# Patient Record
Sex: FEMALE | Race: WHITE
[De-identification: names, ages, dates, MRNs, and addresses within clinical notes are randomized per-mention and may not be internally consistent; named-entity substitution may affect disease eponyms.]

---

## 2017-01-07 ENCOUNTER — HOSPITAL ENCOUNTER (INPATIENT)
Dept: HOSPITAL 47 - EC | Age: 82
LOS: 5 days | Discharge: HOME | DRG: 291 | End: 2017-01-12
Payer: MEDICARE

## 2017-01-07 VITALS — BODY MASS INDEX: 24.9 KG/M2

## 2017-01-07 DIAGNOSIS — I48.1: ICD-10-CM

## 2017-01-07 DIAGNOSIS — J96.00: ICD-10-CM

## 2017-01-07 DIAGNOSIS — I42.9: ICD-10-CM

## 2017-01-07 DIAGNOSIS — E78.5: ICD-10-CM

## 2017-01-07 DIAGNOSIS — K21.9: ICD-10-CM

## 2017-01-07 DIAGNOSIS — I50.23: ICD-10-CM

## 2017-01-07 DIAGNOSIS — Z87.891: ICD-10-CM

## 2017-01-07 DIAGNOSIS — I48.0: ICD-10-CM

## 2017-01-07 DIAGNOSIS — H91.90: ICD-10-CM

## 2017-01-07 DIAGNOSIS — I27.2: ICD-10-CM

## 2017-01-07 DIAGNOSIS — N18.3: ICD-10-CM

## 2017-01-07 DIAGNOSIS — I25.10: ICD-10-CM

## 2017-01-07 DIAGNOSIS — J98.01: ICD-10-CM

## 2017-01-07 DIAGNOSIS — M19.91: ICD-10-CM

## 2017-01-07 DIAGNOSIS — Z79.899: ICD-10-CM

## 2017-01-07 DIAGNOSIS — I36.1: ICD-10-CM

## 2017-01-07 DIAGNOSIS — I13.0: Primary | ICD-10-CM

## 2017-01-07 DIAGNOSIS — J44.9: ICD-10-CM

## 2017-01-07 DIAGNOSIS — F41.1: ICD-10-CM

## 2017-01-07 LAB
ALP SERPL-CCNC: 73 U/L (ref 38–126)
ALT SERPL-CCNC: 50 U/L (ref 9–52)
ANION GAP SERPL CALC-SCNC: 11 MMOL/L
APTT BLD: 22.4 SEC (ref 22–30)
AST SERPL-CCNC: 37 U/L (ref 14–36)
BASOPHILS # BLD AUTO: 0.1 K/UL (ref 0–0.2)
BASOPHILS NFR BLD AUTO: 1 %
BUN SERPL-SCNC: 17 MG/DL (ref 7–17)
CALCIUM SPEC-MCNC: 9.6 MG/DL (ref 8.4–10.2)
CH: 30.8
CHCM: 31.8
CHLORIDE SERPL-SCNC: 103 MMOL/L (ref 98–107)
CK SERPL-CCNC: 115 U/L (ref 30–135)
CK SERPL-CCNC: 91 U/L (ref 30–135)
CO2 SERPL-SCNC: 27 MMOL/L (ref 22–30)
EOSINOPHIL # BLD AUTO: 0.1 K/UL (ref 0–0.7)
EOSINOPHIL NFR BLD AUTO: 2 %
ERYTHROCYTE [DISTWIDTH] IN BLOOD BY AUTOMATED COUNT: 4.66 M/UL (ref 3.8–5.4)
ERYTHROCYTE [DISTWIDTH] IN BLOOD: 12.8 % (ref 11.5–15.5)
GLUCOSE SERPL-MCNC: 109 MG/DL (ref 74–99)
HCT VFR BLD AUTO: 45.4 % (ref 34–46)
HDW: 2.34
HGB BLD-MCNC: 14.3 GM/DL (ref 11.4–16)
INR PPP: 1.2 (ref ?–1.1)
LUC NFR BLD AUTO: 1 %
LYMPHOCYTES # SPEC AUTO: 0.9 K/UL (ref 1–4.8)
LYMPHOCYTES NFR SPEC AUTO: 12 %
MAGNESIUM SPEC-SCNC: 1.8 MG/DL (ref 1.6–2.3)
MCH RBC QN AUTO: 30.6 PG (ref 25–35)
MCHC RBC AUTO-ENTMCNC: 31.4 G/DL (ref 31–37)
MCV RBC AUTO: 97.3 FL (ref 80–100)
MONOCYTES # BLD AUTO: 0.3 K/UL (ref 0–1)
MONOCYTES NFR BLD AUTO: 4 %
NEUTROPHILS # BLD AUTO: 5.9 K/UL (ref 1.3–7.7)
NEUTROPHILS NFR BLD AUTO: 80 %
NON-AFRICAN AMERICAN GFR(MDRD): 42
POTASSIUM SERPL-SCNC: 4.4 MMOL/L (ref 3.5–5.1)
PROT SERPL-MCNC: 6.8 G/DL (ref 6.3–8.2)
PT BLD: 12.3 SEC (ref 9–12)
SODIUM SERPL-SCNC: 141 MMOL/L (ref 137–145)
TROPONIN I SERPL-MCNC: <0.012 NG/ML (ref 0–0.03)
WBC # BLD AUTO: 0.11 10*3/UL
WBC # BLD AUTO: 7.4 K/UL (ref 3.8–10.6)
WBC (PEROX): 7.47

## 2017-01-07 PROCEDURE — 71275 CT ANGIOGRAPHY CHEST: CPT

## 2017-01-07 PROCEDURE — 85379 FIBRIN DEGRADATION QUANT: CPT

## 2017-01-07 PROCEDURE — 84484 ASSAY OF TROPONIN QUANT: CPT

## 2017-01-07 PROCEDURE — 93970 EXTREMITY STUDY: CPT

## 2017-01-07 PROCEDURE — 99291 CRITICAL CARE FIRST HOUR: CPT

## 2017-01-07 PROCEDURE — 93571 IV DOP VEL&/PRESS C FLO 1ST: CPT

## 2017-01-07 PROCEDURE — 81003 URINALYSIS AUTO W/O SCOPE: CPT

## 2017-01-07 PROCEDURE — 82553 CREATINE MB FRACTION: CPT

## 2017-01-07 PROCEDURE — 80048 BASIC METABOLIC PNL TOTAL CA: CPT

## 2017-01-07 PROCEDURE — 85025 COMPLETE CBC W/AUTO DIFF WBC: CPT

## 2017-01-07 PROCEDURE — 85610 PROTHROMBIN TIME: CPT

## 2017-01-07 PROCEDURE — 96376 TX/PRO/DX INJ SAME DRUG ADON: CPT

## 2017-01-07 PROCEDURE — 36415 COLL VENOUS BLD VENIPUNCTURE: CPT

## 2017-01-07 PROCEDURE — 93454 CORONARY ARTERY ANGIO S&I: CPT

## 2017-01-07 PROCEDURE — 83735 ASSAY OF MAGNESIUM: CPT

## 2017-01-07 PROCEDURE — 94640 AIRWAY INHALATION TREATMENT: CPT

## 2017-01-07 PROCEDURE — 96365 THER/PROPH/DIAG IV INF INIT: CPT

## 2017-01-07 PROCEDURE — 85730 THROMBOPLASTIN TIME PARTIAL: CPT

## 2017-01-07 PROCEDURE — 93306 TTE W/DOPPLER COMPLETE: CPT

## 2017-01-07 PROCEDURE — 94760 N-INVAS EAR/PLS OXIMETRY 1: CPT

## 2017-01-07 PROCEDURE — 80053 COMPREHEN METABOLIC PANEL: CPT

## 2017-01-07 PROCEDURE — 83880 ASSAY OF NATRIURETIC PEPTIDE: CPT

## 2017-01-07 PROCEDURE — 71010: CPT

## 2017-01-07 PROCEDURE — 84443 ASSAY THYROID STIM HORMONE: CPT

## 2017-01-07 PROCEDURE — 93965: CPT

## 2017-01-07 PROCEDURE — 82550 ASSAY OF CK (CPK): CPT

## 2017-01-07 PROCEDURE — 93005 ELECTROCARDIOGRAM TRACING: CPT

## 2017-01-07 RX ADMIN — SODIUM CHLORIDE SCH MLS/HR: 450 INJECTION, SOLUTION INTRAVENOUS at 16:34

## 2017-01-07 RX ADMIN — METOPROLOL TARTRATE SCH MG: 25 TABLET, FILM COATED ORAL at 21:00

## 2017-01-07 RX ADMIN — FUROSEMIDE SCH MG: 10 INJECTION, SOLUTION INTRAMUSCULAR; INTRAVENOUS at 20:58

## 2017-01-07 NOTE — XR
EXAMINATION TYPE: XR chest 1V portable

 

DATE OF EXAM: 1/7/2017 11:57 AM

 

COMPARISON: Chest x-ray and CT chest February 2, 2015.

 

HISTORY: Ankle swelling per patient. Dysrhythmia per order

 

TECHNIQUE: Single AP portable frontal upright view of the chest is obtained.

 

FINDINGS:  Underlying emphysematous change with left basilar linear scarring is redemonstrated.  Ther
e is no suspicious new focal airspace opacity, pleural effusion, or pneumothorax seen bilaterally The
 cardiac silhouette size is enlarged with atherosclerotic aorta.   The osseous structures are somewha
t demineralized.

 

IMPRESSION:  Chronic emphysematous change and cardiomegaly without acute pulmonary process. No signif
icant change from prior studies.

## 2017-01-07 NOTE — ED
General Adult HPI





- General


Chief complaint: Extremity Injury, Lower


Stated complaint: LEFT FOOT SWELLING


Time Seen by Provider: 17 11:25


Source: patient, RN notes reviewed, old records reviewed


Mode of arrival: ambulatory


Limitations: no limitations





- History of Present Illness


Initial comments: 





This is a 82-year-old female who presented with complaints of swollen ankles 

and feet for the past week she denies any fevers chills nausea vomiting sweats 

he does have some shortness of breath.  She is of a history of hypertension 

history of A. fib in the past no history of DVTs.  She does complain some calf 

pain.  No leg or ankle pain otherwise.  No cough or phlegm production.  She 

does get some exertional dyspnea.





- Related Data


 Home Medications











 Medication  Instructions  Recorded  Confirmed


 


Acetaminophen Tab [Tylenol Tab] 325 - 650 mg PO Q4H PRN 17


 


Albuterol Inhaler [Ventolin Hfa 1 - 2 puff INHALATION Q6HR PRN 17





Inhaler]   


 


Cholecalciferol [Vitamin D3] 2,000 unit PO DAILY 17


 


Fluticasone/Salmeterol [Advair 1 puff INHALATION RT-BID 17





250-50 Diskus]   


 


LORazepam [Ativan] 1 mg PO HS 17


 


Metoprolol Tartrate [Lopressor] 25 mg PO BID 17


 


Tiotropium 18 Mcg/Puff [Spiriva] 1 cap INHALATION RT-DAILY 17











 Allergies











Allergy/AdvReac Type Severity Reaction Status Date / Time


 


clindamycin HCl Allergy  Swelling Verified 17 12:51





[From Cleocin]     


 


cefuroxime axetil AdvReac  Unknown Verified 17 12:51





[From Ceftin]     


 


levofloxacin [From Levaquin] AdvReac  Sweating Verified 17 12:51


 


prednisone AdvReac  Sweating Verified 17 12:51














Review of Systems


ROS Statement: 


Those systems with pertinent positive or pertinent negative responses have been 

documented in the HPI.





ROS Other: All systems not noted in ROS Statement are negative.





Past Medical History


Past Medical History: Atrial Fibrillation, COPD, GERD/Reflux, Osteoarthritis (OA

)


Additional Past Medical History / Comment(s): Generalized anxiety disorder, 

chronic kidney disease stage III. hearing impaired


History of Any Multi-Drug Resistant Organisms: None Reported


Past Surgical History: Adenoidectomy, Appendectomy, Hysterectomy, Orthopedic 

Surgery, Tonsillectomy


Additional Past Surgical History / Comment(s): Right knee arthroscopy for torn 

meniscus. Colonoscopies x 3 total.  Bilateral eyelid surgery.


Past Anesthesia/Blood Transfusion Reactions: No Reported Reaction


Additional Past Anesthesia/Blood Transfusion Reaction / Comment(s): Pt has 

never recieved blood.


Past Psychological History: Anxiety


Additional Psychological History / Comment(s): Pt is .  She lives alone 

in her single level home in [facility]. She is very independent. She 

rides bicycle and is an avid bowler.  She drives a car.  She has a niece that 

lives nearby if she needs someone.  She shovels her own driveway.


Smoking Status: Former smoker


Past Alcohol Use History: Occasional


Additional Past Alcohol Use History / Comment(s): Patient was a smoker one pack 

per day 54 years.  She quit 10 years ago.  She drinks alcohol daily 1-2 drinks 

if she is at home.  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 

any recent travel.  She has no pets in the home.


Past Drug Use History: None Reported





- Past Family History


  ** Father


Family Medical History: Cancer


Additional Family Medical History / Comment(s): Father  at age 77yrs of 

cancer which pt believes may have started in his throat.





  ** Mother


Family Medical History: Dementia


Additional Family Medical History / Comment(s): Mother  at age 95yrs.  She 

was very healthy most of her life- she had dementia at the very end of her life.





General Exam





- General Exam Comments


Initial Comments: 





This is a well-developed well-nourished awake alert oriented 3 female


Limitations: no limitations


General appearance: alert, in no apparent distress


Head exam: Present: atraumatic, normocephalic, normal inspection


Eye exam: Present: normal appearance, PERRL, EOMI.  Absent: scleral icterus, 

conjunctival injection, periorbital swelling


ENT exam: Present: normal exam, mucous membranes moist


Neck exam: Present: normal inspection.  Absent: tenderness, meningismus, 

lymphadenopathy


Respiratory exam: Present: decreased breath sounds.  Absent: respiratory 

distress, wheezes, rales, rhonchi, stridor


Cardiovascular Exam: Present: tachycardia, irregular rhythm, normal heart 

sounds.  Absent: systolic murmur, diastolic murmur, rubs, gallop, clicks


GI/Abdominal exam: Present: soft, normal bowel sounds.  Absent: distended, 

tenderness, guarding, rebound, rigid


Extremities exam: Present: full ROM, normal capillary refill, pedal edema, calf 

tenderness.  Absent: tenderness, joint swelling


Back exam: Present: normal inspection


Neurological exam: Present: alert, oriented X3, CN II-XII intact


Psychiatric exam: Present: normal affect, normal mood


Skin exam: Present: warm, dry, intact, normal color.  Absent: rash





Course


 Vital Signs











  17





  11:17 11:38 11:42


 


Temperature 97.4 F L  


 


Pulse Rate 137 H  144 H


 


Pulse Rate [  146 H 





Cardiac Monitor   





]   


 


Respiratory 20 20 22





Rate   


 


Blood Pressure 131/95  142/94


 


O2 Sat by Pulse 96  98





Oximetry   














  17





  12:53 13:10 14:30


 


Temperature  97.6 F 


 


Pulse Rate 100 117 H 110 H


 


Pulse Rate [   





Cardiac Monitor   





]   


 


Respiratory  18 18





Rate   


 


Blood Pressure  119/79 151/84


 


O2 Sat by Pulse  97 100





Oximetry   














- Reevaluation(s)


Reevaluation #1: 





17 16:02


Reevaluation patient after the initial evaluation showed no chest pain.  He 

still having symptoms however


Reevaluation #2: 





17 16:02


She had markedly elevated d-dimer ultrasound was done which is negative for DVT 

CAT scan chest showed no evidence of pulmonary embolism





Medical Decision Making





- Medical Decision Making





I did discuss findings with patient and with the admitting physician patient 

will be admitted for evaluation of rapid atrial fibrillation.





- Lab Data


Result diagrams: 


 17 11:35





 17 11:35


 Lab Results











  17 Range/Units





  11:35 11:35 11:35 


 


WBC     (3.8-10.6)  k/uL


 


RBC     (3.80-5.40)  m/uL


 


Hgb     (11.4-16.0)  gm/dL


 


Hct     (34.0-46.0)  %


 


MCV     (80.0-100.0)  fL


 


MCH     (25.0-35.0)  pg


 


MCHC     (31.0-37.0)  g/dL


 


RDW     (11.5-15.5)  %


 


Plt Count     (150-450)  k/uL


 


Neutrophils %     %


 


Lymphocytes %     %


 


Monocytes %     %


 


Eosinophils %     %


 


Basophils %     %


 


Neutrophils #     (1.3-7.7)  k/uL


 


Lymphocytes #     (1.0-4.8)  k/uL


 


Monocytes #     (0-1.0)  k/uL


 


Eosinophils #     (0-0.7)  k/uL


 


Basophils #     (0-0.2)  k/uL


 


PT  12.3 H    (9.0-12.0)  sec


 


INR  1.2    (<1.1)  


 


APTT  22.4    (22.0-30.0)  sec


 


D-Dimer     (<0.60)  mg/L FEU


 


Sodium     (137-145)  mmol/L


 


Potassium     (3.5-5.1)  mmol/L


 


Chloride     ()  mmol/L


 


Carbon Dioxide     (22-30)  mmol/L


 


Anion Gap     mmol/L


 


BUN     (7-17)  mg/dL


 


Creatinine     (0.52-1.04)  mg/dL


 


Est GFR (MDRD) Af Amer     (>60 ml/min/1.73 sqM)  


 


Est GFR (MDRD) Non-Af     (>60 ml/min/1.73 sqM)  


 


Glucose     (74-99)  mg/dL


 


Calcium     (8.4-10.2)  mg/dL


 


Magnesium     (1.6-2.3)  mg/dL


 


Total Bilirubin     (0.2-1.3)  mg/dL


 


AST     (14-36)  U/L


 


ALT     (9-52)  U/L


 


Alkaline Phosphatase     ()  U/L


 


Total Creatine Kinase    115  ()  U/L


 


CK-MB (CK-2)    2.8 H*  (0.0-2.4)  ng/mL


 


CK-MB (CK-2) Rel Index    2.4  


 


Troponin I    <0.012  (0.000-0.034)  ng/mL


 


NT-Pro-B Natriuret Pep   1390   pg/mL


 


Total Protein     (6.3-8.2)  g/dL


 


Albumin     (3.5-5.0)  g/dL


 


TSH     (0.465-4.680)  mIU/L














  17 Range/Units





  11:35 11:35 11:35 


 


WBC  7.4    (3.8-10.6)  k/uL


 


RBC  4.66    (3.80-5.40)  m/uL


 


Hgb  14.3    (11.4-16.0)  gm/dL


 


Hct  45.4    (34.0-46.0)  %


 


MCV  97.3    (80.0-100.0)  fL


 


MCH  30.6    (25.0-35.0)  pg


 


MCHC  31.4    (31.0-37.0)  g/dL


 


RDW  12.8    (11.5-15.5)  %


 


Plt Count  170    (150-450)  k/uL


 


Neutrophils %  80    %


 


Lymphocytes %  12    %


 


Monocytes %  4    %


 


Eosinophils %  2    %


 


Basophils %  1    %


 


Neutrophils #  5.9    (1.3-7.7)  k/uL


 


Lymphocytes #  0.9 L    (1.0-4.8)  k/uL


 


Monocytes #  0.3    (0-1.0)  k/uL


 


Eosinophils #  0.1    (0-0.7)  k/uL


 


Basophils #  0.1    (0-0.2)  k/uL


 


PT     (9.0-12.0)  sec


 


INR     (<1.1)  


 


APTT     (22.0-30.0)  sec


 


D-Dimer    4.37 H  (<0.60)  mg/L FEU


 


Sodium   141   (137-145)  mmol/L


 


Potassium   4.4   (3.5-5.1)  mmol/L


 


Chloride   103   ()  mmol/L


 


Carbon Dioxide   27   (22-30)  mmol/L


 


Anion Gap   11   mmol/L


 


BUN   17   (7-17)  mg/dL


 


Creatinine   1.22 H   (0.52-1.04)  mg/dL


 


Est GFR (MDRD) Af Amer   51   (>60 ml/min/1.73 sqM)  


 


Est GFR (MDRD) Non-Af   42   (>60 ml/min/1.73 sqM)  


 


Glucose   109 H   (74-99)  mg/dL


 


Calcium   9.6   (8.4-10.2)  mg/dL


 


Magnesium   1.8   (1.6-2.3)  mg/dL


 


Total Bilirubin   0.8   (0.2-1.3)  mg/dL


 


AST   37 H   (14-36)  U/L


 


ALT   50   (9-52)  U/L


 


Alkaline Phosphatase   73   ()  U/L


 


Total Creatine Kinase     ()  U/L


 


CK-MB (CK-2)     (0.0-2.4)  ng/mL


 


CK-MB (CK-2) Rel Index     


 


Troponin I     (0.000-0.034)  ng/mL


 


NT-Pro-B Natriuret Pep     pg/mL


 


Total Protein   6.8   (6.3-8.2)  g/dL


 


Albumin   4.3   (3.5-5.0)  g/dL


 


TSH   2.710   (0.465-4.680)  mIU/L














- EKG Data


-: EKG Interpreted by Me (Rapid atrial fibrillation with a rate of 145 QRS of 

86 QT/QTC of 3 or 47)





- Radiology Data


Radiology results: report reviewed, image reviewed





Critical Care Time


Critical Care Time: Yes


Critical Care Time: 





32 minutes of critical care time which includes initial assessment with history 

physical lab and x-ray evaluation reevaluation of response to therapy.  

Reevaluation the patient again.  Discussion with the admitting physician.  

Evaluation of the lab and x-rays were done.  Evaluation CAT scan ultrasound.  

Admission orders and documentation of the above.





Disposition


Clinical Impression: 


 Rapid atrial fibrillation, Bronchospasm, acute





Disposition: ADMITTED AS IP TO THIS HOSP


Condition: Stable

## 2017-01-07 NOTE — US
EXAMINATION TYPE: US venous doppler duplex LE 

 

DATE OF EXAM: 1/7/2017 11:42 AM

 

COMPARISON: US 2/2/2015 right lower extremity

 

CLINICAL HISTORY: US. Patient presents with swollen ankles and feet for the past week as well as calf
 tenderness. She has a history of hypertension and Afib.

 

SIDE PERFORMED: Bilateral  

 

VESSELS IMAGED:

External Iliac Vein (EIV)

Common Femoral Vein

Deep Femoral Vein

Greater Saphenous Vein *

Femoral Vein

Popliteal Vein

Proximal Calf Veins

(* superficial vessels)

 

Right Leg:  Negative for DVT

 

Left Leg:  Negative for DVT

 

TECHNOLOGIST IMPRESSION:  Negative as seen for DVT bilaterally.

 

Satisfactory compressibility, phasicity, and blood flow is seen at the following levels above.

 

IMPRESSION:  No ultrasound evidence for acute DVT in either lower extremity.

## 2017-01-07 NOTE — CT
EXAMINATION TYPE: CT angio chest

 

DATE OF EXAM: 1/7/2017 2:18 PM

 

COMPARISON: Prior CTA chest February 2, 2015

 

HISTORY: Prior study on PACS. Patient having swelling to ankles chest pain rule out pulmonary embolis
m.

 

CT DLP: 561.0 mGycm

Automated exposure control for dose reduction was used.

 

CONTRAST: 

CTA scan of the thorax is performed with IV Contrast, patient injected with 80, wasted 55 mL of Visip
aque 320, pulmonary embolism protocol.  MIP images are created and reviewed.  3D reconstructed images
 are created on an independent workstation and reviewed.

 

FINDINGS:

 

LUNGS: Background of mild to moderate emphysematous change is present bilaterally. There is some scat
tered linear scarring and/or atelectasis in both lung bases. There is no concerning noncalcified pare
nchymal nodule or mass identified. There is redemonstration of 1 cm central calcified nodule in the r
ight midlung anteriorly on axial image 66 presumed benign. 

 

MEDIASTINUM: There is satisfactory enhancement of the pulmonary artery and its branches, there is no 
CT evidence for pulmonary embolism.  There are no greater than 1 cm hilar or mediastinal lymph nodes.
 There are prominent but subcentimeter prevascular, AP window, pericarinal, bilateral hilar, and subc
arinal lymph nodes.  No pericardial effusion is seen. There is moderate right greater than left atria
l dilatation redemonstrated. There is no significant cardiomegaly.

 

 

OTHER:  Small hiatal hernia is present. There is 2 mm nonobstructing calculus posteriorly mid pole le
merritt right kidney on axial image 189 slight underlying scoliotic curvature is present. Multilevel spur
ring in the spine is present. There is persistent slight low-attenuation and lobulation of liver.

 

IMPRESSION: 

 

1. NO CT EVIDENCE FOR PULMONARY EMBOLISM.

2. MILD TO MODERATE EMPHYSEMATOUS CHANGE WITH BASILAR ATELECTATIC CHANGE AND/OR SCARRING. NO SUSPICIO
US ACUTE PULMONARY PROCESS IS SEEN.

## 2017-01-08 LAB
ANION GAP SERPL CALC-SCNC: 11 MMOL/L
BASOPHILS # BLD AUTO: 0.1 K/UL (ref 0–0.2)
BASOPHILS NFR BLD AUTO: 1 %
BUN SERPL-SCNC: 14 MG/DL (ref 7–17)
CALCIUM SPEC-MCNC: 9 MG/DL (ref 8.4–10.2)
CH: 31.6
CHCM: 32.2
CHLORIDE SERPL-SCNC: 99 MMOL/L (ref 98–107)
CK SERPL-CCNC: 85 U/L (ref 30–135)
CO2 SERPL-SCNC: 27 MMOL/L (ref 22–30)
EOSINOPHIL # BLD AUTO: 0.1 K/UL (ref 0–0.7)
EOSINOPHIL NFR BLD AUTO: 1 %
ERYTHROCYTE [DISTWIDTH] IN BLOOD BY AUTOMATED COUNT: 4.57 M/UL (ref 3.8–5.4)
ERYTHROCYTE [DISTWIDTH] IN BLOOD: 12.8 % (ref 11.5–15.5)
GLUCOSE SERPL-MCNC: 144 MG/DL (ref 74–99)
HCT VFR BLD AUTO: 45.1 % (ref 34–46)
HDW: 2.32
HGB BLD-MCNC: 13.9 GM/DL (ref 11.4–16)
LUC NFR BLD AUTO: 1 %
LYMPHOCYTES # SPEC AUTO: 0.5 K/UL (ref 1–4.8)
LYMPHOCYTES NFR SPEC AUTO: 5 %
MCH RBC QN AUTO: 30.4 PG (ref 25–35)
MCHC RBC AUTO-ENTMCNC: 30.8 G/DL (ref 31–37)
MCV RBC AUTO: 98.6 FL (ref 80–100)
MONOCYTES # BLD AUTO: 0.3 K/UL (ref 0–1)
MONOCYTES NFR BLD AUTO: 3 %
NEUTROPHILS # BLD AUTO: 9.1 K/UL (ref 1.3–7.7)
NEUTROPHILS NFR BLD AUTO: 90 %
NON-AFRICAN AMERICAN GFR(MDRD): 46
PH UR: 5 [PH] (ref 5–8)
POTASSIUM SERPL-SCNC: 3.9 MMOL/L (ref 3.5–5.1)
SODIUM SERPL-SCNC: 137 MMOL/L (ref 137–145)
SP GR UR: 1.01 (ref 1–1.03)
TROPONIN I SERPL-MCNC: 0.07 NG/ML (ref 0–0.03)
UA BILLING (MACRO VS. MICRO): (no result)
UROBILINOGEN UR QL STRIP: <2 MG/DL (ref ?–2)
WBC # BLD AUTO: 0.06 10*3/UL
WBC # BLD AUTO: 10.1 K/UL (ref 3.8–10.6)
WBC (PEROX): 10.93

## 2017-01-08 RX ADMIN — BUDESONIDE AND FORMOTEROL FUMARATE DIHYDRATE SCH PUFF: 80; 4.5 AEROSOL RESPIRATORY (INHALATION) at 09:16

## 2017-01-08 RX ADMIN — IPRATROPIUM BROMIDE AND ALBUTEROL SULFATE SCH: .5; 3 SOLUTION RESPIRATORY (INHALATION) at 19:43

## 2017-01-08 RX ADMIN — METOPROLOL TARTRATE SCH MG: 25 TABLET, FILM COATED ORAL at 22:37

## 2017-01-08 RX ADMIN — FUROSEMIDE SCH MG: 10 INJECTION, SOLUTION INTRAMUSCULAR; INTRAVENOUS at 08:34

## 2017-01-08 RX ADMIN — IPRATROPIUM BROMIDE AND ALBUTEROL SULFATE SCH ML: .5; 3 SOLUTION RESPIRATORY (INHALATION) at 11:46

## 2017-01-08 RX ADMIN — SODIUM CHLORIDE SCH MLS/HR: 450 INJECTION, SOLUTION INTRAVENOUS at 17:33

## 2017-01-08 RX ADMIN — BUDESONIDE AND FORMOTEROL FUMARATE DIHYDRATE SCH: 80; 4.5 AEROSOL RESPIRATORY (INHALATION) at 19:43

## 2017-01-08 RX ADMIN — Medication SCH UNIT: at 12:28

## 2017-01-08 RX ADMIN — METOPROLOL TARTRATE SCH MG: 25 TABLET, FILM COATED ORAL at 08:34

## 2017-01-08 RX ADMIN — Medication SCH MG: at 22:37

## 2017-01-08 RX ADMIN — FUROSEMIDE SCH MG: 10 INJECTION, SOLUTION INTRAMUSCULAR; INTRAVENOUS at 22:37

## 2017-01-08 RX ADMIN — IPRATROPIUM BROMIDE AND ALBUTEROL SULFATE SCH ML: .5; 3 SOLUTION RESPIRATORY (INHALATION) at 09:16

## 2017-01-08 RX ADMIN — IPRATROPIUM BROMIDE AND ALBUTEROL SULFATE SCH ML: .5; 3 SOLUTION RESPIRATORY (INHALATION) at 15:44

## 2017-01-08 RX ADMIN — Medication SCH MG: at 01:42

## 2017-01-08 NOTE — P.CRDCN
History of Present Illness


Consult date: 17


Chief complaint: CHF


History of present illness: 





This is a pleasant 82-year-old  female patient who never seen a 

cardiologist in the past with a past medical history significant for COPD, 

paroxysmal A. fib, hypertension, and dyslipidemia, presented to the hospital 

complaining of progressive dyspnea.


She has not been feeding well over the last 4 weeks.  She describes exertional 

dyspnea associated with bilateral lower extremities edema.  Over the last 

several days she has been feeding her heart was racing up.  The patient denies 

having any chest pain or discomfort and denies feeling any dizziness or 

lightheadedness.


She was diagnosed with congestive heart failure exacerbation and she was 

started on Lasix IV.  Also she was noted to be in A. fib with RVR and she was 

started on Cardizem and drip.


The EKG showed A. fib with RVR with diffuse ST and T wave abnormalities likely 

represent underlying CAD.  The chest x-ray showed chronic emphysematous changes 

without any acute finding.  The BNP came in to elevated.


The patient never had any echocardiogram over the last year.





We'll continue the Lasix IV.  Continue the Cardizem drip to control the heart 

rate.  Unfortunately I cannot increase the dose of metoprolol by mouth because 

of the marginally low blood pressure.  We will obtain an echocardiogram with 

Doppler to assess the LV function.  The patient need to be on anticoagulation.  

Currently she is on heparin IV.  We will consider starting her on one of the 

new anticoagulation agent was sitting echocardiogram performed. 





Past Medical History


Past Medical History: Atrial Fibrillation, COPD, Osteoarthritis (OA)


Additional Past Medical History / Comment(s): Generalized anxiety disorder, 

chronic kidney disease stage III. hearing impaired


History of Any Multi-Drug Resistant Organisms: None Reported


Past Surgical History: Adenoidectomy, Appendectomy, Hysterectomy, Orthopedic 

Surgery, Tonsillectomy


Additional Past Surgical History / Comment(s): Right knee arthroscopy for torn 

meniscus. Colonoscopies x 3 total.  Bilateral eyelid surgery.


Past Anesthesia/Blood Transfusion Reactions: No Reported Reaction


Additional Past Anesthesia/Blood Transfusion Reaction / Comment(s): Pt has 

never recieved blood.


Past Psychological History: Anxiety


Additional Psychological History / Comment(s): Pt is .  She lives alone 

in her single level home in [facility]. She is very independent. She 

rides bicycle and is an avid bowler.  She drives a car.  She has a niece that 

lives nearby if she needs someone.  She shovels her own driveway.


Smoking Status: Former smoker


Past Alcohol Use History: Occasional


Additional Past Alcohol Use History / Comment(s): Patient was a smoker one pack 

per day 54 years.  She quit 12 years ago.  She drinks alcohol daily 1-2 drinks 

if she is at home.  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 

any recent travel.  She has no pets in the home.


Past Drug Use History: None Reported





- Past Family History


  ** Father


Family Medical History: Cancer


Additional Family Medical History / Comment(s): Father  at age 77yrs of 

cancer which pt believes may have started in his throat.





  ** Mother


Family Medical History: Dementia


Additional Family Medical History / Comment(s): Mother  at age 95yrs.  She 

was very healthy most of her life- she had dementia at the very end of her life.





Medications and Allergies


 Home Medications











 Medication  Instructions  Recorded  Confirmed  Type


 


Acetaminophen Tab [Tylenol Tab] 325 - 650 mg PO Q4H PRN 17 

History


 


Albuterol Inhaler [Ventolin Hfa 1 - 2 puff INHALATION Q6HR PRN 17 History





Inhaler]    


 


Cholecalciferol [Vitamin D3] 2,000 unit PO DAILY 17 History


 


Fluticasone/Salmeterol [Advair 1 puff INHALATION RT-BID 17 

History





250-50 Diskus]    


 


LORazepam [Ativan] 1 mg PO HS 17 History


 


Metoprolol Tartrate [Lopressor] 25 mg PO BID 17 History


 


Tiotropium 18 Mcg/Puff [Spiriva] 1 cap INHALATION RT-DAILY 17 

History











 Allergies











Allergy/AdvReac Type Severity Reaction Status Date / Time


 


clindamycin HCl Allergy  Swelling Verified 17 12:51





[From Cleocin]     


 


cefuroxime axetil AdvReac  Unknown Verified 17 12:51





[From Ceftin]     


 


levofloxacin [From Levaquin] AdvReac  Sweating Verified 17 12:51


 


prednisone AdvReac  Sweating Verified 17 12:51














Physical Exam


Vitals: 


 Vital Signs











  Temp Pulse Pulse Resp BP BP Pulse Ox


 


 17 11:12    112 H  16   98/64  97


 


 17 09:25   104 H     


 


 17 09:16   100     


 


 17 08:00  97.6 F   116 H  16   109/63  97


 


 17 04:00    112 H  18   120/73  98


 


 17 00:00    122 H  20   136/82  97


 


 17 20:48   110 H     


 


 17 20:39   110 H      98


 


 17 20:00    132 H  20   135/90  96


 


 17 17:10    135 H  18   160/98  98


 


 17 16:36  97.1 F L  115 H   18  107/84   100








 Intake and Output











 17





 22:59 06:59 14:59


 


Intake Total 200 719.302 


 


Output Total  1900 900


 


Balance 200 -1180.698 -900


 


Intake:   


 


  IV  60 


 


    Heparin Sodium,Porcine/  60 





    D5w Pmx 25,000 unit In   





    Dextrose/Water 1 500ml.   





    bag @ 12 UNITS/KG/HR 17.   





    41 mls/hr IV .Q24H [facility] Rx   





    #:[id number]   


 


  Intake, IV Titration  179.302 





  Amount   


 


    Diltiazem 125 mg In  20 





    Sodium Chloride 0.9% 100   





    ml @ 5 MG/HR 5 mls/hr IV   





    .Q24H STA Rx#:[id number]   


 


    Heparin Sodium,Porcine/  159.302 





    D5w Pmx 25,000 unit In   





    Dextrose/Water 1 500ml.   





    bag @ 12 UNITS/KG/HR 17.   





    41 mls/hr IV .Q24H [facility] Rx   





    #:[id number]   


 


  Oral 200 480 


 


Output:   


 


  Urine  1900 900


 


Other:   


 


  # Voids  4 


 


  Weight 72.2 kg 71.9 kg 














- Constitutional


General appearance: no acute distress





- Respiratory


Respiratory: bilateral: diminished





- Cardiovascular


Rhythm: irregularly irregular


Heart sounds: normal: S1, S2





Results





 17 09:16





 17 09:16


 Cardiac Enzymes











  17 Range/Units





  19:41 23:18 09:16 


 


CK-MB (CK-2)  1.8  1.3   (0.0-2.4)  ng/mL


 


Troponin I   0.074 H*  0.138 H*  (0.000-0.034)  ng/mL








 Coagulation











  17 Range/Units





  23:18 09:16 


 


APTT  37.1 H  53.2 H  (22.0-30.0)  sec








 CBC











  17 Range/Units





  09:16 


 


WBC  10.1  (3.8-10.6)  k/uL


 


RBC  4.57  (3.80-5.40)  m/uL


 


Hgb  13.9  (11.4-16.0)  gm/dL


 


Hct  45.1  (34.0-46.0)  %


 


Plt Count  147 L  (150-450)  k/uL








 Comprehensive Metabolic Panel











  17 Range/Units





  09:16 


 


Sodium  137  (137-145)  mmol/L


 


Potassium  3.9  (3.5-5.1)  mmol/L


 


Chloride  99  ()  mmol/L


 


Carbon Dioxide  27  (22-30)  mmol/L


 


BUN  14  (7-17)  mg/dL


 


Creatinine  1.13 H  (0.52-1.04)  mg/dL


 


Glucose  144 H  (74-99)  mg/dL


 


Calcium  9.0  (8.4-10.2)  mg/dL








 Current Medications











Generic Name Dose Route Start Last Admin





  Trade Name Coleq  PRN Reason Stop Dose Admin


 


Acetaminophen/Hydrocodone Bitart  1 each  17 22:53  





  [place] 5-325  PO   





  Q6HR PRN   





  Pain   


 


Albuterol/Ipratropium  3 ml  17 21:47  





  Duoneb 0.5 Mg-3 Mg/3 Ml Soln  INHALATION   





  RT-QID PRN   





  Shortness Of Breath Or Wheezing   


 


Albuterol/Ipratropium  3 ml  17 08:00  17 09:16





  Duoneb 0.5 Mg-3 Mg/3 Ml Soln  INHALATION   3 ml





  RT-QID GOYO   Administration


 


Alprazolam  0.25 mg  17 22:53  





  Xanax  PO   





  TID PRN   





  Anxiety   


 


Budesonide/Formoterol Fumarate  2 puff  17 08:00  17 09:16





  Symbicort 80-4.5 Mcg Inhaler  INHALATION   2 puff





  RT-BID GOYO   Administration


 


Cholecalciferol  2,000 unit  17 12:00  





  Vitamin D3  PO   





  DAILY@1200 GOYO   


 


Furosemide  40 mg  17 21:00  17 08:34





  Lasix  IV   40 mg





  Q12HR GOYO   Administration


 


Heparin Sodium/Dextrose 25,000  500 mls @ 17.41 mls/hr  17 16:15   01:43





  unit/ IV Solution  IV   15 units/kg/hr





  .Q24H GOYO   21.77 mls/hr





  Protocol   Titration





  12 UNITS/KG/HR   


 


Lorazepam  1 mg  17 21:00  17 20:58





  Ativan  PO   1 mg





  HS GOYO   Administration


 


Melatonin  3 mg  17 23:00  17 01:42





  Melatonin  PO   3 mg





  HS GOYO   Administration


 


Metoprolol Tartrate  25 mg  17 21:00  17 08:34





  Lopressor  PO   25 mg





  BID GOYO   Administration


 


Miscellaneous Information  1 each  17 13:32  17 13:52





  Rx Info: Iv Contrast Was Given  MISCELLANE  17 13:32  1 each





  DAILY PRN   Administration





  Per Protocol   


 


Naloxone HCl  0.2 mg  17 16:07  





  Narcan  IV   





  Q2M PRN   





  Opioid Reversal   








 Intake and Output











 17





 22:59 06:59 14:59


 


Intake Total 200 719.302 


 


Output Total  1900 900


 


Balance 200 -1180.698 -900


 


Intake:   


 


  IV  60 


 


    Heparin Sodium,Porcine/  60 





    D5w Pmx 25,000 unit In   





    Dextrose/Water 1 500ml.   





    bag @ 12 UNITS/KG/HR 17.   





    41 mls/hr IV .Q24H GOY[facility] Rx   





    #:[id number]   


 


  Intake, IV Titration  179.302 





  Amount   


 


    Diltiazem 125 mg In  20 





    Sodium Chloride 0.9% 100   





    ml @ 5 MG/HR 5 mls/hr IV   





    .Q24H STA Rx#:[id number]   


 


    Heparin Sodium,Porcine/  159.302 





    D5w Pmx 25,000 unit In   





    Dextrose/Water 1 500ml.   





    bag @ 12 UNITS/KG/HR 17.   





    41 mls/hr IV .Q24H GOYO Rx   





    #:[id number]   


 


  Oral 200 480 


 


Output:   


 


  Urine  1900 900


 


Other:   


 


  # Voids  4 


 


  Weight 72.2 kg 71.9 kg 








 





 17 09:16 





 17 09:16 











Assessment and Plan


Plan: 





Assessment


#1 acute respiratory failure


#2 congestive heart failure exacerbation and known if this is secondary to 

systolic or diastole dysfunction at this point


#3 atrial fibrillation with rapid ventricular response


#4 COPD


#5 multiple comorbidities





Plan


#1 continue the Lasix IV


#2 monitor the kidney function and electrolytes


#3 increase the dose of metoprolol once a pressure is better


#4 consider starting the patient on oral anticoagulation was echocardiogram 

performed 


#5 obtaining an echocardiogram was Doppler


#6 CV underlying CAD has to be ruled out down the line.

## 2017-01-08 NOTE — HP
DATE OF ADMISSION:   



The chief complaint is left foot swelling. 



HISTORY OF PRESENT ILLNESS: This is an 82-year-old woman with a past 

medical history of atrial fibrillation, history of COPD, history of 

DJD, history of generalized anxiety, history of chronic renal disease, 

history of anxiety, lives alone in a single level at home . The 

patient was noted to have ankle swelling and some discomfort and 

patient was taken to [facility], admitted for further 

evaluation and treatment. The patient was found to have atrial 

fibrillation with fast ventricular rate and patient was admitted for 

further evaluation and treatment. Patient also had some bronchospasm 

also.  There is no history of fever, chills or rigors.  No history of 

headache, loss of consciousness or seizures. The patient followed with 

Dr. Anaya in the outpatient setting. 



PAST MEDICAL HISTORY: History of atrial fibrillation, COPD, DJD, 

history of generalized anxiety disorder, adenoidectomy, appendectomy, 

history of anxiety.  



Medications prior to admission include home medications prior to 

admission include:  

1. Spiriva 1 puff daily. 

2. Lopressor 25 mg daily. 

3. Ativan 1 mg p.o. q.h.s.  

4. Advair 250/50 one puff b.i.d. 

5. Vitamin D3 two thousand daily. 

6. Ventolin HFA 1 to 2 puffs q.6 p.r.n.

7. Tylenol 325 to 650 mg q.4 p.r.n. 



Allergies are CLINDAMYCIN, CIPROFLOXACIN, LEVAQUIN and PREDNISONE. 



FAMILY HISTORY:  History of cancer in the family, throat cancer in the 

father.  



SOCIAL HISTORY:  Previous history of smoking, no history of current 

smoking or alcohol intake.  



REVIEW OF SYSTEMS:

ENT:  No diminished vision or diminished hearing.

CARDIOVASCULAR: As mentioned earlier. 

RESPIRATORY: As mentioned earlier. 

GI: No nausea. 

: No dysuria. 

NERVOUS SYSTEM:  No numbness or weakness.

ALLERGY/IMMUNOLOGY:  No asthma or hayfever.

MUSCULOSKELETAL:  As mentioned earlier. 

HEMATOLOGY/ONCOLOGY: No history of anemia. 

ENDOCRINE: As mentioned earlier. 

CONSTITUTIONAL: As mentioned earlier.

DERMATOLOGY:  Negative.

RHEUMATOLOGY:  Negative.

PSYCHIATRY:  As mentioned earlier. 



PHYSICAL EXAM:

Patient is alert and oriented x3. Pulse 132, irregular. Blood pressure 

135/96, respirations 20, temperature is normal, pulse ox is 96% on 2 

L. 

HEENT:  Conjunctivae normal, oral mucosa moist.

NECK:  No jugular venous congestion.  No lymph node enlargement. 

CARDIOVASCULAR SYSTEM:  S1, S2, irregular, tachycardic. No murmur.

RESPIRATORY:  Breath sounds diminished at the bases, bilateral 

scattered rhonchi and expiratory wheeze, no crackles.  

Abdomen is soft, nontender. No mass palpable. 

EXTREMITIES:  Bilateral leg edema present. Otherwise, pulses are 

normal. No swelling.  

NERVOUS SYSTEM: Higher function as mentioned. Moves all 4 limbs. No 

focal motor deficits. 

LYMPHATICS:  No lymph node in the neck, axillae or groin. 

SKIN: No ulcer, rash or bleeding. 



Labs are at this time shows CBC within normal limits. INR and PT is 

12.3, INR is 1.2. D-dimer is 4.37, creatinine is 1.22 and CK-MB is 

2.8.  



ASSESSMENT: 

1. Atrial fibrillation with fast ventricular rate. 

2. Bilateral leg edema, rule out congestive heart failure acute 

exacerbation or cor pulmonale.  

3. Chronic obstructive pulmonary disease. 

4. Degenerative joint disease. 

5. Generalized anxiety.

6. Chronic kidney disease, stage III.

7. Hard of hearing. 

8. History of hysterectomy. 

9. History of degenerative joint disease. 

10. History of anxiety. 

11. Remote history of nicotine dependence. 

12. History of gastroesophageal reflux disease.

13. History of renal failure. 



RECOMMENDATION:   Recommend to continue current medications, continue 

monitoring and symptomatic treatment.  Otherwise, at this time 

continue the bronchodilators. I would also continue with Lasix and 

obtain a 2-D echo with a Doppler. Cardiology will be consulted and 

guarded prognosis because of multiple complex medical issues. A copy 

of this will be forwarded to Dr. Anaya who is the primary physician. 

 



MENDOZA

## 2017-01-09 LAB
ANION GAP SERPL CALC-SCNC: 10 MMOL/L
BASOPHILS # BLD AUTO: 0 K/UL (ref 0–0.2)
BASOPHILS NFR BLD AUTO: 1 %
BUN SERPL-SCNC: 16 MG/DL (ref 7–17)
CALCIUM SPEC-MCNC: 8.9 MG/DL (ref 8.4–10.2)
CH: 31.1
CHCM: 32.5
CHLORIDE SERPL-SCNC: 100 MMOL/L (ref 98–107)
CO2 SERPL-SCNC: 28 MMOL/L (ref 22–30)
EOSINOPHIL # BLD AUTO: 0.2 K/UL (ref 0–0.7)
EOSINOPHIL NFR BLD AUTO: 2 %
ERYTHROCYTE [DISTWIDTH] IN BLOOD BY AUTOMATED COUNT: 4.44 M/UL (ref 3.8–5.4)
ERYTHROCYTE [DISTWIDTH] IN BLOOD: 12.9 % (ref 11.5–15.5)
GLUCOSE SERPL-MCNC: 133 MG/DL (ref 74–99)
HCT VFR BLD AUTO: 42.6 % (ref 34–46)
HDW: 2.35
HGB BLD-MCNC: 13.5 GM/DL (ref 11.4–16)
LUC NFR BLD AUTO: 1 %
LYMPHOCYTES # SPEC AUTO: 0.9 K/UL (ref 1–4.8)
LYMPHOCYTES NFR SPEC AUTO: 13 %
MCH RBC QN AUTO: 30.5 PG (ref 25–35)
MCHC RBC AUTO-ENTMCNC: 31.8 G/DL (ref 31–37)
MCV RBC AUTO: 96 FL (ref 80–100)
MONOCYTES # BLD AUTO: 0.2 K/UL (ref 0–1)
MONOCYTES NFR BLD AUTO: 3 %
NEUTROPHILS # BLD AUTO: 5.5 K/UL (ref 1.3–7.7)
NEUTROPHILS NFR BLD AUTO: 79 %
NON-AFRICAN AMERICAN GFR(MDRD): 48
POTASSIUM SERPL-SCNC: 4 MMOL/L (ref 3.5–5.1)
SODIUM SERPL-SCNC: 138 MMOL/L (ref 137–145)
WBC # BLD AUTO: 0.1 10*3/UL
WBC # BLD AUTO: 6.9 K/UL (ref 3.8–10.6)
WBC (PEROX): 7.12

## 2017-01-09 RX ADMIN — AMIODARONE HYDROCHLORIDE SCH MLS/HR: 50 INJECTION, SOLUTION INTRAVENOUS at 16:45

## 2017-01-09 RX ADMIN — AMIODARONE HYDROCHLORIDE SCH MLS/HR: 50 INJECTION, SOLUTION INTRAVENOUS at 23:10

## 2017-01-09 RX ADMIN — FUROSEMIDE SCH MG: 10 INJECTION, SOLUTION INTRAMUSCULAR; INTRAVENOUS at 15:17

## 2017-01-09 RX ADMIN — IPRATROPIUM BROMIDE AND ALBUTEROL SULFATE SCH ML: .5; 3 SOLUTION RESPIRATORY (INHALATION) at 15:16

## 2017-01-09 RX ADMIN — IPRATROPIUM BROMIDE AND ALBUTEROL SULFATE SCH: .5; 3 SOLUTION RESPIRATORY (INHALATION) at 15:15

## 2017-01-09 RX ADMIN — FUROSEMIDE SCH MG: 10 INJECTION, SOLUTION INTRAMUSCULAR; INTRAVENOUS at 08:43

## 2017-01-09 RX ADMIN — BUDESONIDE AND FORMOTEROL FUMARATE DIHYDRATE SCH PUFF: 80; 4.5 AEROSOL RESPIRATORY (INHALATION) at 08:59

## 2017-01-09 RX ADMIN — APIXABAN SCH MG: 2.5 TABLET, FILM COATED ORAL at 12:03

## 2017-01-09 RX ADMIN — SPIRONOLACTONE SCH MG: 25 TABLET, FILM COATED ORAL at 16:45

## 2017-01-09 RX ADMIN — METOPROLOL TARTRATE SCH MG: 25 TABLET, FILM COATED ORAL at 08:43

## 2017-01-09 RX ADMIN — IPRATROPIUM BROMIDE AND ALBUTEROL SULFATE SCH ML: .5; 3 SOLUTION RESPIRATORY (INHALATION) at 08:59

## 2017-01-09 RX ADMIN — APIXABAN SCH MG: 2.5 TABLET, FILM COATED ORAL at 20:35

## 2017-01-09 RX ADMIN — Medication SCH MG: at 20:36

## 2017-01-09 RX ADMIN — DOCUSATE SODIUM SCH MG: 100 CAPSULE, LIQUID FILLED ORAL at 20:36

## 2017-01-09 RX ADMIN — IPRATROPIUM BROMIDE AND ALBUTEROL SULFATE SCH ML: .5; 3 SOLUTION RESPIRATORY (INHALATION) at 19:29

## 2017-01-09 RX ADMIN — BUDESONIDE AND FORMOTEROL FUMARATE DIHYDRATE SCH PUFF: 80; 4.5 AEROSOL RESPIRATORY (INHALATION) at 19:30

## 2017-01-09 RX ADMIN — Medication SCH UNIT: at 12:03

## 2017-01-09 RX ADMIN — METOPROLOL TARTRATE SCH MG: 25 TABLET, FILM COATED ORAL at 20:36

## 2017-01-09 NOTE — ECHOF
Referral Reason:chf



MEASUREMENTS

--------

HEIGHT: 170.2 cm

WEIGHT: 70.8 kg

BP: 115/63

RVIDd:   2.3 cm     (< 3.3)

IVSd:   1.0 cm     (0.6 - 1.1)

LVIDd:   4.3 cm     (3.9 - 5.3)

LVPWd:   0.9 cm     (0.6 - 1.1)

IVSs:   1.3 cm

LVIDs:   2.9 cm

LVPWs:   1.4 cm

LA Diam:   2.8 cm     (2.7 - 3.8)

LAESV Index (A-L):   24.01 ml/m

Ao Diam:   3.4 cm     (2.0 - 3.7)

AV Cusp:   1.8 cm     (1.5 - 2.6)

LA Diam:   2.7 cm     (2.7 - 3.8)

MV EXCURSION:   13.991 mm     (> 18.000)

MV EF SLOPE:   124 mm/s     (70 - 150)

EPSS:   1.3 cm

MV E Sharif:   0.90 m/s

MV DecT:   232 ms

MV A Sharif:   0.32 m/s

MV E/A Ratio:   2.76 

RAP:   5.00 mmHg

RVSP:   48.29 mmHg







FINDINGS

--------

Resting tachycardia (HR>100bpm).

This was a technically good study.

Left ventricular wall thickness is normal.   Overall 

left ventricular systolic function is severely impaired 

with, an EF between 20 - 25 %.

The right ventricle is normal in size.

Normal LA  size by volume 22+/-6 ml/m2.

The right atrium is normal in size.

Aortic valve is trileaflet and is mildly thickened.

The mitral valve leaflets are mildly thickened.   Mild 

mitral annular calcification present.   Mild mitral 

regurgitation is present.

Moderate to severe tricuspid regurgitation present.   

There is moderate pulmonary hypertension.   The right 

ventricular systolic pressure, as measured by Doppler, 

is 48.29mmHg.

Pulmonic valve appears structurally normal.

The aortic root size is normal.

The inferior vena cava is mildly dilated.

Echo free space may represent effusion or a pericardial 

fat pad.



CONCLUSIONS

--------

1. Resting tachycardia (HR>100bpm).

2. Mild mitral annular calcification present.

3. Mild mitral regurgitation is present.

4. Moderate to severe tricuspid regurgitation present.

5. There is moderate pulmonary hypertension.

6. The right ventricular systolic pressure, as measured by 

Doppler, is 48.29mmHg.

7. Pulmonic valve appears structurally normal.

8. The aortic root size is normal.

9. The inferior vena cava is mildly dilated.

10. Echo free space may represent effusion or a pericardial 

fat pad.

11. This was a technically good study.

12. Left ventricular wall thickness is normal.

13. Overall left ventricular systolic function is severely 

impaired with, an EF between 20 - 25 %.

14. The right ventricle is normal in size.

15. Normal LA size by volume 22+/-6 ml/m2.

16. The right atrium is normal in size.

17. Aortic valve is trileaflet and is mildly thickened.

18. The mitral valve leaflets are mildly thickened.





SONOGRAPHER: Dwayne Yun RDCS

## 2017-01-09 NOTE — PN
DATE OF SERVICE:  01/08/2017



This 82-year-old woman who was admitted with foot swelling and as well 

as CHF atrial fibrillation is being closely monitored.  

Cardiology is following the patient closely.  No chest pain. No 

palpitations.  No fever.  CTA of the chest was showing no evidence of 

pulmonary embolism.  Venous Doppler was negative for DVT. No chest 

pain or palpitation. No fever. 



On exam, alert and oriented times three.  Pulse 118, blood pressure 

ntd, respiratory  rate 20, temperature normal, Pulse ox 97% on 2 L.  

HEENT: Conjunctivae normal. 

NECK: No jugular venous distention. 

CARDIOVASCULAR: S1, S2 muffled. 

RESPIRATORY: Breath sounds diminished at the bases.  A few scattered 

rhonchi and crackles.  

ABDOMEN: Soft, nontender. 

LEGS: Bilateral leg edema. 

Nervous system:  No focal deficits. 



LABS: WBC 10.2, hemoglobin 13.9,  glucose 144, troponin 0.138. 



ASSESSMENT:

1. Acute atrial fibrillation with fast ventricular rate, present on 

admission. 

2. History of bilateral leg edema, possible congestive heart failure 

acute exacerbation with cor pulmonale.  

3. Troponin 0.138, possible acute coronary syndrome. 

4. Chronic obstructive pulmonary disease. 

5. Degenerative joint disease.

6. Generalized anxiety. 

7. Chronic kidney disease, stage III. 

8. History of hard of hearing. 

9. History of hysterectomy. 

10. History of degenerative joint disease. 

11. History of anxiety. 

12. Remote history of nicotine dependence history. 

13. History of gastroesophageal reflux disease. 

14. History of renal failure. 

15. FULL CODE.



RECOMMENDATIONS AND DISCUSSION: In this 82-year-old woman who 

presented with multiple complex medical issues, we will monitor 

patient closely. Continue with current medications.  Continue with 

symptomatic treatment. Cardiology consultation.  Otherwise unstable 

angina protocol. Guarded prognosis because of the multiple complex 

medical issues.  Further recommendations to follow.  See orders for 

details.   



MTDD

## 2017-01-09 NOTE — P.PN
Subjective


Principal diagnosis: 





Atrial fibrillation/congestive heart failure


This is a pleasant 82-year-old  female with history of COPD, 

paroxysmal atrial fibrillation, hypertension, hyperlipidemia, who presented to 

the hospital with symptoms of worsening shortness of breath.  She states that 

she's been getting having progressively more short of breath over the past one 

month or so.  She has also noticed an increase in peripheral edema.  Patient 

was found to be in atrial fib with RVR on admission here and was initiated on 

IV Cardizem.  Chest x-ray also suggested congestive cardiac failure and for 

this reason she was initiated on IV Lasix.  Patient has been diuresing well on 

IV Lasix, her weight is down 1 kg today.  Continues to be in atrial 

fibrillation with a heart rate of 110.  Echo with Doppler study was performed 

which revealed an ejection fraction 20-25%.  IV Cardizem was discontinued.  

Heart rate this afternoon up into the 140 range, earlier today in the 1 teens.  

Patient is currently on Lopressor 25 mg one tablet by mouth twice a day.  Blood 

pressure 108/70.








Objective





- Vital Signs


Vital signs: 


 Vital Signs











Temp  98.1 F   01/09/17 12:00


 


Pulse  126 H  01/09/17 12:00


 


Resp  19   01/09/17 12:00


 


BP  108/72   01/09/17 12:00


 


Pulse Ox  97   01/09/17 12:00








 Intake & Output











 01/08/17 01/09/17 01/09/17





 18:59 06:59 18:59


 


Intake Total 700.698 684.099 540


 


Output Total 900 700 700


 


Balance -199.302 -15.901 -160


 


Weight  70.8 kg 


 


Intake:   


 


  Intake, IV Titration 340.698 444.099 





  Amount   


 


    Diltiazem 125 mg In  160 





    Sodium Chloride 0.9% 100   





    ml @ 5 MG/HR 5 mls/hr IV   





    .Q24H [facility] Rx#:[id number]   


 


    Heparin Sodium,Porcine/ 340.698 284.099 





    D5w Pmx 25,000 unit In   





    Dextrose/Water 1 500ml.   





    bag @ 12 UNITS/KG/HR 17.   





    41 mls/hr IV .Q24H [facility] Rx   





    #:[id number]   


 


  Oral 360 240 540


 


Output:   


 


  Urine 900 700 700


 


Other:   


 


  # Voids  3 


 


  # Bowel Movements   1














- Exam





PHYSICAL EXAMINATION: 





HEENT: Head is atraumatic, normocephalic.  Pupils equal, round.  Neck is 

supple.  There is no elevated jugular venous pressure.





HEART EXAMINATION: S1 and S2 irregular irregular a systolic murmur is heard.





CHEST EXAMINATION: Lungs revealed decreased air exchange with some fine 

expiratory wheezes.





ABDOMEN:  Soft, nontender. Bowel sounds are heard. No organomegaly noted.


 


EXTREMITIES: 2+ peripheral pulses with no evidence of peripheral edema and no 

calf tenderness noted.





NEUROLOGIC patient is awake, alert and oriented -3.


 


.


 








- Labs


CBC & Chem 7: 


 01/09/17 05:55





 01/09/17 05:55


Labs: 


 Abnormal Lab Results - Last 24 Hours (Table)











  01/09/17 01/09/17 01/09/17 Range/Units





  05:55 05:55 05:55 


 


Lymphocytes #  0.9 L    (1.0-4.8)  k/uL


 


APTT    46.6 H  (22.0-30.0)  sec


 


Creatinine   1.10 H   (0.52-1.04)  mg/dL


 


Glucose   133 H   (74-99)  mg/dL














  01/09/17 Range/Units





  12:37 


 


Lymphocytes #   (1.0-4.8)  k/uL


 


APTT  36.5 H  (22.0-30.0)  sec


 


Creatinine   (0.52-1.04)  mg/dL


 


Glucose   (74-99)  mg/dL














Assessment and Plan


(1) Systolic CHF, acute on chronic


Status: Acute   





(2) Paroxysmal a-fib


Status: Acute   





(3) Paroxysmal a-fib


Status: Acute   





(4) COPD (chronic obstructive pulmonary disease)


Status: Acute   





(5) Anxiety


Status: Acute   





(6) Hx of nicotine dependence


Status: Acute   





(7) Cardiomyopathy


Status: Acute   


Plan: 


From cardiology's perspective, we will discontinue the IV Cardizem, start the 

patient on IV amiodarone, we will also give one dose of IV and one dose of by 

mouth Lanoxin.  Continue beta blocker.  Patient will need to have cardiac 

catheterization at some point to rule out underlying coronary artery disease.  

Continue IV Lasix for 24 hours.








DNP note has been reviewed, I agree with a documented findings and plan of 

care.  Patient was seen and examined.

## 2017-01-09 NOTE — PN
DATE OF SERVICE: 01/09/2017. 



PRESENTING COMPLAINT: Short of breath. 



INTERVAL HISTORY: This patient presented with CHF exacerbation, A. fib 

with rapid ventricular rate. Patient has been on IV amiodarone. Heart 

rate still getting up to 150s. Some swelling has come down. Also 

getting IV Lasix. Did tolerate diet. Patient has been up to the 

bedside commode.  



Review of systems done for constitutional, cardiovascular, GI, 

pulmonary; relevant findings as above.  



Current medications are reviewed that include IV amiodarone and 

Eliquis.  



On examination, temperature 98.1, pulse 130, respirations 19, blood 

pressure 122, pulse ox 97% on 2 liters.  

GENERAL APPEARANCE: Sitting up, lying in bed, tired appeared. 

HEENT: Conjunctivae normal. 

NECK: JVD possibly raised. 

RESPIRATORY: Effort increased. 

LUNGS: Some crackles in bases. 

CARDIOVASCULAR: Heart sounds irregular. Edema present. 

ABDOMEN: Soft, nontender. Liver and spleen not palpable. 

PSYCHIATRY: Alert and oriented x3. Mood and affect normal. 



INVESTIGATIONS: White count 6.9, hemoglobin 13.5, potassium 4.0, BUN 

16, creatinine 1.10. The 2-D echocardiogram shows moderate to severe 

tricuspid regurgitation, moderate pulmonary hypertension; ejection 

fraction 20% to 25%.  



ASSESSMENT: 

1. Acute on chronic congestive heart failure exacerbation from 

systolic dysfunction, probably from underlying hypertension.  

2. Persistent atrial fibrillation, rate uncontrolled. 

3. Primary osteoarthritis of multiple joints bilateral. 

4. Chronic kidney stage III, probably from nephrosclerosis. 

5. Chronic obstructive pulmonary disease in an ex-smoker. 

6. Moderate tricuspid, nonrheumatic. 

7. Secondary pulmonary hypertension secondary to chronic obstructive 

pulmonary disease.  



PLAN: The patient continued to diuresed. Already on IV amiodarone. 

Also on IV Lasix. Will also had small dose of Aldactone. CHF is slow 

to respond.

## 2017-01-10 LAB
ANION GAP SERPL CALC-SCNC: 10 MMOL/L
BASOPHILS # BLD AUTO: 0 K/UL (ref 0–0.2)
BASOPHILS NFR BLD AUTO: 0 %
BUN SERPL-SCNC: 18 MG/DL (ref 7–17)
CALCIUM SPEC-MCNC: 8.9 MG/DL (ref 8.4–10.2)
CH: 31
CHCM: 32.2
CHLORIDE SERPL-SCNC: 98 MMOL/L (ref 98–107)
CO2 SERPL-SCNC: 29 MMOL/L (ref 22–30)
EOSINOPHIL # BLD AUTO: 0.1 K/UL (ref 0–0.7)
EOSINOPHIL NFR BLD AUTO: 2 %
ERYTHROCYTE [DISTWIDTH] IN BLOOD BY AUTOMATED COUNT: 4.19 M/UL (ref 3.8–5.4)
ERYTHROCYTE [DISTWIDTH] IN BLOOD: 12.7 % (ref 11.5–15.5)
GLUCOSE SERPL-MCNC: 122 MG/DL (ref 74–99)
HCT VFR BLD AUTO: 40.5 % (ref 34–46)
HDW: 2.35
HGB BLD-MCNC: 13 GM/DL (ref 11.4–16)
LUC NFR BLD AUTO: 2 %
LYMPHOCYTES # SPEC AUTO: 0.7 K/UL (ref 1–4.8)
LYMPHOCYTES NFR SPEC AUTO: 14 %
MCH RBC QN AUTO: 31.2 PG (ref 25–35)
MCHC RBC AUTO-ENTMCNC: 32.2 G/DL (ref 31–37)
MCV RBC AUTO: 96.7 FL (ref 80–100)
MONOCYTES # BLD AUTO: 0.3 K/UL (ref 0–1)
MONOCYTES NFR BLD AUTO: 6 %
NEUTROPHILS # BLD AUTO: 4 K/UL (ref 1.3–7.7)
NEUTROPHILS NFR BLD AUTO: 75 %
NON-AFRICAN AMERICAN GFR(MDRD): 43
POTASSIUM SERPL-SCNC: 3.7 MMOL/L (ref 3.5–5.1)
SODIUM SERPL-SCNC: 137 MMOL/L (ref 137–145)
WBC # BLD AUTO: 0.1 10*3/UL
WBC # BLD AUTO: 5.3 K/UL (ref 3.8–10.6)
WBC (PEROX): 5.58

## 2017-01-10 RX ADMIN — SPIRONOLACTONE SCH MG: 25 TABLET, FILM COATED ORAL at 08:09

## 2017-01-10 RX ADMIN — AMIODARONE HYDROCHLORIDE SCH MLS/HR: 50 INJECTION, SOLUTION INTRAVENOUS at 06:28

## 2017-01-10 RX ADMIN — FUROSEMIDE SCH MG: 10 INJECTION, SOLUTION INTRAMUSCULAR; INTRAVENOUS at 08:08

## 2017-01-10 RX ADMIN — APIXABAN SCH MG: 2.5 TABLET, FILM COATED ORAL at 08:09

## 2017-01-10 RX ADMIN — DIGOXIN SCH MCG: 125 TABLET ORAL at 08:09

## 2017-01-10 RX ADMIN — DOCUSATE SODIUM SCH: 100 CAPSULE, LIQUID FILLED ORAL at 21:50

## 2017-01-10 RX ADMIN — FUROSEMIDE SCH MG: 10 INJECTION, SOLUTION INTRAMUSCULAR; INTRAVENOUS at 16:41

## 2017-01-10 RX ADMIN — IPRATROPIUM BROMIDE AND ALBUTEROL SULFATE SCH ML: .5; 3 SOLUTION RESPIRATORY (INHALATION) at 09:16

## 2017-01-10 RX ADMIN — IPRATROPIUM BROMIDE AND ALBUTEROL SULFATE SCH ML: .5; 3 SOLUTION RESPIRATORY (INHALATION) at 13:36

## 2017-01-10 RX ADMIN — AMIODARONE HYDROCHLORIDE SCH MG: 200 TABLET ORAL at 21:47

## 2017-01-10 RX ADMIN — BUDESONIDE AND FORMOTEROL FUMARATE DIHYDRATE SCH PUFF: 80; 4.5 AEROSOL RESPIRATORY (INHALATION) at 09:16

## 2017-01-10 RX ADMIN — METOPROLOL TARTRATE SCH MG: 50 TABLET, FILM COATED ORAL at 21:48

## 2017-01-10 RX ADMIN — DOCUSATE SODIUM SCH MG: 100 CAPSULE, LIQUID FILLED ORAL at 08:09

## 2017-01-10 RX ADMIN — METOPROLOL TARTRATE SCH MG: 25 TABLET, FILM COATED ORAL at 08:09

## 2017-01-10 RX ADMIN — IPRATROPIUM BROMIDE AND ALBUTEROL SULFATE SCH ML: .5; 3 SOLUTION RESPIRATORY (INHALATION) at 16:39

## 2017-01-10 RX ADMIN — Medication SCH UNIT: at 16:40

## 2017-01-10 RX ADMIN — Medication SCH MG: at 21:48

## 2017-01-10 RX ADMIN — IPRATROPIUM BROMIDE AND ALBUTEROL SULFATE SCH ML: .5; 3 SOLUTION RESPIRATORY (INHALATION) at 21:01

## 2017-01-10 RX ADMIN — BUDESONIDE AND FORMOTEROL FUMARATE DIHYDRATE SCH PUFF: 80; 4.5 AEROSOL RESPIRATORY (INHALATION) at 21:01

## 2017-01-10 NOTE — P.PN
Subjective


Principal diagnosis: 





CHF/A. fib





This is a pleasant 82-year-old  female patient who never seen a 

cardiologist in the past with a past medical history significant for COPD, 

paroxysmal A. fib, hypertension, and dyslipidemia, presented to the hospital 

complaining of progressive dyspnea.


She has not been feeding well over the last 4 weeks.  She describes exertional 

dyspnea associated with bilateral lower extremities edema.  Over the last 

several days she has been feeding her heart was racing up.  The patient denies 

having any chest pain or discomfort and denies feeling any dizziness or 

lightheadedness.


She was diagnosed with congestive heart failure exacerbation and she was 

started on Lasix IV.  Also she was noted to be in A. fib with RVR and she was 

started on Cardizem and drip.


The EKG showed A. fib with RVR with diffuse ST and T wave abnormalities likely 

represent underlying CAD.  The chest x-ray showed chronic emphysematous changes 

without any acute finding.  The BNP came in to elevated.


I'll follow-up with the patient today, she continues to not feel well.  She 

still short of breath.  She still have bilateral lower extremities edema.


In term of atrial fibrillation, she continues to be in A. fib with RVR.





From the cardiovascular standpoint overview, I am going to increase the dose of 

metoprolol to 50 mg by mouth twice a day.  Continue digoxin by mouth.  DC 

amiodarone IV and start her on amiodarone by mouth.  Continue anticoagulation.  

The patient's need to have a heart catheterization in view of the echo showing 

severe cardiomyopathy.








Objective





- Vital Signs


Vital signs: 


 Vital Signs











Temp  98.2 F   01/10/17 08:00


 


Pulse  136 H  01/10/17 09:38


 


Resp  20   01/10/17 08:00


 


BP  124/66   01/10/17 08:00


 


Pulse Ox  94 L  01/10/17 08:00








 Intake & Output











 01/09/17 01/10/17 01/10/17





 18:59 06:59 18:59


 


Intake Total 740 947.566 600


 


Output Total 1100 2775 400


 


Balance -360 -9047.434 200


 


Weight  71.7 kg 


 


Intake:   


 


  Intake, IV Titration  347.566 





  Amount   


 


    Amiodarone 450 mg In  347.566 





    Dextrose 5% in Water 250   





    ml @ 1 MG/MIN 34.53 mls/   





    hr IV .Q7H31M [facility] Rx#:   





    [id number]   


 


  Oral 740 600 600


 


Output:   


 


  Urine 1100 2775 400


 


Other:   


 


  # Voids  5 


 


  # Bowel Movements 1  1














- Constitutional


General appearance: Present: no acute distress





- Respiratory


Respiratory: bilateral: diminished





- Cardiovascular


Rhythm: regular


Heart sounds: normal: S1, S2





- Labs


CBC & Chem 7: 


 01/10/17 06:30





 01/10/17 06:30


Labs: 


 Abnormal Lab Results - Last 24 Hours (Table)











  01/09/17 01/10/17 01/10/17 Range/Units





  12:37 06:30 06:30 


 


Lymphocytes #   0.7 L   (1.0-4.8)  k/uL


 


APTT  36.5 H    (22.0-30.0)  sec


 


BUN    18 H  (7-17)  mg/dL


 


Creatinine    1.20 H  (0.52-1.04)  mg/dL


 


Glucose    122 H  (74-99)  mg/dL














Assessment and Plan


Plan: 





Assessment


#1 congestive heart failure exacerbation secondary to systolic dysfunction 


#2 A. fib with RVR


#3 severity cardiomyopathy





Plan


#1 increase the dose of metoprolol to 50 mg by mouth twice a day


#2 DC amiodarone IV and start amiodarone by mouth


#3 proceed with heart catheterization in the next 24 hours.

## 2017-01-11 LAB
ANION GAP SERPL CALC-SCNC: 13 MMOL/L
BASOPHILS # BLD AUTO: 0 K/UL (ref 0–0.2)
BASOPHILS NFR BLD AUTO: 1 %
BUN SERPL-SCNC: 20 MG/DL (ref 7–17)
CALCIUM SPEC-MCNC: 9.2 MG/DL (ref 8.4–10.2)
CH: 30.7
CHCM: 31.4
CHLORIDE SERPL-SCNC: 105 MMOL/L (ref 98–107)
CO2 SERPL-SCNC: 24 MMOL/L (ref 22–30)
EOSINOPHIL # BLD AUTO: 0.1 K/UL (ref 0–0.7)
EOSINOPHIL NFR BLD AUTO: 3 %
ERYTHROCYTE [DISTWIDTH] IN BLOOD BY AUTOMATED COUNT: 4.46 M/UL (ref 3.8–5.4)
ERYTHROCYTE [DISTWIDTH] IN BLOOD: 12.6 % (ref 11.5–15.5)
GLUCOSE SERPL-MCNC: 137 MG/DL (ref 74–99)
HCT VFR BLD AUTO: 43.9 % (ref 34–46)
HDW: 2.35
HGB BLD-MCNC: 13.5 GM/DL (ref 11.4–16)
LUC NFR BLD AUTO: 2 %
LYMPHOCYTES # SPEC AUTO: 0.7 K/UL (ref 1–4.8)
LYMPHOCYTES NFR SPEC AUTO: 14 %
MCH RBC QN AUTO: 30.2 PG (ref 25–35)
MCHC RBC AUTO-ENTMCNC: 30.7 G/DL (ref 31–37)
MCV RBC AUTO: 98.2 FL (ref 80–100)
MONOCYTES # BLD AUTO: 0.3 K/UL (ref 0–1)
MONOCYTES NFR BLD AUTO: 5 %
NEUTROPHILS # BLD AUTO: 3.6 K/UL (ref 1.3–7.7)
NEUTROPHILS NFR BLD AUTO: 76 %
NON-AFRICAN AMERICAN GFR(MDRD): 43
POTASSIUM SERPL-SCNC: 4.6 MMOL/L (ref 3.5–5.1)
SODIUM SERPL-SCNC: 142 MMOL/L (ref 137–145)
WBC # BLD AUTO: 0.1 10*3/UL
WBC # BLD AUTO: 4.8 K/UL (ref 3.8–10.6)
WBC (PEROX): 5.12

## 2017-01-11 RX ADMIN — MIDAZOLAM ONE MG: 1 INJECTION INTRAMUSCULAR; INTRAVENOUS at 14:30

## 2017-01-11 RX ADMIN — AMIODARONE HYDROCHLORIDE SCH MG: 200 TABLET ORAL at 21:17

## 2017-01-11 RX ADMIN — IPRATROPIUM BROMIDE AND ALBUTEROL SULFATE SCH ML: .5; 3 SOLUTION RESPIRATORY (INHALATION) at 15:55

## 2017-01-11 RX ADMIN — Medication SCH MG: at 21:18

## 2017-01-11 RX ADMIN — VERAPAMIL HYDROCHLORIDE ONE ML: 2.5 INJECTION, SOLUTION INTRAVENOUS at 14:30

## 2017-01-11 RX ADMIN — SPIRONOLACTONE SCH: 25 TABLET, FILM COATED ORAL at 08:01

## 2017-01-11 RX ADMIN — FUROSEMIDE SCH MG: 10 INJECTION, SOLUTION INTRAMUSCULAR; INTRAVENOUS at 07:58

## 2017-01-11 RX ADMIN — MIDAZOLAM ONE MG: 1 INJECTION INTRAMUSCULAR; INTRAVENOUS at 14:32

## 2017-01-11 RX ADMIN — DOCUSATE SODIUM SCH MG: 100 CAPSULE, LIQUID FILLED ORAL at 07:59

## 2017-01-11 RX ADMIN — Medication SCH: at 11:25

## 2017-01-11 RX ADMIN — HEPARIN SODIUM ONE UNIT: 1000 INJECTION, SOLUTION INTRAVENOUS; SUBCUTANEOUS at 14:45

## 2017-01-11 RX ADMIN — METOPROLOL TARTRATE SCH MG: 50 TABLET, FILM COATED ORAL at 06:10

## 2017-01-11 RX ADMIN — DOCUSATE SODIUM SCH MG: 100 CAPSULE, LIQUID FILLED ORAL at 21:17

## 2017-01-11 RX ADMIN — BUDESONIDE AND FORMOTEROL FUMARATE DIHYDRATE SCH PUFF: 80; 4.5 AEROSOL RESPIRATORY (INHALATION) at 08:51

## 2017-01-11 RX ADMIN — METOPROLOL TARTRATE SCH MG: 50 TABLET, FILM COATED ORAL at 21:18

## 2017-01-11 RX ADMIN — BUDESONIDE AND FORMOTEROL FUMARATE DIHYDRATE SCH PUFF: 80; 4.5 AEROSOL RESPIRATORY (INHALATION) at 19:56

## 2017-01-11 RX ADMIN — HEPARIN SODIUM ONE UNIT: 1000 INJECTION, SOLUTION INTRAVENOUS; SUBCUTANEOUS at 14:34

## 2017-01-11 RX ADMIN — DIGOXIN SCH MCG: 125 TABLET ORAL at 06:10

## 2017-01-11 RX ADMIN — AMIODARONE HYDROCHLORIDE SCH MG: 200 TABLET ORAL at 06:09

## 2017-01-11 RX ADMIN — SPIRONOLACTONE SCH MG: 25 TABLET, FILM COATED ORAL at 07:59

## 2017-01-11 RX ADMIN — IPRATROPIUM BROMIDE AND ALBUTEROL SULFATE SCH ML: .5; 3 SOLUTION RESPIRATORY (INHALATION) at 19:57

## 2017-01-11 RX ADMIN — IPRATROPIUM BROMIDE AND ALBUTEROL SULFATE SCH ML: .5; 3 SOLUTION RESPIRATORY (INHALATION) at 08:51

## 2017-01-11 RX ADMIN — VERAPAMIL HYDROCHLORIDE ONE ML: 2.5 INJECTION, SOLUTION INTRAVENOUS at 14:55

## 2017-01-11 RX ADMIN — IPRATROPIUM BROMIDE AND ALBUTEROL SULFATE SCH ML: .5; 3 SOLUTION RESPIRATORY (INHALATION) at 12:14

## 2017-01-11 RX ADMIN — FUROSEMIDE SCH MG: 10 INJECTION, SOLUTION INTRAMUSCULAR; INTRAVENOUS at 16:12

## 2017-01-11 NOTE — LTR
January 11, 2017













RE:  Rosana Valenzuela VILMA







Dear Delfin,



Per our discussion on the phone, Ms. Rosana Valenzuela underwent a heart 

catheterization which showed only intermediate disease involving the 

LAD. Maximized medical treatment is recommended at this point of time. 

 



Thank you for allowing me to participate in her care. 





Sincerely, 







ORLIN FRIEDMAN MD

## 2017-01-11 NOTE — PN
DATE OF SERVICE: 01/11/2017 



PRESENTING COMPLAINT: Short of breath. 



INTERVAL HISTORY: This is a patient who presented with CHF 

exacerbation, atrial fibrillation with rapid ventricular rate. Patient 

is due for a cardiac catheterization today. Edema is slowly coming 

down. Breathing is a bit better.  



Review of systems done for constitutional, cardiovascular, GI, 

pulmonary; relevant findings as above.   

 

Current medications are reviewed. 



On examination, temperature 98.4, pulse 101, respiration 16, blood 

pressure 94/67, pulse ox 97% on 3 L.  

GENERAL APPEARANCE: Lying in bed, not in distress. 

EYES: Pupils equal. Conjunctivae normal. 

NECK: JVD not raised. Mass not palpable. 

RESPIRATORY: Effort normal. 

LUNGS: Diminished. Improved air entry. 

CARDIOVASCULAR: Heart sounds regular. Mild edema. 

ABDOMEN: Soft, non-tender. Liver and spleen not palpable. 

PSYCHIATRY: Alert and oriented x3. Mood and affect normal. 



INVESTIGATIONS: White count 4.8, hemoglobin 13.5. Potassium 4.6. BUN 

20, creatinine 1.20.  



ASSESSMENT: 

1. Acute on chronic congestive heart failure exacerbation from 

systolic dysfunction, probably from underlying hypertension; ejection 

fraction 20%.  

2. Persistent atrial fibrillation. 

3. Primary osteoarthritis of multiple joints, bilateral. 

4. Chronic kidney disease, stage III, probably from nephrosclerosis. 

5. Chronic obstructive pulmonary disease in an ex-smoker. 

6. Moderate tricuspid regurgitation, non-rheumatic. 

7. Secondary pulmonary hypertension secondary to chronic obstructive 

pulmonary disease.  



PLAN: Continue the current medication and treatment plan. Awaiting 

cardiac catheterization. Will follow.

## 2017-01-12 VITALS — TEMPERATURE: 97.5 F | DIASTOLIC BLOOD PRESSURE: 68 MMHG | SYSTOLIC BLOOD PRESSURE: 112 MMHG | HEART RATE: 110 BPM

## 2017-01-12 VITALS — RESPIRATION RATE: 18 BRPM

## 2017-01-12 LAB
ANION GAP SERPL CALC-SCNC: 11 MMOL/L
BUN SERPL-SCNC: 22 MG/DL (ref 7–17)
CALCIUM SPEC-MCNC: 8.8 MG/DL (ref 8.4–10.2)
CHLORIDE SERPL-SCNC: 101 MMOL/L (ref 98–107)
CO2 SERPL-SCNC: 26 MMOL/L (ref 22–30)
GLUCOSE SERPL-MCNC: 110 MG/DL (ref 74–99)
NON-AFRICAN AMERICAN GFR(MDRD): 43
POTASSIUM SERPL-SCNC: 4.1 MMOL/L (ref 3.5–5.1)
SODIUM SERPL-SCNC: 138 MMOL/L (ref 137–145)

## 2017-01-12 RX ADMIN — METOPROLOL TARTRATE SCH MG: 50 TABLET, FILM COATED ORAL at 07:46

## 2017-01-12 RX ADMIN — BUDESONIDE AND FORMOTEROL FUMARATE DIHYDRATE SCH PUFF: 80; 4.5 AEROSOL RESPIRATORY (INHALATION) at 08:15

## 2017-01-12 RX ADMIN — DIGOXIN SCH MCG: 125 TABLET ORAL at 07:46

## 2017-01-12 RX ADMIN — AMIODARONE HYDROCHLORIDE SCH MG: 200 TABLET ORAL at 07:46

## 2017-01-12 RX ADMIN — Medication SCH UNIT: at 11:41

## 2017-01-12 RX ADMIN — DOCUSATE SODIUM SCH MG: 100 CAPSULE, LIQUID FILLED ORAL at 07:46

## 2017-01-12 RX ADMIN — FUROSEMIDE SCH MG: 10 INJECTION, SOLUTION INTRAMUSCULAR; INTRAVENOUS at 07:46

## 2017-01-12 RX ADMIN — SPIRONOLACTONE SCH MG: 25 TABLET, FILM COATED ORAL at 07:46

## 2017-01-12 RX ADMIN — IPRATROPIUM BROMIDE AND ALBUTEROL SULFATE SCH ML: .5; 3 SOLUTION RESPIRATORY (INHALATION) at 08:15

## 2017-01-12 RX ADMIN — IPRATROPIUM BROMIDE AND ALBUTEROL SULFATE SCH ML: .5; 3 SOLUTION RESPIRATORY (INHALATION) at 11:43

## 2017-01-12 NOTE — P.PN
Subjective


Principal diagnosis: 





Atrial fibrillation/congestive heart failure


This is a pleasant 82-year-old  female with history of COPD, 

paroxysmal atrial fibrillation, hypertension, hyperlipidemia, who presented to 

the hospital with symptoms of worsening shortness of breath.  She states that 

she's been getting having progressively more short of breath over the past one 

month or so.  She has also noticed an increase in peripheral edema.  Patient 

was found to be in atrial fib with RVR on admission here and was initiated on 

IV Cardizem.  Chest x-ray also suggested congestive cardiac failure and for 

this reason she was initiated on IV Lasix.  Patient has been diuresing well on 

IV Lasix, her weight is down 1 kg today.  Continues to be in atrial 

fibrillation with a heart rate of 110.  Echo with Doppler study was performed 

which revealed an ejection fraction 20-25%.  IV Cardizem was discontinued. 


01/12/17.... Patient was seen and examined this morning, doing well overall.  

She underwent a cardiac catheterization yesterday which did not reveal any 

significant obstructive coronary artery disease.  From cardiology's perspective 

she may be able to be discharged home today.





Objective





- Vital Signs


Vital signs: 


 Vital Signs











Temp  97.7 F   01/12/17 07:49


 


Pulse  104 H  01/12/17 11:55


 


Resp  18   01/12/17 07:49


 


BP  134/67   01/12/17 07:49


 


Pulse Ox  98   01/12/17 07:49








 Intake & Output











 01/11/17 01/12/17 01/12/17





 18:59 06:59 18:59


 


Intake Total 499.83 540 


 


Output Total 250 1700 


 


Balance 249.83 -1160 


 


Weight  71.7 kg 


 


Intake:   


 


  .83  


 


  Oral 280 540 


 


Output:   


 


  Urine 250 1700 


 


Other:   


 


  # Bowel Movements  1 














- Exam





PHYSICAL EXAMINATION: 





HEENT: Head is atraumatic, normocephalic.  Pupils equal, round.  Neck is 

supple.  There is no elevated jugular venous pressure.





HEART EXAMINATION: S1 and S2 irregular irregular a systolic murmur is heard.





CHEST EXAMINATION: Lungs revealed decreased air exchange with some fine 

expiratory wheezes.





ABDOMEN:  Soft, nontender. Bowel sounds are heard. No organomegaly noted.  

Right radial site clean and dry, small amount of ecchymosis noted.  Good distal 

pulse.


 


EXTREMITIES: 2+ peripheral pulses with no evidence of peripheral edema and no 

calf tenderness noted.





NEUROLOGIC patient is awake, alert and oriented -3.


 


.


 








- Labs


CBC & Chem 7: 


 01/11/17 06:11





 01/12/17 05:58


Labs: 


 Abnormal Lab Results - Last 24 Hours (Table)











  01/12/17 Range/Units





  05:58 


 


BUN  22 H  (7-17)  mg/dL


 


Creatinine  1.20 H  (0.52-1.04)  mg/dL


 


Glucose  110 H  (74-99)  mg/dL














Assessment and Plan


(1) Systolic CHF, acute on chronic


Status: Acute   





(2) Paroxysmal a-fib


Status: Acute   





(3) Paroxysmal a-fib


Status: Acute   





(4) COPD (chronic obstructive pulmonary disease)


Status: Acute   





(5) Anxiety


Status: Acute   





(6) Hx of nicotine dependence


Status: Acute   





(7) Cardiomyopathy


Status: Acute   





(8) S/P cardiac cath


Status: Acute   


Plan: 


From cardiology's perspective, patient may be able to be discharged home today.

  She will be discharged home on amiodarone 400 mg one tablet by mouth twice a 

day for one week, then 200 mg one tablet by mouth 3 times a day for a week, 

then 200 mg one tablet by mouth twice a day.  Along with metoprolol tartrate 50 

mg one tablet by mouth twice a day.  A follow-up appointment will be made with 

Dr. Frank in the office post discharge.


DNP note has been reviewed, I agree with a documented findings and plan of 

care.  Patient was seen and examined.

## 2017-01-13 NOTE — DS
DATE OF ADMISSION:   01/07/2017

DATE OF DISCHARGE:   01/12/2017



FINAL DIAGNOSES: 

1. Acute on chronic congestive heart failure exacerbation from 

systolic dysfunction likely from underlying hypertension. Ejection 

fraction 20%.  

2. Persistent atrial fibrillation. 

3. Primary osteoarthritis of multiple joints, bilateral. 

4. Chronic kidney stage III from nephrosclerosis. 

5. Chronic obstructive pulmonary disease in an ex-smoker. 

6. Moderate tricuspid regurgitation, nonrheumatic. 

7. Secondary pulmonary hypertension secondary to chronic obstructive 

pulmonary disease.  

8. Coronary artery disease, nonobstructive with lesion in the LAD. 



PROCEDURE: Cardiac catheterization 



HOSPITAL COURSE: This patient presented with CHF exacerbation, EF 

found to be 20%, also in atrial fibrillation. Cardiac cath showed 

nonobstructive disease in LAD. Heart rate is controlled before 

discharge. Seen by Dr. Pope from cardiology.  



On exam, lungs are clear. 

CARDIOVASCULAR: Heart sounds irregular. 



DISCHARGE MEDICATIONS: 

1. Aldactone 25 mg a day. 

2. Lopressor 50 mg p.o. b.i.d. 

3. Melatonin 3 mg q.h.s. 

4. Lasix 40 mg a day. 

5. Cordarone 200 mg twice a day for 7 days then 200 mg a day. 

6. Spiriva 1 puff a day. 

7. Ativan 1 mg q.h.s. 

8. Advair 250/50 one puff b.i.d. 

9. Vitamin D3, 2000 units a day. 

10. Ventolin HFA 1 to 2 puffs q.6 p.r.n. 



Follow up with Dr. Anaya 3 days. Follow up with Dr. Pope  on 1/19/17. 



BMP in 3 days.

## 2017-01-13 NOTE — DS
DATE OF ADMISSION:   01/07/2017

DATE OF DISCHARGE:   01/12/2017



ADDENDUM: 



Discharge medications include Eliquis 5 mg p.o. b.i.d.

## 2017-01-23 NOTE — CC
DATE OF SERVICE:   January 11, 2017 







Performing physician: Adonis Pope M.D.  interventional cardiologist. 





PROCEDURE PERFORMED:

1. Selective right and left coronary angiogram: 

2. Fractional flow reserve, FFR of the left anterior descending artery. 



INDICATION: This is a pleasant 82-year-old  female patient 

who presented to the hospital with congestive heart failure and was 

found to have severe cardiomyopathy with an ejection fraction around 

25%. She was scheduled to undergo heart catheterization to rule out 

any severe underlying CAD.  



Approach: Right radial artery. 



COMPLICATIONS: None. 



Level of sedation: Moderate. 



PROCEDURE DESCRIPTION: After obtaining informed consent, the patient 

was brought to the cardiac cath lab. Right radial artery was 

cannulated using micropuncture technique. The micropuncture wire 

passed easily then I placed a 6 [place] sheath in the right radial 

artery. Subsequently, the patient was given 2 mg of verapamil IA and 

3000 units of heparin IV.  



Subsequently I did selective right and left coronary angiogram using 

JR4 and JL 3.5 catheters. Then I did an FFR per IV adenosine infusion 

and please see separate paragraph for that.  



SELECTIVE CORONARY ANGIOGRAM: 

1. The right coronary artery is a large-caliber vessel and it is a 

dominant vessel. It is angiographically normal. It bifurcates into PDA 

and PLV branches; both are angiographically normal.  

2. The left main is angiographically normal. It bifurcates into the 

left circumflex and left anterior descending artery.  

3. The left circumflex is a large-caliber vessel. It is a nondominant 

vessel. The proximal circumflex is angiographically normal. The mid 

circumflex is normal and gives rise to the first OM branch, which 

seems to be angiographically normal and the left circumflex distally 

is angiographically normal.  

4. Left anterior descending artery. The proximal LAD is 

angiographically normally. The mid LAD has a lesion appeared to be in 

the range of 40% to 50%. We did an FFR of that lesion and that came in 

to be 0.84. The LAD distally appeared to have mild disease only.  



FFR of the LAD: Anticoagulation was initiated using Angiomax. 

Subsequently,  after zeroing the Doppler wire and equalization between 

the Doppler wire and the guiding catheter, which was JL 3.5 guiding 

catheter, we did an FFR using IV adenosine infusion and FFR came in to 

be nonischemic.  



CONCLUSION: 

1. Intermediate disease involving the mid left anterior descending 

artery.  

2. FFR of the LAD was 0.84, which was nonischemic. 



POSTPROCEDURE MANAGEMENT: 

1. Maximize medical treatment.

2. Follow up with the patient.

## 2017-04-17 ENCOUNTER — HOSPITAL ENCOUNTER (INPATIENT)
Dept: HOSPITAL 47 - EC | Age: 82
LOS: 3 days | Discharge: HOME | DRG: 191 | End: 2017-04-20
Payer: MEDICARE

## 2017-04-17 VITALS — BODY MASS INDEX: 21.9 KG/M2

## 2017-04-17 DIAGNOSIS — Z87.891: ICD-10-CM

## 2017-04-17 DIAGNOSIS — I25.10: ICD-10-CM

## 2017-04-17 DIAGNOSIS — J44.1: ICD-10-CM

## 2017-04-17 DIAGNOSIS — I36.1: ICD-10-CM

## 2017-04-17 DIAGNOSIS — M19.91: ICD-10-CM

## 2017-04-17 DIAGNOSIS — J20.9: ICD-10-CM

## 2017-04-17 DIAGNOSIS — F41.9: ICD-10-CM

## 2017-04-17 DIAGNOSIS — I13.0: ICD-10-CM

## 2017-04-17 DIAGNOSIS — F41.1: ICD-10-CM

## 2017-04-17 DIAGNOSIS — I50.22: ICD-10-CM

## 2017-04-17 DIAGNOSIS — J44.0: Primary | ICD-10-CM

## 2017-04-17 DIAGNOSIS — Z79.01: ICD-10-CM

## 2017-04-17 DIAGNOSIS — I48.1: ICD-10-CM

## 2017-04-17 DIAGNOSIS — E86.0: ICD-10-CM

## 2017-04-17 DIAGNOSIS — H91.90: ICD-10-CM

## 2017-04-17 DIAGNOSIS — N17.9: ICD-10-CM

## 2017-04-17 DIAGNOSIS — I27.2: ICD-10-CM

## 2017-04-17 DIAGNOSIS — Z79.899: ICD-10-CM

## 2017-04-17 DIAGNOSIS — N18.3: ICD-10-CM

## 2017-04-17 LAB
ALP SERPL-CCNC: 75 U/L (ref 38–126)
ALT SERPL-CCNC: 27 U/L (ref 9–52)
ANION GAP SERPL CALC-SCNC: 13 MMOL/L
APTT BLD: 23 SEC (ref 22–30)
AST SERPL-CCNC: 33 U/L (ref 14–36)
BASOPHILS # BLD AUTO: 0.1 K/UL (ref 0–0.2)
BASOPHILS NFR BLD AUTO: 1 %
BUN SERPL-SCNC: 34 MG/DL (ref 7–17)
CALCIUM SPEC-MCNC: 10.1 MG/DL (ref 8.4–10.2)
CH: 32.3
CHCM: 33.3
CHLORIDE SERPL-SCNC: 96 MMOL/L (ref 98–107)
CK SERPL-CCNC: 108 U/L (ref 30–135)
CO2 SERPL-SCNC: 30 MMOL/L (ref 22–30)
EOSINOPHIL # BLD AUTO: 0.2 K/UL (ref 0–0.7)
EOSINOPHIL NFR BLD AUTO: 2 %
ERYTHROCYTE [DISTWIDTH] IN BLOOD BY AUTOMATED COUNT: 4.51 M/UL (ref 3.8–5.4)
ERYTHROCYTE [DISTWIDTH] IN BLOOD: 14.9 % (ref 11.5–15.5)
GLUCOSE SERPL-MCNC: 97 MG/DL (ref 74–99)
HCT VFR BLD AUTO: 44.1 % (ref 34–46)
HDW: 2.55
HGB BLD-MCNC: 14.5 GM/DL (ref 11.4–16)
INR PPP: 1.1 (ref ?–1.1)
LUC NFR BLD AUTO: 1 %
LYMPHOCYTES # SPEC AUTO: 0.9 K/UL (ref 1–4.8)
LYMPHOCYTES NFR SPEC AUTO: 11 %
MCH RBC QN AUTO: 32.1 PG (ref 25–35)
MCHC RBC AUTO-ENTMCNC: 32.9 G/DL (ref 31–37)
MCV RBC AUTO: 97.6 FL (ref 80–100)
MONOCYTES # BLD AUTO: 0.4 K/UL (ref 0–1)
MONOCYTES NFR BLD AUTO: 5 %
NEUTROPHILS # BLD AUTO: 5.9 K/UL (ref 1.3–7.7)
NEUTROPHILS NFR BLD AUTO: 79 %
NON-AFRICAN AMERICAN GFR(MDRD): 30
POTASSIUM SERPL-SCNC: 4.1 MMOL/L (ref 3.5–5.1)
PROT SERPL-MCNC: 8 G/DL (ref 6.3–8.2)
PT BLD: 11.2 SEC (ref 9–12)
SODIUM SERPL-SCNC: 139 MMOL/L (ref 137–145)
TROPONIN I SERPL-MCNC: <0.012 NG/ML (ref 0–0.03)
WBC # BLD AUTO: 0.09 10*3/UL
WBC # BLD AUTO: 7.5 K/UL (ref 3.8–10.6)
WBC (PEROX): 7.27

## 2017-04-17 PROCEDURE — 87077 CULTURE AEROBIC IDENTIFY: CPT

## 2017-04-17 PROCEDURE — 87040 BLOOD CULTURE FOR BACTERIA: CPT

## 2017-04-17 PROCEDURE — 80053 COMPREHEN METABOLIC PANEL: CPT

## 2017-04-17 PROCEDURE — 87070 CULTURE OTHR SPECIMN AEROBIC: CPT

## 2017-04-17 PROCEDURE — 85730 THROMBOPLASTIN TIME PARTIAL: CPT

## 2017-04-17 PROCEDURE — 99285 EMERGENCY DEPT VISIT HI MDM: CPT

## 2017-04-17 PROCEDURE — 96366 THER/PROPH/DIAG IV INF ADDON: CPT

## 2017-04-17 PROCEDURE — 87502 INFLUENZA DNA AMP PROBE: CPT

## 2017-04-17 PROCEDURE — 36415 COLL VENOUS BLD VENIPUNCTURE: CPT

## 2017-04-17 PROCEDURE — 82553 CREATINE MB FRACTION: CPT

## 2017-04-17 PROCEDURE — 96375 TX/PRO/DX INJ NEW DRUG ADDON: CPT

## 2017-04-17 PROCEDURE — 96365 THER/PROPH/DIAG IV INF INIT: CPT

## 2017-04-17 PROCEDURE — 82550 ASSAY OF CK (CPK): CPT

## 2017-04-17 PROCEDURE — 94760 N-INVAS EAR/PLS OXIMETRY 1: CPT

## 2017-04-17 PROCEDURE — 87205 SMEAR GRAM STAIN: CPT

## 2017-04-17 PROCEDURE — 85610 PROTHROMBIN TIME: CPT

## 2017-04-17 PROCEDURE — 85025 COMPLETE CBC W/AUTO DIFF WBC: CPT

## 2017-04-17 PROCEDURE — 71020: CPT

## 2017-04-17 PROCEDURE — 80048 BASIC METABOLIC PNL TOTAL CA: CPT

## 2017-04-17 PROCEDURE — 87186 SC STD MICRODIL/AGAR DIL: CPT

## 2017-04-17 PROCEDURE — 94640 AIRWAY INHALATION TREATMENT: CPT

## 2017-04-17 PROCEDURE — 93005 ELECTROCARDIOGRAM TRACING: CPT

## 2017-04-17 PROCEDURE — 84484 ASSAY OF TROPONIN QUANT: CPT

## 2017-04-17 RX ADMIN — APIXABAN SCH MG: 2.5 TABLET, FILM COATED ORAL at 22:12

## 2017-04-17 RX ADMIN — METHYLPREDNISOLONE SODIUM SUCCINATE SCH MG: 125 INJECTION, POWDER, FOR SOLUTION INTRAMUSCULAR; INTRAVENOUS at 17:23

## 2017-04-17 RX ADMIN — BUDESONIDE AND FORMOTEROL FUMARATE DIHYDRATE SCH PUFF: 80; 4.5 AEROSOL RESPIRATORY (INHALATION) at 21:38

## 2017-04-17 RX ADMIN — METHYLPREDNISOLONE SODIUM SUCCINATE SCH MG: 125 INJECTION, POWDER, FOR SOLUTION INTRAMUSCULAR; INTRAVENOUS at 23:24

## 2017-04-17 RX ADMIN — IPRATROPIUM BROMIDE AND ALBUTEROL SULFATE PRN ML: .5; 3 SOLUTION RESPIRATORY (INHALATION) at 21:38

## 2017-04-17 RX ADMIN — Medication SCH MG: at 22:12

## 2017-04-17 RX ADMIN — METOPROLOL TARTRATE SCH MG: 50 TABLET, FILM COATED ORAL at 22:12

## 2017-04-17 NOTE — XR
EXAMINATION TYPE: XR chest 2V

 

DATE OF EXAM: 4/17/2017 1:58 PM

 

COMPARISON: 1/7/2017 and 2/2/2015.

 

HISTORY: 83-year-old female difficulty breathing

 

TECHNIQUE:  PA and lateral views

 

FINDINGS:  

The heart is normal size. Aorta within normal limits. Strandy atelectasis at the left base. A few isael
e interstitial prominence with hyperinflation and flattening hemidiaphragms. Suggestion of a calcifie
d granuloma anterior medial right mid lung, unchanged from 2/2/2015. No consolidation or pleural effu
leslye.

 

 

IMPRESSION:  

COPD with strandy left basilar atelectasis. No acute process seen.

## 2017-04-17 NOTE — ED
SOB HPI





- General


Chief Complaint: Shortness of Breath


Stated Complaint: Cough


Time Seen by Provider: 17 13:23


Source: patient


Mode of arrival: ambulatory


Limitations: no limitations





- History of Present Illness


Initial Comments: 





83-year-old white female presents with the complaint of coughing and shortness 

of breath.  She has had this for the last 9 days.  He states that she's had a 

greenish then brownish production.  The shortness of breath is worse with any 

exertion.  She denies any chest pain.  She does have a history of COPD.  She 

normally uses her nebulizer as needed.  She's been utilizing it regularly for 

the past several days.  She admits some mild relief with the nebulized 

treatments.  She has not been on any antibiotics.  She is up-to-date with her 

pneumonia shot and immunizations.  She denies any known fever.  She does not 

utilize oxygen at home.  She quit utilizing tobacco 12 years ago.  No other 

complaints or modifying factors.





- Related Data


 Home Medications











 Medication  Instructions  Recorded  Confirmed


 


Albuterol Inhaler [Ventolin Hfa 1 - 2 puff INHALATION Q6HR PRN 17





Inhaler]   


 


Cholecalciferol [Vitamin D3] 4,000 unit PO DAILY 17


 


Fluticasone/Salmeterol [Advair 1 puff INHALATION RT-BID 17





250-50 Diskus]   


 


LORazepam [Ativan] 1 mg PO BID PRN 17


 


Tiotropium 18 Mcg/Puff [Spiriva] 1 cap INHALATION RT-DAILY 17


 


Amiodarone [Cordarone] 200 mg PO DAILY 17


 


Apixaban [Eliquis] 2.5 mg PO BID 17








 Previous Rx's











 Medication  Instructions  Recorded


 


Furosemide [Lasix] 40 mg PO DAILY #30 tablet 17


 


Melatonin 3 mg PO HS  tablet 17


 


Metoprolol Tartrate [Lopressor] 50 mg PO BID #60 tab 17


 


Spironolactone [Aldactone] 25 mg PO DAILY #30 tab 17











 Allergies











Allergy/AdvReac Type Severity Reaction Status Date / Time


 


cefuroxime axetil AdvReac  Unknown Verified 17 14:06





[From Ceftin]     














Review of Systems


ROS Statement: 


Those systems with pertinent positive or pertinent negative responses have been 

documented in the HPI.





ROS Other: All systems not noted in ROS Statement are negative.





Past Medical History


Past Medical History: Atrial Fibrillation, COPD, Hypertension


Additional Past Medical History / Comment(s): Generalized anxiety disorder, 

chronic kidney disease stage III. hearing impaired


History of Any Multi-Drug Resistant Organisms: None Reported


Past Surgical History: Adenoidectomy, Appendectomy, Hysterectomy, Orthopedic 

Surgery, Tonsillectomy


Additional Past Surgical History / Comment(s): Right knee arthroscopy for torn 

meniscus. Colonoscopies x 3 total.  Bilateral eyelid surgery.


Past Anesthesia/Blood Transfusion Reactions: No Reported Reaction


Additional Past Anesthesia/Blood Transfusion Reaction / Comment(s): Pt has 

never recieved blood.


Past Psychological History: Anxiety


Additional Psychological History / Comment(s): Pt is .  She lives alone 

in her single level home in [facility]. She is very independent. She 

rides bicycle and is an avid bowler.  She drives a car.  She has a niece that 

lives nearby if she needs someone.  She shovels her own driveway.


Smoking Status: Former smoker


Past Alcohol Use History: Occasional


Additional Past Alcohol Use History / Comment(s): Patient was a smoker one pack 

per day 54 years.  She quit 12 years ago.  She drinks alcohol daily 1-2 drinks 

if she is at home.  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 

any recent travel.  She has no pets in the home.


Past Drug Use History: None Reported





- Past Family History


  ** Father


Family Medical History: Cancer


Additional Family Medical History / Comment(s): Father  at age 77yrs of 

cancer which pt believes may have started in his throat.





  ** Mother


Family Medical History: Dementia


Additional Family Medical History / Comment(s): Mother  at age 95yrs.  She 

was very healthy most of her life- she had dementia at the very end of her life.





General Exam





- General Exam Comments


Initial Comments: 





GENERAL: The patient is well nourished and well hydrated. 


VITAL SIGNS: Heart rate, blood pressure, respiratory rate reviewed as recorded 

in nurse's notes. 


EYES: Pupils are round and reactive. Extraocular movements are intact. No 

conjunctival / lid redness or swelling. 


ENT: No external evidence of injury, swelling, or ecchymosis. Airway is patent. 

Throat is clear.  Mucous membranes are dry.


NECK: Nontender. No swelling or evidence of injury. No subcutaneous emphysema. 

Trachea is midline. No thyroid mass. 


HEART: Regular rate and rhythm. Good peripheral pulses. 


LUNGS/CHEST: There is mild wheezing noted bilaterally.  No ecchymosis, 

subcutaneous emphysema, or tenderness. 


ABDOMEN: Abdomen soft without tenderness. No palpable masses or organomegaly. 

No peritoneal signs. No abdominal wall swelling or ecchymosis. 


EXTREMITIES: No extremity tenderness. Normal muscle tone and function. No 

thoracolumbar tenderness. 


NEUROLOGIC: Sensation is grossly intact. Cranial nerve exam reveals face is 

symmetrical, tongue is midline, speech is clear. 


SKIN: No abrasions or ecchymosis is noted. No induration or masses noted. 


PSYCHIATRIC: Alert and oriented. Appropriate behavior and judgment.





Limitations: no limitations





Course


 Vital Signs











  17





  13:10 14:07 14:13


 


Temperature 97.0 F L  


 


Pulse Rate 64 51 L 50 L


 


Respiratory 18  





Rate   


 


Blood Pressure 171/74  


 


O2 Sat by Pulse 94 L  





Oximetry   














Medical Decision Making





- Medical Decision Making





The patient was seen and examined.  All diagnostics are reviewed.  EKG shows a 

normal sinus rhythm with a left anterior fascicular block.  There is some 

nonspecific ST-T wave changes noted in the lateral leads.  The SC interval is 1:

30, QRS durations 100, and QTC intervals 364.  Patient receives an IV as well 

as some Solu-Medrol and Zithromax intravenously.  She receives a DuoNeb 

breathing treatment.  She received some IV fluid hydration.  The x-ray shows 

evidence of COPD but no definite infiltrate.  The laboratory shows that her 

creatinine has increased consistent with acute kidney injury.  It is felt as 

though she benefit from admission to the hospital for further treatment of 

bronchitis, COPD exacerbation and acute kidney injury.  She is agreeable.  Case 

is discussed with internal medicine and they're agreeable to admission as well.





- Lab Data


Result diagrams: 


 17 13:44





 17 13:44


 Lab Results











  17 Range/Units





  13:44 13:44 13:44 


 


WBC   7.5   (3.8-10.6)  k/uL


 


RBC   4.51   (3.80-5.40)  m/uL


 


Hgb   14.5   (11.4-16.0)  gm/dL


 


Hct   44.1   (34.0-46.0)  %


 


MCV   97.6   (80.0-100.0)  fL


 


MCH   32.1   (25.0-35.0)  pg


 


MCHC   32.9   (31.0-37.0)  g/dL


 


RDW   14.9   (11.5-15.5)  %


 


Plt Count   195   (150-450)  k/uL


 


Neutrophils %   79   %


 


Lymphocytes %   11   %


 


Monocytes %   5   %


 


Eosinophils %   2   %


 


Basophils %   1   %


 


Neutrophils #   5.9   (1.3-7.7)  k/uL


 


Lymphocytes #   0.9 L   (1.0-4.8)  k/uL


 


Monocytes #   0.4   (0-1.0)  k/uL


 


Eosinophils #   0.2   (0-0.7)  k/uL


 


Basophils #   0.1   (0-0.2)  k/uL


 


PT     (9.0-12.0)  sec


 


INR     (<1.1)  


 


APTT     (22.0-30.0)  sec


 


Sodium    139  (137-145)  mmol/L


 


Potassium    4.1  (3.5-5.1)  mmol/L


 


Chloride    96 L  ()  mmol/L


 


Carbon Dioxide    30  (22-30)  mmol/L


 


Anion Gap    13  mmol/L


 


BUN    34 H  (7-17)  mg/dL


 


Creatinine    1.63 H  (0.52-1.04)  mg/dL


 


Est GFR (MDRD) Af Amer    37  (>60 ml/min/1.73 sqM)  


 


Est GFR (MDRD) Non-Af    30  (>60 ml/min/1.73 sqM)  


 


Glucose    97  (74-99)  mg/dL


 


Calcium    10.1  (8.4-10.2)  mg/dL


 


Total Bilirubin    0.9  (0.2-1.3)  mg/dL


 


AST    33  (14-36)  U/L


 


ALT    27  (9-52)  U/L


 


Alkaline Phosphatase    75  ()  U/L


 


Total Creatine Kinase  108    ()  U/L


 


CK-MB (CK-2)  2.9 H*    (0.0-2.4)  ng/mL


 


CK-MB (CK-2) Rel Index  2.7    


 


Troponin I  <0.012    (0.000-0.034)  ng/mL


 


Total Protein    8.0  (6.3-8.2)  g/dL


 


Albumin    5.0  (3.5-5.0)  g/dL


 


Influenza Type A RNA     (Not Detectd)  


 


Influenza Type B (PCR)     (Not Detectd)  














  17 Range/Units





  13:44 13:48 


 


WBC    (3.8-10.6)  k/uL


 


RBC    (3.80-5.40)  m/uL


 


Hgb    (11.4-16.0)  gm/dL


 


Hct    (34.0-46.0)  %


 


MCV    (80.0-100.0)  fL


 


MCH    (25.0-35.0)  pg


 


MCHC    (31.0-37.0)  g/dL


 


RDW    (11.5-15.5)  %


 


Plt Count    (150-450)  k/uL


 


Neutrophils %    %


 


Lymphocytes %    %


 


Monocytes %    %


 


Eosinophils %    %


 


Basophils %    %


 


Neutrophils #    (1.3-7.7)  k/uL


 


Lymphocytes #    (1.0-4.8)  k/uL


 


Monocytes #    (0-1.0)  k/uL


 


Eosinophils #    (0-0.7)  k/uL


 


Basophils #    (0-0.2)  k/uL


 


PT  11.2   (9.0-12.0)  sec


 


INR  1.1   (<1.1)  


 


APTT  23.0   (22.0-30.0)  sec


 


Sodium    (137-145)  mmol/L


 


Potassium    (3.5-5.1)  mmol/L


 


Chloride    ()  mmol/L


 


Carbon Dioxide    (22-30)  mmol/L


 


Anion Gap    mmol/L


 


BUN    (7-17)  mg/dL


 


Creatinine    (0.52-1.04)  mg/dL


 


Est GFR (MDRD) Af Amer    (>60 ml/min/1.73 sqM)  


 


Est GFR (MDRD) Non-Af    (>60 ml/min/1.73 sqM)  


 


Glucose    (74-99)  mg/dL


 


Calcium    (8.4-10.2)  mg/dL


 


Total Bilirubin    (0.2-1.3)  mg/dL


 


AST    (14-36)  U/L


 


ALT    (9-52)  U/L


 


Alkaline Phosphatase    ()  U/L


 


Total Creatine Kinase    ()  U/L


 


CK-MB (CK-2)    (0.0-2.4)  ng/mL


 


CK-MB (CK-2) Rel Index    


 


Troponin I    (0.000-0.034)  ng/mL


 


Total Protein    (6.3-8.2)  g/dL


 


Albumin    (3.5-5.0)  g/dL


 


Influenza Type A RNA   Not Detected  (Not Detectd)  


 


Influenza Type B (PCR)   Not Detected  (Not Detectd)  














Disposition


Clinical Impression: 


 Bronchitis, Acute exacerbation of chronic obstructive pulmonary disease (COPD)

, Acute kidney injury, Dehydration, Acute bronchospasm





Disposition: ADMITTED AS IP TO THIS HOSP


Condition: Fair


Time of Disposition: 15:01


Decision Date: 17


Decision Time: 15:01

## 2017-04-18 LAB
GLUCOSE BLD-MCNC: 181 MG/DL (ref 75–99)
GLUCOSE BLD-MCNC: 202 MG/DL (ref 75–99)

## 2017-04-18 RX ADMIN — Medication SCH UNIT: at 08:19

## 2017-04-18 RX ADMIN — INSULIN LISPRO SCH UNIT: 100 INJECTION, SOLUTION INTRAVENOUS; SUBCUTANEOUS at 20:54

## 2017-04-18 RX ADMIN — APIXABAN SCH MG: 2.5 TABLET, FILM COATED ORAL at 08:18

## 2017-04-18 RX ADMIN — METHYLPREDNISOLONE SODIUM SUCCINATE SCH MG: 40 INJECTION, POWDER, FOR SOLUTION INTRAMUSCULAR; INTRAVENOUS at 17:22

## 2017-04-18 RX ADMIN — IPRATROPIUM BROMIDE AND ALBUTEROL SULFATE SCH ML: .5; 3 SOLUTION RESPIRATORY (INHALATION) at 20:35

## 2017-04-18 RX ADMIN — AMIODARONE HYDROCHLORIDE SCH MG: 200 TABLET ORAL at 08:19

## 2017-04-18 RX ADMIN — METHYLPREDNISOLONE SODIUM SUCCINATE SCH MG: 125 INJECTION, POWDER, FOR SOLUTION INTRAMUSCULAR; INTRAVENOUS at 12:16

## 2017-04-18 RX ADMIN — IPRATROPIUM BROMIDE AND ALBUTEROL SULFATE PRN ML: .5; 3 SOLUTION RESPIRATORY (INHALATION) at 08:26

## 2017-04-18 RX ADMIN — METOPROLOL TARTRATE SCH: 50 TABLET, FILM COATED ORAL at 08:16

## 2017-04-18 RX ADMIN — SPIRONOLACTONE SCH MG: 25 TABLET, FILM COATED ORAL at 08:19

## 2017-04-18 RX ADMIN — INSULIN LISPRO SCH UNIT: 100 INJECTION, SOLUTION INTRAVENOUS; SUBCUTANEOUS at 17:23

## 2017-04-18 RX ADMIN — BUDESONIDE AND FORMOTEROL FUMARATE DIHYDRATE SCH PUFF: 80; 4.5 AEROSOL RESPIRATORY (INHALATION) at 08:26

## 2017-04-18 RX ADMIN — METHYLPREDNISOLONE SODIUM SUCCINATE SCH MG: 125 INJECTION, POWDER, FOR SOLUTION INTRAMUSCULAR; INTRAVENOUS at 05:52

## 2017-04-18 RX ADMIN — IPRATROPIUM BROMIDE AND ALBUTEROL SULFATE SCH ML: .5; 3 SOLUTION RESPIRATORY (INHALATION) at 14:07

## 2017-04-18 RX ADMIN — APIXABAN SCH MG: 2.5 TABLET, FILM COATED ORAL at 20:02

## 2017-04-18 RX ADMIN — Medication SCH MG: at 20:02

## 2017-04-18 RX ADMIN — METOPROLOL TARTRATE SCH MG: 50 TABLET, FILM COATED ORAL at 20:02

## 2017-04-18 RX ADMIN — AZITHROMYCIN MONOHYDRATE SCH MLS/HR: 500 INJECTION, POWDER, LYOPHILIZED, FOR SOLUTION INTRAVENOUS at 12:15

## 2017-04-18 RX ADMIN — BUDESONIDE AND FORMOTEROL FUMARATE DIHYDRATE SCH PUFF: 80; 4.5 AEROSOL RESPIRATORY (INHALATION) at 20:35

## 2017-04-18 NOTE — HP
DATE OF ADMISSION:  2017



PRESENTING COMPLAINT: Cough, short of breath. 



HISTORY OF PRESENTING COMPLAINT: This is a pleasant 83-year-old 

patient of Dr. Anaya whose chronic stable medical conditions include 

CHF, atrial fibrillation, osteoarthritis, chronic kidney disease, COPD 

which is at present in exacerbation and secondary pulmonary 

hypertension. Patient presents with one week of increasing amount of 

cough, brown sputum; had a fever for one day; wheezing, brown sputum; 

admitted for the same.  



REVIEW OF SYSTEMS: 

CONSTITUTIONAL: Weak, tired. 

HEENT: None. 

RESPIRATORY: As above. 

CARDIOVASCULAR: None. 

GASTROINTESTINAL: None.

GENITOURINARY: None. 

MUSCULOSKELETAL: Pain in the joints. 

DERMATOLOGICAL: None. 

HEMATOLOGICAL: None. 

LYMPHATICS: None. 

PSYCHIATRY: None. 

NEUROLOGICAL: None. 



PAST MEDICAL HISTORY: 

1. CHF; EF 20%. 

2. Atrial fibrillation. 

3. Arthritis. 

4. Chronic kidney disease. 

5. COPD. 

6. Moderate tricuspid regurgitation. 

7. Secondary pulmonary hypertension. 

8. Coronary artery disease. 



PAST SURGICAL HISTORY: 

1. Adenoidectomy. 

2. Appendectomy. 

3. Hysterectomy. 

4. Orthopedic surgery. 

5. Tonsillectomy. 

6. Right knee arthroscopy for torn meniscus. 

7. Bilateral eye surgery. 



SOCIAL HISTORY: Patient is a , lives by herself. Rides a bicycle. 

Is an avid bowler. Niece lives nearby. Patient smoked for 54 years; 

quit about 12 years ago. She drinks 1 or 2 alcoholic drinks per day. 

She was a seamstress.  



FAMILY HISTORY: Father  of throat cancer. 



HOME MEDICATIONS: 

1. Ventolin HFA one to two puffs q.6 p.r.n. 

2. Spiriva 1 capsule daily. 

3. Melatonin 3 mg at bedtime. 

4. Ativan 1 mg p.o. b.i.d. p.r.n. 

5. Eliquis 2.5 mg p.o. b.i.d. 

6. Aldactone 25 mg p.o. daily. 

7. Lopressor 50 mg p.o. b.i.d.

8. Lasix 40 mg p.o. daily. 

9. Advair 250/50 one puff b.i.d. 

10. Vitamin D3, 4000 units p.o. daily. 

11. Amiodarone 200 mg p.o. daily. 



ALLERGIES: CEFTIN. 



PHYSICAL EXAMINATION: 

VITAL SIGNS ON PRESENTATION: Temperature 97, pulse 64, respiration 18, 

blood pressure 171/74, pulse ox 94% on room air. Repeat blood pressure 

104/56.  

GENERAL APPEARANCE: Thin build. Sitting up, not in distress. 

EYES: Pupils equal. Conjunctivae normal. 

HEENT: External appearance of nose and ears normal. Oral cavity normal. 

NECK: JVD not raised. Mass not palpable. 

RESPIRATORY: Effort increased. 

LUNGS: Diminished breath sounds. Prolonged expiration and wheezing. 

CARDIOVASCULAR: First and second sounds normal. No edema. 

ABDOMEN: Soft, nontender. Liver and spleen not palpable. 

LYMPHATICS: No lymph node palpable in neck or axillae. 

PSYCHIATRY: Alert and oriented x3. Mood and affect normal. 

NEUROLOGICAL: Pupils equal. Cranial nerves grossly intact. Power and 

sensation grossly intact.  



INVESTIGATIONS: White count 7.5, hemoglobin 14.5, potassium 4.1. BUN 

34, creatinine 1.63. Patient's BUN and creatinine were 22 and 1.20 on 

17. Chest x-ray reviewed; it shows no obvious infiltrate.  



ASSESSMENT: 

1. Acute severe chronic obstructive pulmonary disease exacerbation in 

an ex-smoker from acute tracheobronchitis.  

2. Chronic kidney disease, stage III, from hypertensive 

nephrosclerosis.  

3. Chronic congestive heart failure from systolic dysfunction from 

underlying coronary artery disease; ejection fraction 20%.  

4. Persistent atrial fibrillation. 

5. Moderate tricuspid regurgitation, non-rheumatic. 

6. Secondary pulmonary hypertension secondary to chronic obstructive 

pulmonary disease.  

7. Coronary artery disease with non-obstructing lesion in the left 

anterior descending coronary artery.  



PLAN: Home medications are resumed. Will temporarily hold off 

patient's Lasix. Patient was put on antibiotics, nebulized 

bronchodilators, steroids. Care was discussed with the patient. 

Patient was put back on Eliquis. Patient's chest x-ray shows normal 

sinus rhythm. Will hydrate the patient gently, see if there is any 

acute component of renal failure. Pulmonary is consulted.

## 2017-04-19 LAB
ANION GAP SERPL CALC-SCNC: 14 MMOL/L
BUN SERPL-SCNC: 32 MG/DL (ref 7–17)
CALCIUM SPEC-MCNC: 9.6 MG/DL (ref 8.4–10.2)
CHLORIDE SERPL-SCNC: 105 MMOL/L (ref 98–107)
CO2 SERPL-SCNC: 20 MMOL/L (ref 22–30)
GLUCOSE BLD-MCNC: 111 MG/DL (ref 75–99)
GLUCOSE BLD-MCNC: 129 MG/DL (ref 75–99)
GLUCOSE BLD-MCNC: 138 MG/DL (ref 75–99)
GLUCOSE BLD-MCNC: 170 MG/DL (ref 75–99)
GLUCOSE SERPL-MCNC: 243 MG/DL (ref 74–99)
NON-AFRICAN AMERICAN GFR(MDRD): 46
POTASSIUM SERPL-SCNC: 4.1 MMOL/L (ref 3.5–5.1)
SODIUM SERPL-SCNC: 139 MMOL/L (ref 137–145)

## 2017-04-19 RX ADMIN — IPRATROPIUM BROMIDE AND ALBUTEROL SULFATE SCH ML: .5; 3 SOLUTION RESPIRATORY (INHALATION) at 08:03

## 2017-04-19 RX ADMIN — INSULIN LISPRO SCH: 100 INJECTION, SOLUTION INTRAVENOUS; SUBCUTANEOUS at 12:10

## 2017-04-19 RX ADMIN — INSULIN LISPRO SCH UNIT: 100 INJECTION, SOLUTION INTRAVENOUS; SUBCUTANEOUS at 21:19

## 2017-04-19 RX ADMIN — IPRATROPIUM BROMIDE AND ALBUTEROL SULFATE SCH ML: .5; 3 SOLUTION RESPIRATORY (INHALATION) at 20:33

## 2017-04-19 RX ADMIN — AZITHROMYCIN MONOHYDRATE SCH MLS/HR: 500 INJECTION, POWDER, LYOPHILIZED, FOR SOLUTION INTRAVENOUS at 14:27

## 2017-04-19 RX ADMIN — METHYLPREDNISOLONE SODIUM SUCCINATE SCH MG: 40 INJECTION, POWDER, FOR SOLUTION INTRAMUSCULAR; INTRAVENOUS at 08:14

## 2017-04-19 RX ADMIN — APIXABAN SCH MG: 2.5 TABLET, FILM COATED ORAL at 08:15

## 2017-04-19 RX ADMIN — BUDESONIDE AND FORMOTEROL FUMARATE DIHYDRATE SCH PUFF: 80; 4.5 AEROSOL RESPIRATORY (INHALATION) at 08:03

## 2017-04-19 RX ADMIN — APIXABAN SCH MG: 2.5 TABLET, FILM COATED ORAL at 20:09

## 2017-04-19 RX ADMIN — Medication SCH MG: at 21:53

## 2017-04-19 RX ADMIN — IPRATROPIUM BROMIDE AND ALBUTEROL SULFATE SCH ML: .5; 3 SOLUTION RESPIRATORY (INHALATION) at 11:37

## 2017-04-19 RX ADMIN — Medication SCH UNIT: at 08:15

## 2017-04-19 RX ADMIN — BUDESONIDE AND FORMOTEROL FUMARATE DIHYDRATE SCH PUFF: 80; 4.5 AEROSOL RESPIRATORY (INHALATION) at 20:33

## 2017-04-19 RX ADMIN — IPRATROPIUM BROMIDE AND ALBUTEROL SULFATE SCH: .5; 3 SOLUTION RESPIRATORY (INHALATION) at 20:32

## 2017-04-19 RX ADMIN — SPIRONOLACTONE SCH MG: 25 TABLET, FILM COATED ORAL at 08:14

## 2017-04-19 RX ADMIN — METOPROLOL TARTRATE SCH MG: 50 TABLET, FILM COATED ORAL at 08:15

## 2017-04-19 RX ADMIN — METHYLPREDNISOLONE SODIUM SUCCINATE SCH MG: 40 INJECTION, POWDER, FOR SOLUTION INTRAMUSCULAR; INTRAVENOUS at 17:44

## 2017-04-19 RX ADMIN — INSULIN LISPRO SCH UNIT: 100 INJECTION, SOLUTION INTRAVENOUS; SUBCUTANEOUS at 08:19

## 2017-04-19 RX ADMIN — INSULIN LISPRO SCH: 100 INJECTION, SOLUTION INTRAVENOUS; SUBCUTANEOUS at 17:44

## 2017-04-19 RX ADMIN — METHYLPREDNISOLONE SODIUM SUCCINATE SCH MG: 40 INJECTION, POWDER, FOR SOLUTION INTRAMUSCULAR; INTRAVENOUS at 23:51

## 2017-04-19 RX ADMIN — METOPROLOL TARTRATE SCH MG: 50 TABLET, FILM COATED ORAL at 20:09

## 2017-04-19 RX ADMIN — METHYLPREDNISOLONE SODIUM SUCCINATE SCH MG: 40 INJECTION, POWDER, FOR SOLUTION INTRAMUSCULAR; INTRAVENOUS at 00:03

## 2017-04-19 NOTE — P.CNPUL
History of Present Illness


Consult date: 17


Reason for consult: dyspnea, cough, COPD


Chief complaint: Shortness of breath and cough


History of present illness: 





83-year-old female with a history of cough and shortness of breath.  The going 

on for about 10 days prior to admission.  Getting worse.  In addition, she is 

wheezing.  She's coughing up greenish brownish phlegm.  Not coughing up any 

blood.  No fever no chills chills.  No chest pain or chest discomfort.  The 

patient has been using her nebulizer machine at home.  She is feeling generally 

better.  He smoked number of years.  We figure she smoked 57 years at about a 

pack a day.  The patient gave up smoking a while back.  Anyway the patient is 

quite short of breath as mentioned above.





Review of Systems





A 12 point review of system is positive for cough and shortness of breath under 

the pulmonary system.  The rest of the 12 point review of system is 

unremarkable.  The patient does feel much better today compared to yesterday.





Past Medical History


Past Medical History: Atrial Fibrillation, COPD, Hearing Disorder / Deafness, 

Hypertension, Respiratory Disorder


Additional Past Medical History / Comment(s): Generalized anxiety disorder, 

chronic kidney disease stage III. hearing impaired


History of Any Multi-Drug Resistant Organisms: None Reported


Past Surgical History: Adenoidectomy, Appendectomy, Hysterectomy, Orthopedic 

Surgery, Tonsillectomy


Additional Past Surgical History / Comment(s): Right knee arthroscopy for torn 

meniscus. Colonoscopies x 3 total.  Bilateral eyelid surgery.


Past Anesthesia/Blood Transfusion Reactions: No Reported Reaction


Additional Past Anesthesia/Blood Transfusion Reaction / Comment(s): Pt has 

never recieved blood.


Past Psychological History: Anxiety


Additional Psychological History / Comment(s): Pt is .  She lives alone 

in her single level home in [facility]. She is very independent. She 

rides bicycle and is an avid bowler.  She drives a car.  She has a niece that 

lives nearby if she needs someone.  She shovels her own driveway.


Smoking Status: Former smoker


Past Alcohol Use History: Occasional


Additional Past Alcohol Use History / Comment(s): Patient was a smoker one pack 

per day 54 years.  She quit 12 years ago.  She drinks alcohol daily 1-2 drinks 

if she is at home.  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 

any recent travel.  She has no pets in the home.


Past Drug Use History: None Reported





- Past Family History


  ** Father


Family Medical History: Cancer


Additional Family Medical History / Comment(s): Father  at age 77yrs of 

cancer which pt believes may have started in his throat.





  ** Mother


Family Medical History: Dementia


Additional Family Medical History / Comment(s): Mother  at age 95yrs.  She 

was very healthy most of her life- she had dementia at the very end of her life.





Medications and Allergies


 Home Medications











 Medication  Instructions  Recorded  Confirmed  Type


 


Albuterol Inhaler [Ventolin Hfa 1 - 2 puff INHALATION RT-Q6H PRN 17 History





Inhaler]    


 


Cholecalciferol [Vitamin D3] 4,000 unit PO DAILY 17 History


 


Fluticasone/Salmeterol [Advair 1 puff INHALATION RT-BID 17 

History





250-50 Diskus]    


 


LORazepam [Ativan] 1 mg PO BID PRN 17 History


 


Tiotropium 18 Mcg/Puff [Spiriva] 1 cap INHALATION RT-DAILY 17 

History


 


Amiodarone [Cordarone] 200 mg PO DAILY 17 History


 


Apixaban [Eliquis] 2.5 mg PO BID 17 History











 Allergies











Allergy/AdvReac Type Severity Reaction Status Date / Time


 


cefuroxime axetil AdvReac  Unknown Verified 17 14:06





[From Ceftin]     














Physical Exam


Osteopathic Statement: *.  No significant issues noted on an osteopathic 

structural exam other than those noted in the History and Physical/Consult.


Vitals: 


 Vital Signs











  Temp Pulse Pulse Pulse Resp BP Pulse Ox


 


 17 11:47   72     


 


 17 11:37   70     


 


 17 08:13   72     


 


 17 08:03   70     


 


 17 07:00  97.0 F L   65   20  139/71  94 L


 


 17 22:58  97.1 F L    83  16  116/60  94 L


 


 17 20:54   72     


 


 17 20:36   72     


 


 17 16:00     71  18  








 Intake and Output











 17





 06:59 14:59 22:59


 


Intake Total  480 


 


Balance  480 


 


Intake:   


 


  Oral  480 


 


Other:   


 


  # Voids 2  














No acute distress, oriented 3.  Not receiving any supplemental oxygen.





HEENT examination is grossly unremarkable.  Mucous membranes are moist.





Neck supple.  Full range of motion.  No adenopathy or thyromegaly.  Neck veins 

are flat.





Cardiovascular examination reveals regular rhythm rate.  S2 normal.  No S3-S4 

or murmur.





Lungs reveal mostly clear breath sounds.  No wheezes rhonchi or crackles.  

Breath sounds are diminished.





Abdomen soft bowel sounds are heard.





Extremities are intact.  No cyanosis clubbing or edema.





Skin examination is without lesion or rash.





Neurologic examination is brief but nonfocal.





Results





- Laboratory Findings


CBC and BMP: 


 17 13:44





 17 08:59


PT/INR, D-dimer











PT  11.2 sec (9.0-12.0)   17  13:44    


 


INR  1.1  (<1.1)   17  13:44    








Abnormal lab findings: 


 Abnormal Labs











  17





  16:47 20:26 07:23


 


Carbon Dioxide   


 


BUN   


 


Creatinine   


 


Glucose   


 


POC Glucose (mg/dL)  181 H  202 H  138 H














  17





  08:59 11:42


 


Carbon Dioxide  20 L 


 


BUN  32 H 


 


Creatinine  1.14 H 


 


Glucose  243 H 


 


POC Glucose (mg/dL)   111 H














- Diagnostic Findings


Chest x-ray: image reviewed (Chest x-ray labs and medications are all reviewed.

  The patient was examined and interviewed.)





Assessment and Plan


(1) Acute exacerbation of chronic obstructive pulmonary disease (COPD)


Status: Acute   





(2) Bronchitis


Status: Acute   





(3) Bronchospasm, acute


Status: Acute   





(4) Acute bronchospasm


Status: Acute   





(5) Bronchitis


Status: Acute   





(6) COPD (chronic obstructive pulmonary disease)


Status: Acute   





(7) Hx of nicotine dependence


Status: Acute   


Plan: 





Plan dated 2017





The patient should be placed on usual medications for COPD including short 

acting beta agonist, short acting muscarinic antagonist, long-acting beta 

agonist, and inhaled corticosteroids.  In addition, the patient should placed 

on systemic corticosteroids.  The patient would probably benefit from an oral 

antibiotic.  Additional recommendations suggestions are forthcoming.  We'll 

continue to follow.  Chest x-ray stable.  Prognosis is guarded.


Time with Patient: Greater than 30

## 2017-04-20 VITALS — SYSTOLIC BLOOD PRESSURE: 141 MMHG | TEMPERATURE: 97.5 F | DIASTOLIC BLOOD PRESSURE: 76 MMHG

## 2017-04-20 VITALS — RESPIRATION RATE: 16 BRPM

## 2017-04-20 VITALS — HEART RATE: 68 BPM

## 2017-04-20 LAB
ANION GAP SERPL CALC-SCNC: 12 MMOL/L
BUN SERPL-SCNC: 32 MG/DL (ref 7–17)
CALCIUM SPEC-MCNC: 9.7 MG/DL (ref 8.4–10.2)
CHLORIDE SERPL-SCNC: 104 MMOL/L (ref 98–107)
CO2 SERPL-SCNC: 25 MMOL/L (ref 22–30)
GLUCOSE BLD-MCNC: 101 MG/DL (ref 75–99)
GLUCOSE BLD-MCNC: 133 MG/DL (ref 75–99)
GLUCOSE SERPL-MCNC: 207 MG/DL (ref 74–99)
NON-AFRICAN AMERICAN GFR(MDRD): 45
POTASSIUM SERPL-SCNC: 4.7 MMOL/L (ref 3.5–5.1)
SODIUM SERPL-SCNC: 141 MMOL/L (ref 137–145)

## 2017-04-20 RX ADMIN — SPIRONOLACTONE SCH MG: 25 TABLET, FILM COATED ORAL at 08:06

## 2017-04-20 RX ADMIN — INSULIN LISPRO SCH: 100 INJECTION, SOLUTION INTRAVENOUS; SUBCUTANEOUS at 13:07

## 2017-04-20 RX ADMIN — BUDESONIDE AND FORMOTEROL FUMARATE DIHYDRATE SCH PUFF: 80; 4.5 AEROSOL RESPIRATORY (INHALATION) at 07:20

## 2017-04-20 RX ADMIN — METHYLPREDNISOLONE SODIUM SUCCINATE SCH MG: 40 INJECTION, POWDER, FOR SOLUTION INTRAMUSCULAR; INTRAVENOUS at 08:06

## 2017-04-20 RX ADMIN — AMIODARONE HYDROCHLORIDE SCH MG: 200 TABLET ORAL at 08:06

## 2017-04-20 RX ADMIN — IPRATROPIUM BROMIDE AND ALBUTEROL SULFATE SCH ML: .5; 3 SOLUTION RESPIRATORY (INHALATION) at 07:20

## 2017-04-20 RX ADMIN — APIXABAN SCH MG: 2.5 TABLET, FILM COATED ORAL at 08:06

## 2017-04-20 RX ADMIN — IPRATROPIUM BROMIDE AND ALBUTEROL SULFATE SCH ML: .5; 3 SOLUTION RESPIRATORY (INHALATION) at 11:30

## 2017-04-20 RX ADMIN — INSULIN LISPRO SCH UNIT: 100 INJECTION, SOLUTION INTRAVENOUS; SUBCUTANEOUS at 08:05

## 2017-04-20 RX ADMIN — METOPROLOL TARTRATE SCH MG: 50 TABLET, FILM COATED ORAL at 08:06

## 2017-04-20 RX ADMIN — Medication SCH UNIT: at 08:06

## 2017-04-20 NOTE — PN
DATE OF SERVICE:  04/19/2017



PRESENTING COMPLAINT: Short of breath, cough. 



INTERVAL HISTORY: This patient was admitted with COPD exacerbation, 

acute tracheobronchitis, also acute renal failure. Breathing is much 

better. (      ) actually improved.  Tolerating a diet.  



Review of system done for constitutional, cardiovascular, GI, 

pulmonary; relevant findings as above.  



Current medications are reviewed that include nebulized 

bronchodilator, IV steroids.  



On examination, temperature 96.9, pulse 68, respirations 20, blood 

pressure 131/66, pulse ox 96% on room air.  

GENERAL APPEARANCE: Sitting up, not in distress. 

EYES: Pupils equal. Conjunctivae normal. 

NECK: JVD not raised. Mass not palpable. 

RESPIRATORY: Effort normal. 

LUNGS: Diminished breath sounds. 

CARDIOVASCULAR: First and second sounds normal. No edema. 

ABDOMEN: Soft, nontender. Liver and spleen not palpable. 

PSYCHIATRY: Alert and oriented x3. Mood and affect normal 



INVESTIGATIONS: Potassium 4.1. BUN 32, creatinine 1.14. Bicarb is 20. 



ASSESSMENT: 

1. Acute severe chronic obstructive pulmonary disease exacerbation in 

an ex-smoker from acute tracheobronchitis with clinical improvement.  

2. Chronic kidney disease, stage III from hypertensive nephrosclerosis. 

3. Acute renal failure, probably prerenal from patient being on 

diuretics, acute components improving.  

4. Chronic congestive heart failure from systolic dysfunction, 

underlying coronary artery disease, ejection fraction 40%.  

5. Persistent atrial fibrillation. 

6. Moderate tricuspid regurgitation, nonrheumatic. 

7. Secondary pulmonary hypertension secondary to chronic obstructive 

pulmonary disease.  

8. Coronary artery disease with nonobstructive lesion in the left 

anterior descending artery.  



PLAN: Patient overall doing much better. Will resume patient's Lasix 

tomorrow morning. Care was discussed with the patient. Hopefully 

patient can be discharge tomorrow.

## 2017-04-24 NOTE — DS
DATE OF ADMISSION:   04/17/2017

DATE OF DISCHARGE:   04/20/2017





FINAL DIAGNOSIS(ES): 

1. Acute severe chronic obstructive pulmonary disease exacerbation in 

an ex-smoker from acute tracheobronchitis.  

2. Chronic kidney disease stage III from hypertensive nephrosclerosis. 

3. Acute renal failure, probably prerenal from the patient being on 

diuretics.  

4. Chronic congestive heart failure from systolic dysfunction; 

ejection fraction 40%, underlying coronary artery disease.  

5. Atrial fibrillation. 

6. Moderate tricuspid regurgitation, nonrheumatic secondary to 

pulmonary hypertension secondary to severe chronic obstructive 

pulmonary disease.  

7. Coronary artery disease with nonobstructive lesion in the left 

anterior descending coronary artery.  



HOSPITAL COURSE: This patient presented with COPD exacerbation and 

doing better at the time of discharge.  (    ) nebulized 

bronchodilators, steroids. Patient's creatinine was 1.6 on 

presentation, did come down to 1.15 at time of discharge.  



On exam, lungs improved air entry.  CARDIOVASCULAR: First and second 

sounds normal.  



CONSULTATIONS: Dr. Barnard from pulmonary. 



DISCHARGE MEDICATIONS:

1. Ventolin HFA 1 to 2 puffs q.6 p.r.n. 



2. Vitamin D3 1000 units p.o. daily. 

3. Advair Diskus 250, 1 puff daily.

4. Ativan 1 mg p.o. b.i.d. p.r.n. 

5. Melatonin 3 mg at bedtime. 

6. Lopressor 50 mg p.o. b.i.d. 

7. Aldactone 25 mg p.o. daily. 

8. Cordarone 200 mg p.o. daily. 

9. Eliquis 2.5 mg p.o. b.i.d. 

10. Zithromax 500 mg p.o. 3 tablets. 

11. Lasix 40 mg p.o. Monday, Wednesday and Friday. 

12. DuoNeb q.i.d. 



Follow-up with Dr. Anaya on 04/27/2017, follow-up with Dr. Pope on 

04/25/2017.  Follow-up with Dr. Portillo in one week.   BMP in one 

week.  



On exam, lungs improved air entry. 

CARDIOVASCULAR: First and second sounds normal. 



DC planning more than 35 minutes.

## 2017-06-03 ENCOUNTER — HOSPITAL ENCOUNTER (INPATIENT)
Dept: HOSPITAL 47 - EC | Age: 82
LOS: 4 days | Discharge: HOME | DRG: 190 | End: 2017-06-07
Payer: MEDICARE

## 2017-06-03 VITALS — BODY MASS INDEX: 24.2 KG/M2

## 2017-06-03 DIAGNOSIS — I25.10: ICD-10-CM

## 2017-06-03 DIAGNOSIS — I27.2: ICD-10-CM

## 2017-06-03 DIAGNOSIS — Z79.01: ICD-10-CM

## 2017-06-03 DIAGNOSIS — F41.1: ICD-10-CM

## 2017-06-03 DIAGNOSIS — Z88.1: ICD-10-CM

## 2017-06-03 DIAGNOSIS — I13.0: ICD-10-CM

## 2017-06-03 DIAGNOSIS — J44.0: Primary | ICD-10-CM

## 2017-06-03 DIAGNOSIS — I50.22: ICD-10-CM

## 2017-06-03 DIAGNOSIS — L71.9: ICD-10-CM

## 2017-06-03 DIAGNOSIS — Z87.891: ICD-10-CM

## 2017-06-03 DIAGNOSIS — M19.90: ICD-10-CM

## 2017-06-03 DIAGNOSIS — J96.01: ICD-10-CM

## 2017-06-03 DIAGNOSIS — I07.1: ICD-10-CM

## 2017-06-03 DIAGNOSIS — N18.3: ICD-10-CM

## 2017-06-03 DIAGNOSIS — H91.90: ICD-10-CM

## 2017-06-03 DIAGNOSIS — I48.0: ICD-10-CM

## 2017-06-03 DIAGNOSIS — K57.90: ICD-10-CM

## 2017-06-03 DIAGNOSIS — E86.0: ICD-10-CM

## 2017-06-03 DIAGNOSIS — I21.4: ICD-10-CM

## 2017-06-03 DIAGNOSIS — Z79.899: ICD-10-CM

## 2017-06-03 DIAGNOSIS — J44.1: ICD-10-CM

## 2017-06-03 DIAGNOSIS — N17.9: ICD-10-CM

## 2017-06-03 DIAGNOSIS — J20.9: ICD-10-CM

## 2017-06-03 LAB
ALP SERPL-CCNC: 72 U/L (ref 38–126)
ALT SERPL-CCNC: 37 U/L (ref 9–52)
ANION GAP SERPL CALC-SCNC: 14 MMOL/L
APTT BLD: 24.8 SEC (ref 22–30)
AST SERPL-CCNC: 34 U/L (ref 14–36)
BASOPHILS # BLD AUTO: 0.1 K/UL (ref 0–0.2)
BASOPHILS NFR BLD AUTO: 2 %
BUN SERPL-SCNC: 23 MG/DL (ref 7–17)
CALCIUM SPEC-MCNC: 9.5 MG/DL (ref 8.4–10.2)
CH: 32.4
CHCM: 32.6
CHLORIDE SERPL-SCNC: 96 MMOL/L (ref 98–107)
CK SERPL-CCNC: 117 U/L (ref 30–135)
CK SERPL-CCNC: 142 U/L (ref 30–135)
CO2 SERPL-SCNC: 28 MMOL/L (ref 22–30)
EOSINOPHIL # BLD AUTO: 0 K/UL (ref 0–0.7)
EOSINOPHIL NFR BLD AUTO: 0 %
ERYTHROCYTE [DISTWIDTH] IN BLOOD BY AUTOMATED COUNT: 4.68 M/UL (ref 3.8–5.4)
ERYTHROCYTE [DISTWIDTH] IN BLOOD: 13.8 % (ref 11.5–15.5)
GLUCOSE BLD-MCNC: 139 MG/DL (ref 75–99)
GLUCOSE BLD-MCNC: 215 MG/DL (ref 75–99)
GLUCOSE SERPL-MCNC: 152 MG/DL (ref 74–99)
HCT VFR BLD AUTO: 46.8 % (ref 34–46)
HDW: 2.78
HGB BLD-MCNC: 14.9 GM/DL (ref 11.4–16)
INR PPP: 1.3 (ref ?–1.1)
LUC NFR BLD AUTO: 2 %
LYMPHOCYTES # SPEC AUTO: 0.3 K/UL (ref 1–4.8)
LYMPHOCYTES NFR SPEC AUTO: 5 %
MAGNESIUM SPEC-SCNC: 1.7 MG/DL (ref 1.6–2.3)
MCH RBC QN AUTO: 31.9 PG (ref 25–35)
MCHC RBC AUTO-ENTMCNC: 31.9 G/DL (ref 31–37)
MCV RBC AUTO: 99.9 FL (ref 80–100)
MONOCYTES # BLD AUTO: 0.3 K/UL (ref 0–1)
MONOCYTES NFR BLD AUTO: 5 %
NEUTROPHILS # BLD AUTO: 5.2 K/UL (ref 1.3–7.7)
NEUTROPHILS NFR BLD AUTO: 86 %
NON-AFRICAN AMERICAN GFR(MDRD): 40
POTASSIUM SERPL-SCNC: 4.3 MMOL/L (ref 3.5–5.1)
PROT SERPL-MCNC: 7.8 G/DL (ref 6.3–8.2)
PT BLD: 12.4 SEC (ref 9–12)
SODIUM SERPL-SCNC: 138 MMOL/L (ref 137–145)
TROPONIN I SERPL-MCNC: 0.19 NG/ML (ref 0–0.03)
TROPONIN I SERPL-MCNC: 0.22 NG/ML (ref 0–0.03)
WBC # BLD AUTO: 0.13 10*3/UL
WBC # BLD AUTO: 6.1 K/UL (ref 3.8–10.6)
WBC (PEROX): 6.06

## 2017-06-03 PROCEDURE — 80048 BASIC METABOLIC PNL TOTAL CA: CPT

## 2017-06-03 PROCEDURE — 87040 BLOOD CULTURE FOR BACTERIA: CPT

## 2017-06-03 PROCEDURE — 83735 ASSAY OF MAGNESIUM: CPT

## 2017-06-03 PROCEDURE — 85610 PROTHROMBIN TIME: CPT

## 2017-06-03 PROCEDURE — 85379 FIBRIN DEGRADATION QUANT: CPT

## 2017-06-03 PROCEDURE — 96365 THER/PROPH/DIAG IV INF INIT: CPT

## 2017-06-03 PROCEDURE — 85049 AUTOMATED PLATELET COUNT: CPT

## 2017-06-03 PROCEDURE — 96376 TX/PRO/DX INJ SAME DRUG ADON: CPT

## 2017-06-03 PROCEDURE — 82550 ASSAY OF CK (CPK): CPT

## 2017-06-03 PROCEDURE — 96366 THER/PROPH/DIAG IV INF ADDON: CPT

## 2017-06-03 PROCEDURE — 84484 ASSAY OF TROPONIN QUANT: CPT

## 2017-06-03 PROCEDURE — 93005 ELECTROCARDIOGRAM TRACING: CPT

## 2017-06-03 PROCEDURE — 94640 AIRWAY INHALATION TREATMENT: CPT

## 2017-06-03 PROCEDURE — 83880 ASSAY OF NATRIURETIC PEPTIDE: CPT

## 2017-06-03 PROCEDURE — 71020: CPT

## 2017-06-03 PROCEDURE — 82553 CREATINE MB FRACTION: CPT

## 2017-06-03 PROCEDURE — 36415 COLL VENOUS BLD VENIPUNCTURE: CPT

## 2017-06-03 PROCEDURE — 83036 HEMOGLOBIN GLYCOSYLATED A1C: CPT

## 2017-06-03 PROCEDURE — 80061 LIPID PANEL: CPT

## 2017-06-03 PROCEDURE — 85025 COMPLETE CBC W/AUTO DIFF WBC: CPT

## 2017-06-03 PROCEDURE — 94644 CONT INHLJ TX 1ST HOUR: CPT

## 2017-06-03 PROCEDURE — 99291 CRITICAL CARE FIRST HOUR: CPT

## 2017-06-03 PROCEDURE — 85730 THROMBOPLASTIN TIME PARTIAL: CPT

## 2017-06-03 PROCEDURE — 96375 TX/PRO/DX INJ NEW DRUG ADDON: CPT

## 2017-06-03 PROCEDURE — 80053 COMPREHEN METABOLIC PANEL: CPT

## 2017-06-03 RX ADMIN — IPRATROPIUM BROMIDE AND ALBUTEROL SULFATE SCH ML: .5; 3 SOLUTION RESPIRATORY (INHALATION) at 20:26

## 2017-06-03 RX ADMIN — IPRATROPIUM BROMIDE AND ALBUTEROL SULFATE SCH ML: .5; 3 SOLUTION RESPIRATORY (INHALATION) at 23:42

## 2017-06-03 RX ADMIN — METHYLPREDNISOLONE SODIUM SUCCINATE SCH MG: 40 INJECTION, POWDER, FOR SOLUTION INTRAMUSCULAR; INTRAVENOUS at 23:58

## 2017-06-03 RX ADMIN — APIXABAN SCH MG: 2.5 TABLET, FILM COATED ORAL at 21:19

## 2017-06-03 RX ADMIN — METOPROLOL TARTRATE SCH MG: 50 TABLET, FILM COATED ORAL at 21:18

## 2017-06-03 RX ADMIN — METHYLPREDNISOLONE SODIUM SUCCINATE SCH MG: 40 INJECTION, POWDER, FOR SOLUTION INTRAMUSCULAR; INTRAVENOUS at 17:25

## 2017-06-03 NOTE — ED
General Adult HPI





- General


Chief complaint: Shortness of Breath


Stated complaint: SOB


Time Seen by Provider: 17 14:02


Source: patient, RN notes reviewed, old records reviewed


Mode of arrival: wheelchair


Limitations: no limitations





- History of Present Illness


Initial comments: 





This is an 83-year-old female the ER for evaluation.  This patient is urinary 

for evaluation of shortness of breath cough and congestion history of COPD 

history of atrial fib history of heart disease.  Patient denies chest pain with 

this occasional chest tightness worse which takes deep breath worse when she 

coughs.  She admits to fever for one day shortness of breath for 3 days.  No 

sick contacts or travel history.  She is doing.  She was at home with no help.  

She took her oxygen at home and was 72% today.  She thought that was pretty 

worrisome given the emergency room at this time she is improving with 

supplemental oxygen, but still complains of shortness of breath and cough.  

Feels weak





- Related Data


 Home Medications











 Medication  Instructions  Recorded  Confirmed


 


Albuterol Inhaler [Ventolin Hfa 2 puff INHALATION RT-QID PRN 17





Inhaler]   


 


Fluticasone/Salmeterol [Advair 1 puff INHALATION RT-BID 17





250-50 Diskus]   


 


LORazepam [Ativan] 1 mg PO BID PRN 17


 


Amiodarone [Cordarone] 200 mg PO DAILY 17


 


Apixaban [Eliquis] 2.5 mg PO BID 17


 


Furosemide [Lasix] 40 mg PO DAILY 17


 


Tiotropium 18 Mcg/Puff [Spiriva] 1 cap INHALATION RT-DAILY 17








 Previous Rx's











 Medication  Instructions  Recorded


 


Metoprolol Tartrate [Lopressor] 50 mg PO BID #60 tab 17


 


Ipratropium-Albuterol Nebulize 3 ml INHALATION RT-QID #120 17





[Duoneb 0.5 mg-3 mg/3 ml Soln] ampul.neb 











 Allergies











Allergy/AdvReac Type Severity Reaction Status Date / Time


 


cefuroxime axetil Allergy  Unknown Verified 17 15:08





[From Ceftin]     














Review of Systems


ROS Statement: 


Those systems with pertinent positive or pertinent negative responses have been 

documented in the HPI.





ROS Other: All systems not noted in ROS Statement are negative.





Past Medical History


Past Medical History: Atrial Fibrillation, COPD, Hearing Disorder / Deafness, 

Hypertension, Respiratory Disorder


Additional Past Medical History / Comment(s): Generalized anxiety disorder, 

chronic kidney disease stage III. hearing impaired


History of Any Multi-Drug Resistant Organisms: None Reported


Past Surgical History: Adenoidectomy, Appendectomy, Hysterectomy, Orthopedic 

Surgery, Tonsillectomy


Additional Past Surgical History / Comment(s): Right knee arthroscopy for torn 

meniscus. Colonoscopies x 3 total.  Bilateral eyelid surgery.


Past Anesthesia/Blood Transfusion Reactions: No Reported Reaction


Additional Past Anesthesia/Blood Transfusion Reaction / Comment(s): Pt has 

never recieved blood.


Past Psychological History: Anxiety


Additional Psychological History / Comment(s): Pt is .  She lives alone 

in her single level home in [facility]. She is very independent. She 

rides bicycle and is an avid bowler.  She drives a car.  She has a niece that 

lives nearby if she needs someone.  She shovels her own driveway.


Smoking Status: Former smoker


Past Alcohol Use History: Occasional


Additional Past Alcohol Use History / Comment(s): Patient was a smoker one pack 

per day 54 years.  She quit 12 years ago.  She drinks alcohol daily 1-2 drinks 

if she is at home.  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 

any recent travel.  She has no pets in the home.


Past Drug Use History: None Reported





- Past Family History


  ** Father


Family Medical History: Cancer


Additional Family Medical History / Comment(s): Father  at age 77yrs of 

cancer which pt believes may have started in his throat.





  ** Mother


Family Medical History: Dementia


Additional Family Medical History / Comment(s): Mother  at age 95yrs.  She 

was very healthy most of her life- she had dementia at the very end of her life.





General Exam


Limitations: no limitations


General appearance: alert, in no apparent distress


Head exam: Present: atraumatic, normocephalic, normal inspection


Eye exam: Present: normal appearance, PERRL, EOMI.  Absent: scleral icterus, 

conjunctival injection, periorbital swelling


ENT exam: Present: normal exam, mucous membranes moist


Neck exam: Present: normal inspection.  Absent: tenderness, meningismus, 

lymphadenopathy


Respiratory exam: Present: wheezes, accessory muscle use, decreased breath 

sounds, prolonged expiratory.  Absent: respiratory distress, rales, rhonchi, 

stridor


Cardiovascular Exam: Present: regular rate, normal rhythm, normal heart sounds.

  Absent: systolic murmur, diastolic murmur, rubs, gallop, clicks


GI/Abdominal exam: Present: soft, normal bowel sounds.  Absent: distended, 

tenderness, guarding, rebound, rigid


Extremities exam: Present: normal inspection, full ROM, normal capillary 

refill.  Absent: tenderness, pedal edema, joint swelling, calf tenderness


Back exam: Present: normal inspection


Neurological exam: Present: alert, oriented X3, CN II-XII intact


Psychiatric exam: Present: normal affect, normal mood


Skin exam: Present: warm, dry, intact, normal color.  Absent: rash





Course


 Vital Signs











  17





  13:36 13:53 13:54


 


Temperature 100.4 F H  


 


Pulse Rate 63  60


 


Respiratory 20 24 18





Rate   


 


Blood Pressure 140/61  123/58


 


O2 Sat by Pulse 88 L  96





Oximetry   














  17





  14:21 14:35 14:50


 


Temperature   


 


Pulse Rate 60 60 62


 


Respiratory   18





Rate   


 


Blood Pressure   129/63


 


O2 Sat by Pulse   98





Oximetry   














  17





  15:04 15:18


 


Temperature  


 


Pulse Rate 69 78


 


Respiratory  





Rate  


 


Blood Pressure  


 


O2 Sat by Pulse  





Oximetry  














- Reevaluation(s)


Reevaluation #1: 





17 15:26


Patient has great improvement of breathing treatment, no chest pain at this time





EKG Findings





- EKG Comments:


EKG Findings:: EKG shows normal sinus rhythm rate of 62, MI 1:30, , QTC 

369





Medical Decision Making





- Medical Decision Making





83 female here with 3 days of shortness of breath one day of fever and 

increased cough history of COPD positive pneumonia.  Patient admitted for IV 

antibiotic.  She was.  Patient also dehydrated, has occasional chest tightness 

and elevated troponin.  Patient be anticoagulated and admitted for cardiology 

evaluation.





- Lab Data


Result diagrams: 


 17 13:50





 17 13:50


 Lab Results











  06/03/17 06/03/17 06/03/17 Range/Units





  13:50 13:50 13:50 


 


WBC   6.1   (3.8-10.6)  k/uL


 


RBC   4.68   (3.80-5.40)  m/uL


 


Hgb   14.9   (11.4-16.0)  gm/dL


 


Hct   46.8 H   (34.0-46.0)  %


 


MCV   99.9   (80.0-100.0)  fL


 


MCH   31.9   (25.0-35.0)  pg


 


MCHC   31.9   (31.0-37.0)  g/dL


 


RDW   13.8   (11.5-15.5)  %


 


Plt Count   167   (150-450)  k/uL


 


Neutrophils %   86   %


 


Lymphocytes %   5   %


 


Monocytes %   5   %


 


Eosinophils %   0   %


 


Basophils %   2   %


 


Neutrophils #   5.2   (1.3-7.7)  k/uL


 


Lymphocytes #   0.3 L   (1.0-4.8)  k/uL


 


Monocytes #   0.3   (0-1.0)  k/uL


 


Eosinophils #   0.0   (0-0.7)  k/uL


 


Basophils #   0.1   (0-0.2)  k/uL


 


PT     (9.0-12.0)  sec


 


INR     (<1.1)  


 


APTT     (22.0-30.0)  sec


 


Sodium    138  (137-145)  mmol/L


 


Potassium    4.3  (3.5-5.1)  mmol/L


 


Chloride    96 L  ()  mmol/L


 


Carbon Dioxide    28  (22-30)  mmol/L


 


Anion Gap    14  mmol/L


 


BUN    23 H  (7-17)  mg/dL


 


Creatinine    1.28 H  (0.52-1.04)  mg/dL


 


Est GFR (MDRD) Af Amer    48  (>60 ml/min/1.73 sqM)  


 


Est GFR (MDRD) Non-Af    40  (>60 ml/min/1.73 sqM)  


 


Glucose    152 H  (74-99)  mg/dL


 


Calcium    9.5  (8.4-10.2)  mg/dL


 


Magnesium    1.7  (1.6-2.3)  mg/dL


 


Total Bilirubin    1.2  (0.2-1.3)  mg/dL


 


AST    34  (14-36)  U/L


 


ALT    37  (9-52)  U/L


 


Alkaline Phosphatase    72  ()  U/L


 


Total Creatine Kinase  117    ()  U/L


 


CK-MB (CK-2)  3.2 H*    (0.0-2.4)  ng/mL


 


CK-MB (CK-2) Rel Index  2.7    


 


Troponin I  0.194 H*    (0.000-0.034)  ng/mL


 


NT-Pro-B Natriuret Pep     pg/mL


 


Total Protein    7.8  (6.3-8.2)  g/dL


 


Albumin    5.1 H  (3.5-5.0)  g/dL














  17 Range/Units





  13:50 14:20 


 


WBC    (3.8-10.6)  k/uL


 


RBC    (3.80-5.40)  m/uL


 


Hgb    (11.4-16.0)  gm/dL


 


Hct    (34.0-46.0)  %


 


MCV    (80.0-100.0)  fL


 


MCH    (25.0-35.0)  pg


 


MCHC    (31.0-37.0)  g/dL


 


RDW    (11.5-15.5)  %


 


Plt Count    (150-450)  k/uL


 


Neutrophils %    %


 


Lymphocytes %    %


 


Monocytes %    %


 


Eosinophils %    %


 


Basophils %    %


 


Neutrophils #    (1.3-7.7)  k/uL


 


Lymphocytes #    (1.0-4.8)  k/uL


 


Monocytes #    (0-1.0)  k/uL


 


Eosinophils #    (0-0.7)  k/uL


 


Basophils #    (0-0.2)  k/uL


 


PT   12.4 H  (9.0-12.0)  sec


 


INR   1.3  (<1.1)  


 


APTT   24.8  (22.0-30.0)  sec


 


Sodium    (137-145)  mmol/L


 


Potassium    (3.5-5.1)  mmol/L


 


Chloride    ()  mmol/L


 


Carbon Dioxide    (22-30)  mmol/L


 


Anion Gap    mmol/L


 


BUN    (7-17)  mg/dL


 


Creatinine    (0.52-1.04)  mg/dL


 


Est GFR (MDRD) Af Amer    (>60 ml/min/1.73 sqM)  


 


Est GFR (MDRD) Non-Af    (>60 ml/min/1.73 sqM)  


 


Glucose    (74-99)  mg/dL


 


Calcium    (8.4-10.2)  mg/dL


 


Magnesium    (1.6-2.3)  mg/dL


 


Total Bilirubin    (0.2-1.3)  mg/dL


 


AST    (14-36)  U/L


 


ALT    (9-52)  U/L


 


Alkaline Phosphatase    ()  U/L


 


Total Creatine Kinase    ()  U/L


 


CK-MB (CK-2)    (0.0-2.4)  ng/mL


 


CK-MB (CK-2) Rel Index    


 


Troponin I    (0.000-0.034)  ng/mL


 


NT-Pro-B Natriuret Pep  2480   pg/mL


 


Total Protein    (6.3-8.2)  g/dL


 


Albumin    (3.5-5.0)  g/dL














- Radiology Data


Radiology results: report reviewed (Chest x-ray two-view does show likely 

pneumonia), image reviewed





Critical Care Time


Critical Care Time: Yes


Total Critical Care Time: 31





Disposition


Clinical Impression: 


 COPD (chronic obstructive pulmonary disease), Dehydration, Acute kidney injury

, Acute exacerbation of chronic obstructive pulmonary disease (COPD), Community 

acquired pneumonia, NSTEMI (non-ST elevated myocardial infarction), Fever, 

Hypoxia





Disposition: ADMITTED AS IP TO THIS HOSP


Condition: Serious


Referrals: 


Elroy Anaya DO [Primary Care Provider] - 1-2 days

## 2017-06-03 NOTE — HP
DATE OF ADMISSION:  2017



PRESENTING COMPLAINT:  Cough, fever. 



HISTORY OF PRESENTING COMPLAINT: This is a very pleasant 83-year-old 

patient with rather  extensive medical history. Patient's chronic 

stable medical conditions include chronic kidney disease, congestive 

heart failure, coronary artery disease, atrial fibrillation, pulmonary 

hypertension, the patient sees Dr. Anaya as a family doctor. Patient 

presented with 3 days of increasing cough, sputum production and very 

short of breath.  Took pulse ox at home and it dropped down to 70s.   

Tired, rundown, having a fever.  



REVIEW OF SYSTEMS:

CONSTITUTIONAL: Tired. 

HEENT: None. 

RESPIRATORY: As above. 

CARDIOVASCULAR: None. 

GASTROINTESTINAL: None. 

GENITOURINARY: None.  

MUSCULOSKELETAL: Pain in the joints. Dermatologic: None. 

HEMATOLOGIC: None. 

LYMPHATICS: None. 

PSYCHIATRY: None. 

NEUROLOGICAL: None. 



Past medical history of congestive heart failure, atrial fibrillation, 

arthritis, chronic kidney disease, COPD, moderate tricuspid 

regurgitation, secondary pulmonary hypertension, coronary artery 

disease.  



PAST SURGICAL HISTORY: Adenoidectomy, appendectomy, hysterectomy, 

orthopedic surgery, tonsillectomy, right knee arthroscopy for torn 

meniscus, bilateral eye surgery.  



SOCIAL HISTORY: The patient is .  Still rides a bicycle, does 

bowling.  Smoked for 54 years; quit about 12 years ago. Drinks 

anywhere from 1 to 2 alcohol drinks a day and (    ).  



FAMILY HISTORY: Father  of throat cancer. 



HOME MEDICATIONS:

1. Spiriva 1 capsule daily. 

2. Lopressor 50 p.o. b.i.d. 

3. Ativan 1 mg p.o. b.i.d. p.r.n. 

4. DuoNeb q.i.d. 

5. Advair 250/50 1 puff b.i.d. 

6. Eliquis 2.5 p.o. b.i.d. 

7. Cordarone 200 mg p.o. daily. 

8. Ventolin HFA 2 puffs q.i.d. p.r.n. 



ALLERGIES: CEFTIN. 



On examination temperature 100.4, pulse 58, respirations 22, blood 

pressure 94/54, pulse ox 93% on 2 L.  Pulse ox 80% on room air.  

GENERAL APPEARANCE: Average build, sitting up, tired appearing. 

EYES: Pupils equal. Conjunctivae normal. 

HEENT: External appearance of nose and ears normal. Oral cavity normal. 

NECK: JVD not raised. Mass not palpable. 

RESPIRATORY: Effort increased. 

LUNGS: Diminished breath sounds. Prolonged expiration. 

CARDIOVASCULAR: First and second sounds normal. No edema. 

ABDOMEN: Soft, nontender. Liver and spleen not palpable. 

LYMPHATIC: No lymph nodes palpable in the neck or axilla.  

PSYCHIATRY: Alert and oriented x3. Mood and affect normal. 

NEUROLOGICAL: Pupils equal. Cranial nerves grossly intact. Power and 

sensation grossly intact.  



INVESTIGATIONS: White count 6.1, hemoglobin 14.9. Potassium 4.3, BUN 

23, creatinine 1.28.   Troponin 0.194.  Chest x-ray reviewed.  Some 

coarse markings.     



ASSESSMENT:

1. Acute severe chronic obstructive pulmonary disease exacerbation in 

an ex-smoker from acute tracheobronchitis.  

2. Chronic kidney disease stage III from hypertensive nephrosclerosis.

3. Chronic congestive heart failure from systolic dysfunction; 

ejection fraction 40%, underlying coronary artery disease.  

4. Paroxysmal atrial fibrillation. 

5. Moderate tricuspid regurgitation, nonrheumatic secondary to 

pulmonary hypertension secondary to chronic obstructive pulmonary 

disease.  

6. Coronary artery disease with nonobstructive lesion in the left 

anterior descending artery.  

7. Acute hypoxic respiratory failure, present at admission. 



PLAN: Patient will be put on nebulized bronchodilators, steroids, 

antibiotics. Home medications will be resumed. Care was discussed with 

the patient.  This is not a primary cardiac event.  The troponin leak 

is probably from patient being sick.  Will follow.

## 2017-06-03 NOTE — XR
EXAMINATION TYPE: XR chest 2V

 

DATE OF EXAM: 6/3/2017 2:16 PM

 

COMPARISON: 4/17/2017

 

HISTORY: Short of breath

 

TECHNIQUE:  Frontal and lateral views of the chest are obtained.

 

FINDINGS:  Heart and mediastinum are normal. There is mild pulmonary hyperinflation. There are no hil
ar masses. There are chest leads. There is slight coarsening of interstitial markings. Bony thorax is
 intact.

 

IMPRESSION:  There is evidence for mild COPD and pulmonary fibrosis. No acute lung disease. No change
.

## 2017-06-04 LAB
ANION GAP SERPL CALC-SCNC: 11 MMOL/L
BASOPHILS # BLD AUTO: 0 K/UL (ref 0–0.2)
BASOPHILS NFR BLD AUTO: 0 %
BUN SERPL-SCNC: 20 MG/DL (ref 7–17)
CALCIUM SPEC-MCNC: 9.1 MG/DL (ref 8.4–10.2)
CH: 32.3
CHCM: 33.2
CHLORIDE SERPL-SCNC: 103 MMOL/L (ref 98–107)
CHOLEST SERPL-MCNC: 139 MG/DL (ref ?–200)
CK SERPL-CCNC: 155 U/L (ref 30–135)
CO2 SERPL-SCNC: 27 MMOL/L (ref 22–30)
EOSINOPHIL # BLD AUTO: 0 K/UL (ref 0–0.7)
EOSINOPHIL NFR BLD AUTO: 0 %
ERYTHROCYTE [DISTWIDTH] IN BLOOD BY AUTOMATED COUNT: 4.25 M/UL (ref 3.8–5.4)
ERYTHROCYTE [DISTWIDTH] IN BLOOD: 13.3 % (ref 11.5–15.5)
GLUCOSE BLD-MCNC: 120 MG/DL (ref 75–99)
GLUCOSE BLD-MCNC: 152 MG/DL (ref 75–99)
GLUCOSE BLD-MCNC: 181 MG/DL (ref 75–99)
GLUCOSE BLD-MCNC: 187 MG/DL (ref 75–99)
GLUCOSE SERPL-MCNC: 163 MG/DL (ref 74–99)
HCT VFR BLD AUTO: 41.5 % (ref 34–46)
HDLC SERPL-MCNC: 59 MG/DL (ref 40–60)
HDW: 2.96
HEMOGLOBIN A1C: 5.6 % (ref 4.2–6.1)
HGB BLD-MCNC: 13.6 GM/DL (ref 11.4–16)
LUC NFR BLD AUTO: 1 %
LYMPHOCYTES # SPEC AUTO: 0.4 K/UL (ref 1–4.8)
LYMPHOCYTES NFR SPEC AUTO: 9 %
MCH RBC QN AUTO: 32 PG (ref 25–35)
MCHC RBC AUTO-ENTMCNC: 32.8 G/DL (ref 31–37)
MCV RBC AUTO: 97.6 FL (ref 80–100)
MONOCYTES # BLD AUTO: 0.1 K/UL (ref 0–1)
MONOCYTES NFR BLD AUTO: 1 %
NEUTROPHILS # BLD AUTO: 3.4 K/UL (ref 1.3–7.7)
NEUTROPHILS NFR BLD AUTO: 88 %
NON-AFRICAN AMERICAN GFR(MDRD): 49
POTASSIUM SERPL-SCNC: 3.9 MMOL/L (ref 3.5–5.1)
SODIUM SERPL-SCNC: 141 MMOL/L (ref 137–145)
TRIGL SERPL-MCNC: 48 MG/DL (ref ?–150)
TROPONIN I SERPL-MCNC: 0.09 NG/ML (ref 0–0.03)
WBC # BLD AUTO: 0.03 10*3/UL
WBC # BLD AUTO: 3.9 K/UL (ref 3.8–10.6)
WBC (PEROX): 4.3

## 2017-06-04 RX ADMIN — IPRATROPIUM BROMIDE AND ALBUTEROL SULFATE SCH ML: .5; 3 SOLUTION RESPIRATORY (INHALATION) at 17:20

## 2017-06-04 RX ADMIN — METHYLPREDNISOLONE SODIUM SUCCINATE SCH MG: 40 INJECTION, POWDER, FOR SOLUTION INTRAMUSCULAR; INTRAVENOUS at 16:29

## 2017-06-04 RX ADMIN — METOPROLOL TARTRATE SCH MG: 50 TABLET, FILM COATED ORAL at 21:08

## 2017-06-04 RX ADMIN — METOPROLOL TARTRATE SCH MG: 50 TABLET, FILM COATED ORAL at 08:22

## 2017-06-04 RX ADMIN — AMIODARONE HYDROCHLORIDE SCH MG: 200 TABLET ORAL at 08:22

## 2017-06-04 RX ADMIN — ATORVASTATIN CALCIUM SCH MG: 40 TABLET, FILM COATED ORAL at 21:08

## 2017-06-04 RX ADMIN — INSULIN LISPRO SCH UNIT: 100 INJECTION, SOLUTION INTRAVENOUS; SUBCUTANEOUS at 12:22

## 2017-06-04 RX ADMIN — APIXABAN SCH MG: 2.5 TABLET, FILM COATED ORAL at 08:22

## 2017-06-04 RX ADMIN — FUROSEMIDE SCH MG: 40 TABLET ORAL at 08:22

## 2017-06-04 RX ADMIN — IPRATROPIUM BROMIDE AND ALBUTEROL SULFATE SCH ML: .5; 3 SOLUTION RESPIRATORY (INHALATION) at 13:10

## 2017-06-04 RX ADMIN — IPRATROPIUM BROMIDE AND ALBUTEROL SULFATE SCH ML: .5; 3 SOLUTION RESPIRATORY (INHALATION) at 09:18

## 2017-06-04 RX ADMIN — APIXABAN SCH MG: 2.5 TABLET, FILM COATED ORAL at 21:08

## 2017-06-04 RX ADMIN — IPRATROPIUM BROMIDE AND ALBUTEROL SULFATE SCH ML: .5; 3 SOLUTION RESPIRATORY (INHALATION) at 21:14

## 2017-06-04 RX ADMIN — INSULIN LISPRO SCH: 100 INJECTION, SOLUTION INTRAVENOUS; SUBCUTANEOUS at 17:29

## 2017-06-04 RX ADMIN — INSULIN LISPRO SCH UNIT: 100 INJECTION, SOLUTION INTRAVENOUS; SUBCUTANEOUS at 21:09

## 2017-06-04 RX ADMIN — LEVOFLOXACIN SCH MLS/HR: 750 INJECTION, SOLUTION INTRAVENOUS at 14:58

## 2017-06-04 RX ADMIN — METHYLPREDNISOLONE SODIUM SUCCINATE SCH MG: 40 INJECTION, POWDER, FOR SOLUTION INTRAMUSCULAR; INTRAVENOUS at 08:22

## 2017-06-04 RX ADMIN — ASPIRIN 81 MG CHEWABLE TABLET SCH MG: 81 TABLET CHEWABLE at 08:22

## 2017-06-04 RX ADMIN — IPRATROPIUM BROMIDE AND ALBUTEROL SULFATE SCH: .5; 3 SOLUTION RESPIRATORY (INHALATION) at 03:47

## 2017-06-04 RX ADMIN — INSULIN LISPRO SCH UNIT: 100 INJECTION, SOLUTION INTRAVENOUS; SUBCUTANEOUS at 06:26

## 2017-06-04 NOTE — CONS
DATE OF CONSULTATION:  06/40/2017



CHIEF COMPLAINT: Elevated troponin. 



This is an 83-year-old lady with history of COPD, mild nonobstructive 

coronary artery disease, chronic renal insufficiency, paroxysmal 

atrial fibrillation and pulmonary hypertension, who presented to the 

hospital complaining of cough, sputum and shortness of breath. 

Cardiology has been consulted because of mild troponin elevation. At 

the time of my evaluation, she is comfortable at rest and is free of 

symptoms. The patient had a cardiac catheterization and an FFR of the 

lesion within the left anterior descending artery for which she was 

advised medical therapy in January of this year. The troponin 

elevation is of unclear clinical significance and it is at 0.1, 0.2 

and 0.094. It could be due to supply demand mismatch associated with 

hypoxia from chronic obstructive pulmonary disease exacerbation. I 

reviewed her old records including the cath and FFR data.  



Past medical history is significant for COPD, hypertension, paroxysmal 

atrial fibrillation, coronary artery disease.  



MEDICATIONS: 

1. Spiriva. 

2. Lopressor 50 b.i.d.

3. Ativan. 

4. Duoneb. 

5. Lasix 40 daily. 

6. Advair. 

7. Eliquis 2.5 b.i.d.

8. Cardura 200 daily. 

9. Ventolin. 

10. Ceftin. 



FAMILY HISTORY: Negative for premature coronary artery disease. 



SOCIAL HISTORY: Negative for current smoking, EtOH abuse, or drug 

abuse.  



REVIEW OF SYSTEMS: 

HEENT: Unremarkable. 

CARDIAC: As described above. 

RESPIRATORY: As described above. 

GI: Negative. 

GENITOURINARY: Negative. 

Allergy/immunology: Negative. 

SKIN: Negative. 

MUSCULOSKELETAL: Significant for arthritis. 

PSYCHOSOCIAL: Negative. 

Dermatology: Negative. 

CONSTITUTIONAL: Negative. 

Oncological: Negative. 

The rest of the system review is not relevant. 



On exam, comfortable at rest. Vital signs are stable. There is no 

jugular venous distention. Chest exam reveals diminished air entry 

bilaterally with occasional rhonchi. Heart exam reveals first and 

second heart sounds. No gallop. No murmur. Abdomen is soft, nontender. 

Exam of the extremities did not reveal edema. Peripheral pulses are 

felt.  



Labs show a hemoglobin of 13.6, potassium is 3.9. Creatinine is 1. 

Troponins are elevated. LDL cholesterol is 70.  



EKG shows sinus rhythm with nonspecific ST-T wave changes. 



ASSESSMENT: 

1. Non-ST segment elevation myocardial infarction. 

2. Paroxysmal atrial fibrillation. 

3. Chronic obstructive pulmonary disease exacerbation. 

4. Hypertension. 



PLAN: The patient is doing well. Troponin elevation does not require 

any further evaluation. Will treat with optimal medical therapy. 

Hopefully can be discharged home over the next 1 to 2 days.

## 2017-06-05 LAB
ANION GAP SERPL CALC-SCNC: 11 MMOL/L
BASOPHILS # BLD AUTO: 0 K/UL (ref 0–0.2)
BASOPHILS NFR BLD AUTO: 0 %
BUN SERPL-SCNC: 26 MG/DL (ref 7–17)
CALCIUM SPEC-MCNC: 9.6 MG/DL (ref 8.4–10.2)
CH: 31.7
CHCM: 31.9
CHLORIDE SERPL-SCNC: 104 MMOL/L (ref 98–107)
CO2 SERPL-SCNC: 28 MMOL/L (ref 22–30)
EOSINOPHIL # BLD AUTO: 0.1 K/UL (ref 0–0.7)
EOSINOPHIL NFR BLD AUTO: 1 %
ERYTHROCYTE [DISTWIDTH] IN BLOOD BY AUTOMATED COUNT: 4.45 M/UL (ref 3.8–5.4)
ERYTHROCYTE [DISTWIDTH] IN BLOOD: 13.7 % (ref 11.5–15.5)
GLUCOSE BLD-MCNC: 122 MG/DL (ref 75–99)
GLUCOSE BLD-MCNC: 145 MG/DL (ref 75–99)
GLUCOSE BLD-MCNC: 153 MG/DL (ref 75–99)
GLUCOSE BLD-MCNC: 192 MG/DL (ref 75–99)
GLUCOSE SERPL-MCNC: 150 MG/DL (ref 74–99)
HCT VFR BLD AUTO: 44.5 % (ref 34–46)
HDW: 2.86
HGB BLD-MCNC: 14.1 GM/DL (ref 11.4–16)
LUC NFR BLD AUTO: 1 %
LYMPHOCYTES # SPEC AUTO: 0.5 K/UL (ref 1–4.8)
LYMPHOCYTES NFR SPEC AUTO: 4 %
MCH RBC QN AUTO: 31.6 PG (ref 25–35)
MCHC RBC AUTO-ENTMCNC: 31.6 G/DL (ref 31–37)
MCV RBC AUTO: 100 FL (ref 80–100)
MONOCYTES # BLD AUTO: 0.3 K/UL (ref 0–1)
MONOCYTES NFR BLD AUTO: 3 %
NEUTROPHILS # BLD AUTO: 10.9 K/UL (ref 1.3–7.7)
NEUTROPHILS NFR BLD AUTO: 92 %
NON-AFRICAN AMERICAN GFR(MDRD): 50
POTASSIUM SERPL-SCNC: 4.6 MMOL/L (ref 3.5–5.1)
SODIUM SERPL-SCNC: 143 MMOL/L (ref 137–145)
WBC # BLD AUTO: 0.06 10*3/UL
WBC # BLD AUTO: 11.9 K/UL (ref 3.8–10.6)
WBC (PEROX): 11.89

## 2017-06-05 RX ADMIN — LEVOFLOXACIN SCH: 750 INJECTION, SOLUTION INTRAVENOUS at 14:36

## 2017-06-05 RX ADMIN — METHYLPREDNISOLONE SODIUM SUCCINATE SCH MG: 40 INJECTION, POWDER, FOR SOLUTION INTRAMUSCULAR; INTRAVENOUS at 00:34

## 2017-06-05 RX ADMIN — FUROSEMIDE SCH MG: 40 TABLET ORAL at 08:00

## 2017-06-05 RX ADMIN — METOPROLOL TARTRATE SCH MG: 50 TABLET, FILM COATED ORAL at 21:03

## 2017-06-05 RX ADMIN — INSULIN LISPRO SCH UNIT: 100 INJECTION, SOLUTION INTRAVENOUS; SUBCUTANEOUS at 07:59

## 2017-06-05 RX ADMIN — IPRATROPIUM BROMIDE AND ALBUTEROL SULFATE SCH ML: .5; 3 SOLUTION RESPIRATORY (INHALATION) at 20:31

## 2017-06-05 RX ADMIN — ATORVASTATIN CALCIUM SCH MG: 40 TABLET, FILM COATED ORAL at 21:02

## 2017-06-05 RX ADMIN — METHYLPREDNISOLONE SODIUM SUCCINATE SCH MG: 40 INJECTION, POWDER, FOR SOLUTION INTRAMUSCULAR; INTRAVENOUS at 07:58

## 2017-06-05 RX ADMIN — ASPIRIN 81 MG CHEWABLE TABLET SCH MG: 81 TABLET CHEWABLE at 08:01

## 2017-06-05 RX ADMIN — AMIODARONE HYDROCHLORIDE SCH MG: 200 TABLET ORAL at 08:01

## 2017-06-05 RX ADMIN — METHYLPREDNISOLONE SODIUM SUCCINATE SCH MG: 40 INJECTION, POWDER, FOR SOLUTION INTRAMUSCULAR; INTRAVENOUS at 17:46

## 2017-06-05 RX ADMIN — IPRATROPIUM BROMIDE AND ALBUTEROL SULFATE SCH ML: .5; 3 SOLUTION RESPIRATORY (INHALATION) at 10:54

## 2017-06-05 RX ADMIN — INSULIN LISPRO SCH: 100 INJECTION, SOLUTION INTRAVENOUS; SUBCUTANEOUS at 17:46

## 2017-06-05 RX ADMIN — METOPROLOL TARTRATE SCH MG: 50 TABLET, FILM COATED ORAL at 08:00

## 2017-06-05 RX ADMIN — APIXABAN SCH MG: 2.5 TABLET, FILM COATED ORAL at 08:01

## 2017-06-05 RX ADMIN — INSULIN LISPRO SCH UNIT: 100 INJECTION, SOLUTION INTRAVENOUS; SUBCUTANEOUS at 12:27

## 2017-06-05 RX ADMIN — INSULIN LISPRO SCH UNIT: 100 INJECTION, SOLUTION INTRAVENOUS; SUBCUTANEOUS at 21:03

## 2017-06-05 RX ADMIN — IPRATROPIUM BROMIDE AND ALBUTEROL SULFATE SCH ML: .5; 3 SOLUTION RESPIRATORY (INHALATION) at 07:07

## 2017-06-05 RX ADMIN — IPRATROPIUM BROMIDE AND ALBUTEROL SULFATE SCH ML: .5; 3 SOLUTION RESPIRATORY (INHALATION) at 15:39

## 2017-06-05 RX ADMIN — APIXABAN SCH MG: 2.5 TABLET, FILM COATED ORAL at 21:03

## 2017-06-05 RX ADMIN — ISOSORBIDE MONONITRATE SCH MG: 30 TABLET, EXTENDED RELEASE ORAL at 08:01

## 2017-06-05 NOTE — P.CNPUL
History of Present Illness


Consult date: 17


Reason for consult: dyspnea


History of present illness: 





83-year-old female patient with known history of COPD, with a baseline FEV1 of 

47% of predicted maintained on a combination of Advair and Spiriva on 

outpatient basis.  The patient was in the hospital in 2017 admit for 

worsening shortness of breath and she was found to be in acute CHF with 

increased lower extremity edema and new onset atrial fibrillation with rapid 

ventricular response.  She was treated with a Cardizem drip and ultimately she 

was switched to amiodarone and echocardiogram was done and showed that the 

patient has depressed ejection fraction of 20-25%.  CT angios the chest that 

was done back then was negative.  The patient was optimized in regards to her 

CHF and she was discharged home.  In follow-up I saw this patient in my office 

and she was looking very well and she was not having any major respiratory 

distress.  She was also on long-term anticoagulation using  Eliquis.  

Subsequently the patient was rehospitalized on May 2017 for acute bronchitis 

and COPD exacerbation.  During her hospital stay the patient had a kidney 

injury with her creatinine went up to 1.3 and subsequently it improved





During this current hospitalization, the patient came into the hospital because 

of increased dyspnea.  Cough.  She had also increased sputum production and 

wheezing.  Troponins were minimally elevated and there was evidence of troponin 

leak.  The patient was hypoxic and she checked her pulse ox at home and was as 

low as 72%.  Note that she does not have significant hypoxemia.  Based on that 

she came into the hospital.  She was seen by cardiology.  Pulmonary was also 

placed on consult.  The chest x-ray shows evidence of COPD and there is no 

evidence of an acute pneumonia at this point.  There is increased coarse 

interstitial markings bilaterally.  The patient's white cell count is not 

elevated.  The patient's renal function is within normal limits.  She is free 

of any chest pain.





Review of Systems


All systems: negative


Constitutional: Denies chills, Denies fever


Eyes: denies blurred vision, denies pain


Ears, nose, mouth and throat: Denies headache, Denies sore throat


Cardiovascular: Denies chest pain, Denies shortness of breath


Respiratory: Reports cough, Reports cough with sputum, Reports dyspnea, Reports 

wheezing


Gastrointestinal: Denies abdominal pain, Denies diarrhea, Denies nausea, Denies 

vomiting


Genitourinary: Denies dysuria, Denies hematuria


Musculoskeletal: Denies myalgias


Integumentary: Denies pruritus, Denies rash


Neurological: Denies numbness, Denies weakness


Psychiatric: Denies anxiety, Denies depression


Endocrine: Denies fatigue, Denies weight change





Past Medical History


Past Medical History: Atrial Fibrillation, Coronary Artery Disease (CAD), Heart 

Failure, COPD, Hearing Disorder / Deafness, Hypertension, Respiratory Disorder


Additional Past Medical History / Comment(s): Generalized anxiety disorder,  

hearing impaired, generalized anxiety disorder, CHF with an ejection fraction 

of 20-25%, nonocclusive coronary artery disease, diverticulosis, paroxysmal 

atrial fibrillation, osteoarthritis, secondary pulmonary hypertension.


History of Any Multi-Drug Resistant Organisms: None Reported


Past Surgical History: Adenoidectomy, Appendectomy, Hysterectomy, Orthopedic 

Surgery, Tonsillectomy


Additional Past Surgical History / Comment(s): Right knee arthroscopy for torn 

meniscus. Colonoscopies x 3 total.  Bilateral eyelid surgery.


Past Anesthesia/Blood Transfusion Reactions: No Reported Reaction


Additional Past Anesthesia/Blood Transfusion Reaction / Comment(s): Pt has 

never recieved blood.


Past Psychological History: Anxiety


Additional Psychological History / Comment(s): Pt is .  She lives alone 

in her single level home in [facility]. She is very independent. She 

rides bicycle and is an avid bowler.  She drives a car.  She has a niece that 

lives nearby if she needs someone.  She shovels her own driveway.


Smoking Status: Former smoker


Past Alcohol Use History: Occasional


Additional Past Alcohol Use History / Comment(s): Patient was a smoker one pack 

per day 54 years.  She quit 12 years ago.  She drinks alcohol daily 1-2 drinks 

if she is at home.  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 

any recent travel.  She has no pets in the home.


Past Drug Use History: None Reported





- Past Family History


  ** Father


Family Medical History: Cancer


Additional Family Medical History / Comment(s): Father  at age 77yrs of 

cancer which pt believes may have started in his throat.





  ** Mother


Family Medical History: Dementia


Additional Family Medical History / Comment(s): Mother  at age 95yrs.  She 

was very healthy most of her life- she had dementia at the very end of her life.





Medications and Allergies


 Home Medications











 Medication  Instructions  Recorded  Confirmed  Type


 


Albuterol Inhaler [Ventolin Hfa 2 puff INHALATION RT-QID PRN 17 

History





Inhaler]    


 


Fluticasone/Salmeterol [Advair 1 puff INHALATION RT-BID 17 

History





250-50 Diskus]    


 


LORazepam [Ativan] 1 mg PO BID PRN 17 History


 


Amiodarone [Cordarone] 200 mg PO DAILY 17 History


 


Apixaban [Eliquis] 2.5 mg PO BID 17 History


 


Furosemide [Lasix] 40 mg PO DAILY 17 History


 


Tiotropium 18 Mcg/Puff [Spiriva] 1 cap INHALATION RT-DAILY 17 

History











 Allergies











Allergy/AdvReac Type Severity Reaction Status Date / Time


 


cefuroxime axetil Allergy  Unknown Verified 17 15:08





[From Ceftin]     














Physical Exam


Vitals: 


 Vital Signs











  Temp Pulse Pulse Pulse Resp BP Pulse Ox


 


 17 16:00    60  79  18  


 


 17 15:49   76     


 


 17 15:40   68     


 


 17 15:00  96.1 F L   60   18  121/60  96


 


 17 11:05   68     


 


 17 10:55   72     


 


 17 08:00    77  79  18  


 


 17 07:15   72     


 


 17 07:09   76     


 


 17 07:00  97.7 F   77   18  139/74  94 L


 


 17 23:15     79  20  


 


 17 21:18   80     


 


 17 21:06   76     


 


 17 20:00  98.1 F   79  79  20  154/70  94 L








 Intake and Output











 17





 06:59 14:59 22:59


 


Intake Total  400 


 


Output Total   150


 


Balance  400 -150


 


Intake:   


 


  Oral  400 


 


Output:   


 


  Urine   150


 


Other:   


 


  Voiding Method Toilet Toilet Toilet


 


  # Voids 1 3 3


 


  Weight  70.2 kg 70.2 kg








 Patient Weight











 17





 06:59


 


Weight 70.2 kg














Head exam was generally normal. There was no scleral icterus or corneal arcus. 

Mucous membranes were moist.Neck was supple and without jugular venous 

distension, thyromegaly, or carotid bruits. Carotids were easily palpable 

bilaterally. There was no adenopathy.  Lung sounds are diminished bilaterally 

and there are some few scattered expiratory wheezes.Cardiac exam revealed the 

PMI to be normally situated and sized. The rhythm was regular and no 

extrasystoles were noted during several minutes of auscultation. The first and 

second heart sounds were normal and physiologic splitting of the second heart 

sound was noted. There were no murmurs, rubs, clicks, or gallops.Abdominal exam 

revealed normal bowel sounds. The abdomen was soft, non-tender, and without 

masses, organomegaly, or appreciable enlargement of the abdominal 

aorta.Examination of the extremities revealed easily palpable radial, femoral 

and pedal pulses. There was no cyanosis, clubbing or edema.





Results





- Laboratory Findings


CBC and BMP: 


 17 07:12





 17 07:12


PT/INR, D-dimer











PT  12.4 sec (9.0-12.0)  H  17  14:20    


 


INR  1.3  (<1.1)   17  14:20    


 


D-Dimer  0.25 mg/L FEU (<0.60)   17  14:20    








Abnormal lab findings: 


 Abnormal Labs











  17





  13:50 13:50 13:50


 


WBC   


 


Hct   46.8 H 


 


Plt Count   


 


Neutrophils #   


 


Lymphocytes #   0.3 L 


 


PT   


 


Chloride    96 L


 


BUN    23 H


 


Creatinine    1.28 H


 


Glucose    152 H


 


POC Glucose (mg/dL)   


 


Total Creatine Kinase   


 


CK-MB (CK-2)  3.2 H*  


 


Troponin I  0.194 H*  


 


Albumin    5.1 H














  17





  14:20 17:06 19:23


 


WBC   


 


Hct   


 


Plt Count   


 


Neutrophils #   


 


Lymphocytes #   


 


PT  12.4 H  


 


Chloride   


 


BUN   


 


Creatinine   


 


Glucose   


 


POC Glucose (mg/dL)   139 H 


 


Total Creatine Kinase    142 H


 


CK-MB (CK-2)    4.1 H*


 


Troponin I    0.218 H*


 


Albumin   














  17





  21:19 01:47 03:31


 


WBC   


 


Hct   


 


Plt Count    117 L


 


Neutrophils #   


 


Lymphocytes #   


 


PT   


 


Chloride   


 


BUN   


 


Creatinine   


 


Glucose   


 


POC Glucose (mg/dL)  215 H  


 


Total Creatine Kinase   155 H 


 


CK-MB (CK-2)   6.7 H* 


 


Troponin I   0.094 H* 


 


Albumin   














  17





  05:51 08:03 08:03


 


WBC   


 


Hct   


 


Plt Count   142 L 


 


Neutrophils #   


 


Lymphocytes #   0.4 L 


 


PT   


 


Chloride   


 


BUN    20 H


 


Creatinine    1.07 H


 


Glucose    163 H


 


POC Glucose (mg/dL)  152 H  


 


Total Creatine Kinase   


 


CK-MB (CK-2)   


 


Troponin I   


 


Albumin   














  17





  11:50 17:24 20:18


 


WBC   


 


Hct   


 


Plt Count   


 


Neutrophils #   


 


Lymphocytes #   


 


PT   


 


Chloride   


 


BUN   


 


Creatinine   


 


Glucose   


 


POC Glucose (mg/dL)  181 H  120 H  187 H


 


Total Creatine Kinase   


 


CK-MB (CK-2)   


 


Troponin I   


 


Albumin   














  17





  07:12 07:12 07:22


 


WBC  11.9 H  


 


Hct   


 


Plt Count   


 


Neutrophils #  10.9 H  


 


Lymphocytes #  0.5 L  


 


PT   


 


Chloride   


 


BUN   26 H 


 


Creatinine   1.06 H 


 


Glucose   150 H 


 


POC Glucose (mg/dL)    145 H


 


Total Creatine Kinase   


 


CK-MB (CK-2)   


 


Troponin I   


 


Albumin   














  17





  11:19 17:37


 


WBC  


 


Hct  


 


Plt Count  


 


Neutrophils #  


 


Lymphocytes #  


 


PT  


 


Chloride  


 


BUN  


 


Creatinine  


 


Glucose  


 


POC Glucose (mg/dL)  153 H  122 H


 


Total Creatine Kinase  


 


CK-MB (CK-2)  


 


Troponin I  


 


Albumin  














- Diagnostic Findings


Chest x-ray: image reviewed





Assessment and Plan


Plan: 





Assessment





1 acute COPD exacerbation/acute tracheal bronchitis.





2 moderate to severe COPD with a baseline FEV1 of 47% of predicted the patient 

is demented on a combination of Advair and Spiriva on outpatient basis.





3 CHF with an ejection fraction of 20-25%





4 nonocclusive coronary artery disease





5 troponin leak





6 proximity fibrillation





7 anxiety disorder





8 secondary pulmonary hypertension





9 diverticulosis





10 osteoarthritis





Plan





Continue DuoNeb neb last treatment around-the-clock.  Continue IV Solu-Medrol.  

Continue Levaquin.  Cardiac condition is stable.  Troponin leak is nonspecific.

  CHF seems to be optimized at this point.  Patient is improving.  We'll 

continue to follow.  Continue long-term and to coagulation also.

## 2017-06-05 NOTE — PN
DATE OF SERVICE:  06/04/2017



PRESENTING COMPLAINT: Cough, fever. 



INTERVAL HISTORY: This patient presented with an acute severe COPD 

exacerbation. Patient is lying in bed, appears anxious. Some shortness 

of breath at rest noted. Patient is tolerating diet. Ambulatory with 

assistance to the bathroom.  



Review of systems done for constitutional, cardiovascular, GI, 

pulmonary with relevant findings as above.  



CURRENT MEDICATIONS: DuoNeb, Cordarone, Eliquis, aspirin, Lasix, 

Imdur, levofloxacin IV, Solu-Medrol, Lopressor.  



PHYSICAL EXAMINATION: 

VITAL SIGNS: Temperature 96.9, pulse 70, respirations 18, blood 

pressure 141/69, oxygen saturation 95% on 2 liters.  

GENERAL APPEARANCE: Patient appears anxious. Face has red rubor on 

cheeks as well as down on her neck. Patient has some noted shortness 

of breath.  

EYES: Pupils equal. Conjunctivae normal. 

NECK: JVD not raised. Mass not palpable. 

RESPIRATORY: Effort increased. 

LUNGS: Diminished breath sounds bilaterally. Prolonged expiration. 

CARDIOVASCULAR: First and second sounds noted. No edema. 

ABDOMEN: Soft, nontender. Liver and spleen not palpable. 

PSYCHIATRY: Alert and oriented x3. Patient appears anxious. 



INVESTIGATIONS: Accu-Cheks noted. 



Cardiology consult complete, will optimize with medical therapy. 



ASSESSMENT: 

1. Acute severe chronic obstructive pulmonary disease exacerbation in 

an ex-smoker from acute tracheobronchitis, slow to respond.  

2. Chronic kidney disease, stage III from hypertensive nephrosclerosis. 

3. Chronic congestive heart failure from systolic dysfunction. 

Ejection fraction 40%, underlying coronary artery disease.  

4. Paroxysmal atrial fibrillation, patient is now in sinus rhythm. 

5. Moderate tricuspid regurgitation, nonrheumatic secondary to 

pulmonary hypertension secondary to chronic obstructive pulmonary 

disease.  

6. Coronary artery disease with nonobstructive lesion to the left 

anterior descending artery.  

7. Acute hypoxic respiratory failure, present at admission, improving. 



PLAN: Will continue current medication and treatment plan. Will follow 

closely.  



Patient was seen and examined by nurse practitioner, Alla Vance, 

and all elements of the case discussed with attending, Dr. Diop.

## 2017-06-05 NOTE — PN
DATE OF SERVICE: 06/05/2017



PRESENTING COMPLAINT: Cough and fever. 



INTERVAL HISTORY: This patient presented with an acute severe 

exacerbation of COPD.  Today patient is lying in bed visiting with 

friends mild shortness of breath noted. Tolerating her diet.  

Ambulatory with assistance to the bathroom.  



Reviews of systems done for constitutional, cardiovascular, GI, 

pulmonary, with relevant findings as above.  



CURRENT MEDICATIONS: DuoNeb, Cordarone, Eliquis, aspirin, Lasix, 

Imdur, levofloxacin IV, Solu-Medrol, Lopressor.  



PHYSICAL EXAMINATION:

VITAL SIGNS: Temperature is 97.7, pulse 77, respirations 18, blood 

pressure 139/74, oxygen saturation 94% on 3 liters.  

GENERAL APPEARANCE: Patient sitting at the bedside. No acute distress. 

EYES: Pupils equal. Conjunctivae normal. 

NECK: JVD raised. Mass not palpable. 

RESPIRATORY: Effort increased. 

LUNGS: Diminished breath sounds bilaterally. Prolonged expiration. 

CARDIOVASCULAR: First and second sounds noted. No edema. 

ABDOMEN: Soft, nontender. Liver and spleen not palpable. 

PSYCHIATRY: Alert and oriented x3. Patient appears less anxious today. 



INVESTIGATIONS: White blood cell count 1.9, BUN 26, creatinine 1.06, 

blood glucose noted.  



ASSESSMENT:

1. Acute severe chronic obstructive pulmonary disease exacerbation in 

an ex-smoker from acute tracheobronchitis, slow to respond.  

2. Acute non-ST segment elevated myocardial infarction improving. 

3. Chronic kidney disease stage III from hypertensive nephrosclerosis. 

4. Chronic congestive heart failure from systolic dysfunction ejection 

fraction 40%, underlying coronary artery disease.  

5. Paroxysmal atrial fibrillation, patient is now in sinus rhythm. 

6. Moderate tricuspid regurgitation nonrheumatic secondary to 

pulmonary hypertension secondary to chronic obstructive pulmonary 

disease.  

7. Coronary artery disease and nonobstructive lesion to the left 

anterior descending artery.  

8. Acute hypoxic respiratory failure, present at admission, improving. 



PLAN: We will continue current medication and treatment plan. Will 

follow closely.  



Patient was seen and examined by nurse practitioner, Alla Vance, 

and all elements of the case discussed with attending, Dr. Diop.  





I performed a history and physical examination of this patient and 

discussed the same with the dictator.  I agree with the dictator's 

note.  Any additional findings/opinions, etc. will be noted.

## 2017-06-05 NOTE — PN
DATE OF SERVICE:  06/04/2017  



ATTENDING NOTE: 



This patient was seen and examined by me on 6/04/2017. I reviewed the 

note of my nurse practitioner, Ms. Vance.  Discussed additional 

findings as before.  Patient presented with severe COPD exacerbation. 

Still short of breath, very anxious, did tolerate a diet. 



On examination, afebrile. 

LUNGS: Decreased breath sounds, wheezing. 

CARDIOVASCULAR: First and second sounds are normal. 



ASSESSMENT: 

1. Acute severe chronic obstructive pulmonary disease exacerbation 

from acute tracheobronchitis slow to respond.  

2. Acute non-ST elevation myocardial infarction per Cardiology, stable. 



PLAN: Continue current medication and treatment plan. Care was 

discussed with the patient, reassured. Steroids to continue. Patient 

is slow to respond.

## 2017-06-06 LAB
ANION GAP SERPL CALC-SCNC: 12 MMOL/L
BASOPHILS # BLD AUTO: 0 K/UL (ref 0–0.2)
BASOPHILS NFR BLD AUTO: 0 %
BUN SERPL-SCNC: 33 MG/DL (ref 7–17)
CALCIUM SPEC-MCNC: 9.5 MG/DL (ref 8.4–10.2)
CH: 32.1
CHCM: 32.7
CHLORIDE SERPL-SCNC: 98 MMOL/L (ref 98–107)
CO2 SERPL-SCNC: 32 MMOL/L (ref 22–30)
EOSINOPHIL # BLD AUTO: 0 K/UL (ref 0–0.7)
EOSINOPHIL NFR BLD AUTO: 0 %
ERYTHROCYTE [DISTWIDTH] IN BLOOD BY AUTOMATED COUNT: 4.25 M/UL (ref 3.8–5.4)
ERYTHROCYTE [DISTWIDTH] IN BLOOD: 13.5 % (ref 11.5–15.5)
GLUCOSE BLD-MCNC: 129 MG/DL (ref 75–99)
GLUCOSE BLD-MCNC: 132 MG/DL (ref 75–99)
GLUCOSE BLD-MCNC: 146 MG/DL (ref 75–99)
GLUCOSE BLD-MCNC: 158 MG/DL (ref 75–99)
GLUCOSE SERPL-MCNC: 130 MG/DL (ref 74–99)
HCT VFR BLD AUTO: 42 % (ref 34–46)
HDW: 2.92
HGB BLD-MCNC: 13.7 GM/DL (ref 11.4–16)
LUC NFR BLD AUTO: 1 %
LYMPHOCYTES # SPEC AUTO: 0.4 K/UL (ref 1–4.8)
LYMPHOCYTES NFR SPEC AUTO: 4 %
MCH RBC QN AUTO: 32.3 PG (ref 25–35)
MCHC RBC AUTO-ENTMCNC: 32.7 G/DL (ref 31–37)
MCV RBC AUTO: 98.7 FL (ref 80–100)
MONOCYTES # BLD AUTO: 0.3 K/UL (ref 0–1)
MONOCYTES NFR BLD AUTO: 3 %
NEUTROPHILS # BLD AUTO: 9 K/UL (ref 1.3–7.7)
NEUTROPHILS NFR BLD AUTO: 92 %
NON-AFRICAN AMERICAN GFR(MDRD): 42
POTASSIUM SERPL-SCNC: 4.3 MMOL/L (ref 3.5–5.1)
SODIUM SERPL-SCNC: 142 MMOL/L (ref 137–145)
WBC # BLD AUTO: 0.09 10*3/UL
WBC # BLD AUTO: 9.8 K/UL (ref 3.8–10.6)
WBC (PEROX): 9.89

## 2017-06-06 RX ADMIN — AMIODARONE HYDROCHLORIDE SCH MG: 200 TABLET ORAL at 08:16

## 2017-06-06 RX ADMIN — IPRATROPIUM BROMIDE AND ALBUTEROL SULFATE SCH ML: .5; 3 SOLUTION RESPIRATORY (INHALATION) at 11:19

## 2017-06-06 RX ADMIN — APIXABAN SCH MG: 2.5 TABLET, FILM COATED ORAL at 08:16

## 2017-06-06 RX ADMIN — FUROSEMIDE SCH MG: 40 TABLET ORAL at 08:16

## 2017-06-06 RX ADMIN — METOPROLOL TARTRATE SCH MG: 50 TABLET, FILM COATED ORAL at 21:12

## 2017-06-06 RX ADMIN — IPRATROPIUM BROMIDE AND ALBUTEROL SULFATE SCH ML: .5; 3 SOLUTION RESPIRATORY (INHALATION) at 07:25

## 2017-06-06 RX ADMIN — ASPIRIN 81 MG CHEWABLE TABLET SCH MG: 81 TABLET CHEWABLE at 08:16

## 2017-06-06 RX ADMIN — INSULIN LISPRO SCH: 100 INJECTION, SOLUTION INTRAVENOUS; SUBCUTANEOUS at 12:30

## 2017-06-06 RX ADMIN — ATORVASTATIN CALCIUM SCH MG: 40 TABLET, FILM COATED ORAL at 21:12

## 2017-06-06 RX ADMIN — ISOSORBIDE MONONITRATE SCH MG: 30 TABLET, EXTENDED RELEASE ORAL at 08:15

## 2017-06-06 RX ADMIN — IPRATROPIUM BROMIDE AND ALBUTEROL SULFATE SCH ML: .5; 3 SOLUTION RESPIRATORY (INHALATION) at 15:15

## 2017-06-06 RX ADMIN — METHYLPREDNISOLONE SODIUM SUCCINATE SCH MG: 40 INJECTION, POWDER, FOR SOLUTION INTRAMUSCULAR; INTRAVENOUS at 16:59

## 2017-06-06 RX ADMIN — INSULIN LISPRO SCH UNIT: 100 INJECTION, SOLUTION INTRAVENOUS; SUBCUTANEOUS at 18:02

## 2017-06-06 RX ADMIN — IPRATROPIUM BROMIDE AND ALBUTEROL SULFATE SCH ML: .5; 3 SOLUTION RESPIRATORY (INHALATION) at 18:59

## 2017-06-06 RX ADMIN — INSULIN LISPRO SCH UNIT: 100 INJECTION, SOLUTION INTRAVENOUS; SUBCUTANEOUS at 21:12

## 2017-06-06 RX ADMIN — METHYLPREDNISOLONE SODIUM SUCCINATE SCH MG: 40 INJECTION, POWDER, FOR SOLUTION INTRAMUSCULAR; INTRAVENOUS at 00:40

## 2017-06-06 RX ADMIN — METHYLPREDNISOLONE SODIUM SUCCINATE SCH MG: 40 INJECTION, POWDER, FOR SOLUTION INTRAMUSCULAR; INTRAVENOUS at 08:15

## 2017-06-06 RX ADMIN — METOPROLOL TARTRATE SCH MG: 50 TABLET, FILM COATED ORAL at 08:15

## 2017-06-06 RX ADMIN — INSULIN LISPRO SCH: 100 INJECTION, SOLUTION INTRAVENOUS; SUBCUTANEOUS at 08:18

## 2017-06-06 RX ADMIN — APIXABAN SCH MG: 2.5 TABLET, FILM COATED ORAL at 21:12

## 2017-06-06 NOTE — PN
DATE OF SERVICE: 6/6/2017. 



PRESENTING COMPLAINT: Cough and fever. 



INTERVAL HISTORY: This patient presented with an acute severe 

exacerbation of COPD .  Today the patient is lying in bed, wants to go 

home, not in any distress but is; however, concerned about oxygen 

having to go home on oxygen. Patient appears mildly anxious. 

Tolerating her diet. Ambulatory with assistance to the bathroom.  



Review of systems done for constitutional, cardiovascular, GI, 

pulmonary, with relevant findings as above.  



CURRENT MEDICATIONS: DuoNeb, Cordarone, Eliquis, aspirin, Lasix, 

Imdur, levofloxacin IV, Solu-Medrol, Lopressor.  



PHYSICAL EXAMINATION:

VITAL SIGNS: Temperature 97.3, pulse 56, respiratory rate 20, blood 

pressure 127/56, oxygen saturation 97% on 3 liters.  

GENERAL APPEARANCE: Patient is lying in the bed, cooperative. No 

distress.  

EYES: Pupils equal. Conjunctivae normal. 

NECK: JVD raised. Mass not palpable. 

RESPIRATORY: Effort increased. 

LUNGS: Diminished breath sounds bilaterally. Prolonged expiration. 

CARDIOVASCULAR: First and second sounds noted. No edema. 

ABDOMEN: Soft, nontender. Liver and spleen not palpable. 

PSYCHIATRY: Alert and oriented x3. Patient is anxious today. 



INVESTIGATIONS: White blood cell count 9.8, hemoglobin 13.7. Sodium 

142, potassium 4.3. BUN 33, creatinine 1.22, blood glucose 158.  



ASSESSMENT:

1. Acute severe chronic obstructive pulmonary disease exacerbation, 

slow to respond.  

2. Acute non-ST myocardial infarction. 

3. Chronic kidney disease stage III from hypertensive nephrosclerosis. 

4. Chronic congestive heart failure from systolic dysfunction. 

Ejection fraction 40%, underlying coronary artery disease.  

5. Paroxysmal atrial fibrillation. Patient is now in sinus rhythm. 

6. Moderate tricuspid regurgitation nonrheumatic secondary to 

pulmonary hypertension secondary to chronic obstructive pulmonary 

disease.  

7. Coronary artery disease and nonobstructive lesion to the left 

anterior descending artery.  

8. Acute hypoxic respiratory failure, present at admission, improving. 



PLAN: Patient to remain on IV antibiotics as well as pulmonology 

initiating steroid taper. Patient may be stable enough to discharge 

tomorrow. Will follow.  



Patient seen and examined by nurse practitioner Alla Vance, and 

all elements of the case discussed with attending, Dr. Diop.  



I performed a history and physical examination of this patient and 

discussed the same with the dictator.  I agree with the dictator's 

note.  Any additional findings/opinions, etc. will be noted.

## 2017-06-06 NOTE — P.PN
Subjective


Principal diagnosis: 





COPD exacerbation.





83-year-old female patient with known history of COPD, with a baseline FEV1 of 

47% of predicted maintained on a combination of Advair and Spiriva on 

outpatient basis.  The patient was in the hospital in January 2017 admit for 

worsening shortness of breath and she was found to be in acute CHF with 

increased lower extremity edema and new onset atrial fibrillation with rapid 

ventricular response.  She was treated with a Cardizem drip and ultimately she 

was switched to amiodarone and echocardiogram was done and showed that the 

patient has depressed ejection fraction of 20-25%.  CT angios the chest that 

was done back then was negative.  The patient was optimized in regards to her 

CHF and she was discharged home.  In follow-up I saw this patient in my office 

and she was looking very well and she was not having any major respiratory 

distress.  She was also on long-term anticoagulation using  Eliquis.  

Subsequently the patient was rehospitalized on May 2017 for acute bronchitis 

and COPD exacerbation.  During her hospital stay the patient had a kidney 

injury with her creatinine went up to 1.3 and subsequently it improved





During this current hospitalization, the patient came into the hospital because 

of increased dyspnea.  Cough.  She had also increased sputum production and 

wheezing.  Troponins were minimally elevated and there was evidence of troponin 

leak.  The patient was hypoxic and she checked her pulse ox at home and was as 

low as 72%.  Note that she does not have significant hypoxemia.  Based on that 

she came into the hospital.  She was seen by cardiology.  Pulmonary was also 

placed on consult.  The chest x-ray shows evidence of COPD and there is no 

evidence of an acute pneumonia at this point.  There is increased coarse 

interstitial markings bilaterally.  The patient's white cell count is not 

elevated.  The patient's renal function is within normal limits.  She is free 

of any chest pain.





The patient is seen again today June 6, 2017 in follow-up on the regular 

medical floor.  She is awake and alert in no acute distress.  She has been 

trialed without her oxygen and is maintaining good O2 saturations in the 90s on 

room air even while walking in the hallway.  She states she is breathing easier 

today as compared to yesterday.  Blood cultures reveal no growth to date.  No 

leukocytosis.  She's been afebrile.  She remains on bronchodilators, IV Solu-

Medrol, Levaquin. 





Objective





- Vital Signs


Vital signs: 


 Vital Signs











Temp  97.3 F L  06/06/17 07:00


 


Pulse  68   06/06/17 11:33


 


Resp  18   06/06/17 08:00


 


BP  127/56   06/06/17 07:00


 


Pulse Ox  97   06/06/17 07:00








 Intake & Output











 06/05/17 06/06/17 06/06/17





 18:59 06:59 18:59


 


Intake Total 400  


 


Output Total 150  


 


Balance 250  


 


Weight 70.2 kg  


 


Intake:   


 


  Oral 400  


 


Output:   


 


  Urine 150  


 


Other:   


 


  Voiding Method Toilet Toilet Toilet


 


  # Voids 3 1 














- Exam





GENERAL EXAM: Alert, active, comfortable in no apparent distress.


HEAD: Normocephalic.


EYES: Normal reaction of pupils, equal size.


NOSE: Clear with pink turbinates.


THROAT: No erythema or exudates.


NECK: No masses, no JVD.


CHEST: No chest wall deformity.


LUNGS: Equal air entry with no crackles, wheeze, rhonchi or dullness.


CVS: S1 and S2 normal with no audible murmurs, regular rhythm.


ABDOMEN: No hepatosplenomegaly, normal bowel sounds, no guarding or rigidity.


SPINE: No scoliosis or deformity


SKIN: No rashes


CENTRAL NERVOUS SYSTEM: No focal deficits, tone is normal in all 4 extremities.


Extremities: There is no peripheral edema.  No clubbing, no cyanosis.  

Peripheral pulses are intact.





- Labs


CBC & Chem 7: 


 06/06/17 07:03





 06/06/17 07:03


Labs: 


 Abnormal Lab Results - Last 24 Hours (Table)











  06/05/17 06/05/17 06/06/17 Range/Units





  17:37 20:49 07:03 


 


Neutrophils #    9.0 H  (1.3-7.7)  k/uL


 


Lymphocytes #    0.4 L  (1.0-4.8)  k/uL


 


Carbon Dioxide     (22-30)  mmol/L


 


BUN     (7-17)  mg/dL


 


Creatinine     (0.52-1.04)  mg/dL


 


Glucose     (74-99)  mg/dL


 


POC Glucose (mg/dL)  122 H  192 H   (75-99)  mg/dL














  06/06/17 06/06/17 06/06/17 Range/Units





  07:03 07:13 11:11 


 


Neutrophils #     (1.3-7.7)  k/uL


 


Lymphocytes #     (1.0-4.8)  k/uL


 


Carbon Dioxide  32 H    (22-30)  mmol/L


 


BUN  33 H    (7-17)  mg/dL


 


Creatinine  1.22 H    (0.52-1.04)  mg/dL


 


Glucose  130 H    (74-99)  mg/dL


 


POC Glucose (mg/dL)   129 H  158 H  (75-99)  mg/dL








 Microbiology - Last 24 Hours (Table)











 06/03/17 13:50 Blood Culture - Preliminary





 Blood    No Growth after 48 hours














Assessment and Plan


Plan: 





Assessment





1 acute COPD exacerbation/acute tracheal bronchitis.





2 moderate to severe COPD with a baseline FEV1 of 47% of predicted the patient 

is demented on a combination of Advair and Spiriva on outpatient basis.





3 CHF with an ejection fraction of 20-25%





4 nonocclusive coronary artery disease





5 troponin leak





6 proximity fibrillation





7 anxiety disorder





8 secondary pulmonary hypertension





9 diverticulosis





10 osteoarthritis





Plan:





The patient was seen and evaluated by Dr. Portillo.  She is stable from the 

pulmonary standpoint.  We'll continue with her current medications.  We'll 

increase her activity as tolerated.  We'll initiate a prednisone taper.  We'll 

continue to follow.

## 2017-06-06 NOTE — PN
DATE OF SERVICE:  06/05/2017



ATTENDING NOTE: 



The patient was seen and examined by me today. I reviewed the note of 

my nurse practitioner, Ms. Vance.  Discussed additional findings 

below. Patient presented with COPD. Also had acute non- (      ) MI.  

Patient still short of breath, anxious.  



On examination, decreased breath sounds, prolonged expiration wheezing. 

CARDIOVASCULAR: First and second sounds normal. 



ASSESSMENT: 

1. Acute severe chronic obstructive pulmonary disease exacerbation, 

slow to respond.  

2. Acute non-ST myocardial infarction. 



PLAN: Care was discussed with the patient. Family at the bedside, 

reassured. Continue treatment plan. Patient is definitely slow to 

respond.

## 2017-06-07 VITALS — DIASTOLIC BLOOD PRESSURE: 98 MMHG | RESPIRATION RATE: 18 BRPM | TEMPERATURE: 98.5 F | SYSTOLIC BLOOD PRESSURE: 131 MMHG

## 2017-06-07 VITALS — HEART RATE: 68 BPM

## 2017-06-07 LAB
ANION GAP SERPL CALC-SCNC: 8 MMOL/L
BASOPHILS # BLD AUTO: 0 K/UL (ref 0–0.2)
BASOPHILS NFR BLD AUTO: 0 %
BUN SERPL-SCNC: 40 MG/DL (ref 7–17)
CALCIUM SPEC-MCNC: 10 MG/DL (ref 8.4–10.2)
CH: 31.8
CHCM: 32.7
CHLORIDE SERPL-SCNC: 98 MMOL/L (ref 98–107)
CO2 SERPL-SCNC: 35 MMOL/L (ref 22–30)
EOSINOPHIL # BLD AUTO: 0 K/UL (ref 0–0.7)
EOSINOPHIL NFR BLD AUTO: 0 %
ERYTHROCYTE [DISTWIDTH] IN BLOOD BY AUTOMATED COUNT: 4.35 M/UL (ref 3.8–5.4)
ERYTHROCYTE [DISTWIDTH] IN BLOOD: 13.3 % (ref 11.5–15.5)
GLUCOSE BLD-MCNC: 102 MG/DL (ref 75–99)
GLUCOSE SERPL-MCNC: 79 MG/DL (ref 74–99)
HCT VFR BLD AUTO: 42.5 % (ref 34–46)
HDW: 2.88
HGB BLD-MCNC: 14.1 GM/DL (ref 11.4–16)
LUC NFR BLD AUTO: 2 %
LYMPHOCYTES # SPEC AUTO: 0.6 K/UL (ref 1–4.8)
LYMPHOCYTES NFR SPEC AUTO: 5 %
MCH RBC QN AUTO: 32.4 PG (ref 25–35)
MCHC RBC AUTO-ENTMCNC: 33.1 G/DL (ref 31–37)
MCV RBC AUTO: 97.7 FL (ref 80–100)
MONOCYTES # BLD AUTO: 0.8 K/UL (ref 0–1)
MONOCYTES NFR BLD AUTO: 7 %
NEUTROPHILS # BLD AUTO: 9.5 K/UL (ref 1.3–7.7)
NEUTROPHILS NFR BLD AUTO: 86 %
NON-AFRICAN AMERICAN GFR(MDRD): 37
POTASSIUM SERPL-SCNC: 4.5 MMOL/L (ref 3.5–5.1)
SODIUM SERPL-SCNC: 141 MMOL/L (ref 137–145)
WBC # BLD AUTO: 0.17 10*3/UL
WBC # BLD AUTO: 11 K/UL (ref 3.8–10.6)
WBC (PEROX): 11.42

## 2017-06-07 RX ADMIN — INSULIN LISPRO SCH: 100 INJECTION, SOLUTION INTRAVENOUS; SUBCUTANEOUS at 12:27

## 2017-06-07 RX ADMIN — AMIODARONE HYDROCHLORIDE SCH MG: 200 TABLET ORAL at 08:31

## 2017-06-07 RX ADMIN — APIXABAN SCH MG: 2.5 TABLET, FILM COATED ORAL at 08:31

## 2017-06-07 RX ADMIN — ASPIRIN 81 MG CHEWABLE TABLET SCH MG: 81 TABLET CHEWABLE at 08:32

## 2017-06-07 RX ADMIN — IPRATROPIUM BROMIDE AND ALBUTEROL SULFATE SCH ML: .5; 3 SOLUTION RESPIRATORY (INHALATION) at 11:34

## 2017-06-07 RX ADMIN — FUROSEMIDE SCH MG: 40 TABLET ORAL at 08:33

## 2017-06-07 RX ADMIN — IPRATROPIUM BROMIDE AND ALBUTEROL SULFATE SCH ML: .5; 3 SOLUTION RESPIRATORY (INHALATION) at 15:13

## 2017-06-07 RX ADMIN — INSULIN LISPRO SCH: 100 INJECTION, SOLUTION INTRAVENOUS; SUBCUTANEOUS at 08:14

## 2017-06-07 RX ADMIN — IPRATROPIUM BROMIDE AND ALBUTEROL SULFATE SCH ML: .5; 3 SOLUTION RESPIRATORY (INHALATION) at 07:10

## 2017-06-07 RX ADMIN — METOPROLOL TARTRATE SCH MG: 50 TABLET, FILM COATED ORAL at 08:32

## 2017-06-07 RX ADMIN — ISOSORBIDE MONONITRATE SCH MG: 30 TABLET, EXTENDED RELEASE ORAL at 08:31

## 2017-06-07 NOTE — P.PN
Subjective


Principal diagnosis: 





COPD exacerbation.





83-year-old female patient with known history of COPD, with a baseline FEV1 of 

47% of predicted maintained on a combination of Advair and Spiriva on 

outpatient basis.  The patient was in the hospital in January 2017 admit for 

worsening shortness of breath and she was found to be in acute CHF with 

increased lower extremity edema and new onset atrial fibrillation with rapid 

ventricular response.  She was treated with a Cardizem drip and ultimately she 

was switched to amiodarone and echocardiogram was done and showed that the 

patient has depressed ejection fraction of 20-25%.  CT angios the chest that 

was done back then was negative.  The patient was optimized in regards to her 

CHF and she was discharged home.  In follow-up I saw this patient in my office 

and she was looking very well and she was not having any major respiratory 

distress.  She was also on long-term anticoagulation using  Eliquis.  

Subsequently the patient was rehospitalized on May 2017 for acute bronchitis 

and COPD exacerbation.  During her hospital stay the patient had a kidney 

injury with her creatinine went up to 1.3 and subsequently it improved





During this current hospitalization, the patient came into the hospital because 

of increased dyspnea.  Cough.  She had also increased sputum production and 

wheezing.  Troponins were minimally elevated and there was evidence of troponin 

leak.  The patient was hypoxic and she checked her pulse ox at home and was as 

low as 72%.  Note that she does not have significant hypoxemia.  Based on that 

she came into the hospital.  She was seen by cardiology.  Pulmonary was also 

placed on consult.  The chest x-ray shows evidence of COPD and there is no 

evidence of an acute pneumonia at this point.  There is increased coarse 

interstitial markings bilaterally.  The patient's white cell count is not 

elevated.  The patient's renal function is within normal limits.  She is free 

of any chest pain.





The patient is seen again today June 6, 2017 in follow-up on the regular 

medical floor.  She is awake and alert in no acute distress.  She has been 

trialed without her oxygen and is maintaining good O2 saturations in the 90s on 

room air even while walking in the hallway.  She states she is breathing easier 

today as compared to yesterday.  Blood cultures reveal no growth to date.  No 

leukocytosis.  She's been afebrile.  She remains on bronchodilators, IV Solu-

Medrol, Levaquin. 





The patient is seen again today 06/07/2017 in follow-up on the regular medical 

floor.  She is doing quite well.  She is nearly back to her baseline. She's 

been up ambulating with assistance and maintaining good O2 saturations in the 

90s.  Her cough has subsided.  She has been treated with bronchodilators, 

steroids and Levaquin.  She is anxious to go home. 





Objective





- Vital Signs


Vital signs: 


 Vital Signs











Temp  98.5 F   06/07/17 07:00


 


Pulse  68   06/07/17 11:46


 


Resp  18   06/07/17 11:34


 


BP  131/98   06/07/17 07:00


 


Pulse Ox  97   06/07/17 07:00








 Intake & Output











 06/06/17 06/07/17 06/07/17





 18:59 06:59 18:59


 


Intake Total 400 800 


 


Output Total 150  


 


Balance 250 800 


 


Weight 70.3 kg  67.6 kg


 


Intake:   


 


  Oral 400 800 


 


Output:   


 


  Urine 150  


 


Other:   


 


  Voiding Method Toilet Toilet Toilet


 


  # Voids 3 2 














- Exam





GENERAL EXAM: Alert, active, comfortable in no apparent distress.


HEAD: Normocephalic.


EYES: Normal reaction of pupils, equal size.


NOSE: Clear with pink turbinates.


THROAT: No erythema or exudates.


NECK: No masses, no JVD.


CHEST: No chest wall deformity.


LUNGS: Equal air entry with no crackles, wheeze, rhonchi or dullness.


CVS: S1 and S2 normal with no audible murmurs, regular rhythm.


ABDOMEN: No hepatosplenomegaly, normal bowel sounds, no guarding or rigidity.


SPINE: No scoliosis or deformity


SKIN: No rashes


CENTRAL NERVOUS SYSTEM: No focal deficits, tone is normal in all 4 extremities.


Extremities: There is no peripheral edema.  No clubbing, no cyanosis.  

Peripheral pulses are intact.





- Labs


CBC & Chem 7: 


 06/07/17 09:32





 06/07/17 09:32


Labs: 


 Abnormal Lab Results - Last 24 Hours (Table)











  06/06/17 06/06/17 06/07/17 Range/Units





  17:34 20:45 07:22 


 


WBC     (3.8-10.6)  k/uL


 


Neutrophils #     (1.3-7.7)  k/uL


 


Lymphocytes #     (1.0-4.8)  k/uL


 


Carbon Dioxide     (22-30)  mmol/L


 


BUN     (7-17)  mg/dL


 


Creatinine     (0.52-1.04)  mg/dL


 


POC Glucose (mg/dL)  132 H  146 H  102 H  (75-99)  mg/dL














  06/07/17 06/07/17 Range/Units





  09:32 09:32 


 


WBC  11.0 H   (3.8-10.6)  k/uL


 


Neutrophils #  9.5 H   (1.3-7.7)  k/uL


 


Lymphocytes #  0.6 L   (1.0-4.8)  k/uL


 


Carbon Dioxide   35 H  (22-30)  mmol/L


 


BUN   40 H  (7-17)  mg/dL


 


Creatinine   1.35 H  (0.52-1.04)  mg/dL


 


POC Glucose (mg/dL)    (75-99)  mg/dL








 Microbiology - Last 24 Hours (Table)











 06/03/17 13:50 Blood Culture - Preliminary





 Blood    No Growth after 72 hours














Assessment and Plan


Plan: 





Assessment





1 acute COPD exacerbation/acute tracheal bronchitis.





2 moderate to severe COPD with a baseline FEV1 of 47% of predicted the patient 

is demented on a combination of Advair and Spiriva on outpatient basis.





3 CHF with an ejection fraction of 20-25%





4 nonocclusive coronary artery disease





5 troponin leak





6 proximity fibrillation





7 anxiety disorder





8 secondary pulmonary hypertension





9 diverticulosis





10 osteoarthritis





Plan:





The patient was seen and evaluated by Dr. Portillo.  She is stable from the 

pulmonary standpoint and could be discharged home today.  Continue her home 

medications.  We'll initiate a prednisone taper.  She will follow-up in our 

office in 1-2 weeks' time.  She is however encouraged to call sooner with any 

recurrence of symptoms or other questions or concerns.

## 2017-06-08 NOTE — DS
DATE OF ADMISSION:   06/03/2017

DATE OF DISCHARGE:   06/07/2017



FINAL DIAGNOSES: 

1. Acute severe chronic obstructive pulmonary disease exacerbation, 

present on admission.  

2. Acute non-ST-elevation myocardial infarction. 

3. Chronic kidney disease, stage III, from hypertensive 

nephrosclerosis.  

4. Chronic congestive heart failure from systolic dysfunction; 

ejection fraction 40%, with underlying coronary artery disease.  

5. Paroxysmal atrial fibrillation, now in sinus rhythm. 

6. Moderate tricuspid regurgitation, non-rheumatic, secondary to 

pulmonary hypertension secondary to chronic obstructive pulmonary 

disease.  

7. Coronary artery disease with non-obstructive lesion to the left 

anterior descending coronary artery from prior cardiac 

catheterization.  

8. Acute hypoxic respiratory failure, present on admission, from 

chronic obstructive pulmonary disease exacerbation.  

9. Acute tracheobronchitis, present on admission. 

10. Rosacea, chronic. 



HOSPITAL COURSE: This patient presented with shortness of breath, felt 

to have acute non-Q-wave MI. No further intervention per Dr. MERARY Mar. 

Patient had a recent cardiac catheterization that showed (      ) 

findings. Also has COPD exacerbation, doing better, and 

tracheobronchitis. On the day of discharge care was discussed in 

detail with the patient. Patient was up and about in the hallway. 

Patient's BUN and creatinine were 14 and 1.35.  



DISCHARGE MEDICATIONS: 

1. Ventolin HFA 2 puffs q.i.d. p.r.n. 

2. Advair 250/50 one puff b.i.d. 

3. Ativan 1 mg p.o. b.i.d. p.r.n. 

4. Lopressor 50 mg p.o. b.i.d. 

5. Cordarone 200 mg p.o. daily. 

6. Eliquis 2.5 p.o. b.i.d. 

7. Lasix 40 mg p.o. daily. 

8. Spiriva 1 capsule p.o. daily. 

9. Aspirin 81 mg p.o. daily. 

10. Lipitor 40 mg at bedtime. 

11. Imdur ER 30 mg daily. 

12. Nitrostat 0.4 sublingually q.5 p.r.n. 

13. DuoNeb q.i.d. p.r.n. 

14. Prednisone taper. 



Follow up with Dr. Anaya on 6/12/17; Dr. Pope on 6/13/17; Dr. Barnard 

on 6/15/17.  



On examination: 

CARDIOVASCULAR: First and second seconds normal. 

LUNGS: Decreased breath sounds. 



Discharge planning more than 35 minutes.

## 2017-06-09 ENCOUNTER — HOSPITAL ENCOUNTER (EMERGENCY)
Dept: HOSPITAL 47 - EC | Age: 82
Discharge: HOME | End: 2017-06-09
Payer: MEDICARE

## 2017-06-09 VITALS
DIASTOLIC BLOOD PRESSURE: 73 MMHG | RESPIRATION RATE: 18 BRPM | HEART RATE: 53 BPM | SYSTOLIC BLOOD PRESSURE: 144 MMHG | TEMPERATURE: 98.8 F

## 2017-06-09 DIAGNOSIS — Z79.52: ICD-10-CM

## 2017-06-09 DIAGNOSIS — J44.9: Primary | ICD-10-CM

## 2017-06-09 DIAGNOSIS — Z87.891: ICD-10-CM

## 2017-06-09 DIAGNOSIS — Z79.51: ICD-10-CM

## 2017-06-09 DIAGNOSIS — Z79.899: ICD-10-CM

## 2017-06-09 DIAGNOSIS — Z79.01: ICD-10-CM

## 2017-06-09 DIAGNOSIS — I48.0: ICD-10-CM

## 2017-06-09 DIAGNOSIS — I25.10: ICD-10-CM

## 2017-06-09 DIAGNOSIS — I50.9: ICD-10-CM

## 2017-06-09 DIAGNOSIS — I27.2: ICD-10-CM

## 2017-06-09 DIAGNOSIS — Z88.1: ICD-10-CM

## 2017-06-09 LAB
ALP SERPL-CCNC: 63 U/L (ref 38–126)
ALT SERPL-CCNC: 37 U/L (ref 9–52)
ANION GAP SERPL CALC-SCNC: 14 MMOL/L
APTT BLD: 26 SEC (ref 22–30)
AST SERPL-CCNC: 34 U/L (ref 14–36)
BASOPHILS # BLD AUTO: 0 K/UL (ref 0–0.2)
BASOPHILS NFR BLD AUTO: 0 %
BUN SERPL-SCNC: 44 MG/DL (ref 7–17)
CALCIUM SPEC-MCNC: 9 MG/DL (ref 8.4–10.2)
CH: 32.4
CHCM: 33.6
CHLORIDE SERPL-SCNC: 99 MMOL/L (ref 98–107)
CK SERPL-CCNC: 53 U/L (ref 30–135)
CO2 SERPL-SCNC: 26 MMOL/L (ref 22–30)
EOSINOPHIL # BLD AUTO: 0.1 K/UL (ref 0–0.7)
EOSINOPHIL NFR BLD AUTO: 1 %
ERYTHROCYTE [DISTWIDTH] IN BLOOD BY AUTOMATED COUNT: 4.22 M/UL (ref 3.8–5.4)
ERYTHROCYTE [DISTWIDTH] IN BLOOD: 13.1 % (ref 11.5–15.5)
GLUCOSE SERPL-MCNC: 262 MG/DL (ref 74–99)
HCT VFR BLD AUTO: 40.9 % (ref 34–46)
HDW: 2.81
HGB BLD-MCNC: 13.6 GM/DL (ref 11.4–16)
INR PPP: 1.2 (ref ?–1.1)
LUC NFR BLD AUTO: 1 %
LYMPHOCYTES # SPEC AUTO: 0.5 K/UL (ref 1–4.8)
LYMPHOCYTES NFR SPEC AUTO: 6 %
MCH RBC QN AUTO: 32.3 PG (ref 25–35)
MCHC RBC AUTO-ENTMCNC: 33.3 G/DL (ref 31–37)
MCV RBC AUTO: 97 FL (ref 80–100)
MONOCYTES # BLD AUTO: 0.1 K/UL (ref 0–1)
MONOCYTES NFR BLD AUTO: 2 %
NEUTROPHILS # BLD AUTO: 7.4 K/UL (ref 1.3–7.7)
NEUTROPHILS NFR BLD AUTO: 91 %
NON-AFRICAN AMERICAN GFR(MDRD): 37
POTASSIUM SERPL-SCNC: 4.8 MMOL/L (ref 3.5–5.1)
PROT SERPL-MCNC: 6.3 G/DL (ref 6.3–8.2)
PT BLD: 11.9 SEC (ref 9–12)
SODIUM SERPL-SCNC: 139 MMOL/L (ref 137–145)
TROPONIN I SERPL-MCNC: <0.012 NG/ML (ref 0–0.03)
WBC # BLD AUTO: 0.04 10*3/UL
WBC # BLD AUTO: 8.1 K/UL (ref 3.8–10.6)
WBC (PEROX): 8.68

## 2017-06-09 PROCEDURE — 96374 THER/PROPH/DIAG INJ IV PUSH: CPT

## 2017-06-09 PROCEDURE — 99285 EMERGENCY DEPT VISIT HI MDM: CPT

## 2017-06-09 PROCEDURE — 85379 FIBRIN DEGRADATION QUANT: CPT

## 2017-06-09 PROCEDURE — 94640 AIRWAY INHALATION TREATMENT: CPT

## 2017-06-09 PROCEDURE — 71020: CPT

## 2017-06-09 PROCEDURE — 85730 THROMBOPLASTIN TIME PARTIAL: CPT

## 2017-06-09 PROCEDURE — 82550 ASSAY OF CK (CPK): CPT

## 2017-06-09 PROCEDURE — 82553 CREATINE MB FRACTION: CPT

## 2017-06-09 PROCEDURE — 85610 PROTHROMBIN TIME: CPT

## 2017-06-09 PROCEDURE — 84484 ASSAY OF TROPONIN QUANT: CPT

## 2017-06-09 PROCEDURE — 93005 ELECTROCARDIOGRAM TRACING: CPT

## 2017-06-09 PROCEDURE — 36415 COLL VENOUS BLD VENIPUNCTURE: CPT

## 2017-06-09 PROCEDURE — 85025 COMPLETE CBC W/AUTO DIFF WBC: CPT

## 2017-06-09 PROCEDURE — 80053 COMPREHEN METABOLIC PANEL: CPT

## 2017-06-09 NOTE — XR
EXAMINATION TYPE: XR chest 2V

 

DATE OF EXAM: 6/9/2017

 

COMPARISON: Chest x-ray Loly 3, 2017.

 

HISTORY: Difficulty in breathing.

 

TECHNIQUE:  Frontal and lateral views of the chest are obtained.

 

FINDINGS:  There is chronic emphysematous change without suspicious focal air space opacity, pleural 
effusion, or pneumothorax seen. Patchy left basilar scarring or atelectasis is redemonstrated. The ca
rdiac silhouette size is stable and upper limits of normal with atherosclerotic thoracic aorta.   The
 osseous structures are intact.

 

IMPRESSION:  Chronic emphysematous change without suspicious acute pulmonary process.

## 2017-06-09 NOTE — ED
General Adult HPI





- General


Stated complaint: Diff breathing


Time Seen by Provider: 17 13:42


Source: patient, RN notes reviewed


Limitations: no limitations





- History of Present Illness


Initial comments: 





Patient is a pleasant 83-year-old female presenting to the emergency Department 

with complaints of difficulty breathing.  Patient was just discharged from the 

hospital days ago patient does check her oxygen and found it to decreased to 84

% with exertion.  Patient does complain of some dyspnea with exertion.  She has 

only mild at rest.  Patient does have cough with minimal productivity.  No 

fevers.  Patient does have COPD with similar symptoms previously.





- Related Data


 Home Medications











 Medication  Instructions  Recorded  Confirmed


 


Albuterol Inhaler [Ventolin Hfa 2 puff INHALATION RT-QID PRN 17





Inhaler]   


 


Fluticasone/Salmeterol [Advair 1 puff INHALATION RT-BID 17





250-50 Diskus]   


 


LORazepam [Ativan] 1 mg PO BID PRN 17


 


Amiodarone [Cordarone] 200 mg PO DAILY 17


 


Apixaban [Eliquis] 2.5 mg PO BID 17


 


Furosemide [Lasix] 40 mg PO DAILY 17


 


Tiotropium 18 Mcg/Puff [Spiriva] 1 cap INHALATION RT-DAILY 17


 


predniSONE See Taper PO DAILY 17








 Previous Rx's











 Medication  Instructions  Recorded


 


Metoprolol Tartrate [Lopressor] 50 mg PO BID #60 tab 17


 


Aspirin 81 mg PO DAILY 17


 


Atorvastatin [Lipitor] 40 mg PO HS #30 tab 17


 


Isosorbide Mononitrate ER [Imdur] 30 mg PO DAILY #30 tab 17


 


Nitroglycerin Sl Tabs [Nitrostat] 0.4 mg SUBLINGUAL Q5M PRN #30 tab 17


 


Ipratropium-Albuterol Nebulize 3 ml INHALATION RT-QID PRN #120 17





[Duoneb 0.5 mg-3 mg/3 ml Soln] ampul.neb 











 Allergies











Allergy/AdvReac Type Severity Reaction Status Date / Time


 


cefuroxime axetil Allergy  Unknown Verified 17 13:47





[From Ceftin]     














Review of Systems


ROS Statement: 


Those systems with pertinent positive or pertinent negative responses have been 

documented in the HPI.





ROS Other: All systems not noted in ROS Statement are negative.


Constitutional: Denies: fever


Eyes: Denies: eye pain


ENT: Denies: ear pain


Respiratory: Reports: as per HPI, dyspnea


Cardiovascular: Denies: chest pain


Endocrine: Reports: fatigue


Gastrointestinal: Denies: abdominal pain


Genitourinary: Denies: urgency


Musculoskeletal: Denies: back pain


Skin: Denies: rash


Neurological: Denies: weakness





Past Medical History


Past Medical History: Atrial Fibrillation, Coronary Artery Disease (CAD), Heart 

Failure, COPD, Hearing Disorder / Deafness, Hypertension, Respiratory Disorder


Additional Past Medical History / Comment(s): Generalized anxiety disorder,  

hearing impaired, generalized anxiety disorder, CHF with an ejection fraction 

of 20-25%, nonocclusive coronary artery disease, diverticulosis, paroxysmal 

atrial fibrillation, osteoarthritis, secondary pulmonary hypertension.


History of Any Multi-Drug Resistant Organisms: None Reported


Past Surgical History: Adenoidectomy, Appendectomy, Hysterectomy, Orthopedic 

Surgery, Tonsillectomy


Additional Past Surgical History / Comment(s): Right knee arthroscopy for torn 

meniscus. Colonoscopies x 3 total.  Bilateral eyelid surgery.


Past Anesthesia/Blood Transfusion Reactions: No Reported Reaction


Additional Past Anesthesia/Blood Transfusion Reaction / Comment(s): Pt has 

never recieved blood.


Past Psychological History: Anxiety


Additional Psychological History / Comment(s): Pt is .  She lives alone 

in her single level home in [place]. She is very independent. She 

rides bicycle and is an avid bowler.  She drives a car.  She has a niece that 

lives nearby if she needs someone.  She shovels her own driveway.


Smoking Status: Former smoker


Past Alcohol Use History: Occasional


Additional Past Alcohol Use History / Comment(s): Patient was a smoker one pack 

per day 54 years.  She quit 12 years ago.  She drinks alcohol daily 1-2 drinks 

if she is at home.  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 

any recent travel.  She has no pets in the home.


Past Drug Use History: None Reported





- Past Family History


  ** Father


Family Medical History: Cancer


Additional Family Medical History / Comment(s): Father  at age 77yrs of 

cancer which pt believes may have started in his throat.





  ** Mother


Family Medical History: Dementia


Additional Family Medical History / Comment(s): Mother  at age 95yrs.  She 

was very healthy most of her life- she had dementia at the very end of her life.





General Exam


General appearance: alert, in no apparent distress


Head exam: Present: atraumatic


Eye exam: Present: normal appearance, PERRL


ENT exam: Present: normal oropharynx


Neck exam: Present: normal inspection


Respiratory exam: Present: decreased breath sounds


Cardiovascular Exam: Present: regular rate, normal rhythm


GI/Abdominal exam: Present: soft.  Absent: tenderness


Extremities exam: Present: normal inspection.  Absent: pedal edema, calf 

tenderness


Neurological exam: Present: alert


Psychiatric exam: Present: normal affect, normal mood


Skin exam: Present: normal color





Course


 Vital Signs











  17





  13:30 14:13 14:19


 


Temperature 98.6 F  


 


Pulse Rate 55 L 48 L 45 L


 


Respiratory 20  





Rate   


 


Blood Pressure 130/60  


 


O2 Sat by Pulse 95  





Oximetry   














  17





  14:57


 


Temperature 99.0 F


 


Pulse Rate 49 L


 


Respiratory 18





Rate 


 


Blood Pressure 108/67


 


O2 Sat by Pulse 95





Oximetry 














EKG Findings





- EKG Comments:


EKG Findings:: Sinus bradycardia 49.  .  QRS 84.  .  .  

Left axis.  Septal Q waves.  No acute ST change.





Medical Decision Making





- Medical Decision Making





Patient reevaluated and resting comfortably in bed.  Patient requesting 

discharge home.  Case discussed with Dr. whitley who is familiar with this 

patient and did come evaluate.  He did prescribe patient oxygen.   

will help assist.





- Lab Data


Result diagrams: 


 17 14:00





 17 14:00


 Lab Results











  17 Range/Units





  14:00 14:00 14:00 


 


WBC   8.1   (3.8-10.6)  k/uL


 


RBC   4.22   (3.80-5.40)  m/uL


 


Hgb   13.6   (11.4-16.0)  gm/dL


 


Hct   40.9   (34.0-46.0)  %


 


MCV   97.0   (80.0-100.0)  fL


 


MCH   32.3   (25.0-35.0)  pg


 


MCHC   33.3   (31.0-37.0)  g/dL


 


RDW   13.1   (11.5-15.5)  %


 


Plt Count   238   (150-450)  k/uL


 


Neutrophils %   91   %


 


Lymphocytes %   6   %


 


Monocytes %   2   %


 


Eosinophils %   1   %


 


Basophils %   0   %


 


Neutrophils #   7.4   (1.3-7.7)  k/uL


 


Lymphocytes #   0.5 L   (1.0-4.8)  k/uL


 


Monocytes #   0.1   (0-1.0)  k/uL


 


Eosinophils #   0.1   (0-0.7)  k/uL


 


Basophils #   0.0   (0-0.2)  k/uL


 


PT     (9.0-12.0)  sec


 


INR     (<1.1)  


 


APTT     (22.0-30.0)  sec


 


D-Dimer     (<0.60)  mg/L FEU


 


Sodium    139  (137-145)  mmol/L


 


Potassium    4.8  (3.5-5.1)  mmol/L


 


Chloride    99  ()  mmol/L


 


Carbon Dioxide    26  (22-30)  mmol/L


 


Anion Gap    14  mmol/L


 


BUN    44 H  (7-17)  mg/dL


 


Creatinine    1.38 H  (0.52-1.04)  mg/dL


 


Est GFR (MDRD) Af Amer    44  (>60 ml/min/1.73 sqM)  


 


Est GFR (MDRD) Non-Af    37  (>60 ml/min/1.73 sqM)  


 


Glucose    262 H  (74-99)  mg/dL


 


Calcium    9.0  (8.4-10.2)  mg/dL


 


Total Bilirubin    0.8  (0.2-1.3)  mg/dL


 


AST    34  (14-36)  U/L


 


ALT    37  (9-52)  U/L


 


Alkaline Phosphatase    63  ()  U/L


 


Total Creatine Kinase  53    ()  U/L


 


CK-MB (CK-2)  2.5 H*    (0.0-2.4)  ng/mL


 


CK-MB (CK-2) Rel Index  4.7    


 


Troponin I  <0.012    (0.000-0.034)  ng/mL


 


Total Protein    6.3  (6.3-8.2)  g/dL


 


Albumin    4.0  (3.5-5.0)  g/dL














  17 Range/Units





  14:00 


 


WBC   (3.8-10.6)  k/uL


 


RBC   (3.80-5.40)  m/uL


 


Hgb   (11.4-16.0)  gm/dL


 


Hct   (34.0-46.0)  %


 


MCV   (80.0-100.0)  fL


 


MCH   (25.0-35.0)  pg


 


MCHC   (31.0-37.0)  g/dL


 


RDW   (11.5-15.5)  %


 


Plt Count   (150-450)  k/uL


 


Neutrophils %   %


 


Lymphocytes %   %


 


Monocytes %   %


 


Eosinophils %   %


 


Basophils %   %


 


Neutrophils #   (1.3-7.7)  k/uL


 


Lymphocytes #   (1.0-4.8)  k/uL


 


Monocytes #   (0-1.0)  k/uL


 


Eosinophils #   (0-0.7)  k/uL


 


Basophils #   (0-0.2)  k/uL


 


PT  11.9  (9.0-12.0)  sec


 


INR  1.2  (<1.1)  


 


APTT  26.0  (22.0-30.0)  sec


 


D-Dimer  0.26  (<0.60)  mg/L FEU


 


Sodium   (137-145)  mmol/L


 


Potassium   (3.5-5.1)  mmol/L


 


Chloride   ()  mmol/L


 


Carbon Dioxide   (22-30)  mmol/L


 


Anion Gap   mmol/L


 


BUN   (7-17)  mg/dL


 


Creatinine   (0.52-1.04)  mg/dL


 


Est GFR (MDRD) Af Amer   (>60 ml/min/1.73 sqM)  


 


Est GFR (MDRD) Non-Af   (>60 ml/min/1.73 sqM)  


 


Glucose   (74-99)  mg/dL


 


Calcium   (8.4-10.2)  mg/dL


 


Total Bilirubin   (0.2-1.3)  mg/dL


 


AST   (14-36)  U/L


 


ALT   (9-52)  U/L


 


Alkaline Phosphatase   ()  U/L


 


Total Creatine Kinase   ()  U/L


 


CK-MB (CK-2)   (0.0-2.4)  ng/mL


 


CK-MB (CK-2) Rel Index   


 


Troponin I   (0.000-0.034)  ng/mL


 


Total Protein   (6.3-8.2)  g/dL


 


Albumin   (3.5-5.0)  g/dL














- Radiology Data


Radiology results: image reviewed (Chest x-ray shows no acute process.)





Disposition


Clinical Impression: 


 COPD (chronic obstructive pulmonary disease)





Disposition: HOME SELF-CARE


Condition: Stable


Instructions:  COPD (Chronic Obstructive Pulmonary Disease) (ED)


Additional Instructions: 


Oxygen as prescribed by Dr. enamorado.  Please follow-up with primary care 

physician in the beginning of the week.  Return for nausea, difficult to 

breathing, fevers, worsening symptoms or other concerns.


Referrals: 


Elroy Anaya DO [Primary Care Provider] - 1-2 days


Time of Disposition: 16:01

## 2017-07-07 ENCOUNTER — HOSPITAL ENCOUNTER (INPATIENT)
Dept: HOSPITAL 47 - EC | Age: 82
LOS: 2 days | Discharge: HOME | DRG: 812 | End: 2017-07-09
Payer: MEDICARE

## 2017-07-07 VITALS — RESPIRATION RATE: 16 BRPM

## 2017-07-07 DIAGNOSIS — Z79.899: ICD-10-CM

## 2017-07-07 DIAGNOSIS — I13.0: ICD-10-CM

## 2017-07-07 DIAGNOSIS — I07.1: ICD-10-CM

## 2017-07-07 DIAGNOSIS — N17.9: ICD-10-CM

## 2017-07-07 DIAGNOSIS — J96.11: ICD-10-CM

## 2017-07-07 DIAGNOSIS — D50.0: ICD-10-CM

## 2017-07-07 DIAGNOSIS — I25.2: ICD-10-CM

## 2017-07-07 DIAGNOSIS — M19.91: ICD-10-CM

## 2017-07-07 DIAGNOSIS — K92.2: ICD-10-CM

## 2017-07-07 DIAGNOSIS — I27.2: ICD-10-CM

## 2017-07-07 DIAGNOSIS — I25.10: ICD-10-CM

## 2017-07-07 DIAGNOSIS — F41.1: ICD-10-CM

## 2017-07-07 DIAGNOSIS — Z79.51: ICD-10-CM

## 2017-07-07 DIAGNOSIS — I42.9: ICD-10-CM

## 2017-07-07 DIAGNOSIS — H91.90: ICD-10-CM

## 2017-07-07 DIAGNOSIS — Z79.82: ICD-10-CM

## 2017-07-07 DIAGNOSIS — Z79.01: ICD-10-CM

## 2017-07-07 DIAGNOSIS — L71.9: ICD-10-CM

## 2017-07-07 DIAGNOSIS — I48.0: ICD-10-CM

## 2017-07-07 DIAGNOSIS — Z90.710: ICD-10-CM

## 2017-07-07 DIAGNOSIS — I50.22: ICD-10-CM

## 2017-07-07 DIAGNOSIS — Z88.1: ICD-10-CM

## 2017-07-07 DIAGNOSIS — K57.90: ICD-10-CM

## 2017-07-07 DIAGNOSIS — I27.81: ICD-10-CM

## 2017-07-07 DIAGNOSIS — Z87.891: ICD-10-CM

## 2017-07-07 DIAGNOSIS — E78.5: ICD-10-CM

## 2017-07-07 DIAGNOSIS — D62: Primary | ICD-10-CM

## 2017-07-07 DIAGNOSIS — Z99.81: ICD-10-CM

## 2017-07-07 DIAGNOSIS — J44.9: ICD-10-CM

## 2017-07-07 DIAGNOSIS — N18.3: ICD-10-CM

## 2017-07-07 LAB
ALP SERPL-CCNC: 83 U/L (ref 38–126)
ALT SERPL-CCNC: 41 U/L (ref 9–52)
ANION GAP SERPL CALC-SCNC: 10 MMOL/L
APTT BLD: 22.6 SEC (ref 22–30)
AST SERPL-CCNC: 35 U/L (ref 14–36)
BASOPHILS # BLD AUTO: 0.1 K/UL (ref 0–0.2)
BASOPHILS NFR BLD AUTO: 1 %
BUN SERPL-SCNC: 25 MG/DL (ref 7–17)
CALCIUM SPEC-MCNC: 9.1 MG/DL (ref 8.4–10.2)
CH: 30.9
CH: 31.1
CH: 31.2
CHCM: 32.7
CHCM: 32.9
CHCM: 32.9
CHLORIDE SERPL-SCNC: 104 MMOL/L (ref 98–107)
CK SERPL-CCNC: 110 U/L (ref 30–135)
CO2 SERPL-SCNC: 28 MMOL/L (ref 22–30)
EOSINOPHIL # BLD AUTO: 0 K/UL (ref 0–0.7)
EOSINOPHIL # BLD AUTO: 0.1 K/UL (ref 0–0.7)
EOSINOPHIL # BLD AUTO: 0.1 K/UL (ref 0–0.7)
EOSINOPHIL NFR BLD AUTO: 1 %
EOSINOPHIL NFR BLD AUTO: 1 %
EOSINOPHIL NFR BLD AUTO: 2 %
ERYTHROCYTE [DISTWIDTH] IN BLOOD BY AUTOMATED COUNT: 3.12 M/UL (ref 3.8–5.4)
ERYTHROCYTE [DISTWIDTH] IN BLOOD BY AUTOMATED COUNT: 3.45 M/UL (ref 3.8–5.4)
ERYTHROCYTE [DISTWIDTH] IN BLOOD BY AUTOMATED COUNT: 3.52 M/UL (ref 3.8–5.4)
ERYTHROCYTE [DISTWIDTH] IN BLOOD: 14.4 % (ref 11.5–15.5)
ERYTHROCYTE [DISTWIDTH] IN BLOOD: 14.4 % (ref 11.5–15.5)
ERYTHROCYTE [DISTWIDTH] IN BLOOD: 14.5 % (ref 11.5–15.5)
GLUCOSE SERPL-MCNC: 103 MG/DL (ref 74–99)
HCT VFR BLD AUTO: 29.9 % (ref 34–46)
HCT VFR BLD AUTO: 32.8 % (ref 34–46)
HCT VFR BLD AUTO: 33.6 % (ref 34–46)
HDW: 3.37
HDW: 3.38
HDW: 3.39
HGB BLD-MCNC: 10.9 GM/DL (ref 11.4–16)
HGB BLD-MCNC: 11 GM/DL (ref 11.4–16)
HGB BLD-MCNC: 9.9 GM/DL (ref 11.4–16)
INR PPP: 1.2 (ref ?–1.1)
LUC NFR BLD AUTO: 2 %
LUC NFR BLD AUTO: 2 %
LUC NFR BLD AUTO: 3 %
LYMPHOCYTES # SPEC AUTO: 0.9 K/UL (ref 1–4.8)
LYMPHOCYTES # SPEC AUTO: 1.1 K/UL (ref 1–4.8)
LYMPHOCYTES # SPEC AUTO: 1.2 K/UL (ref 1–4.8)
LYMPHOCYTES NFR SPEC AUTO: 23 %
LYMPHOCYTES NFR SPEC AUTO: 24 %
LYMPHOCYTES NFR SPEC AUTO: 26 %
MAGNESIUM SPEC-SCNC: 1.9 MG/DL (ref 1.6–2.3)
MCH RBC QN AUTO: 31.1 PG (ref 25–35)
MCH RBC QN AUTO: 31.6 PG (ref 25–35)
MCH RBC QN AUTO: 31.8 PG (ref 25–35)
MCHC RBC AUTO-ENTMCNC: 32.7 G/DL (ref 31–37)
MCHC RBC AUTO-ENTMCNC: 33.3 G/DL (ref 31–37)
MCHC RBC AUTO-ENTMCNC: 33.3 G/DL (ref 31–37)
MCV RBC AUTO: 95 FL (ref 80–100)
MCV RBC AUTO: 95.2 FL (ref 80–100)
MCV RBC AUTO: 95.5 FL (ref 80–100)
MONOCYTES # BLD AUTO: 0.3 K/UL (ref 0–1)
MONOCYTES # BLD AUTO: 0.4 K/UL (ref 0–1)
MONOCYTES # BLD AUTO: 0.4 K/UL (ref 0–1)
MONOCYTES NFR BLD AUTO: 8 %
NEUTROPHILS # BLD AUTO: 2.5 K/UL (ref 1.3–7.7)
NEUTROPHILS # BLD AUTO: 2.6 K/UL (ref 1.3–7.7)
NEUTROPHILS # BLD AUTO: 3 K/UL (ref 1.3–7.7)
NEUTROPHILS NFR BLD AUTO: 61 %
NEUTROPHILS NFR BLD AUTO: 63 %
NEUTROPHILS NFR BLD AUTO: 65 %
NON-AFRICAN AMERICAN GFR(MDRD): 35
POTASSIUM SERPL-SCNC: 3.6 MMOL/L (ref 3.5–5.1)
PROT SERPL-MCNC: 6.3 G/DL (ref 6.3–8.2)
PT BLD: 11.9 SEC (ref 9–12)
SODIUM SERPL-SCNC: 142 MMOL/L (ref 137–145)
TROPONIN I SERPL-MCNC: <0.012 NG/ML (ref 0–0.03)
WBC # BLD AUTO: 0.09 10*3/UL
WBC # BLD AUTO: 0.09 10*3/UL
WBC # BLD AUTO: 0.12 10*3/UL
WBC # BLD AUTO: 3.8 K/UL (ref 3.8–10.6)
WBC # BLD AUTO: 4.3 K/UL (ref 3.8–10.6)
WBC # BLD AUTO: 4.8 K/UL (ref 3.8–10.6)
WBC (PEROX): 3.91
WBC (PEROX): 4.29
WBC (PEROX): 4.87

## 2017-07-07 PROCEDURE — 36415 COLL VENOUS BLD VENIPUNCTURE: CPT

## 2017-07-07 PROCEDURE — 84484 ASSAY OF TROPONIN QUANT: CPT

## 2017-07-07 PROCEDURE — 93005 ELECTROCARDIOGRAM TRACING: CPT

## 2017-07-07 PROCEDURE — 82553 CREATINE MB FRACTION: CPT

## 2017-07-07 PROCEDURE — 96374 THER/PROPH/DIAG INJ IV PUSH: CPT

## 2017-07-07 PROCEDURE — 85730 THROMBOPLASTIN TIME PARTIAL: CPT

## 2017-07-07 PROCEDURE — 71020: CPT

## 2017-07-07 PROCEDURE — 93306 TTE W/DOPPLER COMPLETE: CPT

## 2017-07-07 PROCEDURE — 83880 ASSAY OF NATRIURETIC PEPTIDE: CPT

## 2017-07-07 PROCEDURE — 83735 ASSAY OF MAGNESIUM: CPT

## 2017-07-07 PROCEDURE — 80048 BASIC METABOLIC PNL TOTAL CA: CPT

## 2017-07-07 PROCEDURE — 82272 OCCULT BLD FECES 1-3 TESTS: CPT

## 2017-07-07 PROCEDURE — 85610 PROTHROMBIN TIME: CPT

## 2017-07-07 PROCEDURE — 99285 EMERGENCY DEPT VISIT HI MDM: CPT

## 2017-07-07 PROCEDURE — 94640 AIRWAY INHALATION TREATMENT: CPT

## 2017-07-07 PROCEDURE — 82550 ASSAY OF CK (CPK): CPT

## 2017-07-07 PROCEDURE — 80053 COMPREHEN METABOLIC PANEL: CPT

## 2017-07-07 PROCEDURE — 85025 COMPLETE CBC W/AUTO DIFF WBC: CPT

## 2017-07-07 RX ADMIN — IPRATROPIUM BROMIDE AND ALBUTEROL SULFATE PRN ML: .5; 3 SOLUTION RESPIRATORY (INHALATION) at 19:38

## 2017-07-07 RX ADMIN — BUDESONIDE AND FORMOTEROL FUMARATE DIHYDRATE SCH PUFF: 80; 4.5 AEROSOL RESPIRATORY (INHALATION) at 19:38

## 2017-07-07 RX ADMIN — ATORVASTATIN CALCIUM SCH MG: 40 TABLET, FILM COATED ORAL at 20:26

## 2017-07-07 RX ADMIN — METOPROLOL TARTRATE SCH MG: 50 TABLET, FILM COATED ORAL at 20:26

## 2017-07-07 NOTE — ED
General Adult HPI





- General


Chief complaint: Extremity Problem,Nontraumatic


Stated complaint: feet swelling, Hx of CHF


Time Seen by Provider: 17 11:35


Source: patient, RN notes reviewed


Mode of arrival: wheelchair


Limitations: no limitations





- History of Present Illness


Initial comments: 





This is an 83-year-old female presents emergency Department with a past medical 

history significant for congestive heart failure.  Patient states she is to be 

on Lasix twice a day she is only on Lasix once a day now.  Patient states her 

legs of gotten swollen over the last few days and she is call the cardiologist 

and no one has called her back so she decided to come to the emergency 

department to be evaluated.  Patient denies any difficulty breathing or 

shortness of breath more than normal.  Patient denies any palpitations or chest 

pain.  Patient denies abdominal pain patient denies nausea vomiting diarrhea.  

Patient denies headache patient denies lightheadedness dizziness or near 

syncopal episode.  Patient's only complaint is a little swollen.  She denies 

any calf pain.





- Related Data


 Home Medications











 Medication  Instructions  Recorded  Confirmed


 


Albuterol Inhaler [Ventolin Hfa 2 puff INHALATION RT-QID PRN 17





Inhaler]   


 


Fluticasone/Salmeterol [Advair 1 puff INHALATION RT-BID 17





250-50 Diskus]   


 


LORazepam [Ativan] 1 mg PO BID PRN 17


 


Amiodarone [Cordarone] 200 mg PO DAILY 17


 


Apixaban [Eliquis] 2.5 mg PO BID 17


 


Furosemide [Lasix] 40 mg PO DAILY 17


 


Tiotropium 18 Mcg/Puff [Spiriva] 1 cap INHALATION RT-DAILY 17








 Previous Rx's











 Medication  Instructions  Recorded


 


Metoprolol Tartrate [Lopressor] 50 mg PO BID #60 tab 17


 


Aspirin 81 mg PO DAILY 17


 


Atorvastatin [Lipitor] 40 mg PO HS #30 tab 17


 


Isosorbide Mononitrate ER [Imdur] 30 mg PO DAILY #30 tab 17


 


Nitroglycerin Sl Tabs [Nitrostat] 0.4 mg SUBLINGUAL Q5M PRN #30 tab 17


 


Ipratropium-Albuterol Nebulize 3 ml INHALATION RT-QID PRN #120 17





[Duoneb 0.5 mg-3 mg/3 ml Soln] ampul.neb 











 Allergies











Allergy/AdvReac Type Severity Reaction Status Date / Time


 


cefuroxime axetil Allergy  Unknown Verified 17 12:08





[From Ceftin]     














Review of Systems


ROS Statement: 


Those systems with pertinent positive or pertinent negative responses have been 

documented in the HPI.





ROS Other: All systems not noted in ROS Statement are negative.





Past Medical History


Past Medical History: Atrial Fibrillation, Coronary Artery Disease (CAD), Heart 

Failure, COPD, Hearing Disorder / Deafness, Hypertension, Respiratory Disorder


Additional Past Medical History / Comment(s): Generalized anxiety disorder,  

hearing impaired, generalized anxiety disorder, CHF with an ejection fraction 

of 20-25%, nonocclusive coronary artery disease, diverticulosis, paroxysmal 

atrial fibrillation, osteoarthritis, secondary pulmonary hypertension.


History of Any Multi-Drug Resistant Organisms: None Reported


Past Surgical History: Adenoidectomy, Appendectomy, Hysterectomy, Orthopedic 

Surgery, Tonsillectomy


Additional Past Surgical History / Comment(s): Right knee arthroscopy for torn 

meniscus. Colonoscopies x 3 total.  Bilateral eyelid surgery.


Past Anesthesia/Blood Transfusion Reactions: No Reported Reaction


Additional Past Anesthesia/Blood Transfusion Reaction / Comment(s): Pt has 

never recieved blood.


Past Psychological History: Anxiety


Smoking Status: Former smoker


Past Alcohol Use History: Occasional


Past Drug Use History: None Reported





- Past Family History


  ** Father


Family Medical History: Cancer


Additional Family Medical History / Comment(s): Father  at age 77yrs of 

cancer which pt believes may have started in his throat.





  ** Mother


Family Medical History: Dementia


Additional Family Medical History / Comment(s): Mother  at age 95yrs.  She 

was very healthy most of her life- she had dementia at the very end of her life.





General Exam





- General Exam Comments


Initial Comments: 





GENERAL:


Patient is well-developed and well-nourished.  Patient is nontoxic and well-

hydrated and is in no acute distress.





ENT:


Neck is soft and supple.  No significant lymphadenopathy is noted.  Oropharynx 

is clear.  Moist mucous membranes.  Neck has full range of motion without 

eliciting any pain. 





EYES:


The sclera were anicteric and conjunctiva were pink and moist.  Extraocular 

movements were intact and pupils were equal round and reactive to light.  

Eyelids were unremarkable.





PULMONARY:


Unlabored respirations.  Good breath sounds bilaterally.  No audible rales 

rhonchi or wheezing was noted.





CARDIOVASCULAR:


There is a regular rate and rhythm without any murmurs gallops or rubs. 





ABDOMEN:


Soft and nontender with normal bowel sounds.  No palpable organomegaly was 

noted.  There is no palpable pulsatile mass.





SKIN:


Skin is clear with no lesions or rashes and otherwise unremarkable.





NEUROLOGIC:


Patient is alert and oriented x3.  Cranial nerves II through XII are grossly 

intact.  Motor and sensory are also intact.  Normal speech, volume and content.

  Symmetrical smile.  Cerebellar exam grossly intact.





MUSCULOSKELETAL:


Normal extremities with adequate strength and full range of motion.  1+ edema 

bilaterally





LYMPHATICS:


No significant lymphadenopathy is noted





PSYCHIATRIC:


Normal psychiatric evaluation.  Normal interpersonal interactions appears 

functionally intact in deals appropriately with others.  No signs of 

depression.  No signs of anxiety. 


Limitations: no limitations





Course


 Vital Signs











  17





  11:32 12:28 13:17


 


Temperature 97.7 F  97.7 F


 


Pulse Rate 54 L 52 L 53 L


 


Respiratory 16 20 18





Rate   


 


Blood Pressure 114/56 124/60 113/58


 


O2 Sat by Pulse 99 97 97





Oximetry   














  17





  14:25


 


Temperature 


 


Pulse Rate 52 L


 


Respiratory 18





Rate 


 


Blood Pressure 155/67


 


O2 Sat by Pulse 98





Oximetry 














Medical Decision Making





- Medical Decision Making





EKG shows sinus bradycardia 52 bpm MT interval is 130 QRS is 94 QT interval 408 

QTC is 379.  Patient's EKG shows no ST segment elevation or depression or T-

wave abnormalities noted.





Patient was guaiac positive and hemoglobin dropped 4 points from 1 month ago.





- Lab Data


Result diagrams: 


 17 12:15





 17 12:15


 Lab Results











  17 Range/Units





  12:15 12:15 12:15 


 


WBC   3.8   (3.8-10.6)  k/uL


 


RBC   3.12 L   (3.80-5.40)  m/uL


 


Hgb   9.9 L D   (11.4-16.0)  gm/dL


 


Hct   29.9 L   (34.0-46.0)  %


 


MCV   95.5   (80.0-100.0)  fL


 


MCH   31.8   (25.0-35.0)  pg


 


MCHC   33.3   (31.0-37.0)  g/dL


 


RDW   14.5   (11.5-15.5)  %


 


Plt Count   244   (150-450)  k/uL


 


Neutrophils %   65   %


 


Lymphocytes %   23   %


 


Monocytes %   8   %


 


Eosinophils %   1   %


 


Basophils %   1   %


 


Neutrophils #   2.5   (1.3-7.7)  k/uL


 


Lymphocytes #   0.9 L   (1.0-4.8)  k/uL


 


Monocytes #   0.3   (0-1.0)  k/uL


 


Eosinophils #   0.0   (0-0.7)  k/uL


 


Basophils #   0.1   (0-0.2)  k/uL


 


Hypochromasia   Slight   


 


PT     (9.0-12.0)  sec


 


INR     (<1.1)  


 


APTT     (22.0-30.0)  sec


 


Sodium    142  (137-145)  mmol/L


 


Potassium    3.6  (3.5-5.1)  mmol/L


 


Chloride    104  ()  mmol/L


 


Carbon Dioxide    28  (22-30)  mmol/L


 


Anion Gap    10  mmol/L


 


BUN    25 H  (7-17)  mg/dL


 


Creatinine    1.43 H  (0.52-1.04)  mg/dL


 


Est GFR (MDRD) Af Amer    42  (>60 ml/min/1.73 sqM)  


 


Est GFR (MDRD) Non-Af    35  (>60 ml/min/1.73 sqM)  


 


Glucose    103 H  (74-99)  mg/dL


 


Calcium    9.1  (8.4-10.2)  mg/dL


 


Magnesium    1.9  (1.6-2.3)  mg/dL


 


Total Bilirubin    0.8  (0.2-1.3)  mg/dL


 


AST    35  (14-36)  U/L


 


ALT    41  (9-52)  U/L


 


Alkaline Phosphatase    83  ()  U/L


 


Total Creatine Kinase  110    ()  U/L


 


CK-MB (CK-2)  2.0    (0.0-2.4)  ng/mL


 


CK-MB (CK-2) Rel Index  1.8    


 


Troponin I  <0.012    (0.000-0.034)  ng/mL


 


NT-Pro-B Natriuret Pep     pg/mL


 


Total Protein    6.3  (6.3-8.2)  g/dL


 


Albumin    4.0  (3.5-5.0)  g/dL


 


Stool Occult Blood     (Negative)  














  17 Range/Units





  12:15 12:15 14:20 


 


WBC     (3.8-10.6)  k/uL


 


RBC     (3.80-5.40)  m/uL


 


Hgb     (11.4-16.0)  gm/dL


 


Hct     (34.0-46.0)  %


 


MCV     (80.0-100.0)  fL


 


MCH     (25.0-35.0)  pg


 


MCHC     (31.0-37.0)  g/dL


 


RDW     (11.5-15.5)  %


 


Plt Count     (150-450)  k/uL


 


Neutrophils %     %


 


Lymphocytes %     %


 


Monocytes %     %


 


Eosinophils %     %


 


Basophils %     %


 


Neutrophils #     (1.3-7.7)  k/uL


 


Lymphocytes #     (1.0-4.8)  k/uL


 


Monocytes #     (0-1.0)  k/uL


 


Eosinophils #     (0-0.7)  k/uL


 


Basophils #     (0-0.2)  k/uL


 


Hypochromasia     


 


PT  11.9    (9.0-12.0)  sec


 


INR  1.2    (<1.1)  


 


APTT  22.6    (22.0-30.0)  sec


 


Sodium     (137-145)  mmol/L


 


Potassium     (3.5-5.1)  mmol/L


 


Chloride     ()  mmol/L


 


Carbon Dioxide     (22-30)  mmol/L


 


Anion Gap     mmol/L


 


BUN     (7-17)  mg/dL


 


Creatinine     (0.52-1.04)  mg/dL


 


Est GFR (MDRD) Af Amer     (>60 ml/min/1.73 sqM)  


 


Est GFR (MDRD) Non-Af     (>60 ml/min/1.73 sqM)  


 


Glucose     (74-99)  mg/dL


 


Calcium     (8.4-10.2)  mg/dL


 


Magnesium     (1.6-2.3)  mg/dL


 


Total Bilirubin     (0.2-1.3)  mg/dL


 


AST     (14-36)  U/L


 


ALT     (9-52)  U/L


 


Alkaline Phosphatase     ()  U/L


 


Total Creatine Kinase     ()  U/L


 


CK-MB (CK-2)     (0.0-2.4)  ng/mL


 


CK-MB (CK-2) Rel Index     


 


Troponin I     (0.000-0.034)  ng/mL


 


NT-Pro-B Natriuret Pep   849   pg/mL


 


Total Protein     (6.3-8.2)  g/dL


 


Albumin     (3.5-5.0)  g/dL


 


Stool Occult Blood    Positive H  (Negative)  














Disposition


Clinical Impression: 


 Anemia, Peripheral edema, GI bleed





Disposition: ADMITTED AS IP TO THIS HOSP


Referrals: 


Elroy Anaya DO [Primary Care Provider] - 1-2 days


Time of Disposition: 15:31

## 2017-07-07 NOTE — P.CNPUL
History of Present Illness


Consult date: 17


Requesting physician: Manohar Diop


Reason for consult: COPD, pulmonary hypertension, other (Bilateral peripheral 

edema.)


Chief complaint: Increased swelling in lower extremities, and at least 2 pound 

weight gain.


History of present illness: 


This is an 83-year-old female with history of COPD, FEV1 is 47%, maintained 

normally on Advair and Spiriva on outpatient basis.  Patient was last admitted 

about a month ago, and she was seen by Dr. Portillo on consultation.  At the 

time the patient was noted to have congestive heart failure, new onset atrial 

fibrillation and RVR.  At the time the patient was treated with amiodarone, and 

her echocardiogram showed LV dysfunction, ejection fraction was 20-25%.  CT 

angiogram of the chest and back negative for pulmonary embolism.  Patient is 

also on long-term anticoagulation in the form of eliquis.  This time the 

patient was admitted mostly with symptoms of increased swelling in lower 

extremities, left more so than right, some shortness of breath, not much 

different from baseline.  Patient denied any fever, no chills, no hemoptysis, 

no chest pain.  Hemoglobin in the ER was noted to be 9.9, however follow-up 

hemoglobin was noted to be about 11.  At any rate considering the patient's 

multiple medical problems including COPD, cardiomyopathy and LV dysfunction, 

increased swelling in lower extremities, patient was admitted and this consult 

was initiated.  Pulmonary-wise, patient is basically about her baseline.  She 

has no cough no wheezing and on physical examination she sounded quite clear.








Review of Systems


Constitutional: tired, no weight loss, no fever, no chills,


HEENT: No neck pain, no sore throat, no headache, no blurred vision, no 

dizziness.


RESPIRATORY: Chronic shortness of breath on exertion related to COPD pulmonary 

hypertension and cardiomyopathy.


CARDIOVASCULAR: Increased swelling in lower extremities.  No palpitations.  No 

chest pain.


GASTROINTESTINAL: Dark stools


GENITOURINARY: No dysuria and no frequency no urgency.


MUSCULOSKELETAL: Pain in the joints


LYMPHATICS: None


HEMATOLOGICAL: Easy bruising, no clotting, no history of thromboembolic disease.


PSYCHIATRY: No symptoms of active depression.


NEUROLOGICAL: No headaches no blurred vision no dizziness no seizures.





Past Medical History


Past Medical History: Atrial Fibrillation, Coronary Artery Disease (CAD), Heart 

Failure, COPD, Hearing Disorder / Deafness, Hyperlipidemia, Hypertension, 

Myocardial Infarction (MI), Pneumonia, Renal Disease, Respiratory Disorder


Additional Past Medical History / Comment(s): Generalized anxiety disorder,  

hearing impaired, generalized anxiety disorder, CHF with an ejection fraction 

of 20-25%, nonocclusive coronary artery disease, diverticulosis, paroxysmal 

atrial fibrillation, osteoarthritis, BRONCHITIS, HOME 02 2 LITERS N/C AT NIGHT,

rosacea, arthritis, nstemi


Last Myocardial Infarction Date:: unsure of date


History of Any Multi-Drug Resistant Organisms: None Reported


Past Surgical History: Adenoidectomy, Appendectomy, Hysterectomy, Orthopedic 

Surgery, Tonsillectomy


Additional Past Surgical History / Comment(s): Right knee arthroscopy for torn 

meniscus. Colonoscopies x 3 total.  Bilateral eyelid surgery. PARTIAL 

HYSTERECTOMY STILL HAS PART OF LT OVARY


Past Anesthesia/Blood Transfusion Reactions: No Reported Reaction


Additional Past Anesthesia/Blood Transfusion Reaction / Comment(s): Pt has 

never recieved blood.


Smoking Status: Former smoker





- Past Family History


  ** Father


Family Medical History: Cancer


Additional Family Medical History / Comment(s): Father  at age 77yrs of 

cancer which pt believes may have started in his throat.





  ** Mother


Family Medical History: Dementia


Additional Family Medical History / Comment(s): Mother  at age 95yrs.  She 

was very healthy most of her life- she had dementia at the very end of her life.





Medications and Allergies


 Home Medications











 Medication  Instructions  Recorded  Confirmed  Type


 


Albuterol Inhaler [Ventolin Hfa 2 puff INHALATION RT-QID PRN 17 

History





Inhaler]    


 


Fluticasone/Salmeterol [Advair 1 puff INHALATION RT-BID 17 

History





250-50 Diskus]    


 


LORazepam [Ativan] 1 mg PO BID PRN 17 History


 


Amiodarone [Cordarone] 200 mg PO DAILY 17 History


 


Apixaban [Eliquis] 2.5 mg PO BID 17 History


 


Furosemide [Lasix] 40 mg PO DAILY 17 History


 


Tiotropium 18 Mcg/Puff [Spiriva] 1 cap INHALATION RT-DAILY 17 

History











 Allergies











Allergy/AdvReac Type Severity Reaction Status Date / Time


 


cefuroxime axetil Allergy  Unknown Verified 17 12:08





[From Ceftin]     














Physical Exam


Vitals: 


 Vital Signs











  Temp Pulse Pulse Resp BP BP Pulse Ox


 


 17 19:57   63     


 


 17 19:42   63     


 


 17 17:10  96.1 F L   63  16   142/63  98


 


 17 16:15  97.4 F L  54 L   18  149/62   98


 


 17 14:25   52 L   18  155/67   98


 


 17 13:17  97.7 F  53 L   18  113/58   97


 


 17 12:28   52 L   20  124/60   97


 


 17 11:32  97.7 F  54 L   16  114/56   99








 Intake and Output











 17





 06:59 14:59 22:59


 


Other:   


 


  Weight  68.039 kg 








 Patient Weight











 17





 06:59


 


Weight 68.039 kg














Physical Exam revealed an 83-year-old female on room air, in no form of 

respiratory distress.


HEENT:[Neck is supple.] [No neck masses.] [No thyromegaly.] [No JVD.]


Chest: [Diminished breath sounds at the bases, no crackles or rhonchi or 

wheezes.


Cardiac Exam: [Normal S1 and S2, no S3 gallop, no murmur.]


Abdomen: [Soft, nontender,  no megaly, no rebound, no guarding, normal bowel 

sounds.]


Extremities: [1+ bipedal edema, some erythema noted in both lower extremities, 

left more so than right..]


Neurological Exam: [No focal neurologic deficit.]


Skin: Superficial bruising noted in both upper and lower extremities.





Results





- Laboratory Findings


CBC and BMP: 


 17 16:50





 17 12:15


PT/INR, D-dimer











PT  11.9 sec (9.0-12.0)   17  12:15    


 


INR  1.2  (<1.1)   17  12:15    








Abnormal lab findings: 


 Abnormal Labs











  17





  12:15 12:15 14:20


 


RBC  3.12 L  


 


Hgb  9.9 L D  


 


Hct  29.9 L  


 


Lymphocytes #  0.9 L  


 


BUN   25 H 


 


Creatinine   1.43 H 


 


Glucose   103 H 


 


Stool Occult Blood    Positive H














  17





  16:50


 


RBC  3.52 L


 


Hgb  11.0 L


 


Hct  33.6 L


 


Lymphocytes # 


 


BUN 


 


Creatinine 


 


Glucose 


 


Stool Occult Blood 














- Diagnostic Findings


Chest x-ray: image reviewed (Left basilar atelectasis was noted otherwise 

unremarkable.)





Assessment and Plan


Plan: 


Impression:





1 acute on chronic cor pulmonale secondary to pulmonary hypertension, as well 

as cardiomyopathy and LV dysfunction.





2 history of severe COPD FEV1 is in the range of 47%.





3 history of chronic hypoxic respiratory failure secondary to COPD and 

pulmonary hypertension as well as LV dysfunction, patient is presently on home 

O2 at





4 history of paroxysmal atrial fibrillation maintained on medications to 

control her rate.





5 history of chronic congestive heart failure, secondary to systolic 

dysfunction.  EF is about 40%.





6 history of coronary artery disease





7 history of moderate tricuspid regurgitation and pulmonary hypertension, 

multifactorial.  Related to COPD and the related to LV dysfunction as well as 

valvular heart disease.





8 anemia most likely secondary to GI blood losses secondary to Anticoagulation 

therapy, patient will need to be seen by gastroenterology and determine source 

of her bleeding, and whether the patient could be placed back on 

anticoagulation therapy in the future.  In the meantime hold anticoagulation 

therapy.





9 acute kidney injury, baseline creatinine is 1.06 about a month ago, and it is 

now up to 1.43.  Suggest nephrology evaluation and avoiding nephrotoxic drugs 

in the meantime.





Recommendation: Agree with the present treatment plan, patient is back on her 

usual meds including for COPD and other consultations from different 

consultants are pending.  We'll continue to follow.





Time with Patient: Greater than 30

## 2017-07-07 NOTE — XR
EXAMINATION TYPE: XR chest 2V

 

DATE OF EXAM: 7/7/2017

 

HISTORY: Chest Pain.

 

REFERENCE: Previous study dated 6/9/2017.

 

FINDINGS: The lungs are overinflated. The heart is mildly prominent. There is mild vascular congestio
n without edema. There is some left basilar airspace disease either representing atelectasis or early
 pneumonia. The right lung is clear. Pleural spaces are clear.

 

IMPRESSION: 

1. COPD.

2. MILD CARDIOMEGALY.

3. VASCULAR CONGESTION WITHOUT EDEMA.

4. LEFT BASILAR AIRSPACE DISEASE.

## 2017-07-07 NOTE — P.HPIM
History of Present Illness


H&P Date: 17


Chief Complaint: Edema of the legs


This is a pleasant 83-year-old patient well known to be from multiple 

admissions.  Patient chronic stable medical conditions include chronic kidney 

disease, congestive heart failure, coronary artery disease, atrial fibrillation

, pulmonary hypertension and follows a Dr. Anaya as his family doctor and Dr. Pope as her cardiologist.  Patient's presents with swelling of the lower 

extremities more so the left leg and decided to come in.  Denies increased 

shortness of breath from her baseline.  Denies belly swelling.  Denies any pain 

and lower extremities.  Denies any fever.  Patient has been on anticoagulation 

and states for some time has noticed black stools.  In the ER patient noticed 

to drop her hemoglobin by more than 3 units from last time she was here not 

long ago and now admitted for the same.  Patient is feeling tired and rundown.





Significant past medical history:


Atrial fibrillation, osteoarthritis, chronic kidney disease, COPD, moderate 

tricuspid regurgitation, secondary pulmonary hypertension, coronary artery 

disease








Review of Systems





GEN.: tired


EYES: None


HEENT: None


NECK: None


RESPIRATORY: None


CARDIOVASCULAR: None


GASTROINTESTINAL: Dark stools


GENITOURINARY: None


MUSCULOSKELETAL: Pain in the joints


LYMPHATICS: None


HEMATOLOGICAL: Chronic bruising on the skin  


PSYCHIATRY: Anxious


NEUROLOGICAL: None





Past Medical History


Past Medical History: Atrial Fibrillation, Coronary Artery Disease (CAD), Heart 

Failure, COPD, Hearing Disorder / Deafness, Hyperlipidemia, Hypertension, 

Myocardial Infarction (MI), Pneumonia, Renal Disease, Respiratory Disorder


Additional Past Medical History / Comment(s): Generalized anxiety disorder,  

hearing impaired, generalized anxiety disorder, CHF with an ejection fraction 

of 20-25%, nonocclusive coronary artery disease, diverticulosis, paroxysmal 

atrial fibrillation, osteoarthritis, BRONCHITIS, HOME 02 2 LITERS N/C AT NIGHT,

rosacea, arthritis, nstemi


Last Myocardial Infarction Date:: unsure of date


History of Any Multi-Drug Resistant Organisms: None Reported


Past Surgical History: Adenoidectomy, Appendectomy, Hysterectomy, Orthopedic 

Surgery, Tonsillectomy


Additional Past Surgical History / Comment(s): Right knee arthroscopy for torn 

meniscus. Colonoscopies x 3 total.  Bilateral eyelid surgery. PARTIAL 

HYSTERECTOMY STILL HAS PART OF LT OVARY


Past Anesthesia/Blood Transfusion Reactions: No Reported Reaction


Additional Past Anesthesia/Blood Transfusion Reaction / Comment(s): Pt has 

never recieved blood.


Smoking Status: Former smoker





- Past Family History


  ** Father


Family Medical History: Cancer


Additional Family Medical History / Comment(s): Father  at age 77yrs of 

cancer which pt believes may have started in his throat.





  ** Mother


Family Medical History: Dementia


Additional Family Medical History / Comment(s): Mother  at age 95yrs.  She 

was very healthy most of her life- she had dementia at the very end of her life.





Medications and Allergies


 Home Medications











 Medication  Instructions  Recorded  Confirmed  Type


 


Albuterol Inhaler [Ventolin Hfa 2 puff INHALATION RT-QID PRN 17 

History





Inhaler]    


 


Fluticasone/Salmeterol [Advair 1 puff INHALATION RT-BID 17 

History





250-50 Diskus]    


 


LORazepam [Ativan] 1 mg PO BID PRN 17 History


 


Amiodarone [Cordarone] 200 mg PO DAILY 17 History


 


Apixaban [Eliquis] 2.5 mg PO BID 17 History


 


Furosemide [Lasix] 40 mg PO DAILY 17 History


 


Tiotropium 18 Mcg/Puff [Spiriva] 1 cap INHALATION RT-DAILY 17 

History











 Allergies











Allergy/AdvReac Type Severity Reaction Status Date / Time


 


cefuroxime axetil Allergy  Unknown Verified 17 12:08





[From Ceftin]     














Physical Exam


Vitals: 


 Vital Signs











  Temp Pulse Pulse Resp BP BP Pulse Ox


 


 17 17:10  96.1 F L   63  16   142/63  98


 


 17 16:15  97.4 F L  54 L   18  149/62   98


 


 17 14:25   52 L   18  155/67   98


 


 17 13:17  97.7 F  53 L   18  113/58   97


 


 17 12:28   52 L   20  124/60   97


 


 17 11:32  97.7 F  54 L   16  114/56   99








 








 Patient Weight











 17





 06:59


 


Weight 68.039 kg














VITAL SIGNS: Reviewed.  BMI noted


GENERAL: Average built, sitting up, diet.


EYES: Pupils equal.  Conjunctiva palel.


HEENT: External appearance of nose and ears normal, oral cavity grossly normal.


NECK: JVD not raised; masses not palpable.


HEART: First and second heart sounds are normal;  some peripheral edema.  


LUNGS: Respiratory rate normal; decreased breath sounds.  


ABDOMEN: Soft,  nontender, liver spleen not palpable, no masses palpable.  


LYMPHATICS: No lymph nodes palpable in the axilla and neck.  


PSYCH: Alert and oriented x3;  mood  and affect normal.  


NEUROLOGICAL: Cranial nerves grossly intact; no facial asymmetry,   power and 

sensation grossly intact.


DERMATOLOGICAL: Superficial bruising in the extremities  





Results


CBC & Chem 7: 


 17 16:50





 17 12:15


Labs: 


Labs white count 3.8 hemoglobin 9.9 his hemoglobin was 13.6 on 17.  Repeat 

hemoglobin 11.0 today (25, creatinine 1.43 it was 44/1.38 on 2017 A








Assessment and Plan


Plan: 





Assessment:


-Acute blood loss anemia from chronic GI bleed in a patient who is on 

anticoagulation for underlying atrial fibrillation, patient complaining of dark 

stools


-Paroxysmal atrial fibrillation


Primary osteoarthritis multiple joints


Chronic kidney disease stage III from hypertensive nephrosclerosis


COPD in an ex-smoker


Chronic congestive heart failure from systolic dysfunction EF 40% from 

underlying coronary artery disease


Moderate tricuspid regurgitation nondramatic secondary to pulmonary hypertension

; COPD


Coronary artery disease with nonobstructive lesion to the LAD from prior 

cardiac catheterization


Rosacea chronic





Plan:


Plan:


Patient anticoagulation is being held.  Serial H&H will be done.  Other home 

medications be resumed.  Care was discussed with the patient.  GI is being 

consulted.

## 2017-07-08 LAB
BASOPHILS # BLD AUTO: 0 K/UL (ref 0–0.2)
BASOPHILS NFR BLD AUTO: 1 %
CH: 31.3
CHCM: 32.6
EOSINOPHIL # BLD AUTO: 0.1 K/UL (ref 0–0.7)
EOSINOPHIL NFR BLD AUTO: 1 %
ERYTHROCYTE [DISTWIDTH] IN BLOOD BY AUTOMATED COUNT: 3.28 M/UL (ref 3.8–5.4)
ERYTHROCYTE [DISTWIDTH] IN BLOOD: 14.4 % (ref 11.5–15.5)
HCT VFR BLD AUTO: 31.7 % (ref 34–46)
HDW: 3.35
HGB BLD-MCNC: 10.3 GM/DL (ref 11.4–16)
LUC NFR BLD AUTO: 2 %
LYMPHOCYTES # SPEC AUTO: 0.7 K/UL (ref 1–4.8)
LYMPHOCYTES NFR SPEC AUTO: 17 %
MCH RBC QN AUTO: 31.3 PG (ref 25–35)
MCHC RBC AUTO-ENTMCNC: 32.5 G/DL (ref 31–37)
MCV RBC AUTO: 96.4 FL (ref 80–100)
MONOCYTES # BLD AUTO: 0.3 K/UL (ref 0–1)
MONOCYTES NFR BLD AUTO: 7 %
NEUTROPHILS # BLD AUTO: 2.8 K/UL (ref 1.3–7.7)
NEUTROPHILS NFR BLD AUTO: 72 %
WBC # BLD AUTO: 0.06 10*3/UL
WBC # BLD AUTO: 3.9 K/UL (ref 3.8–10.6)
WBC (PEROX): 4.21

## 2017-07-08 RX ADMIN — ISOSORBIDE MONONITRATE SCH MG: 30 TABLET, EXTENDED RELEASE ORAL at 08:56

## 2017-07-08 RX ADMIN — BUDESONIDE AND FORMOTEROL FUMARATE DIHYDRATE SCH PUFF: 80; 4.5 AEROSOL RESPIRATORY (INHALATION) at 08:37

## 2017-07-08 RX ADMIN — METOPROLOL TARTRATE SCH MG: 50 TABLET, FILM COATED ORAL at 08:55

## 2017-07-08 RX ADMIN — ASPIRIN SCH EACH: 325 TABLET ORAL at 18:10

## 2017-07-08 RX ADMIN — BUDESONIDE AND FORMOTEROL FUMARATE DIHYDRATE SCH PUFF: 80; 4.5 AEROSOL RESPIRATORY (INHALATION) at 20:29

## 2017-07-08 RX ADMIN — ASPIRIN SCH EACH: 325 TABLET ORAL at 12:04

## 2017-07-08 RX ADMIN — TIOTROPIUM BROMIDE SCH PUFF: 18 CAPSULE ORAL; RESPIRATORY (INHALATION) at 08:37

## 2017-07-08 RX ADMIN — FUROSEMIDE SCH MG: 40 TABLET ORAL at 08:56

## 2017-07-08 RX ADMIN — METOPROLOL TARTRATE SCH MG: 25 TABLET, FILM COATED ORAL at 20:04

## 2017-07-08 RX ADMIN — IPRATROPIUM BROMIDE AND ALBUTEROL SULFATE PRN ML: .5; 3 SOLUTION RESPIRATORY (INHALATION) at 08:37

## 2017-07-08 RX ADMIN — ATORVASTATIN CALCIUM SCH MG: 40 TABLET, FILM COATED ORAL at 20:04

## 2017-07-08 RX ADMIN — ASPIRIN SCH EACH: 325 TABLET ORAL at 23:01

## 2017-07-08 RX ADMIN — AMIODARONE HYDROCHLORIDE SCH MG: 200 TABLET ORAL at 08:56

## 2017-07-08 RX ADMIN — IPRATROPIUM BROMIDE AND ALBUTEROL SULFATE PRN ML: .5; 3 SOLUTION RESPIRATORY (INHALATION) at 20:29

## 2017-07-08 NOTE — P.PN
<Alla Vance - Last Filed: 07/08/17 16:29>





Progress Note - Text


DATE OF SERVICE: 


07/08/2017





PRESENTING COMPLAINT:


Edema to lower extremities





INTERVAL HISTORY:


This is an 83-year-old patient who presented with swelling of her lower 

extremities more so to the left and right.  Patient's on anticoagulation and 

states noticing black stools.  Hemoglobin dropped I more than 3 g from the last 

time she was here.  Endorses feeling tired and rundown.





07/08/2017: Patient sitting up in a chair at the bedside, very angry, demanding 

to know why she is not on antibiotic therapy.  Patient's belief is that she has 

a cellulitis to her left leg, which she believes requires treatment, with 

antibiotic therapy.  NP attempted to explain the perils of overuse of 

antibiotics, however patient was not interested.  NP informed patient that she 

would discuss her case with attending and formulate a new plan of care.  

Patient does not have any other complaints, pain, issues at this time, per her 

report.





REVIEW OF SYSTEMS:


Done for constitutional ,cardiovascular, GI, pulmonary with relevant findings 

as above.





CURRENT MEDICATIONS


DuoNeb, amiodarone, Lipitor, Lasix, Imdur, Ativan, Lopressor, dicloxacillin.





PHYSICAL EXAM


VITAL SIGNS: 


Temperature 97.6, pulse 56, respiratory rate 16, blood pressure 115/54, oxygen 

saturation 97% on room air.





GENERAL APPEARANCE: Sitting in a chair at the end of the bed, very angry, 

annoyed. 


EYES: Pupils equal. Conjunctiva normal. 


NECK: JVD not raised. Mass not palpable. 


RESPIRATORY: Respiratory effort normal. Lungs diminished bilaterally to 

auscultation. 


CARDIOVASCULAR: First and second sounds normal.  Some peripheral edema noted to 

lower extremities left greater than right.


ABDOMEN: Soft. Liver and spleen not palpable. No tenderness. No mass palpable. 


PSYCHIATRY: Alert and oriented x3. Mood and affect normal. 





INVESTIGATIONS:


White blood cell count 3.9, hemoglobin 10.3


ECHOCARDIOGRAM: Sinus rhythm, EF between 55 and 60%, mild mitral regurgitation.





ASSESSMENT:


-Acute blood loss anemia from chronic GI bleed in a patient who is on 

anticoagulation for underlying atrial fibrillation, patient complaining of dark 

stools


-Paroxysmal atrial fibrillation, patient currently in sinus rhythm


-Primary osteoarthritis multiple joints


-Chronic kidney disease stage III from hypertensive nephrosclerosis


-COPD in an ex-smoker


-Chronic congestive heart failure from systolic dysfunction EF55-60% from 

underlying coronary artery disease


-Moderate tricuspid regurgitation nondramatic secondary to pulmonary 

hypertension; COPD


-Coronary artery disease with nonobstructive lesion to the LAD from prior 

cardiac catheterization


-Rosacea chronic


-Left lower extremity with redness and swelling, possible cellulitis, source 

unknown.





PLAN:


Hemoglobin is stable, await input from GI prior to placing patient back on 

anticoagulation for paroxysmal atrial fibrillation, however patient is in 

normal sinus rhythm at the current time.  Remains in sinus rhythm at this time, 

echocardiogram completed with EF of 55-60%.  Left lower extremity redness, 

possible cellulitis source unknown, discussion had with attending physician 

regarding antibiotic use, it is unclear if this is in fact cellulitis or some 

other skin abnormality.  Therefore, antibiotics added to the regimen.  

Tentative discharge planning for Sunday, 07/09/2017.





NP statement: Patient was seen and examined by nurse practitioner Alla Vance and all elements of the case discussed with attending  Dr. Diop

















<Manohar Diop - Last Filed: 07/08/17 20:09>





Progress Note - Text





Attending note.


Date of service-07/09/2017


This patient was seen and examined by me today.  I reviewed the note of my 

nurse practitioner, Ms. Vance.  Discussed with her, additional findings as 

below.


Admitted with acute GI bleed probably from being on anticoagulation.  Patient 

is having dark stools.  Earlier today patient insisted to my nurse practitioner 

to get antibiotics for what she thought was ordered to be cellulitis and was 

put on penicillin.  I had explained to the patient yesterday that the redness 

to the lower extremity was most likely from being on blood thinners and I didn'

t feel it was cellulitis.





On examination:


Lower extremity shows some light bruising felt to be from anticoagulation.  No 

localized increased temperature.


Investigations:


White count 3.9, hemoglobin 10.3


Assessment and plan:


Acute blood loss anemia felt to be from anticoagulation for underlying atrial 

fibrillation.  Patient is insistent on getting antibody for lower extremity 

only given a short course in my view she really doesn't need it.  Recheck 

hemoglobin in the morning

## 2017-07-08 NOTE — ECHOF
Referral Reason:cm



MEASUREMENTS

--------

HEIGHT: 170.2 cm

WEIGHT: 68.0 kg

BP: 115/54

RVIDd:   3.0 cm     (< 3.3)

IVSd:   1.0 cm     (0.6 - 1.1)

LVIDd:   4.8 cm     (3.9 - 5.3)

LVPWd:   1.0 cm     (0.6 - 1.1)

IVSs:   1.5 cm

LVIDs:   3.2 cm

LVPWs:   1.6 cm

LA Diam:   3.5 cm     (2.7 - 3.8)

LAESV Index (A-L):   25.48 ml/m

Ao Diam:   3.2 cm     (2.0 - 3.7)

AV Cusp:   2.2 cm     (1.5 - 2.6)

MV EXCURSION:   13.666 mm     (> 18.000)

MV EF SLOPE:   78 mm/s     (70 - 150)

EPSS:   0.6 cm

MV E Sharif:   0.84 m/s

MV DecT:   342 ms

MV A Sharif:   0.84 m/s

MV E/A Ratio:   0.99 

AR PHT:   789 ms

RAP:   5.00 mmHg

RVSP:   37.78 mmHg







FINDINGS

--------

Sinus rhythm.

This was a technically good study.

The left ventricular size is normal.   Left ventricular 

wall thickness is normal.   Overall left ventricular 

systolic function is normal with, an EF between 55 - 60 

%.

The right ventricle is normal in size.

Normal LA  size by volume 22+/-6 ml/m2.

The right atrium is normal in size.

There is mild aortic valve sclerosis.   There is mild 

aortic regurgitation.

Mild mitral annular calcification present.   Mild 

mitral regurgitation is present.

Mild tricuspid regurgitation present.   There is mild 

pulmonary hypertension.   The right ventricular 

systolic pressure, as measured by Doppler, is 37.78mmHg.

Trace/mild (physiologic)  pulmonic regurgitation.

The aortic root size is normal.

Normal inferior vena cava with normal inspiratory 

collapse consistent with estimated right atrial 

pressure of  5 mmHg.

There is no pericardial effusion.



CONCLUSIONS

--------

1. Sinus rhythm.

2. Mild tricuspid regurgitation present.

3. There is mild pulmonary hypertension.

4. The right ventricular systolic pressure, as measured by 

Doppler, is 37.78mmHg.

5. Trace/mild (physiologic)  pulmonic regurgitation.

6. The aortic root size is normal.

7. Normal inferior vena cava with normal inspiratory 

collapse consistent with estimated right atrial 

pressure of  5 mmHg.

8. There is no pericardial effusion.

9. This was a technically good study.

10. The left ventricular size is normal.

11. Overall left ventricular systolic function is normal 

with, an EF between 55 - 60 %.

12. Normal LA size by volume 22+/-6 ml/m2.

13. There is mild aortic valve sclerosis.

14. There is mild aortic regurgitation.

15. Mild mitral annular calcification present.

16. Mild mitral regurgitation is present.





SONOGRAPHER: Ayanna Harvey RDCS

## 2017-07-08 NOTE — P.PN
Subjective





This is an 83-year-old female with history of COPD, FEV1 is 47%, maintained 

normally on Advair and Spiriva on outpatient basis.  Patient was last admitted 

about a month ago, and she was seen by Dr. Portillo on consultation.  At the 

time the patient was noted to have congestive heart failure, new onset atrial 

fibrillation and RVR.  At the time the patient was treated with amiodarone, and 

her echocardiogram showed LV dysfunction, ejection fraction was 20-25%.  CT 

angiogram of the chest and back negative for pulmonary embolism.  Patient is 

also on long-term anticoagulation in the form of eliquis.  This time the 

patient was admitted mostly with symptoms of increased swelling in lower 

extremities, left more so than right, some shortness of breath, not much 

different from baseline.  Patient denied any fever, no chills, no hemoptysis, 

no chest pain.  Hemoglobin in the ER was noted to be 9.9, however follow-up 

hemoglobin was noted to be about 11.  At any rate considering the patient's 

multiple medical problems including COPD, cardiomyopathy and LV dysfunction, 

increased swelling in lower extremities, patient was admitted and this consult 

was initiated.  Pulmonary-wise, patient is basically about her baseline.  She 

has no cough no wheezing and on physical examination she sounded quite clear.





The patient is seen again today 07/08/2017 in follow-up on the regular medical 

floor.  She is awake and alert in no acute distress.  She is currently sitting 

up in a chair at the bedside.  Pulmonary-wise she is mainly at her baseline.  

She is breathing easier today as compared to yesterday.  No cough or 

congestion.  No chills or night sweats.  She is maintaining good O2 saturations 

in the upper 90s on room air.  She's been afebrile.  No leukocytosis.  Her 

stool was positive for occult blood.  Her hemoglobin was stable at 10.9 last 

evening.





Objective





- Vital Signs


Vital signs: 


 Vital Signs











Temp  97.6 F   07/08/17 07:00


 


Pulse  62   07/08/17 08:48


 


Resp  16   07/08/17 07:00


 


BP  115/54   07/08/17 07:00


 


Pulse Ox  96   07/08/17 08:37








 Intake & Output











 07/07/17 07/08/17 07/08/17





 18:59 06:59 18:59


 


Intake Total  200 


 


Balance  200 


 


Weight 68.039 kg  


 


Intake:   


 


  Oral  200 


 


Other:   


 


  Voiding Method  Toilet 


 


  # Voids  1 














- Exam





GENERAL EXAM: Alert, active, comfortable in no apparent distress.


HEAD: Normocephalic.


EYES: Normal reaction of pupils, equal size.


NOSE: Clear with pink turbinates.


THROAT: No erythema or exudates.


NECK: No masses, no JVD.


CHEST: No chest wall deformity.


LUNGS: Equal air entry with no crackles, wheeze, rhonchi or dullness.


CVS: S1 and S2 normal with no audible mumurs, regular rhythm.


ABDOMEN: No hepatosplenomegaly, normal bowel sounds, no guarding or rigidity.


SPINE: No scoliosis or deformity


SKIN: No rashes


CENTRAL NERVOUS SYSTEM: No focal deficits, tone is normal in all 4 extremities.


Extremities: There is no significant peripheral edema.  No clubbing, no 

cyanosis.  Peripheral pulses are intact.





- Labs


CBC & Chem 7: 


 07/07/17 21:42





 07/07/17 12:15


Labs: 


 Abnormal Lab Results - Last 24 Hours (Table)











  07/07/17 07/07/17 07/07/17 Range/Units





  12:15 12:15 14:20 


 


RBC  3.12 L    (3.80-5.40)  m/uL


 


Hgb  9.9 L D    (11.4-16.0)  gm/dL


 


Hct  29.9 L    (34.0-46.0)  %


 


Lymphocytes #  0.9 L    (1.0-4.8)  k/uL


 


BUN   25 H   (7-17)  mg/dL


 


Creatinine   1.43 H   (0.52-1.04)  mg/dL


 


Glucose   103 H   (74-99)  mg/dL


 


Stool Occult Blood    Positive H  (Negative)  














  07/07/17 07/07/17 Range/Units





  16:50 21:42 


 


RBC  3.52 L  3.45 L  (3.80-5.40)  m/uL


 


Hgb  11.0 L  10.9 L  (11.4-16.0)  gm/dL


 


Hct  33.6 L  32.8 L  (34.0-46.0)  %


 


Lymphocytes #    (1.0-4.8)  k/uL


 


BUN    (7-17)  mg/dL


 


Creatinine    (0.52-1.04)  mg/dL


 


Glucose    (74-99)  mg/dL


 


Stool Occult Blood    (Negative)  














Assessment and Plan


Plan: 





Impression:





1 acute on chronic cor pulmonale secondary to pulmonary hypertension, as well 

as cardiomyopathy and LV dysfunction.





2 history of severe COPD FEV1 is in the range of 47%.





3 history of chronic hypoxic respiratory failure secondary to COPD and 

pulmonary hypertension as well as LV dysfunction, patient is presently on home 

O2 at





4 history of paroxysmal atrial fibrillation maintained on medications to 

control her rate.





5 history of chronic congestive heart failure, secondary to systolic 

dysfunction.  EF is about 40%.





6 history of coronary artery disease





7 history of moderate tricuspid regurgitation and pulmonary hypertension, 

multifactorial.  Related to COPD and the related to LV dysfunction as well as 

valvular heart disease.





8 anemia most likely secondary to GI blood losses secondary to Anticoagulation 

therapy, patient will need to be seen by gastroenterology and determine source 

of her bleeding, and whether the patient could be placed back on 

anticoagulation therapy in the future.  In the meantime hold anticoagulation 

therapy.





9 acute kidney injury, baseline creatinine is 1.06 about a month ago, and it is 

now up to 1.43.  Suggest nephrology evaluation and avoiding nephrotoxic drugs 

in the meantime.





Plan:





The patient was seen and evaluated by Dr. Sumner.  We'll continue with her 

current pulmonary medications.  We'll await further input from GI services.  We'

ll continue to follow.

## 2017-07-08 NOTE — P.CONS
History of Present Illness





- Reason for Consult


Consult date: 17





- History of Present Illness





The patient is an 83-year-old patient who presented with swelling of her LE Lt 

> Rt and reported dark stools. She dropped her Hb by 3 Gm compared to a recent 

level. Has history of CHF and atrial fibrillation on anticoagulation. Had no 

abdominal pain or any nausea, vomiting or hematemesis. Patient reported feeling 

tired and run down. Had three prior colonoscopies but no prior upper endoscopy.





Review of Systems





REVIEW OF SYSTEMS:


CARDIOPULMONARY: No chest pain or shortness of breath.  


GENITOURINARY:  No dysuria or hematuria. 


MUSCULOSKELETAL: Unremarkable. 


SKIN: Unremarkable. 


ENDOCRINE: Unremarkable. 


PSYCHIATRIC: Unremarkable. 


NEUROLOGY: Unremarkable. 


ENT: Vision unremarkable. 


CONSTITUTIONAL: No recent weight loss. No fever, chills, night sweats. 





Past Medical History


Past Medical History: Atrial Fibrillation, Coronary Artery Disease (CAD), Heart 

Failure, COPD, Hearing Disorder / Deafness, Hyperlipidemia, Hypertension, 

Myocardial Infarction (MI), Pneumonia, Renal Disease, Respiratory Disorder


Additional Past Medical History / Comment(s): Generalized anxiety disorder,  

hearing impaired, generalized anxiety disorder, CHF with an ejection fraction 

of 20-25%, nonocclusive coronary artery disease, diverticulosis, paroxysmal 

atrial fibrillation, osteoarthritis, BRONCHITIS, HOME 02 2 LITERS N/C AT NIGHT,

rosacea, arthritis, nstemi


Last Myocardial Infarction Date:: unsure of date


History of Any Multi-Drug Resistant Organisms: None Reported


Past Surgical History: Adenoidectomy, Appendectomy, Hysterectomy, Orthopedic 

Surgery, Tonsillectomy


Additional Past Surgical History / Comment(s): Right knee arthroscopy for torn 

meniscus. Colonoscopies x 3 total.  Bilateral eyelid surgery. PARTIAL 

HYSTERECTOMY STILL HAS PART OF LT OVARY


Past Anesthesia/Blood Transfusion Reactions: No Reported Reaction


Additional Past Anesthesia/Blood Transfusion Reaction / Comm: Pt has never 

recieved blood.


Smoking Status: Former smoker





- Past Family History


  ** Father


Family Medical History: Cancer


Additional Family Medical History / Comment(s): Father  at age 77yrs of 

cancer which pt believes may have started in his throat.





  ** Mother


Family Medical History: Dementia


Additional Family Medical History / Comment(s): Mother  at age 95yrs.  She 

was very healthy most of her life- she had dementia at the very end of her life.





Medications and Allergies


 Home Medications











 Medication  Instructions  Recorded  Confirmed  Type


 


Albuterol Inhaler [Ventolin Hfa 2 puff INHALATION RT-QID PRN 17 

History





Inhaler]    


 


Fluticasone/Salmeterol [Advair 1 puff INHALATION RT-BID 17 

History





250-50 Diskus]    


 


LORazepam [Ativan] 1 mg PO BID PRN 17 History


 


Amiodarone [Cordarone] 200 mg PO DAILY 17 History


 


Apixaban [Eliquis] 2.5 mg PO BID 17 History


 


Furosemide [Lasix] 40 mg PO DAILY 17 History


 


Tiotropium 18 Mcg/Puff [Spiriva] 1 cap INHALATION RT-DAILY 17 

History











 Allergies











Allergy/AdvReac Type Severity Reaction Status Date / Time


 


cefuroxime axetil Allergy  Unknown Verified 17 12:08





[From Ceftin]     














Physical Exam


Vitals: 


 Vital Signs











  Temp Pulse Pulse Resp BP BP Pulse Ox


 


 17 19:57   63     


 


 17 19:42   63     


 


 17 17:10  96.1 F L   63  16   142/63  98


 


 17 16:15  97.4 F L  54 L   18  149/62   98


 


 17 14:25   52 L   18  155/67   98


 


 17 13:17  97.7 F  53 L   18  113/58   97


 


 17 12:28   52 L   20  124/60   97


 


 17 11:32  97.7 F  54 L   16  114/56   99








 Intake and Output











 17





 14:59 22:59 06:59


 


Other:   


 


  Weight 68.039 kg  








 Patient Weight











 17





 06:59


 


Weight 68.039 kg














On physical examination, patient appears comfortable in no apparent distress. 

Vital signs are stable. 


HEENT: Unremarkable. Conjunctivae pink. Sclerae anicteric. Oral cavity no 

lesions. 


NECK: No JVD or lymph node enlargement. 


CHEST: Clear to auscultation. 


HEART: Regular rate and rhythm. 


ABDOMEN: Soft. Bowel sounds are positive. No organomegaly. No ascites.


EXTREMITIES: Stasis changes and piting edema pedally and pre-tibially.


SKIN: No rashes. 


NEUROLOGIC: Alert and oriented x3.  No focal deficits. 





Results


CBC & Chem 7: 


 17 07:15





 17 07:13


Labs: 


 Abnormal Lab Results - Last 24 Hours (Table)











  17 Range/Units





  12:15 12:15 14:20 


 


RBC  3.12 L    (3.80-5.40)  m/uL


 


Hgb  9.9 L D    (11.4-16.0)  gm/dL


 


Hct  29.9 L    (34.0-46.0)  %


 


Lymphocytes #  0.9 L    (1.0-4.8)  k/uL


 


BUN   25 H   (7-17)  mg/dL


 


Creatinine   1.43 H   (0.52-1.04)  mg/dL


 


Glucose   103 H   (74-99)  mg/dL


 


Stool Occult Blood    Positive H  (Negative)  














  17 Range/Units





  16:50 21:42 


 


RBC  3.52 L  3.45 L  (3.80-5.40)  m/uL


 


Hgb  11.0 L  10.9 L  (11.4-16.0)  gm/dL


 


Hct  33.6 L  32.8 L  (34.0-46.0)  %


 


Lymphocytes #    (1.0-4.8)  k/uL


 


BUN    (7-17)  mg/dL


 


Creatinine    (0.52-1.04)  mg/dL


 


Glucose    (74-99)  mg/dL


 


Stool Occult Blood    (Negative)  














Assessment and Plan


Plan: 





83-year old female with GI bleeding and drop in Hb likely related to gastritis 

or PUD made worse by anticoagulation. Agree with your current management. She 

is already on acid suppressant medication. Will check labs and overall course. 

Contingency EGD in the hospital or as outpatient. I will discuss with you and F/

U with you with interest.

## 2017-07-09 VITALS — TEMPERATURE: 97.3 F | DIASTOLIC BLOOD PRESSURE: 67 MMHG | SYSTOLIC BLOOD PRESSURE: 159 MMHG

## 2017-07-09 VITALS — HEART RATE: 65 BPM

## 2017-07-09 LAB
ANION GAP SERPL CALC-SCNC: 8 MMOL/L
BASOPHILS # BLD AUTO: 0 K/UL (ref 0–0.2)
BASOPHILS NFR BLD AUTO: 1 %
BUN SERPL-SCNC: 21 MG/DL (ref 7–17)
CALCIUM SPEC-MCNC: 9.3 MG/DL (ref 8.4–10.2)
CH: 30.6
CHCM: 32.2
CHLORIDE SERPL-SCNC: 107 MMOL/L (ref 98–107)
CO2 SERPL-SCNC: 28 MMOL/L (ref 22–30)
EOSINOPHIL # BLD AUTO: 0.1 K/UL (ref 0–0.7)
EOSINOPHIL NFR BLD AUTO: 2 %
ERYTHROCYTE [DISTWIDTH] IN BLOOD BY AUTOMATED COUNT: 3.28 M/UL (ref 3.8–5.4)
ERYTHROCYTE [DISTWIDTH] IN BLOOD: 14.3 % (ref 11.5–15.5)
GLUCOSE SERPL-MCNC: 98 MG/DL (ref 74–99)
HCT VFR BLD AUTO: 31.4 % (ref 34–46)
HDW: 3.44
HGB BLD-MCNC: 10.4 GM/DL (ref 11.4–16)
LUC NFR BLD AUTO: 3 %
LYMPHOCYTES # SPEC AUTO: 1 K/UL (ref 1–4.8)
LYMPHOCYTES NFR SPEC AUTO: 26 %
MCH RBC QN AUTO: 31.7 PG (ref 25–35)
MCHC RBC AUTO-ENTMCNC: 33.1 G/DL (ref 31–37)
MCV RBC AUTO: 95.6 FL (ref 80–100)
MONOCYTES # BLD AUTO: 0.3 K/UL (ref 0–1)
MONOCYTES NFR BLD AUTO: 9 %
NEUTROPHILS # BLD AUTO: 2.2 K/UL (ref 1.3–7.7)
NEUTROPHILS NFR BLD AUTO: 59 %
NON-AFRICAN AMERICAN GFR(MDRD): 40
POTASSIUM SERPL-SCNC: 3.9 MMOL/L (ref 3.5–5.1)
SODIUM SERPL-SCNC: 143 MMOL/L (ref 137–145)
WBC # BLD AUTO: 0.1 10*3/UL
WBC # BLD AUTO: 3.6 K/UL (ref 3.8–10.6)
WBC (PEROX): 3.65

## 2017-07-09 RX ADMIN — AMIODARONE HYDROCHLORIDE SCH MG: 200 TABLET ORAL at 09:49

## 2017-07-09 RX ADMIN — ASPIRIN SCH EACH: 325 TABLET ORAL at 09:49

## 2017-07-09 RX ADMIN — FUROSEMIDE SCH MG: 40 TABLET ORAL at 09:49

## 2017-07-09 RX ADMIN — IPRATROPIUM BROMIDE AND ALBUTEROL SULFATE PRN ML: .5; 3 SOLUTION RESPIRATORY (INHALATION) at 08:11

## 2017-07-09 RX ADMIN — BUDESONIDE AND FORMOTEROL FUMARATE DIHYDRATE SCH PUFF: 80; 4.5 AEROSOL RESPIRATORY (INHALATION) at 08:11

## 2017-07-09 RX ADMIN — METOPROLOL TARTRATE SCH MG: 25 TABLET, FILM COATED ORAL at 09:49

## 2017-07-09 RX ADMIN — IPRATROPIUM BROMIDE AND ALBUTEROL SULFATE PRN ML: .5; 3 SOLUTION RESPIRATORY (INHALATION) at 12:02

## 2017-07-09 RX ADMIN — TIOTROPIUM BROMIDE SCH PUFF: 18 CAPSULE ORAL; RESPIRATORY (INHALATION) at 08:11

## 2017-07-09 RX ADMIN — ISOSORBIDE MONONITRATE SCH MG: 30 TABLET, EXTENDED RELEASE ORAL at 09:49

## 2017-07-09 NOTE — P.DS
Providers


Date of admission: 


07/08/17 15:11





Expected date of discharge: 07/09/17


Attending physician: 


Manohar Diop





Consults: 





 





07/07/17 15:32


Consult Physician Urgent 


   Consulting Provider: Erich Christianson


   Consult Reason/Comments: GI bleed


   Do you want consulting provider notified?: Yes











Primary care physician: 


[facility] Course: 


This is a pleasant 83-year-old patient well known to be from multiple 

admissions.  Patient chronic stable medical conditions include chronic kidney 

disease, congestive heart failure, coronary artery disease, atrial fibrillation

, pulmonary hypertension and follows a Dr. Anaya as his family doctor and Dr. Pope as her cardiologist.  Patient's presents with swelling of the lower 

extremities more so the left leg and decided to come in.  Denies increased 

shortness of breath from her baseline.  Denies belly swelling.  Denies any pain 

and lower extremities.  Denies any fever.  Patient has been on anticoagulation 

and states for some time has noticed black stools.  In the ER patient noticed 

to drop her hemoglobin by more than 3 units from last time she was here not 

long ago and now admitted for the same.  Patient is feeling tired and rundown.


Patient's Eliquis was held.  Patient's stool had become brown of the time of 

discharge.  Dr. Christianson from GI conveyed to the nurse that patient can resume 

Eliquis.  Told the patient to watch for bleeding or dark stools.  Repeat 

hemoglobin as an outpatient.  Patient was not felt to be in CHF exacerbation 

and some swelling of lower extremity could be from venous insufficiency.  

Patient was not felt to have any cellulitis.  Day of discharge patient up and 

about in the hallway doing well.





Discharge planning discussion more than 35 minutes





On examination:


Lungs-decreased breath sounds, bruising on lower extremity from being on 

anticoagulation





Hemoglobin 10.4





Final diagnoses:


-Acute blood loss anemia from chronic GI bleed in a patient who is on 

anticoagulation for underlying atrial fibrillation, patient complaining of dark 

stools


-Paroxysmal atrial fibrillation


Primary osteoarthritis multiple joints


Chronic kidney disease stage III from hypertensive nephrosclerosis


COPD in an ex-smoker


Chronic congestive heart failure from systolic dysfunction EF 40% from 

underlying coronary artery disease


Moderate tricuspid regurgitation nondramatic secondary to pulmonary hypertension

; COPD


Coronary artery disease with nonobstructive lesion to the LAD from prior 

cardiac catheterization


Rosacea chronic


Acute GI bleed from patient being on Eliquis, present on admission





Plan - Discharge Summary


New Discharge Prescriptions: 


New


   Omeprazole [PriLOSEC] 20 mg PO AC-BID #60 cap





Continue


   LORazepam [Ativan] 1 mg PO BID PRN


     PRN Reason: Anxiety/Insomnia


   Fluticasone/Salmeterol [Advair 250-50 Diskus] 1 puff INHALATION RT-BID


   Albuterol Inhaler [Ventolin Hfa Inhaler] 2 puff INHALATION RT-QID PRN


     PRN Reason: Shortness Of Breath


   Apixaban [Eliquis] 2.5 mg PO BID


   Amiodarone [Cordarone] 200 mg PO DAILY


   Furosemide [Lasix] 40 mg PO DAILY


   Tiotropium 18 Mcg/Puff [Spiriva] 1 cap INHALATION RT-DAILY


   Atorvastatin [Lipitor] 40 mg PO HS #30 tab


   Isosorbide Mononitrate ER [Imdur] 30 mg PO DAILY #30 tab


   Nitroglycerin Sl Tabs [Nitrostat] 0.4 mg SUBLINGUAL Q5M PRN #30 tab


     PRN Reason: Chest Pain


   Ipratropium-Albuterol Nebulize [Duoneb 0.5 mg-3 mg/3 ml Soln] 3 ml 

INHALATION RT-QID PRN #120 ampul.neb


     PRN Reason: Wheezing





Changed


   Metoprolol Tartrate [Lopressor] 25 mg PO BID #60 tab





Discontinued


   Aspirin 81 mg PO DAILY


Discharge Medication List





Albuterol Inhaler [Ventolin Hfa Inhaler] 2 puff INHALATION RT-QID PRN 01/07/17 [

History]


Fluticasone/Salmeterol [Advair 250-50 Diskus] 1 puff INHALATION RT-BID 01/07/17 

[History]


LORazepam [Ativan] 1 mg PO BID PRN 01/07/17 [History]


Amiodarone [Cordarone] 200 mg PO DAILY 04/17/17 [History]


Apixaban [Eliquis] 2.5 mg PO BID 04/17/17 [History]


Furosemide [Lasix] 40 mg PO DAILY 06/03/17 [History]


Tiotropium 18 Mcg/Puff [Spiriva] 1 cap INHALATION RT-DAILY 06/03/17 [History]


Atorvastatin [Lipitor] 40 mg PO HS #30 tab 06/05/17 [Rx]


Isosorbide Mononitrate ER [Imdur] 30 mg PO DAILY #30 tab 06/05/17 [Rx]


Nitroglycerin Sl Tabs [Nitrostat] 0.4 mg SUBLINGUAL Q5M PRN #30 tab 06/05/17 [Rx

]


Ipratropium-Albuterol Nebulize [Duoneb 0.5 mg-3 mg/3 ml Soln] 3 ml INHALATION RT

-QID PRN #120 ampul.neb 06/07/17 [Rx]


Metoprolol Tartrate [Lopressor] 25 mg PO BID #60 tab 07/09/17 [Rx]


Omeprazole [PriLOSEC] 20 mg PO AC-BID #60 cap 07/09/17 [Rx]








Follow up Appointment(s)/Referral(s): 


Erich Christianson MD [STAFF PHYSICIAN] - 4 Weeks


Elroy Anaya DO [Primary Care Provider] - 1 Week


Ambulatory/Diagnostic Orders: 


Complete Blood Count w/diff [LAB.AMB] Time Frame: 1 Week, Location: Determined 

By Patient


Patient Instructions/Handouts:  Gastrointestinal Bleeding (DC)


Activity/Diet/Wound Care/Special Instructions: 


follow up with Dr. Christianson in one month.





 If black stool or abdominal pain occurs call his office for earlier 

appointment or if an emergency go to the ER. 





OK to restart Eliquis per GI. 





Start protonix


Discharge Disposition: HOME SELF-CARE

## 2017-07-10 NOTE — PN
Mrs. Valenzuela is an 83-year-old female who has a history of mild to moderate 
coronary artery disease, prior history of cardiomyopathy, who presented with 
symptoms of worsening dyspnea and peripheral edema. She has a history of severe 
chronic obstructive lung disease. She is feeling better today, ambulating 
without difficulty, denying any chest pain. She denies any dizziness or 
palpitations. She had a repeat echocardiogram performed yesterday that revealed 
improvement in left ventricular systolic function with mild pulmonary 
hypertension and mild aortic and mitral as well as tricuspid regurgitation. She 
continues to be at this time on amiodarone 200 mg daily, Lipitor 40 mg daily, 
furosemide 40 mg daily, isosorbide mononitrate 30 mg daily, metoprolol tartrate 
25 mg twice a day. 



PHYSICAL EXAMINATION: Blood pressure 120/60 with a heart rate 60.

LUNGS: Clear.

HEART: Regular rate and rhythm. S1, S2. No S3. No rub. 

ABDOMEN: Soft, non-tender.

EXTREMITIES: Trace edema. 



Lab data revealed BUN and creatinine of 21 and 1.27, which is improved compared 
with yesterday. Her hemoglobin is 10.4, which has been stable. 



IMPRESSION:

1. Symptoms of progressive dyspnea, most likely related to her primary lung 
disease with chronic obstructive lung disease.

2. Prior history of cardiomyopathy that has improved. 

3. Paroxysmal atrial fibrillation. Patient not anticoagulated because of recent 
dark stool.

4. Mild to moderate coronary artery disease. 

5. History of chronic kidney disease. 



RECOMMENDATIONS: From the cardiac standpoint, the patient is stable. She will 
continue on present therapy. The cardiomyopathy has recovered. If she is 
cleared from the GI Service, then Eliquis will be reinitiated and she will 
follow as an outpatient with Dr. Pope. 
MENDOZA

## 2017-07-10 NOTE — CONS
Mrs. Valenzuela is an 83-year-old female with a known history of chronic obstructive 
lung disease, history of paroxysmal atrial fibrillation, nonischemic 
cardiomyopathy, who presented with progressive peripheral edema on the left 
side as well as evidence of change in the color of her stool.  She has been 
followed by Dr. Pope on a regular basis and has underwent a cardiac 
catheterization by Dr. Pope in January of this year and was found to have mild 
to moderate obstructive disease in the LAD. At that time her echocardiogram 
revealed a severely impaired left ventricular systolic function. Patient has 
been complaining of worsening edema on the left side. She denies any change in 
her breathing. She has no PND, no orthopnea. She has home oxygen at night, but 
not during the day. She has no palpitations, no dizziness or syncope.  Her 
coronary risk factors are remarkable for history of hypertension and 
hyperlipidemia. She is a nondiabetic. 



Her medications at home include Eliquis 2.5 mg twice a day, Lipitor 40 mg daily
, aspirin 81 mg daily, isosorbide mononitrate 30 mg daily, Lasix 40 mg daily, 
Advair, metoprolol tartrate 50 mg twice a day, amiodarone 200 daily. 



REVIEW OF SYSTEMS:

RESPIRATORY SYSTEM: She has dyspnea on exertion, history of chronic obstructive 
lung disease, had been followed by Dr. Portillo on regular base. 

GI SYSTEM: She denies any prior history of documented GI bleeding, although she 
has noted dark black stool. 

 SYSTEM: No dysuria or hematuria. 

NERVOUS SYSTEM: She denies any history of stroke or seizure. 



PHYSICAL EXAMINATION: She is an 83-year-old female alert, oriented and in no 
apparent distress.  Blood pressure 115/54 with heart rate in the 60s. 

HEAD: Normocephalic. 

EYES: Sclerae anicteric. 

NECK: No bruit noted. 

LUNGS: Decreased air exchange, but no wheezes or rales. 

HEART: Regular rate and rhythm. S1, S2. No S3 with a systolic murmur at the 
base. No diastolic murmur, no rub. 

ABDOMEN: Soft, nontender. Positive bowel sounds. No organomegaly. 

EXTREMITIES: Bruising was noted on both extremities with 1+ edema on the left 
more than on the right. 



Lab data revealed the hemoglobin of 9.9, today is 10.3. BUN and creatinine of 
25 and 1.43. Her troponin less than 0.012. She is heme positive. Her INR on 
admission is 1.2. 



IMPRESSION: 

1.  Symptoms of progressive peripheral edema in a patient with prior history of 
cardiomyopathy, nonischemic. 

2.  Gastrointestinal bleeding. Hemoglobin is stable. 

3.  Paroxysmal atrial fibrillation remains in sinus mechanism. 

4.  Hyperlipidemia. 

5.  Prior history of hypertension. 



RECOMMENDATIONS: From the cardiac standpoint I will repeat her echocardiogram 
to further evaluate her left ventricular systolic function. I will stop her 
aspirin since she has nonobstructive coronary artery disease and she is on 
Coumadin. I will follow her renal function. Depending on her blood pressure and 
her heart rate further adjustment of her medical regimen will be done. 
Depending on her progress further recommendations will be made.  Thank you for 
this consult. Will follow with you. 
MENDOZA

## 2018-02-08 ENCOUNTER — HOSPITAL ENCOUNTER (INPATIENT)
Dept: HOSPITAL 47 - EC | Age: 83
LOS: 3 days | Discharge: HOME | DRG: 378 | End: 2018-02-11
Payer: MEDICARE

## 2018-02-08 VITALS — BODY MASS INDEX: 23.5 KG/M2

## 2018-02-08 DIAGNOSIS — J96.11: ICD-10-CM

## 2018-02-08 DIAGNOSIS — J44.9: ICD-10-CM

## 2018-02-08 DIAGNOSIS — Z88.1: ICD-10-CM

## 2018-02-08 DIAGNOSIS — H91.90: ICD-10-CM

## 2018-02-08 DIAGNOSIS — I42.9: ICD-10-CM

## 2018-02-08 DIAGNOSIS — N17.9: ICD-10-CM

## 2018-02-08 DIAGNOSIS — I27.29: ICD-10-CM

## 2018-02-08 DIAGNOSIS — Z82.0: ICD-10-CM

## 2018-02-08 DIAGNOSIS — Z90.89: ICD-10-CM

## 2018-02-08 DIAGNOSIS — M19.90: ICD-10-CM

## 2018-02-08 DIAGNOSIS — I48.0: ICD-10-CM

## 2018-02-08 DIAGNOSIS — K29.01: Primary | ICD-10-CM

## 2018-02-08 DIAGNOSIS — F41.1: ICD-10-CM

## 2018-02-08 DIAGNOSIS — E78.5: ICD-10-CM

## 2018-02-08 DIAGNOSIS — Z79.01: ICD-10-CM

## 2018-02-08 DIAGNOSIS — I25.2: ICD-10-CM

## 2018-02-08 DIAGNOSIS — Z79.899: ICD-10-CM

## 2018-02-08 DIAGNOSIS — I11.0: ICD-10-CM

## 2018-02-08 DIAGNOSIS — I48.2: ICD-10-CM

## 2018-02-08 DIAGNOSIS — I50.32: ICD-10-CM

## 2018-02-08 DIAGNOSIS — Z80.8: ICD-10-CM

## 2018-02-08 DIAGNOSIS — Z90.49: ICD-10-CM

## 2018-02-08 DIAGNOSIS — Z80.41: ICD-10-CM

## 2018-02-08 DIAGNOSIS — Z87.891: ICD-10-CM

## 2018-02-08 DIAGNOSIS — Z88.8: ICD-10-CM

## 2018-02-08 DIAGNOSIS — Z81.1: ICD-10-CM

## 2018-02-08 DIAGNOSIS — Z82.49: ICD-10-CM

## 2018-02-08 DIAGNOSIS — I25.10: ICD-10-CM

## 2018-02-08 DIAGNOSIS — K92.1: ICD-10-CM

## 2018-02-08 DIAGNOSIS — Z82.5: ICD-10-CM

## 2018-02-08 DIAGNOSIS — Z99.81: ICD-10-CM

## 2018-02-08 DIAGNOSIS — Z90.710: ICD-10-CM

## 2018-02-08 DIAGNOSIS — Z87.01: ICD-10-CM

## 2018-02-08 LAB
ALBUMIN SERPL-MCNC: 4.4 G/DL (ref 3.5–5)
ALP SERPL-CCNC: 82 U/L (ref 38–126)
ALT SERPL-CCNC: 32 U/L (ref 9–52)
ANION GAP SERPL CALC-SCNC: 13 MMOL/L
APTT BLD: 24 SEC (ref 22–30)
AST SERPL-CCNC: 32 U/L (ref 14–36)
BASOPHILS # BLD AUTO: 0.1 K/UL (ref 0–0.2)
BASOPHILS NFR BLD AUTO: 1 %
BUN SERPL-SCNC: 28 MG/DL (ref 7–17)
CALCIUM SPEC-MCNC: 9.7 MG/DL (ref 8.4–10.2)
CHLORIDE SERPL-SCNC: 101 MMOL/L (ref 98–107)
CK SERPL-CCNC: 124 U/L (ref 30–135)
CO2 SERPL-SCNC: 29 MMOL/L (ref 22–30)
EOSINOPHIL # BLD AUTO: 0.2 K/UL (ref 0–0.7)
EOSINOPHIL NFR BLD AUTO: 3 %
ERYTHROCYTE [DISTWIDTH] IN BLOOD BY AUTOMATED COUNT: 4.12 M/UL (ref 3.8–5.4)
ERYTHROCYTE [DISTWIDTH] IN BLOOD: 12.8 % (ref 11.5–15.5)
GLUCOSE SERPL-MCNC: 95 MG/DL (ref 74–99)
HCT VFR BLD AUTO: 40.4 % (ref 34–46)
HGB BLD-MCNC: 12.8 GM/DL (ref 11.4–16)
INR PPP: 1.1 (ref ?–1.2)
LYMPHOCYTES # SPEC AUTO: 0.7 K/UL (ref 1–4.8)
LYMPHOCYTES NFR SPEC AUTO: 12 %
MAGNESIUM SPEC-SCNC: 2.3 MG/DL (ref 1.6–2.3)
MCH RBC QN AUTO: 31 PG (ref 25–35)
MCHC RBC AUTO-ENTMCNC: 31.6 G/DL (ref 31–37)
MCV RBC AUTO: 97.9 FL (ref 80–100)
MONOCYTES # BLD AUTO: 0.3 K/UL (ref 0–1)
MONOCYTES NFR BLD AUTO: 5 %
NEUTROPHILS # BLD AUTO: 4.6 K/UL (ref 1.3–7.7)
NEUTROPHILS NFR BLD AUTO: 76 %
PLATELET # BLD AUTO: 230 K/UL (ref 150–450)
POTASSIUM SERPL-SCNC: 3.9 MMOL/L (ref 3.5–5.1)
PROT SERPL-MCNC: 6.9 G/DL (ref 6.3–8.2)
PT BLD: 10.7 SEC (ref 9–12)
SODIUM SERPL-SCNC: 143 MMOL/L (ref 137–145)
TROPONIN I SERPL-MCNC: <0.012 NG/ML (ref 0–0.03)
WBC # BLD AUTO: 6.1 K/UL (ref 3.8–10.6)

## 2018-02-08 PROCEDURE — 82550 ASSAY OF CK (CPK): CPT

## 2018-02-08 PROCEDURE — 83735 ASSAY OF MAGNESIUM: CPT

## 2018-02-08 PROCEDURE — 86900 BLOOD TYPING SEROLOGIC ABO: CPT

## 2018-02-08 PROCEDURE — 85025 COMPLETE CBC W/AUTO DIFF WBC: CPT

## 2018-02-08 PROCEDURE — 80053 COMPREHEN METABOLIC PANEL: CPT

## 2018-02-08 PROCEDURE — 85730 THROMBOPLASTIN TIME PARTIAL: CPT

## 2018-02-08 PROCEDURE — 85027 COMPLETE CBC AUTOMATED: CPT

## 2018-02-08 PROCEDURE — 94640 AIRWAY INHALATION TREATMENT: CPT

## 2018-02-08 PROCEDURE — 43239 EGD BIOPSY SINGLE/MULTIPLE: CPT

## 2018-02-08 PROCEDURE — 80048 BASIC METABOLIC PNL TOTAL CA: CPT

## 2018-02-08 PROCEDURE — 36415 COLL VENOUS BLD VENIPUNCTURE: CPT

## 2018-02-08 PROCEDURE — 82272 OCCULT BLD FECES 1-3 TESTS: CPT

## 2018-02-08 PROCEDURE — 94760 N-INVAS EAR/PLS OXIMETRY 1: CPT

## 2018-02-08 PROCEDURE — 88305 TISSUE EXAM BY PATHOLOGIST: CPT

## 2018-02-08 PROCEDURE — 86901 BLOOD TYPING SEROLOGIC RH(D): CPT

## 2018-02-08 PROCEDURE — 84484 ASSAY OF TROPONIN QUANT: CPT

## 2018-02-08 PROCEDURE — 82553 CREATINE MB FRACTION: CPT

## 2018-02-08 PROCEDURE — 99285 EMERGENCY DEPT VISIT HI MDM: CPT

## 2018-02-08 PROCEDURE — 88342 IMHCHEM/IMCYTCHM 1ST ANTB: CPT

## 2018-02-08 PROCEDURE — 86850 RBC ANTIBODY SCREEN: CPT

## 2018-02-08 PROCEDURE — 85610 PROTHROMBIN TIME: CPT

## 2018-02-08 RX ADMIN — METOPROLOL TARTRATE SCH MG: 25 TABLET, FILM COATED ORAL at 20:52

## 2018-02-08 RX ADMIN — BUDESONIDE AND FORMOTEROL FUMARATE DIHYDRATE SCH PUFF: 80; 4.5 AEROSOL RESPIRATORY (INHALATION) at 19:16

## 2018-02-08 NOTE — ED
General Adult HPI





- General


Chief complaint: GI Bleed


Stated complaint: Bloody Stool


Time Seen by Provider: 18 15:30


Source: patient, RN notes reviewed


Mode of arrival: ambulatory


Limitations: no limitations





- History of Present Illness


Initial comments: 





This is an 83-year-old female presents emergency Department stating she is on 

blood thinners for atrial fibrillation.  Patient states in the past she has had 

a GI bleed.  Patient states today she started having black stools but no bright 

red blood.  Patient states she's had no other symptoms.  Patient states she's 

had no abdominal cramping no abdominal pain.  Patient denies any 

lightheadedness or dizziness.  Patient denies any chest pain palpitations 

difficulty breathing or shortness of breath.   denies any recent history 

of diarrhea.  Patient denies any recent fever or chills.  Patient states aside 

from the dark black stools she is asymptomatic





- Related Data


 Home Medications











 Medication  Instructions  Recorded  Confirmed


 


Fluticasone/Salmeterol [Advair 1 puff INHALATION RT-BID 17





250-50 Diskus]   


 


Amiodarone [Cordarone] 200 mg PO DAILY 17


 


Apixaban [Eliquis] 2.5 mg PO BID 17


 


Furosemide [Lasix] 40 mg PO DAILY 17


 


Tiotropium 18 Mcg/Puff [Spiriva] 1 cap INHALATION RT-DAILY 17


 


Cholecalciferol (Vitamin D3) 4,000 unit PO DAILY 18





[Vitamin D3]   


 


LORazepam [Ativan] 1 mg PO HS 18


 


Multivitamins, Thera [Multivitamin 1 tab PO DAILY 18





(formulary)]   








 Previous Rx's











 Medication  Instructions  Recorded


 


Metoprolol Tartrate [Lopressor] 25 mg PO BID #60 tab 17











 Allergies











Allergy/AdvReac Type Severity Reaction Status Date / Time


 


amoxicillin [From Augmentin] Allergy  Unknown Verified 18 16:08


 


cefuroxime axetil Allergy  Unknown Verified 18 16:08





[From Ceftin]     


 


clavulanic acid Allergy  Unknown Verified 18 16:08





[From Augmentin]     














Review of Systems


ROS Statement: 


Those systems with pertinent positive or pertinent negative responses have been 

documented in the HPI.





ROS Other: All systems not noted in ROS Statement are negative.





Past Medical History


Past Medical History: Atrial Fibrillation, Coronary Artery Disease (CAD), Heart 

Failure, COPD, Hearing Disorder / Deafness, Hyperlipidemia, Hypertension, 

Myocardial Infarction (MI), Pneumonia, Renal Disease, Respiratory Disorder


Additional Past Medical History / Comment(s): Generalized anxiety disorder,  

hearing impaired, generalized anxiety disorder, CHF with an ejection fraction 

of 20-25%, nonocclusive coronary artery disease, diverticulosis, paroxysmal 

atrial fibrillation, osteoarthritis, BRONCHITIS, HOME 02 2 LITERS N/C AT NIGHT,

rosacea, arthritis, nstemi


Last Myocardial Infarction Date:: unsure of date


History of Any Multi-Drug Resistant Organisms: None Reported


Past Surgical History: Adenoidectomy, Appendectomy, Hysterectomy, Orthopedic 

Surgery, Tonsillectomy


Additional Past Surgical History / Comment(s): Right knee arthroscopy for torn 

meniscus. Colonoscopies x 3 total.  Bilateral eyelid surgery. PARTIAL 

HYSTERECTOMY STILL HAS PART OF LT OVARY


Past Anesthesia/Blood Transfusion Reactions: No Reported Reaction


Additional Past Anesthesia/Blood Transfusion Reaction / Comment(s): Pt has 

never recieved blood.


Past Psychological History: Anxiety


Smoking Status: Former smoker


Past Alcohol Use History: None Reported


Past Drug Use History: None Reported





- Past Family History


  ** Father


Family Medical History: Cancer


Additional Family Medical History / Comment(s): Father  at age 77yrs of 

cancer which pt believes may have started in his throat.





  ** Mother


Family Medical History: Dementia


Additional Family Medical History / Comment(s): Mother  at age 95yrs.  She 

was very healthy most of her life- she had dementia at the very end of her life.





General Exam





- General Exam Comments


Initial Comments: 





GENERAL:


Patient is well-developed and well-nourished.  Patient is nontoxic and well-

hydrated and is in no acute distress.





ENT:


Neck is soft and supple.  No significant lymphadenopathy is noted.  Oropharynx 

is clear.  Moist mucous membranes.  Neck has full range of motion without 

eliciting any pain.





EYES:


The sclera were anicteric and conjunctiva were pink and moist.  Extraocular 

movements were intact and pupils were equal round and reactive to light.  

Eyelids were unremarkable.





PULMONARY:


Unlabored respirations.  Good breath sounds bilaterally.  No audible rales 

rhonchi or wheezing was noted.





CARDIOVASCULAR:


There is a regular rate and rhythm without any murmurs gallops or rubs.  





ABDOMEN:


Soft and nontender with normal bowel sounds.  No palpable organomegaly was 

noted.  There is no palpable pulsatile mass.





RECTAL:


On rectal exam there was dark black stool





SKIN:


Skin is clear with no lesions or rashes and otherwise unremarkable.





NEUROLOGIC:


Patient is alert and oriented x3.  Cranial nerves II through XII are grossly 

intact.  Motor and sensory are also intact.  Normal speech, volume and content.

  Symmetrical smile.  





MUSCULOSKELETAL:


Normal extremities with adequate strength and full range of motion.  No lower 

extremity swelling or edema.  No calf tenderness.





LYMPHATICS:


No significant lymphadenopathy is noted





PSYCHIATRIC:


Normal psychiatric evaluation.  


Limitations: no limitations





Course


 Vital Signs











  18





  15:31 16:26 16:55


 


Temperature 98.1 F  


 


Pulse Rate 60 55 L 55 L


 


Respiratory 20 18 18





Rate   


 


Blood Pressure 141/76 145/65 156/71


 


O2 Sat by Pulse 99 95 95





Oximetry   














Medical Decision Making





- Medical Decision Making





Patient's occult blood was negative.





I spoke with Dr. Dailey I admitted the patient I consult to assess her.  I also 

held the eliquis





- Lab Data


Result diagrams: 


 18 16:15





 Lab Results











  18 Range/Units





  16:15 16:15 


 


WBC  6.1   (3.8-10.6)  k/uL


 


RBC  4.12   (3.80-5.40)  m/uL


 


Hgb  12.8   (11.4-16.0)  gm/dL


 


Hct  40.4   (34.0-46.0)  %


 


MCV  97.9   (80.0-100.0)  fL


 


MCH  31.0   (25.0-35.0)  pg


 


MCHC  31.6   (31.0-37.0)  g/dL


 


RDW  12.8   (11.5-15.5)  %


 


Plt Count  230   (150-450)  k/uL


 


Neutrophils %  76   %


 


Lymphocytes %  12   %


 


Monocytes %  5   %


 


Eosinophils %  3   %


 


Basophils %  1   %


 


Neutrophils #  4.6   (1.3-7.7)  k/uL


 


Lymphocytes #  0.7 L   (1.0-4.8)  k/uL


 


Monocytes #  0.3   (0-1.0)  k/uL


 


Eosinophils #  0.2   (0-0.7)  k/uL


 


Basophils #  0.1   (0-0.2)  k/uL


 


Stool Occult Blood   Positive H  (Negative)  














Disposition


Clinical Impression: 


 GI bleed





Disposition: ADMITTED AS IP TO THIS HOSP


Referrals: 


Timi Jimenes MD [Primary Care Provider] - 1-2 days


Time of Disposition: 17:00

## 2018-02-09 LAB
BASOPHILS # BLD AUTO: 0.1 K/UL (ref 0–0.2)
BASOPHILS NFR BLD AUTO: 2 %
EOSINOPHIL # BLD AUTO: 0.2 K/UL (ref 0–0.7)
EOSINOPHIL NFR BLD AUTO: 5 %
ERYTHROCYTE [DISTWIDTH] IN BLOOD BY AUTOMATED COUNT: 4.07 M/UL (ref 3.8–5.4)
ERYTHROCYTE [DISTWIDTH] IN BLOOD: 13 % (ref 11.5–15.5)
HCT VFR BLD AUTO: 39.8 % (ref 34–46)
HGB BLD-MCNC: 12.5 GM/DL (ref 11.4–16)
LYMPHOCYTES # SPEC AUTO: 0.8 K/UL (ref 1–4.8)
LYMPHOCYTES NFR SPEC AUTO: 16 %
MCH RBC QN AUTO: 30.7 PG (ref 25–35)
MCHC RBC AUTO-ENTMCNC: 31.5 G/DL (ref 31–37)
MCV RBC AUTO: 97.7 FL (ref 80–100)
MONOCYTES # BLD AUTO: 0.3 K/UL (ref 0–1)
MONOCYTES NFR BLD AUTO: 6 %
NEUTROPHILS # BLD AUTO: 3.4 K/UL (ref 1.3–7.7)
NEUTROPHILS NFR BLD AUTO: 70 %
PLATELET # BLD AUTO: 247 K/UL (ref 150–450)
WBC # BLD AUTO: 4.9 K/UL (ref 3.8–10.6)

## 2018-02-09 RX ADMIN — CEFAZOLIN SCH MLS/HR: 330 INJECTION, POWDER, FOR SOLUTION INTRAMUSCULAR; INTRAVENOUS at 11:16

## 2018-02-09 RX ADMIN — FUROSEMIDE SCH MG: 40 TABLET ORAL at 08:04

## 2018-02-09 RX ADMIN — IPRATROPIUM BROMIDE SCH MG: 0.5 SOLUTION RESPIRATORY (INHALATION) at 11:12

## 2018-02-09 RX ADMIN — PANTOPRAZOLE SODIUM SCH MG: 40 INJECTION, POWDER, FOR SOLUTION INTRAVENOUS at 13:20

## 2018-02-09 RX ADMIN — METOPROLOL TARTRATE SCH MG: 25 TABLET, FILM COATED ORAL at 08:04

## 2018-02-09 RX ADMIN — BUDESONIDE AND FORMOTEROL FUMARATE DIHYDRATE SCH PUFF: 80; 4.5 AEROSOL RESPIRATORY (INHALATION) at 07:04

## 2018-02-09 RX ADMIN — AMIODARONE HYDROCHLORIDE SCH MG: 200 TABLET ORAL at 08:04

## 2018-02-09 RX ADMIN — METOPROLOL TARTRATE SCH MG: 25 TABLET, FILM COATED ORAL at 20:09

## 2018-02-09 RX ADMIN — IPRATROPIUM BROMIDE SCH MG: 0.5 SOLUTION RESPIRATORY (INHALATION) at 07:04

## 2018-02-09 RX ADMIN — BUDESONIDE AND FORMOTEROL FUMARATE DIHYDRATE SCH PUFF: 80; 4.5 AEROSOL RESPIRATORY (INHALATION) at 18:42

## 2018-02-09 RX ADMIN — IPRATROPIUM BROMIDE SCH MG: 0.5 SOLUTION RESPIRATORY (INHALATION) at 18:42

## 2018-02-09 RX ADMIN — IPRATROPIUM BROMIDE SCH MG: 0.5 SOLUTION RESPIRATORY (INHALATION) at 15:26

## 2018-02-09 NOTE — CONS
CONSULTATION



DATE OF SERVICE:

02/08/2018



REASON FOR CONSULTATION:

Acute GI bleed.



HISTORY OF PRESENT ILLNESS:

The patient is an 83-year-old pleasant white female who was admitted to the hospital

after having an episode of black tarry stools at home yesterday.  She called her PCP

and had advised her to go to the emergency room. Since being in the hospital, she did

not have any episodes of bleeding.  She has a history of chronic A. fib and has been on

Eliquis, which she took yesterday morning.  She came in to the emergency room and

hemoglobin was 12.5 g/dL.  She denies any abdominal pain.  No nausea, vomiting.  She

states that she had a similar episode in May of this year and bleeding continued for 2

days and stopped after Eliquis was held for a week.  Her last colonoscopy was about 3

or 4 years ago and according to the patient was within normal limits.



PAST MEDICAL HISTORY:

Significant for A. fib, hypertension, hyperlipidemia.



MEDICATIONS:

At home Advair, Cordarone, Eliquis, Lasix, Spiriva, vitamin D3, and multivitamin.



ALLERGIES:

To CEFUROXIME, AMOXICILLIN.



SOCIAL HISTORY:

No smoking.  No alcohol use.



PAST SURGICAL HISTORY:

Hysterectomy, appendectomy, adenoidectomy, orthopedic surgery, tonsillectomy, bilateral

cataract surgery, colonoscopy, the last one 3 years ago.



FAMILY HISTORY:

Father had throat cancer.  Mother had coronary artery disease.



REVIEW OF SYSTEMS:

CARDIOPULMONARY:  No chest pain, shortness of breath.

GENITOURINARY:  No dysuria, no hematuria.

MUSCULOSKELETAL:  Unremarkable.

SKIN:  Unremarkable.

ENDOCRINE:  Unremarkable.

PSYCHIATRIC:  Unremarkable.

NEUROLOGY:  Unremarkable.

ENT: Vision unremarkable.

CONSTITUTIONAL:  No recent weight loss.  No fever, chills, night sweats.



PHYSICAL EXAMINATION:

She appears comfortable.  No apparent distress.

VITAL SIGNS:  Stable.  Blood pressure is 132/86, pulse rate 80, temperature 98.2.

HEENT EXAMINATION:  Unremarkable, conjunctivae are pink, sclerae nonicteric.  Oral

cavity no lesions.

NECK:  No JVD or lymph node enlargement.

CHEST:  Clear to auscultation.

HEART:  Regular rate and rhythm.

ABDOMEN:  Soft.  Bowel sounds are positive.  No organomegaly.

EXTREMITIES:  No pedal edema.

SKIN:  No rashes.

NEURO:  Alert and oriented x3.  No focal deficits.



LABS:

Done at the time of admission to the hospital: WBC is 5.8, hemoglobin 12.4.  Repeat

hemoglobin this morning is 12.4.



IMPRESSION:

1. This is a lady with history of atrial fibrillation, presently on Eliquis, admitted

    to the hospital with episodes of black tarry stools that happened yesterday.  She

    did not have any more bleeding since being in the hospital and remains

    hemodynamically stable.  Had a similar episode 6 months ago that resolved

    spontaneously.  Most likely dealing with an upper gastrointestinal source of

    bleeding.

2. History of atrial fibrillation on Eliquis that has been on hold.  The last dose was

    yesterday.



RECOMMENDATIONS:

1. Clear liquid diet.

2. Continue with IV Protonix 40 mg daily.

3. Will proceed with an upper endoscopy tomorrow.  Discussed with the patient risks,

    benefits and complications of the procedure and she is agreeable to it.

Thank you for this consultation.





SHAYY / LUCIANA: [id number] / Job#: [id number]

## 2018-02-09 NOTE — P.HPIM
History of Present Illness


H&P Date: 18


Chief Complaint: GI bleed





This is an 83-year-old  female patient of Dr. Jimenes, Dr. Poep and Dr Portillo with past medical history for paroxysmal atrial fibrillation 

maintained on eliquis, Lopressor and amiodarone, nonischemic cardiomyopathy, 

chronic systolic heart failure, COPD with FEV1 of 47%, chronic cor pulmonale 

secondary to pulmonary hypertension, chronic hypoxic respiratory failure on 

home O2 her last heart catheterization was with Dr. Pope in 2017 that 

showed mild to moderate obstructive disease in the LAD.  EF 20 to 25% in 

2017 and repeat in 2017 was 55-60%.  A shunt was admitted last 

July for black stools and at that time was seen by Dr. Christianson but because 

bleeding.  There was no endoscopy done.  Patient states she had black stools a 

little amount 2 yesterday.  She denies any abdominal pain, nausea, vomiting, 

diarrhea.  She denies any lightheadedness, chest pain or shortness of breath.








Patient presented to [facility] emergency center for 

evaluation. Her vital signs were stable.  Occult blood was positive.  

Hemoglobin 12.8.  BUN 28 and creatinine 1.7.  Eliquis was held and patient 

admitted to the [facility] floor and consult requested with Dr. EKATERINA Mar and 

patient has been scheduled for EGD tomorrow.














Review of Systems


All systems: negative


Constitutional: Denies anorexia, Denies chills, Denies fatigue, Denies fever, 

Denies lethargy, Denies poor appetite, Denies weakness


Eyes: denies blurred vision, denies pain


Ears, nose, mouth and throat: Denies headache, Denies sore throat, Denies 

vertigo


Cardiovascular: Denies chest pain, Denies decreased exercise tolerance, Denies 

dyspnea on exertion, Denies edema, Denies leg edema, Denies lightheadedness, 

Denies shortness of breath, Denies syncope


Respiratory: Denies cough, Denies cough with sputum, Denies dyspnea, Denies 

excessive sputum, Denies hemoptysis


Gastrointestinal: Reports melena, Denies abdominal pain, Denies diarrhea, 

Denies nausea, Denies vomiting


Genitourinary: Denies dysuria, Denies hematuria


Musculoskeletal: Denies myalgias


Integumentary: Denies pruritus, Denies rash


Neurological: Denies numbness, Denies weakness


Psychiatric: Denies anxiety, Denies depression


Endocrine: Denies fatigue, Denies weight change





Past Medical History


Past Medical History: Atrial Fibrillation, Coronary Artery Disease (CAD), Heart 

Failure, COPD, Hearing Disorder / Deafness, Hyperlipidemia, Hypertension, 

Myocardial Infarction (MI), Pneumonia, Renal Disease, Respiratory Disorder


Additional Past Medical History / Comment(s): Generalized anxiety disorder,  

hearing impaired, generalized anxiety disorder, CHF with an ejection fraction 

of 55-60%, nonocclusive coronary artery disease, diverticulosis, paroxysmal 

atrial fibrillation, osteoarthritis, BRONCHITIS, HOME 02 2 LITERS N/C AT NIGHT,

rosacea, arthritis, nstemi


Last Myocardial Infarction Date:: unsure of date


History of Any Multi-Drug Resistant Organisms: None Reported


Past Surgical History: Adenoidectomy, Appendectomy, Hysterectomy, Orthopedic 

Surgery, Tonsillectomy


Additional Past Surgical History / Comment(s): Right knee arthroscopy for torn 

meniscus. Colonoscopies x 3 total.  Bilateral eyelid surgery. PARTIAL 

HYSTERECTOMY STILL HAS PART OF LT OVARY


Past Anesthesia/Blood Transfusion Reactions: No Reported Reaction


Additional Past Anesthesia/Blood Transfusion Reaction / Comment(s): Pt has 

never recieved blood.


Past Psychological History: Anxiety


Additional Psychological History / Comment(s): Pt is .  She lives alone 

in her single level home in [facility]. She is very independent. She 

drives a car.  She has a niece that lives nearby if she needs someone.


Smoking Status: Former smoker


Past Alcohol Use History: None Reported


Additional Past Alcohol Use History / Comment(s): Patient was a smoker more 

than one pack per day for 55 years and quit 13 years ago.  She states she 

drinks a couple glasses of wine some days of the week.


Past Drug Use History: None Reported





- Past Family History


  ** Father


Family Medical History: Cancer


Additional Family Medical History / Comment(s): Father  at age 77yrs of 

cancer which pt believes may have started in his throat.





  ** Mother


Family Medical History: Dementia


Additional Family Medical History / Comment(s): Mother  at age 95yrs.  She 

was very healthy most of her life- she had dementia at the very end of her life.





  ** Brother(s)


Additional Family Medical History / Comment(s): Patient had 3 brothers and one 

is living and 93 years of age and [place] with history of Alzheimer's dementia, 

coronary artery disease, CHF, A. fib, COPD.  Patient has 2 brothers that have 

passed one from alcoholism and one from a traumatic head injury from a fall.





  ** Sister(s)


Additional Family Medical History / Comment(s): Patient has one sister that 

 of ovarian cancer.  Patient has one son that  from alcoholic 

complications.





Medications and Allergies


 Home Medications











 Medication  Instructions  Recorded  Confirmed  Type


 


Fluticasone/Salmeterol [Advair 1 puff INHALATION RT-BID 17 

History





250-50 Diskus]    


 


Amiodarone [Cordarone] 200 mg PO DAILY 17 History


 


Apixaban [Eliquis] 2.5 mg PO BID 17 History


 


Furosemide [Lasix] 40 mg PO DAILY 17 History


 


Tiotropium 18 Mcg/Puff [Spiriva] 1 cap INHALATION RT-DAILY 17 

History


 


Metoprolol Tartrate [Lopressor] 25 mg PO BID #60 tab 17 Rx


 


Cholecalciferol (Vitamin D3) 4,000 unit PO DAILY 18 History





[Vitamin D3]    


 


LORazepam [Ativan] 1 mg PO HS 18 History


 


Multivitamins, Thera [Multivitamin 1 tab PO DAILY 18 History





(formulary)]    











 Allergies











Allergy/AdvReac Type Severity Reaction Status Date / Time


 


amoxicillin [From Augmentin] Allergy  Unknown Verified 18 16:08


 


cefuroxime axetil Allergy  Unknown Verified 18 16:08





[From Ceftin]     


 


clavulanic acid Allergy  Unknown Verified 18 16:08





[From Augmentin]     














Physical Exam


Vitals: 


 Vital Signs











  Temp Pulse Pulse Resp BP BP BP


 


 18 07:14   80     


 


 18 07:04   80     


 


 18 07:00  97.2 F L   48 L  16   128/61 


 


 18 23:00  97.0 F L   54 L  18    111/55


 


 18 19:40  98.5 F   57 L  20    146/67


 


 18 18:16    57 L  16   


 


 18 18:08  96.8 F L   57 L  16    175/75


 


 18 17:41   61   18  188/75  


 


 18 16:55   55 L   18  156/71  


 


 18 16:26   55 L   18  145/65  


 


 18 15:31  98.1 F  60   20  141/76  














  Pulse Ox


 


 18 07:14 


 


 18 07:04 


 


 18 07:00  98


 


 18 23:00  98


 


 18 19:40  97


 


 18 18:16 


 


 18 18:08  99


 


 18 17:41  95


 


 18 16:55  95


 


 18 16:26  95


 


 18 15:31  99








 Intake and Output











 18





 22:59 06:59 14:59


 


Other:   


 


  Voiding Method  Toilet 


 


  # Voids 2 1 


 


  Weight 68.039 kg  

















Gen: This is an 83-year-old  female.  She is sitting up in bed and 

appears to be comfortable and in no acute distress.


HEENT: Head is atraumatic, normocephalic. Pupils equal, round. Sclerae is 

anicteric. 


NECK: Supple. No JVD. No lymphadenopathy. No thyromegaly. 


LUNGS: Clear to auscultation. No wheezes or rhonchi.  No intercostal 

retractions.


HEART: Irregular rate and rhythm.  Systolic murmur. 


ABDOMEN: Soft. Bowel sounds are present. No masses.  No tenderness.


EXTREMITIES: No pedal edema.  No calf tenderness.  Dorsalis pedis palpable 

bilaterally.


NEUROLOGICAL: Patient is awake, alert and oriented x3. Cranial nerves 2 through 

12 are grossly intact. 








Results


CBC & Chem 7: 


 18 07:37





 18 16:15


Labs: 


 Abnormal Lab Results - Last 24 Hours (Table)











  18 Range/Units





  16:15 16:15 16:15 


 


Lymphocytes #   0.7 L   (1.0-4.8)  k/uL


 


BUN    28 H  (7-17)  mg/dL


 


Creatinine    1.70 H  (0.52-1.04)  mg/dL


 


CK-MB (CK-2)  2.7 H*    (0.0-2.4)  ng/mL


 


Stool Occult Blood     (Negative)  














  18 Range/Units





  16:15 07:37 


 


Lymphocytes #   0.8 L  (1.0-4.8)  k/uL


 


BUN    (7-17)  mg/dL


 


Creatinine    (0.52-1.04)  mg/dL


 


CK-MB (CK-2)    (0.0-2.4)  ng/mL


 


Stool Occult Blood  Positive H   (Negative)  














Thrombosis Risk Factor Assmnt





- DVT/VTE Prophylaxis


DVT/VTE Prophylaxis: Mechanical Prophylaxis ordered





- Choose All That Apply


Each Factor Represents 1 point: Abnormal pulmonary function (COPD), Acute MI


Other Risk Factors: No


Other congenital or acquired thrombophilia - If yes, enter type in comment: No


Thrombosis Risk Factor Assessment Total Risk Factor Score: 2


Thrombosis Risk Factor Assessment Level: Low Risk





Assessment and Plan


Plan: 





1.  Acute GI bleed.  Consult with Dr EKATERINA arora.  Patient is scheduled 

for EGD tomorrow.  Eliquis is on hold.





2.  Acute kidney injury.  IV fluid 0.9 normal saline at 50 mL per hour.  Avoid 

nephrotoxic agents.  Repeat labs.





3.  Paroxysmal atrial fibrillation.  Continue Lopressor 25 mg twice daily, 

amiodarone 200 mg daily.  Eliquis on hold.





4.  History of nonischemic cardiomyopathy with chronic diastolic heart failure, 

stable without exacerbation.  Continue Lasix 40 mg daily and Lopressor.





5.  COPD without exacerbation.  Continue Atrovent and Symbicort.





6.  Chronic hypoxic respiratory failure on home O2.  Continue oxygen prn.





7.  History of coronary artery disease and follows with Dr. Pope.





8.  GI prophylaxis.  Protonix.





9.  DVT prophylaxis.  AURA hose and SCDs.





10. [  ].





Patient will be admitted to the hospital for a minimum of 2 night stay.


Discharge plan: Discharge home


Impression and plan of care have been directed as dictated by the signing 

physician.  Carol Hicks nurse practitioner acting as scribe for signing 

physician.

## 2018-02-10 VITALS — RESPIRATION RATE: 16 BRPM

## 2018-02-10 LAB
ANION GAP SERPL CALC-SCNC: 9 MMOL/L
BUN SERPL-SCNC: 18 MG/DL (ref 7–17)
CALCIUM SPEC-MCNC: 9.4 MG/DL (ref 8.4–10.2)
CHLORIDE SERPL-SCNC: 105 MMOL/L (ref 98–107)
CO2 SERPL-SCNC: 29 MMOL/L (ref 22–30)
ERYTHROCYTE [DISTWIDTH] IN BLOOD BY AUTOMATED COUNT: 3.9 M/UL (ref 3.8–5.4)
ERYTHROCYTE [DISTWIDTH] IN BLOOD: 12.8 % (ref 11.5–15.5)
GLUCOSE SERPL-MCNC: 87 MG/DL (ref 74–99)
HCT VFR BLD AUTO: 38.5 % (ref 34–46)
HGB BLD-MCNC: 12 GM/DL (ref 11.4–16)
MCH RBC QN AUTO: 30.6 PG (ref 25–35)
MCHC RBC AUTO-ENTMCNC: 31.1 G/DL (ref 31–37)
MCV RBC AUTO: 98.5 FL (ref 80–100)
PLATELET # BLD AUTO: 232 K/UL (ref 150–450)
POTASSIUM SERPL-SCNC: 4.4 MMOL/L (ref 3.5–5.1)
SODIUM SERPL-SCNC: 143 MMOL/L (ref 137–145)
WBC # BLD AUTO: 5.8 K/UL (ref 3.8–10.6)

## 2018-02-10 PROCEDURE — 0DB68ZX EXCISION OF STOMACH, VIA NATURAL OR ARTIFICIAL OPENING ENDOSCOPIC, DIAGNOSTIC: ICD-10-PCS

## 2018-02-10 RX ADMIN — THERA TABS SCH EACH: TAB at 11:15

## 2018-02-10 RX ADMIN — Medication SCH UNIT: at 11:15

## 2018-02-10 RX ADMIN — AMIODARONE HYDROCHLORIDE SCH MG: 200 TABLET ORAL at 08:23

## 2018-02-10 RX ADMIN — CEFAZOLIN SCH MLS/HR: 330 INJECTION, POWDER, FOR SOLUTION INTRAMUSCULAR; INTRAVENOUS at 06:13

## 2018-02-10 RX ADMIN — FUROSEMIDE SCH MG: 40 TABLET ORAL at 08:23

## 2018-02-10 RX ADMIN — BUDESONIDE AND FORMOTEROL FUMARATE DIHYDRATE SCH PUFF: 80; 4.5 AEROSOL RESPIRATORY (INHALATION) at 19:00

## 2018-02-10 RX ADMIN — METOPROLOL TARTRATE SCH MG: 25 TABLET, FILM COATED ORAL at 21:24

## 2018-02-10 RX ADMIN — IPRATROPIUM BROMIDE SCH MG: 0.5 SOLUTION RESPIRATORY (INHALATION) at 07:17

## 2018-02-10 RX ADMIN — IPRATROPIUM BROMIDE SCH MG: 0.5 SOLUTION RESPIRATORY (INHALATION) at 19:00

## 2018-02-10 RX ADMIN — IPRATROPIUM BROMIDE SCH MG: 0.5 SOLUTION RESPIRATORY (INHALATION) at 15:30

## 2018-02-10 RX ADMIN — APIXABAN SCH MG: 2.5 TABLET, FILM COATED ORAL at 21:24

## 2018-02-10 RX ADMIN — BUDESONIDE AND FORMOTEROL FUMARATE DIHYDRATE SCH PUFF: 80; 4.5 AEROSOL RESPIRATORY (INHALATION) at 07:17

## 2018-02-10 RX ADMIN — PANTOPRAZOLE SODIUM SCH MG: 40 INJECTION, POWDER, FOR SOLUTION INTRAVENOUS at 08:23

## 2018-02-10 RX ADMIN — IPRATROPIUM BROMIDE SCH MG: 0.5 SOLUTION RESPIRATORY (INHALATION) at 11:02

## 2018-02-10 RX ADMIN — METOPROLOL TARTRATE SCH MG: 25 TABLET, FILM COATED ORAL at 08:23

## 2018-02-10 RX ADMIN — METOPROLOL TARTRATE SCH: 25 TABLET, FILM COATED ORAL at 08:25

## 2018-02-10 NOTE — P.PCN
Date of Procedure: 02/10/18


Procedure(s) Performed: 


BRIEF HISTORY: Patient is a 83-year-old, pleasant, white female, scheduled for 

an upper endoscopy as a part of evaluation of black stools for the last 2 days' 

duration.  She has history of A. fib and has been on Eliquis procedure has been 

on hold for the last 2 days.  Her hemoglobin is stable at 12 g/dL.. 





PROCEDURE PERFORMED: Esophagogastroduodenoscopy with biopsy





PREOPERATIVE DIAGNOSIS: Melena of 2 days' duration. 





IV sedation per anesthesia. 





PROCEDURE: After informed consent was obtained, the patient  was brought into 

the endoscopy unit. IV sedation was administered by Anesthesia under continuous 

monitoring. Initially the Olympus GIF-140 video endoscope was inserted into the 

mouth. Esophagus intubated without any difficulty. It was gradually advanced 

into the stomach and duodenum and carefully examined. The bulb and the second 

part of the duodenum appeared normal. The scope at this time was withdrawn to 

the stomach, adequately insufflated with air, and upon careful examination, 

mucosa of the antrum, had scattered erosions and biopsies were done from this 

area.  The body, cardia and the fundus appeared normal. The scope was then 

withdrawn into the esophagus. The GE junction was located at 39 cm from the 

incisors. The esophagus appeared normal. There were no erosions or ulcerations 

seen and the patient tolerated the procedure well. 





IMPRESSION: 


1.  Antral erosive gastritis.


2.  No evidence of esophagitis or peptic ulcer disease.


3.  No evidence of active upper GI bleed.





RECOMMENDATIONS: The findings of this examination were discussed with the 

patient.  Her diet will be advanced as tolerated.  She will continue on 

Protonix 40 mg daily.  Anticoagulation can be resumed today.

## 2018-02-10 NOTE — P.PN
Subjective


Progress Note Date: 02/10/18





This is an 83-year-old  female patient of Dr. Jimenes, Dr. Pope and Dr Portillo with past medical history for paroxysmal atrial fibrillation 

maintained on eliquis, Lopressor and amiodarone, nonischemic cardiomyopathy, 

chronic systolic heart failure, COPD with FEV1 of 47%, chronic cor pulmonale 

secondary to pulmonary hypertension, chronic hypoxic respiratory failure on 

home O2 her last heart catheterization was with Dr. Pope in January 2017 that 

showed mild to moderate obstructive disease in the LAD.  EF 20 to 25% in 

January 2017 and repeat in July 2017 was 55-60%.  A shunt was admitted last 

July for black stools and at that time was seen by Dr. Christianson but because 

bleeding.  There was no endoscopy done.  Patient states she had black stools a 

little amount 2 yesterday.  She denies any abdominal pain, nausea, vomiting, 

diarrhea.  She denies any lightheadedness, chest pain or shortness of breath.








Patient presented to [facility] emergency center for 

evaluation. Her vital signs were stable.  Occult blood was positive.  

Hemoglobin 12.8.  BUN 28 and creatinine 1.7.  Eliquis was held and patient 

admitted to the [facility] floor and consult requested with Dr. EKATERINA Mar and 

patient has EGD today that showed gastritis and we will start her on protonix 

40 mg orally daily and will resume her Eliquis and will keep her tonight as to 

recheck her cbc in AM if stable will discharge home.





Objective





- Vital Signs


Vital signs: 


 Vital Signs











Temp  97.2 F L  02/10/18 07:32


 


Pulse  58 L  02/10/18 07:32


 


Resp  18   02/10/18 07:32


 


BP  132/64   02/10/18 07:32


 


Pulse Ox  94 L  02/10/18 07:32








 Intake & Output











 02/09/18 02/10/18 02/10/18





 18:59 06:59 18:59


 


Intake Total 2234  


 


Balance 2234  


 


Weight 68.039 kg 74.5 kg 


 


Intake:   


 


  Oral 2234  


 


Other:   


 


  # Voids 1 2 














- Constitutional


General appearance: Present: no acute distress





- EENT


Eyes: Present: anicteric sclerae, EOMI, PERRLA, dentition normal, normal 

appearance.  Absent: ptosis, scleral icterus


ENT: Present: hearing grossly normal, NA/AT, normal oropharynx.  Absent: thrush


Ears: bilateral: normal





- Neck


Neck: Present: normal ROM.  Absent: lymphadenopathy, rigidity, stridor, 

thyromegaly


Carotids: bilateral: upstroke normal


Thyroid: bilateral: normal size





- Respiratory


Respiratory: bilateral: diminished, negative: dullness, rales, rhonchi, wheezing

, prolonged expiration, prolonged inspiration





- Cardiovascular


Rhythm: regular


Heart sounds: normal: S1, S2


Abnormal Heart Sounds: Present: systolic murmur.  Absent: S3 Gallop, S4 Gallop





- Gastrointestinal


General gastrointestinal: Present: normal bowel sounds, soft.  Absent: 

splenomegaly, tenderness, umbilical hernia, ventral hernia





- Integumentary


Integumentary: Present: normal, normal turgor





- Neurologic


Neurologic: Present: CNII-XII intact





- Musculoskeletal


Musculoskeletal: Present: gait normal, strength equal bilaterally





- Psychiatric


Psychiatric: Present: A&O x's 3, appropriate affect, intact judgment & insight





- Labs


CBC & Chem 7: 


 02/10/18 07:28





 02/10/18 07:28


Labs: 


 Abnormal Lab Results - Last 24 Hours (Table)











  02/09/18 Range/Units





  07:37 


 


Lymphocytes #  0.8 L  (1.0-4.8)  k/uL














Assessment and Plan


Plan: 





Assessment and Plan


Plan: 





1.  Acute GI bleed post EGD that showed mild gastritis. we will continue with 

Protonix 40 mg po daily and we will resume Eliquis.





2.  Acute kidney injury.  IV fluid 0.9 normal saline at 50 mL per hour.  Avoid 

nephrotoxic agents.  Repeat labs.





3.  Paroxysmal atrial fibrillation.  Continue Lopressor 25 mg twice daily, 

amiodarone 200 mg daily. resume Eliquis.





4.  History of nonischemic cardiomyopathy with chronic diastolic heart failure, 

stable without exacerbation.  Continue Lasix 40 mg daily and Lopressor.





5.  COPD without exacerbation.  Continue Atrovent and Symbicort.





6.  Chronic hypoxic respiratory failure on home O2.  Continue oxygen prn.





7.  History of coronary artery disease and follows with Dr. Pope.





8.  GI prophylaxis.  Protonix.





9.  DVT prophylaxis.  AURA hose and SCDs.





10. Home in AM.

## 2018-02-11 VITALS — HEART RATE: 55 BPM

## 2018-02-11 VITALS — DIASTOLIC BLOOD PRESSURE: 63 MMHG | TEMPERATURE: 96.8 F | SYSTOLIC BLOOD PRESSURE: 137 MMHG

## 2018-02-11 LAB
ALBUMIN SERPL-MCNC: 4.1 G/DL (ref 3.5–5)
ALP SERPL-CCNC: 74 U/L (ref 38–126)
ALT SERPL-CCNC: 27 U/L (ref 9–52)
ANION GAP SERPL CALC-SCNC: 12 MMOL/L
AST SERPL-CCNC: 31 U/L (ref 14–36)
BASOPHILS # BLD AUTO: 0 K/UL (ref 0–0.2)
BASOPHILS NFR BLD AUTO: 1 %
BUN SERPL-SCNC: 20 MG/DL (ref 7–17)
CALCIUM SPEC-MCNC: 9.9 MG/DL (ref 8.4–10.2)
CHLORIDE SERPL-SCNC: 105 MMOL/L (ref 98–107)
CO2 SERPL-SCNC: 28 MMOL/L (ref 22–30)
EOSINOPHIL # BLD AUTO: 0.2 K/UL (ref 0–0.7)
EOSINOPHIL NFR BLD AUTO: 3 %
ERYTHROCYTE [DISTWIDTH] IN BLOOD BY AUTOMATED COUNT: 3.81 M/UL (ref 3.8–5.4)
ERYTHROCYTE [DISTWIDTH] IN BLOOD: 12.7 % (ref 11.5–15.5)
GLUCOSE SERPL-MCNC: 121 MG/DL (ref 74–99)
HCT VFR BLD AUTO: 38.5 % (ref 34–46)
HGB BLD-MCNC: 11.9 GM/DL (ref 11.4–16)
LYMPHOCYTES # SPEC AUTO: 0.6 K/UL (ref 1–4.8)
LYMPHOCYTES NFR SPEC AUTO: 9 %
MCH RBC QN AUTO: 31.1 PG (ref 25–35)
MCHC RBC AUTO-ENTMCNC: 30.8 G/DL (ref 31–37)
MCV RBC AUTO: 101.2 FL (ref 80–100)
MONOCYTES # BLD AUTO: 0.3 K/UL (ref 0–1)
MONOCYTES NFR BLD AUTO: 5 %
NEUTROPHILS # BLD AUTO: 5.4 K/UL (ref 1.3–7.7)
NEUTROPHILS NFR BLD AUTO: 81 %
PLATELET # BLD AUTO: 218 K/UL (ref 150–450)
POTASSIUM SERPL-SCNC: 4.4 MMOL/L (ref 3.5–5.1)
PROT SERPL-MCNC: 6.6 G/DL (ref 6.3–8.2)
SODIUM SERPL-SCNC: 145 MMOL/L (ref 137–145)
WBC # BLD AUTO: 6.7 K/UL (ref 3.8–10.6)

## 2018-02-11 RX ADMIN — THERA TABS SCH EACH: TAB at 08:17

## 2018-02-11 RX ADMIN — Medication SCH UNIT: at 08:17

## 2018-02-11 RX ADMIN — APIXABAN SCH MG: 2.5 TABLET, FILM COATED ORAL at 08:17

## 2018-02-11 RX ADMIN — AMIODARONE HYDROCHLORIDE SCH MG: 200 TABLET ORAL at 08:17

## 2018-02-11 RX ADMIN — BUDESONIDE AND FORMOTEROL FUMARATE DIHYDRATE SCH PUFF: 80; 4.5 AEROSOL RESPIRATORY (INHALATION) at 07:05

## 2018-02-11 RX ADMIN — IPRATROPIUM BROMIDE SCH MG: 0.5 SOLUTION RESPIRATORY (INHALATION) at 07:05

## 2018-02-11 RX ADMIN — FUROSEMIDE SCH MG: 40 TABLET ORAL at 08:17

## 2018-02-11 RX ADMIN — METOPROLOL TARTRATE SCH MG: 25 TABLET, FILM COATED ORAL at 08:17

## 2018-02-11 RX ADMIN — IPRATROPIUM BROMIDE SCH MG: 0.5 SOLUTION RESPIRATORY (INHALATION) at 10:56

## 2018-02-11 RX ADMIN — CEFAZOLIN SCH MLS/HR: 330 INJECTION, POWDER, FOR SOLUTION INTRAMUSCULAR; INTRAVENOUS at 05:57

## 2018-02-11 RX ADMIN — PANTOPRAZOLE SODIUM SCH MG: 40 INJECTION, POWDER, FOR SOLUTION INTRAVENOUS at 08:16

## 2018-02-11 NOTE — P.DS
Providers


Date of admission: 


02/08/18 17:01





Attending physician: 


Lenin Dailey





Consults: 





 





02/08/18 17:01


Consult Physician Urgent 


   Consulting Provider: Erich Christianson


   Consult Reason/Comments: GI bleed


   Do you want consulting provider notified?: Yes











Primary care physician: 


Timi Jimenes





[facility] Hospital Course: 





This is an 83-year-old  female patient of Dr. Jimenes, Dr. Pope and Dr Portillo with past medical history for paroxysmal atrial fibrillation 

maintained on eliquis, Lopressor and amiodarone, nonischemic cardiomyopathy, 

chronic systolic heart failure, COPD with FEV1 of 47%, chronic cor pulmonale 

secondary to pulmonary hypertension, chronic hypoxic respiratory failure on 

home O2 her last heart catheterization was with Dr. Pope in January 2017 that 

showed mild to moderate obstructive disease in the LAD.  EF 20 to 25% in 

January 2017 and repeat in July 2017 was 55-60%.  A shunt was admitted last 

July for black stools and at that time was seen by Dr. Christianson but because 

bleeding.  There was no endoscopy done.  Patient states she had black stools a 

little amount 2 yesterday.  She denies any abdominal pain, nausea, vomiting, 

diarrhea.  She denies any lightheadedness, chest pain or shortness of breath.








Patient presented to [facility] emergency center for 

evaluation. Her vital signs were stable.  Occult blood was positive.  

Hemoglobin 12.8.  BUN 28 and creatinine 1.7.  Eliquis was held and patient 

[facility] and consult requested with Dr. EKATERINA Mar and 

patient has EGD today that showed gastritis and we will start her on protonix 

40 mg orally daily and will resume her Eliquis and will keep her tonight as to 

recheck her cbc in AM if stable will discharge home.





2/11: HGB is stable and the patient will be discharged home and follow up with 

 as outpatient in one week.








Discharge diagnoses:








1.  Acute GI bleed post EGD that showed mild gastritis. 





2.  Acute kidney injury.  





3.  Paroxysmal atrial fibrillation.  





4.  History of nonischemic cardiomyopathy with chronic diastolic heart failure.





5.  COPD without exacerbation.  





6.  Chronic hypoxic respiratory failure on home O2.  





7.  History of coronary artery disease


Patient Condition at Discharge: Good





Plan - Discharge Summary


Discharge Rx Participant: No


New Discharge Prescriptions: 


Continue


   Fluticasone/Salmeterol [Advair 250-50 Diskus] 1 puff INHALATION RT-BID


   Apixaban [Eliquis] 2.5 mg PO BID


   Amiodarone [Cordarone] 200 mg PO DAILY


   Furosemide [Lasix] 40 mg PO DAILY


   Tiotropium 18 Mcg/Puff [Spiriva] 1 cap INHALATION RT-DAILY


   Metoprolol Tartrate [Lopressor] 25 mg PO BID #60 tab


   Multivitamins, Thera [Multivitamin (formulary)] 1 tab PO DAILY


   Cholecalciferol (Vitamin D3) [Vitamin D3] 4,000 unit PO DAILY


   LORazepam [Ativan] 1 mg PO HS


Discharge Medication List





Fluticasone/Salmeterol [Advair 250-50 Diskus] 1 puff INHALATION RT-BID 01/07/17 

[History]


Amiodarone [Cordarone] 200 mg PO DAILY 04/17/17 [History]


Apixaban [Eliquis] 2.5 mg PO BID 04/17/17 [History]


Furosemide [Lasix] 40 mg PO DAILY 06/03/17 [History]


Tiotropium 18 Mcg/Puff [Spiriva] 1 cap INHALATION RT-DAILY 06/03/17 [History]


Metoprolol Tartrate [Lopressor] 25 mg PO BID #60 tab 07/09/17 [Rx]


Cholecalciferol (Vitamin D3) [Vitamin D3] 4,000 unit PO DAILY 02/08/18 [History]


LORazepam [Ativan] 1 mg PO HS 02/08/18 [History]


Multivitamins, Thera [Multivitamin (formulary)] 1 tab PO DAILY 02/08/18 [History

]








Follow up Appointment(s)/Referral(s): 


Timi Jimenes MD [Primary Care Provider] - 1-2 days


Patient Instructions/Handouts:  Gastrointestinal Bleeding (DC)


Discharge Disposition: HOME SELF-CARE

## 2018-02-11 NOTE — PN
PROGRESS NOTE



DATE OF SERVICE:

02/11/18.



REQUESTING PHYSICIAN:

Dr. Dailey.



The patient is an 83 -year-old pleasant white female admitted to hospital with black

tarry stools of 2 days duration.  She underwent an upper endoscopy yesterday that

showed mild antral erosive gastritis.  The patient is doing well.  Eliquis on hold.  No

further bleeding.  _____Hemodynamically stable.



PHYSICAL EXAMINATION:

Appears comfortable, no apparent distress.  VITAL SIGNS:  Stable.  Blood pressure is

137/63, pulse rate 54.  Temperature 96.8.  HEENT examination unremarkable. Conjunctivae

pink.  Sclerae anicteric.  Oral cavity no lesions.  Neck no jugular venous distention

or lymph node enlargement.  Chest was clear to auscultation.  HEART:  Regular rate and

rhythm.

ABDOMEN:  Soft.  Bowel sounds are positive.  No organomegaly extremities no pedal

edema.  Skin no rashes.  NEUROLOGIC:  Alert and oriented x3.  No focal deficits.



LAB:

Hemoglobin 12 today, WBC 5.8, platelets  normal.



IMPRESSION:

1. Acute upper gastrointestinal bleed, status post upper endoscopy yesterday that

    showed erosive gastritis.  No active bleeding.  Eliquis is on hold.

2. Chronic atrial fibrillation.



RECOMMENDATIONS:

1. Continue Protonix 40 mg daily.

2. Resume anticoagulation.

3. Regular diet.

4. She can be discharged home today.

Thank you for this consultation.





MMODL / IJN: [id number] / Job#: [id number]

## 2018-02-15 NOTE — CDI
Last Revision, December 2017

                Documentation Clarification Form



Date: 2/15/2018 1:02:00 PM

From: Madeleine Mata-

Phone: If you have questions, contact Candis Booth, Manager at [phone].

MRN: [id number]

Admit Date: 2/8/2018 

Patient Name: Rosana Valenzuela

Visit Number: [id number]

Discharge Date: 2/11/18



ATTENTION: The Clinical Documentation Specialists (CDI) and [facility] Coding Staff 
appreciate your assistance in clarifying documentation. Please respond to the 
clarification below the line at the bottom and electronically sign. The CDI & 
[facility] Coding staff will review the response and follow-up if needed. Please note: 
Queries are made part of the Legal Health Record. If you have any questions, 
please contact the author of this message via ITS.



Dr. Verenice Mar,



Pt is admitted with GI bleed with melena.

  

EGD performed with findings of Antral erosive gastritis 



Consult:   Most likely dealing with an upper gastrointestinal source of 
bleeding. 

Acute upper gastrointestinal bleed, status post upper endoscopy that 

showed erosive gastritis. No active bleeding. 



In your professional opinion, can you please clarify the underlying cause of GI 
bleed, if known? 



Antral erosive gastritis with bleed

Antral erosive gastritis without bleed

Other, please specify ___________

Unable to determine

___________________________________________________________________
MTDD

## 2018-05-15 ENCOUNTER — HOSPITAL ENCOUNTER (OUTPATIENT)
Dept: HOSPITAL 47 - ORWHC2ENDO | Age: 83
Discharge: HOME | End: 2018-05-15
Payer: MEDICARE

## 2018-05-15 VITALS — RESPIRATION RATE: 16 BRPM | TEMPERATURE: 98.2 F

## 2018-05-15 VITALS — DIASTOLIC BLOOD PRESSURE: 67 MMHG | HEART RATE: 52 BPM | SYSTOLIC BLOOD PRESSURE: 137 MMHG

## 2018-05-15 VITALS — BODY MASS INDEX: 23.2 KG/M2

## 2018-05-15 DIAGNOSIS — J44.9: ICD-10-CM

## 2018-05-15 DIAGNOSIS — Z88.1: ICD-10-CM

## 2018-05-15 DIAGNOSIS — Z79.01: ICD-10-CM

## 2018-05-15 DIAGNOSIS — N19: ICD-10-CM

## 2018-05-15 DIAGNOSIS — Z79.51: ICD-10-CM

## 2018-05-15 DIAGNOSIS — I25.10: ICD-10-CM

## 2018-05-15 DIAGNOSIS — I48.91: ICD-10-CM

## 2018-05-15 DIAGNOSIS — E78.5: ICD-10-CM

## 2018-05-15 DIAGNOSIS — I11.0: ICD-10-CM

## 2018-05-15 DIAGNOSIS — Z86.19: ICD-10-CM

## 2018-05-15 DIAGNOSIS — I50.9: ICD-10-CM

## 2018-05-15 DIAGNOSIS — Z88.0: ICD-10-CM

## 2018-05-15 DIAGNOSIS — K52.832: ICD-10-CM

## 2018-05-15 DIAGNOSIS — Z79.899: ICD-10-CM

## 2018-05-15 DIAGNOSIS — K57.30: Primary | ICD-10-CM

## 2018-05-15 DIAGNOSIS — M19.90: ICD-10-CM

## 2018-05-15 PROCEDURE — 88305 TISSUE EXAM BY PATHOLOGIST: CPT

## 2018-05-15 PROCEDURE — 45380 COLONOSCOPY AND BIOPSY: CPT

## 2018-05-15 NOTE — P.PCN
Date of Procedure: 05/15/18


Procedure(s) Performed: 


Procedure: Colonoscopy and biopsy.





Preoperative diagnosis: Change in bowel habits.





Postoperative diagnosis: 1. Sigmoid diverticulosis with no evidence of acute 

diverticulitis or strictures.  2. Exam of the colon and terminal ileum is, 

otherwise, within normal limits.  3. Biopsies obtained from the terminal ileum 

and right colon.





Preparation: HalfLytely prep.





Sedation: Was provided by anesthesia.





Brief clinical history: The patient is an 84-year-old female with history of C. 

difficile colitis, was recently evaluated because of change in bowel habits 

with episodes of diarrhea.  Her last colonoscopy was in 2013.  This evaluation 

is to assess for inflammatory, infectious conditions or neoplasia.





Procedure: With the patient on her left lateral decubitus position and after 

informed consent and adequate sedation, the perianal area was inspected and it 

did not show any fissures or fistulas.  There were no masses felt on digital 

rectal examination.  The Olympus CFQ 160L video colonoscope was then inserted 

in the rectum in the usual fashion and advanced to the cecum.  I intubated the 

ileocecal valve and examined the terminal ileum.  There were several 

diverticular orifices seen scattered in the sigmoid but I saw no evidence of 

acute diverticulitis or strictures.  The mucosa of the colon and terminal ileum 

appeared healthy with no edema, erythema, friability, ulceration, exudation or 

spontaneous bleeding.  No polyps or tumors were seen or any other pathology.  I 

obtained biopsies from the terminal ileum and right colon then I retroflexed 

the endoscope in the rectum before the endoscope was withdrawn.





The patient tolerated the procedure well.





Plan: The patient was reassured.  Discussed dietary measures and symptomatic 

treatment.  We will await pathology results and make further plans based on her 

course and biopsy results.  She will follow-up with you as planned and I will 

keep you updated on her progress.

## 2020-06-29 ENCOUNTER — HOSPITAL ENCOUNTER (EMERGENCY)
Dept: HOSPITAL 47 - EC | Age: 85
Discharge: HOME | End: 2020-06-29
Payer: MEDICARE

## 2020-06-29 VITALS
SYSTOLIC BLOOD PRESSURE: 138 MMHG | TEMPERATURE: 98.7 F | DIASTOLIC BLOOD PRESSURE: 74 MMHG | RESPIRATION RATE: 18 BRPM | HEART RATE: 85 BPM

## 2020-06-29 DIAGNOSIS — I49.9: Primary | ICD-10-CM

## 2020-06-29 DIAGNOSIS — F41.9: ICD-10-CM

## 2020-06-29 DIAGNOSIS — M19.90: ICD-10-CM

## 2020-06-29 DIAGNOSIS — I11.9: ICD-10-CM

## 2020-06-29 DIAGNOSIS — Z79.899: ICD-10-CM

## 2020-06-29 DIAGNOSIS — I48.91: ICD-10-CM

## 2020-06-29 DIAGNOSIS — Z88.1: ICD-10-CM

## 2020-06-29 DIAGNOSIS — Z79.01: ICD-10-CM

## 2020-06-29 DIAGNOSIS — I25.10: ICD-10-CM

## 2020-06-29 DIAGNOSIS — I25.2: ICD-10-CM

## 2020-06-29 DIAGNOSIS — E78.5: ICD-10-CM

## 2020-06-29 DIAGNOSIS — Z95.5: ICD-10-CM

## 2020-06-29 DIAGNOSIS — Z88.0: ICD-10-CM

## 2020-06-29 DIAGNOSIS — Z79.51: ICD-10-CM

## 2020-06-29 DIAGNOSIS — Z87.891: ICD-10-CM

## 2020-06-29 DIAGNOSIS — J44.9: ICD-10-CM

## 2020-06-29 DIAGNOSIS — I50.9: ICD-10-CM

## 2020-06-29 PROCEDURE — 99284 EMERGENCY DEPT VISIT MOD MDM: CPT

## 2020-06-29 PROCEDURE — 93005 ELECTROCARDIOGRAM TRACING: CPT

## 2020-06-29 NOTE — ED
General Adult HPI





- General


Chief complaint: Arrhythmia/Palpitations


Stated complaint: hypertension


Time Seen by Provider: 20 10:44


Source: patient


Mode of arrival: wheelchair


Limitations: no limitations





- History of Present Illness


Initial comments: 


Dictation was produced using dragon dictation software. please excuse any 

grammatical, word or spelling errors. 





This patient was cared for during a federal and state declared state of 

emergency secondary to Covid 19





Chief Complaint: 86-year-old female past medical history of anxiety, A. fib 

heart failure presents with elevated blood pressure and heart rate at home





History of Present Illness: This 86-year-old female she presents today because 

she checked her blood pressure and her heart rate at home.  To be elevated.  

This was read after patient took her daily medications.  Patient has a history 

of atrial fibrillation.  She is on an anticoagulation medication.  Patient was 

immediately worried about the number that was measured on her automatic blood 

pressure cuff.  She states that the systolic measured be 180.  She is here today

because she doesn't need to have a stroke.  Patient states that she checked her 

blood pressure today routinely.  She was not having any symptoms.  She currently

denies any symptoms at this time.  She currently takes apixiban.








The ROS documented in this emergency department record has been reviewed and 

confirmed by me.  Those systems with pertinent positive or negative responses 

have been documented in the HPI.  All other systems are other negative and/or 

noncontributory.








PHYSICAL EXAM:


General Impression: Alert and oriented x3, not in acute distress


HEENT: Normocephalic atraumatic, extra-ocular movements intact, pupils equal and

reactive to light bilaterally, mucous membranes moist.


Cardiovascular: Heart regular rate and rhythm


Chest: Able to complete full sentences, no retractions, no tachypnea


Abdomen: abdomen soft, non-tender, non-distended, no organomegaly


Musculoskeletal: Pulses present and equal in all extremities, no peripheral 

edema


Motor:  no focal deficits noted


Neurological: CN II-XII grossly intact, no focal motor or sensory deficits noted


Skin: Intact with no visualized rashes


Psych: Normal affect and mood





ED course: 86-year-old female presents with elevated blood pressure and heart 

rate at home vital signs upon arrival are within acceptable limits.  Her heart 

rates control 85, blood pressures 138/74.  Patient does not have any strokelike 

symptoms.  She does not have any medical complaints.  EKG is benign.  Patient 

does not have any neuro symptoms.  Physical exam is completely benign.  

Reassurance provided.  Patient told to return to the emergency department or 

seek immediate medical attention if she develops a symmetrical numbness and 

weakness, aphasia or dysarthria.  Patient will be discharged per she is advised 

follow-up with her primary care physician.








EKG interpretation: Ventricular rate 94, A. fib, , . No IL 

prolongation, no QTC prolongation, no ST or T-wave changes noted.   Overall, 

this EKG is unremarkable











- Related Data


                                Home Medications











 Medication  Instructions  Recorded  Confirmed


 


Fluticasone/Salmeterol [Advair 1 puff INHALATION RT-BID 01/07/17 05/15/18





250-50 Diskus]   


 


Amiodarone [Cordarone] 200 mg PO DAILY 04/17/17 05/15/18


 


Apixaban [Eliquis] 2.5 mg PO BID 04/17/17 05/15/18


 


Furosemide [Lasix] 40 mg PO BID 06/03/17 05/15/18


 


Tiotropium 18 Mcg/Puff [Spiriva] 1 cap INHALATION RT-DAILY 06/03/17 05/15/18


 


Cholecalciferol (Vitamin D3) 4,000 unit PO DAILY 02/08/18 05/15/18





[Vitamin D3]   


 


LORazepam [Ativan] 1 mg PO HS 02/08/18 05/15/18


 


Multivitamins, Thera [Multivitamin 1 tab PO DAILY 02/08/18 05/15/18





(formulary)]   


 


Acetaminophen [Tylenol Extra 500 mg PO DAILY PRN 05/11/18 05/15/18





Strength]   


 


Ferrous Sulfate [Feosol] 325 mg PO DAILY 05/11/18 05/15/18








                                  Previous Rx's











 Medication  Instructions  Recorded


 


Metoprolol Tartrate [Lopressor] 25 mg PO BID #60 tab 17











                                    Allergies











Allergy/AdvReac Type Severity Reaction Status Date / Time


 


amoxicillin [From Augmentin] Allergy  Unknown Verified 20 10:41


 


cefuroxime axetil Allergy  Unknown Verified 20 10:41





[From Ceftin]     


 


clavulanic acid Allergy  Unknown Verified 20 10:41





[From Augmentin]     














Review of Systems


ROS Statement: 


Those systems with pertinent positive or pertinent negative responses have been 

documented in the HPI.





ROS Other: All systems not noted in ROS Statement are negative.





Past Medical History


Past Medical History: Atrial Fibrillation, Coronary Artery Disease (CAD), Heart 

Failure, COPD, GERD/Reflux, GI Bleed, Hearing Disorder / Deafness, 

Hyperlipidemia, Hypertension, Myocardial Infarction (MI), Osteoarthritis (OA), 

Pneumonia, Renal Disease, Respiratory Disorder


Additional Past Medical History / Comment(s): HEARING AIDS., CHF , HX OF C-DIFF 

("A FEW YRS AGO")., DIVERTICULOSIS, BRONCHITIS,  02 @ 2 LITERS N/C AT NIGHT, 

ROSACEA., STATES DIARRHEA FOR 7 WEEKS-STOOLS DARK., STATES CHECKING FOR C-DIFF.


Last Myocardial Infarction Date:: unsure of date


History of Any Multi-Drug Resistant Organisms: None Reported


Past Surgical History: Adenoidectomy, Appendectomy, Heart Catheterization, 

Hysterectomy, Orthopedic Surgery, Tonsillectomy


Additional Past Surgical History / Comment(s): Right knee arthroscopy for torn 

meniscus. Colonoscopies ,  Bilateral eyelid surgery. PARTIAL HYSTERECTOMY STILL 

HAS PART OF LT OVARY, EGD.


Past Anesthesia/Blood Transfusion Reactions: No Reported Reaction


Additional Past Anesthesia/Blood Transfusion Reaction / Comment(s): .


Past Psychological History: Anxiety


Smoking Status: Former smoker


Past Alcohol Use History: Daily


Past Drug Use History: None Reported





- Past Family History


  ** Brother(s)


Additional Family Medical History / Comment(s): Patient had 3 brothers and one 

is living and 93 years of age and [place] with history of Alzheimer's dementia, 

coronary artery disease, CHF, A. fib, COPD.  Patient has 2 brothers that have 

passed one from alcoholism and one from a traumatic head injury from a fall.





  ** Sister(s)


Additional Family Medical History / Comment(s): Patient has one sister that 

of ovarian cancer.  Patient has one son that  from alcoholic complications.





  ** Father


Family Medical History: Cancer


Additional Family Medical History / Comment(s): Father  at age 77yrs of 

cancer which pt believes may have started in his throat.





  ** Mother


Family Medical History: Dementia


Additional Family Medical History / Comment(s): Mother  at age 95yrs.  She 

was very healthy most of her life- she had dementia at the very end of her life.





General Exam


Limitations: no limitations





Course





                                   Vital Signs











  20





  10:36


 


Temperature 98.7 F


 


Pulse Rate 85


 


Respiratory 18





Rate 


 


Blood Pressure 138/74


 


O2 Sat by Pulse 97





Oximetry 














Disposition


Clinical Impression: 


 Dysrhythmia, Hypertension





Disposition: HOME SELF-CARE


Condition: Good


Instructions (If sedation given, give patient instructions):  Heart Palpitations

(ED)


Is patient prescribed a controlled substance at d/c from ED?: No


Referrals: 


Lenin Dailey MD [Primary Care Provider] - 1-2 days


Time of Disposition: 11:07

## 2020-12-17 ENCOUNTER — HOSPITAL ENCOUNTER (EMERGENCY)
Dept: HOSPITAL 47 - EC | Age: 85
Discharge: HOME | End: 2020-12-17
Payer: MEDICARE

## 2020-12-17 VITALS
HEART RATE: 78 BPM | TEMPERATURE: 97.9 F | RESPIRATION RATE: 18 BRPM | SYSTOLIC BLOOD PRESSURE: 133 MMHG | DIASTOLIC BLOOD PRESSURE: 72 MMHG

## 2020-12-17 DIAGNOSIS — Z79.899: ICD-10-CM

## 2020-12-17 DIAGNOSIS — F41.9: ICD-10-CM

## 2020-12-17 DIAGNOSIS — Z87.891: ICD-10-CM

## 2020-12-17 DIAGNOSIS — J44.9: ICD-10-CM

## 2020-12-17 DIAGNOSIS — Z90.49: ICD-10-CM

## 2020-12-17 DIAGNOSIS — Z88.0: ICD-10-CM

## 2020-12-17 DIAGNOSIS — E78.5: ICD-10-CM

## 2020-12-17 DIAGNOSIS — Z95.5: ICD-10-CM

## 2020-12-17 DIAGNOSIS — I48.91: ICD-10-CM

## 2020-12-17 DIAGNOSIS — I10: ICD-10-CM

## 2020-12-17 DIAGNOSIS — M54.16: Primary | ICD-10-CM

## 2020-12-17 DIAGNOSIS — Z90.710: ICD-10-CM

## 2020-12-17 DIAGNOSIS — Z88.1: ICD-10-CM

## 2020-12-17 DIAGNOSIS — I25.2: ICD-10-CM

## 2020-12-17 DIAGNOSIS — Z79.51: ICD-10-CM

## 2020-12-17 PROCEDURE — 72110 X-RAY EXAM L-2 SPINE 4/>VWS: CPT

## 2020-12-17 PROCEDURE — 96372 THER/PROPH/DIAG INJ SC/IM: CPT

## 2020-12-17 PROCEDURE — 73502 X-RAY EXAM HIP UNI 2-3 VIEWS: CPT

## 2020-12-17 PROCEDURE — 99283 EMERGENCY DEPT VISIT LOW MDM: CPT

## 2020-12-17 NOTE — ED
Extremity Problem HPI





- General


Chief complaint: Extremity Problem,Nontraumatic


Stated complaint: R hip & leg pain


Time Seen by Provider: 20 11:40


Source: patient, RN notes reviewed


Mode of arrival: wheelchair


Limitations: no limitations





- History of Present Illness


Initial comments: 





This a 86 -year-old female presents emergency Department chief complaint of 

right hip, leg pain.  Patient states started a few days ago.  Patient was seen 

by PCP on Tuesday states that she did not mention the symptoms.  She felt better

yesterday but woke up with worsening pain today.  This pain areas down her leg. 

She has no discoloration currently other than her normal bruising which she 

states is from blood thinners.  Patient states that she has increased pain with 

movement and walking.  Patient denies any trauma.  Patient denies any bowel, 

bladder incontinence or retention.  Denies any saddle anesthesias or lower 

shunted paresthesias.  Patient states that she is still able to ambulate states 

it is painful.  She does not use any walking devices.





- Related Data


                                Home Medications











 Medication  Instructions  Recorded  Confirmed


 


Fluticasone/Salmeterol [Advair 1 puff INHALATION RT-BID 01/07/17 05/15/18





250-50 Diskus]   


 


Amiodarone [Cordarone] 200 mg PO DAILY 04/17/17 05/15/18


 


Apixaban [Eliquis] 2.5 mg PO BID 04/17/17 05/15/18


 


Furosemide [Lasix] 40 mg PO BID 06/03/17 05/15/18


 


Tiotropium 18 Mcg/Puff [Spiriva] 1 cap INHALATION RT-DAILY 06/03/17 05/15/18


 


Cholecalciferol (Vitamin D3) 4,000 unit PO DAILY 02/08/18 05/15/18





[Vitamin D3]   


 


LORazepam [Ativan] 1 mg PO HS 02/08/18 05/15/18


 


Multivitamins, Thera [Multivitamin 1 tab PO DAILY 02/08/18 05/15/18





(formulary)]   


 


Acetaminophen [Tylenol Extra 500 mg PO DAILY PRN 05/11/18 05/15/18





Strength]   


 


Ferrous Sulfate [Feosol] 325 mg PO DAILY 05/11/18 05/15/18








                                  Previous Rx's











 Medication  Instructions  Recorded


 


Metoprolol Tartrate [Lopressor] 25 mg PO BID #60 tab 17


 


predniSONE 50 mg PO DAILY #4 tab 20











                                    Allergies











Allergy/AdvReac Type Severity Reaction Status Date / Time


 


amoxicillin [From Augmentin] Allergy  Unknown Verified 20 11:31


 


cefuroxime axetil Allergy  Unknown Verified 20 11:31





[From Ceftin]     


 


clavulanic acid Allergy  Unknown Verified 20 11:31





[From Augmentin]     














Review of Systems


ROS Statement: 


Those systems with pertinent positive or pertinent negative responses have been 

documented in the HPI.





ROS Other: All systems not noted in ROS Statement are negative.





Past Medical History


Past Medical History: Atrial Fibrillation, Coronary Artery Disease (CAD), Heart 

Failure, COPD, GERD/Reflux, GI Bleed, Hearing Disorder / Deafness, 

Hyperlipidemia, Hypertension, Myocardial Infarction (MI), Osteoarthritis (OA), 

Pneumonia, Renal Disease, Respiratory Disorder


Additional Past Medical History / Comment(s): HEARING AIDS., CHF , HX OF C-DIFF 

("A FEW YRS AGO")., DIVERTICULOSIS, BRONCHITIS,  02 @ 2 LITERS N/C AT NIGHT, 

ROSACEA., STATES DIARRHEA FOR 7 WEEKS-STOOLS DARK., STATES CHECKING FOR C-DIFF.


Last Myocardial Infarction Date:: unsure of date


History of Any Multi-Drug Resistant Organisms: None Reported


Past Surgical History: Adenoidectomy, Appendectomy, Heart Catheterization, 

Hysterectomy, Orthopedic Surgery, Tonsillectomy


Additional Past Surgical History / Comment(s): Right knee arthroscopy for torn 

meniscus. Colonoscopies ,  Bilateral eyelid surgery. PARTIAL HYSTERECTOMY STILL 

HAS PART OF LT OVARY, EGD.


Past Anesthesia/Blood Transfusion Reactions: No Reported Reaction


Additional Past Anesthesia/Blood Transfusion Reaction / Comment(s): .


Past Psychological History: Anxiety


Smoking Status: Former smoker


Past Alcohol Use History: Daily


Past Drug Use History: None Reported





- Past Family History


  ** Brother(s)


Additional Family Medical History / Comment(s): Patient had 3 brothers and one 

is living and 93 years of age and [place] with history of Alzheimer's dementia, 

coronary artery disease, CHF, A. fib, COPD.  Patient has 2 brothers that have 

passed one from alcoholism and one from a traumatic head injury from a fall.





  ** Sister(s)


Additional Family Medical History / Comment(s): Patient has one sister that 

of ovarian cancer.  Patient has one son that  from alcoholic complications.





  ** Father


Family Medical History: Cancer


Additional Family Medical History / Comment(s): Father  at age 77yrs of 

cancer which pt believes may have started in his throat.





  ** Mother


Family Medical History: Dementia


Additional Family Medical History / Comment(s): Mother  at age 95yrs.  She 

was very healthy most of her life- she had dementia at the very end of her life.





General Exam


Limitations: no limitations


General appearance: alert, in no apparent distress


Head exam: Present: atraumatic, normocephalic, normal inspection


Eye exam: Present: normal appearance, PERRL, EOMI.  Absent: scleral icterus, 

conjunctival injection, periorbital swelling


Respiratory exam: Present: normal lung sounds bilaterally.  Absent: respiratory 

distress, wheezes, rales, rhonchi, stridor


Cardiovascular Exam: Present: regular rate, normal rhythm, normal heart sounds. 

Absent: systolic murmur, diastolic murmur, rubs, gallop, clicks


GI/Abdominal exam: Present: soft, normal bowel sounds.  Absent: distended, 

tenderness, guarding, rebound, rigid


Extremities exam: Present: other (Lower extremity strength equal bilaterally, 

mild pain with right straight leg raise there is equal color and warmth there is

noted ecchymosis of the lower extremity posterior.  Pedal pulses equal 

bilaterally)


Back exam: Present: full ROM.  Absent: tenderness, CVA tenderness (R), CVA 

tenderness (L), paraspinal tenderness, vertebral tenderness


Neurological exam: Present: alert, oriented X3, CN II-XII intact, reflexes 

normal.  Absent: motor sensory deficit


Skin exam: Present: warm, dry, intact, normal color.  Absent: rash





Course


                                   Vital Signs











  20





  11:31


 


Temperature 98.8 F


 


Pulse Rate 76


 


Respiratory 16





Rate 


 


Blood Pressure 121/73


 


O2 Sat by Pulse 95





Oximetry 














Medical Decision Making





- Medical Decision Making





86 year old female presented for right leg pain.  She has right sided sciatica. 

Patient is neurologically intact she has no red flag symptoms.  She was able to 

ambulate in the room with no difficulty.  Patient does feel comfortable with 

discharge home.  Return parameters were discussed.





Disposition


Clinical Impression: 


 Acute right lumbar radiculopathy





Disposition: HOME SELF-CARE


Condition: Stable


Instructions (If sedation given, give patient instructions):  Sciatica (ED)


Additional Instructions: 


Please return to the Emergency Department if symptoms worsen or any other 

concerns.


Prescriptions: 


predniSONE 50 mg PO DAILY #4 tab


Is patient prescribed a controlled substance at d/c from ED?: No


Referrals: 


Lenin Dailey MD [Primary Care Provider] - 1-2 days


DANDRE Day DO [Doctor of Osteopathic Medicine] - 1-2 days


Time of Disposition: 13:26

## 2021-01-03 ENCOUNTER — HOSPITAL ENCOUNTER (EMERGENCY)
Dept: HOSPITAL 47 - EC | Age: 86
Discharge: HOME | End: 2021-01-03
Payer: MEDICARE

## 2021-01-03 VITALS — SYSTOLIC BLOOD PRESSURE: 131 MMHG | TEMPERATURE: 97.4 F | DIASTOLIC BLOOD PRESSURE: 71 MMHG | HEART RATE: 102 BPM

## 2021-01-03 VITALS — RESPIRATION RATE: 18 BRPM

## 2021-01-03 DIAGNOSIS — Z87.891: ICD-10-CM

## 2021-01-03 DIAGNOSIS — Z88.0: ICD-10-CM

## 2021-01-03 DIAGNOSIS — M51.17: Primary | ICD-10-CM

## 2021-01-03 DIAGNOSIS — H91.90: ICD-10-CM

## 2021-01-03 DIAGNOSIS — F41.9: ICD-10-CM

## 2021-01-03 DIAGNOSIS — I25.2: ICD-10-CM

## 2021-01-03 DIAGNOSIS — Z79.01: ICD-10-CM

## 2021-01-03 DIAGNOSIS — Z79.899: ICD-10-CM

## 2021-01-03 DIAGNOSIS — J44.9: ICD-10-CM

## 2021-01-03 DIAGNOSIS — Z79.51: ICD-10-CM

## 2021-01-03 DIAGNOSIS — Z88.1: ICD-10-CM

## 2021-01-03 DIAGNOSIS — I50.9: ICD-10-CM

## 2021-01-03 DIAGNOSIS — I11.0: ICD-10-CM

## 2021-01-03 LAB
ANION GAP SERPL CALC-SCNC: 5 MMOL/L
BASOPHILS # BLD AUTO: 0.1 K/UL (ref 0–0.2)
BASOPHILS NFR BLD AUTO: 1 %
BUN SERPL-SCNC: 29 MG/DL (ref 7–17)
CALCIUM SPEC-MCNC: 9.5 MG/DL (ref 8.4–10.2)
CHLORIDE SERPL-SCNC: 94 MMOL/L (ref 98–107)
CO2 SERPL-SCNC: 35 MMOL/L (ref 22–30)
EOSINOPHIL # BLD AUTO: 0.3 K/UL (ref 0–0.7)
EOSINOPHIL NFR BLD AUTO: 3 %
ERYTHROCYTE [DISTWIDTH] IN BLOOD BY AUTOMATED COUNT: 5.27 M/UL (ref 3.8–5.4)
ERYTHROCYTE [DISTWIDTH] IN BLOOD: 12.6 % (ref 11.5–15.5)
GLUCOSE SERPL-MCNC: 119 MG/DL (ref 74–99)
HCT VFR BLD AUTO: 50.6 % (ref 34–46)
HGB BLD-MCNC: 16.8 GM/DL (ref 11.4–16)
LYMPHOCYTES # SPEC AUTO: 0.7 K/UL (ref 1–4.8)
LYMPHOCYTES NFR SPEC AUTO: 7 %
MCH RBC QN AUTO: 31.8 PG (ref 25–35)
MCHC RBC AUTO-ENTMCNC: 33.2 G/DL (ref 31–37)
MCV RBC AUTO: 96 FL (ref 80–100)
MONOCYTES # BLD AUTO: 0.4 K/UL (ref 0–1)
MONOCYTES NFR BLD AUTO: 4 %
NEUTROPHILS # BLD AUTO: 9.5 K/UL (ref 1.3–7.7)
NEUTROPHILS NFR BLD AUTO: 85 %
PH UR: 7 [PH] (ref 5–8)
PLATELET # BLD AUTO: 191 K/UL (ref 150–450)
POTASSIUM SERPL-SCNC: 3.6 MMOL/L (ref 3.5–5.1)
SODIUM SERPL-SCNC: 134 MMOL/L (ref 137–145)
SP GR UR: 1 (ref 1–1.03)
UROBILINOGEN UR QL STRIP: <2 MG/DL (ref ?–2)
WBC # BLD AUTO: 11.1 K/UL (ref 3.8–10.6)

## 2021-01-03 PROCEDURE — 96361 HYDRATE IV INFUSION ADD-ON: CPT

## 2021-01-03 PROCEDURE — 36415 COLL VENOUS BLD VENIPUNCTURE: CPT

## 2021-01-03 PROCEDURE — 72131 CT LUMBAR SPINE W/O DYE: CPT

## 2021-01-03 PROCEDURE — 81003 URINALYSIS AUTO W/O SCOPE: CPT

## 2021-01-03 PROCEDURE — 96374 THER/PROPH/DIAG INJ IV PUSH: CPT

## 2021-01-03 PROCEDURE — 80048 BASIC METABOLIC PNL TOTAL CA: CPT

## 2021-01-03 PROCEDURE — 85025 COMPLETE CBC W/AUTO DIFF WBC: CPT

## 2021-01-03 PROCEDURE — 99284 EMERGENCY DEPT VISIT MOD MDM: CPT

## 2021-01-03 NOTE — CT
EXAMINATION TYPE: CT lumbar spine wo con

 

DATE OF EXAM: 1/3/2021

 

COMPARISON: None

 

HISTORY: Right sided back and leg pain with foot numbness.

 

CT DLP: 718.2 mGycm

Automated exposure control for dose reduction was used.

 

Images were obtained from the level of T12-S3 vertebra without contrast.

 

Lumbar vertebra have normal alignment. Disc spaces are fairly normal for age. There is mild narrowing
 at L4-5 disc. The posterior elements are intact. There is no compression fracture. There is no lumba
r paraspinal mass. There is hypertrophic facet arthropathy and ligamentum flavum thickening with spin
al stenosis at L4-5. There is a posterior lateral disc herniation at L5-S1 on the right side. This is
 impinging on the neural foramen.

 

Sacroiliac joints are intact. I see no focal bone destruction. There is posterior concentric disc bul
ging at L4-5 and L3-4 and L2-3.

 

IMPRESSION:

Spondylotic changes.

 

There is posterior lateral right side L5-S1 disc herniation impinging on the neural foramen and proba
josr clinically significant in this patient with right side pain.

 

No fracture.

 

Moderately severe spinal stenosis at L4-5.

## 2021-01-03 NOTE — ED
General Adult HPI





- General


Chief complaint: Back Pain/Injury


Stated complaint: revisit - rt foot numbness


Time Seen by Provider: 21 13:03


Source: patient


Mode of arrival: ambulatory


Limitations: no limitations





- History of Present Illness


Initial comments: 


Dictation was produced using dragon dictation software. please excuse any 

grammatical, word or spelling errors. 





This patient was cared for during a federal and state declared state of 

emergency secondary to Covid 19





Chief Complaint: 86-year-old female presents with neck pain.





History of Present Illness: 86-year-old female she is fairly high functioning.  

Patient was seen in emergency department to half weeks ago where she was 

evaluated for acute onset back pain.  She had x-rays performed and given 

referral to spine DrTammi Day.  Patient states that Dr. Day did not do much 

for her to give her steroids.  Patient states that her symptoms are so severe 

especially worse with movement.  She states that her symptoms radiate down to 

her right lower extremity.  States that she does have some numbness over the 

dorsum of her foot.  She has not had an MRI.  Denies any fever.  No history of 

trauma.








The ROS documented in this emergency department record has been reviewed and 

confirmed by me.  Those systems with pertinent positive or negative responses 

have been documented in the HPI.  All other systems are other negative and/or 

noncontributory.








PHYSICAL EXAM:


General Impression: Alert and oriented x3, not in acute distress


HEENT: Normocephalic atraumatic, extra-ocular movements intact, pupils equal and

reactive to light bilaterally, mucous membranes moist.


Cardiovascular: Heart regular rate and rhythm


Chest: Able to complete full sentences, no retractions, no tachypnea


Abdomen: abdomen soft, non-tender, non-distended, no organomegaly


Musculoskeletal: Pulses present and equal in all extremities, no peripheral 

edema, mild tenderness to palpation over the right paraspinal lumbar area, and, 

DP pulses intact and equal bilaterally, negative straight leg test


Motor:  no focal deficits noted


Neurological: CN II-XII grossly intact, no focal motor or sensory deficits noted


Skin: Intact with no visualized rashes


Psych: Normal affect and mood





ED course: 86-year-old female presents with her second visit for back pain.  All

signs upon arrival are within acceptable limits.


Laboratory evaluation obtained.  CBC, metabolic panel is within acceptable 

limits.  Urinalysis is negative.  Computed tomography scan of the lumbar spine 

shows L5-S1 disc herniation impinging on the neural foramen probably clinically 

significant.  Patient is notified of results.  Patient is agreeable for 

discharge.  Given prescription for by mouth analgesics.  She is told to follow-

up with Dr. Day for outpatient management of her back symptoms.











- Related Data


                                Home Medications











 Medication  Instructions  Recorded  Confirmed


 


Fluticasone/Salmeterol [Advair 1 puff INHALATION RT-BID 01/07/17 05/15/18





250-50 Diskus]   


 


Amiodarone [Cordarone] 200 mg PO DAILY 04/17/17 05/15/18


 


Apixaban [Eliquis] 2.5 mg PO BID 04/17/17 05/15/18


 


Furosemide [Lasix] 40 mg PO BID 06/03/17 05/15/18


 


Tiotropium 18 Mcg/Puff [Spiriva] 1 cap INHALATION RT-DAILY 06/03/17 05/15/18


 


Cholecalciferol (Vitamin D3) 4,000 unit PO DAILY 02/08/18 05/15/18





[Vitamin D3]   


 


LORazepam [Ativan] 1 mg PO HS 02/08/18 05/15/18


 


Multivitamins, Thera [Multivitamin 1 tab PO DAILY 02/08/18 05/15/18





(formulary)]   


 


Acetaminophen [Tylenol Extra 500 mg PO DAILY PRN 05/11/18 05/15/18





Strength]   


 


Ferrous Sulfate [Feosol] 325 mg PO DAILY 05/11/18 05/15/18








                                  Previous Rx's











 Medication  Instructions  Recorded


 


Metoprolol Tartrate [Lopressor] 25 mg PO BID #60 tab 17


 


predniSONE 50 mg PO DAILY #4 tab 20


 


HYDROcodone/APAP 5-325MG [Norco 1 tab PO Q6HR PRN 3 Days #12 tab 21





5-325]  











                                    Allergies











Allergy/AdvReac Type Severity Reaction Status Date / Time


 


amoxicillin [From Augmentin] Allergy  Unknown Verified 21 12:59


 


cefuroxime axetil Allergy  Unknown Verified 21 12:59





[From Ceftin]     


 


clavulanic acid Allergy  Unknown Verified 21 12:59





[From Augmentin]     














Review of Systems


ROS Statement: 


Those systems with pertinent positive or pertinent negative responses have been 

documented in the HPI.





ROS Other: All systems not noted in ROS Statement are negative.





Past Medical History


Past Medical History: Atrial Fibrillation, Coronary Artery Disease (CAD), Heart 

Failure, COPD, GERD/Reflux, GI Bleed, Hearing Disorder / Deafness, 

Hyperlipidemia, Hypertension, Myocardial Infarction (MI), Osteoarthritis (OA), 

Pneumonia, Renal Disease, Respiratory Disorder


Additional Past Medical History / Comment(s): HEARING AIDS., CHF , HX OF C-DIFF 

("A FEW YRS AGO")., DIVERTICULOSIS, BRONCHITIS,  02 @ 2 LITERS N/C AT NIGHT, 

ROSACEA., STATES DIARRHEA FOR 7 WEEKS-STOOLS DARK., STATES CHECKING FOR C-DIFF.


Last Myocardial Infarction Date:: unsure of date


History of Any Multi-Drug Resistant Organisms: None Reported


Past Surgical History: Adenoidectomy, Appendectomy, Heart Catheterization, 

Hysterectomy, Orthopedic Surgery, Tonsillectomy


Additional Past Surgical History / Comment(s): Right knee arthroscopy for torn 

meniscus. Colonoscopies ,  Bilateral eyelid surgery. PARTIAL HYSTERECTOMY STILL 

HAS PART OF LT OVARY, EGD.


Past Anesthesia/Blood Transfusion Reactions: No Reported Reaction


Additional Past Anesthesia/Blood Transfusion Reaction / Comment(s): .


Past Psychological History: Anxiety


Smoking Status: Former smoker


Past Alcohol Use History: Daily


Past Drug Use History: None Reported





- Past Family History


  ** Brother(s)


Additional Family Medical History / Comment(s): Patient had 3 brothers and one 

is living and 93 years of age and [place] with history of Alzheimer's dementia, 

coronary artery disease, CHF, A. fib, COPD.  Patient has 2 brothers that have 

passed one from alcoholism and one from a traumatic head injury from a fall.





  ** Sister(s)


Additional Family Medical History / Comment(s): Patient has one sister that 

of ovarian cancer.  Patient has one son that  from alcoholic complications.





  ** Father


Family Medical History: Cancer


Additional Family Medical History / Comment(s): Father  at age 77yrs of 

cancer which pt believes may have started in his throat.





  ** Mother


Family Medical History: Dementia


Additional Family Medical History / Comment(s): Mother  at age 95yrs.  She 

was very healthy most of her life- she had dementia at the very end of her life.





General Exam


Limitations: no limitations





Course


                                   Vital Signs











  21





  13:00


 


Temperature 98 F


 


Pulse Rate 103 H


 


Respiratory 18





Rate 


 


Blood Pressure 123/77


 


O2 Sat by Pulse 96





Oximetry 














Medical Decision Making





- Lab Data


Result diagrams: 


                                 21 13:27





                                 21 13:27


                                   Lab Results











  21 Range/Units





  13:27 13:27 13:43 


 


WBC  11.1 H    (3.8-10.6)  k/uL


 


RBC  5.27    (3.80-5.40)  m/uL


 


Hgb  16.8 H    (11.4-16.0)  gm/dL


 


Hct  50.6 H    (34.0-46.0)  %


 


MCV  96.0    (80.0-100.0)  fL


 


MCH  31.8    (25.0-35.0)  pg


 


MCHC  33.2    (31.0-37.0)  g/dL


 


RDW  12.6    (11.5-15.5)  %


 


Plt Count  191    (150-450)  k/uL


 


MPV  7.5    


 


Neutrophils %  85    %


 


Lymphocytes %  7    %


 


Monocytes %  4    %


 


Eosinophils %  3    %


 


Basophils %  1    %


 


Neutrophils #  9.5 H    (1.3-7.7)  k/uL


 


Lymphocytes #  0.7 L    (1.0-4.8)  k/uL


 


Monocytes #  0.4    (0-1.0)  k/uL


 


Eosinophils #  0.3    (0-0.7)  k/uL


 


Basophils #  0.1    (0-0.2)  k/uL


 


Sodium   134 L   (137-145)  mmol/L


 


Potassium   3.6   (3.5-5.1)  mmol/L


 


Chloride   94 L   ()  mmol/L


 


Carbon Dioxide   35 H   (22-30)  mmol/L


 


Anion Gap   5   mmol/L


 


BUN   29 H   (7-17)  mg/dL


 


Creatinine   1.33 H   (0.52-1.04)  mg/dL


 


Est GFR (CKD-EPI)AfAm   42   (>60 ml/min/1.73 sqM)  


 


Est GFR (CKD-EPI)NonAf   36   (>60 ml/min/1.73 sqM)  


 


Glucose   119 H   (74-99)  mg/dL


 


Calcium   9.5   (8.4-10.2)  mg/dL


 


Urine Color    Colorless  


 


Urine Appearance    Clear  (Clear)  


 


Urine pH    7.0  (5.0-8.0)  


 


Ur Specific Gravity    1.003  (1.001-1.035)  


 


Urine Protein    Negative  (Negative)  


 


Urine Glucose (UA)    Negative  (Negative)  


 


Urine Ketones    Negative  (Negative)  


 


Urine Blood    Negative  (Negative)  


 


Urine Nitrite    Negative  (Negative)  


 


Urine Bilirubin    Negative  (Negative)  


 


Urine Urobilinogen    <2.0  (<2.0)  mg/dL


 


Ur Leukocyte Esterase    Negative  (Negative)  














Disposition


Clinical Impression: 


 Sciatica





Disposition: HOME SELF-CARE


Condition: Fair


Instructions (If sedation given, give patient instructions):  Acute Low Back 

Pain (ED)


Prescriptions: 


HYDROcodone/APAP 5-325MG [Norco 5-325] 1 tab PO Q6HR PRN 3 Days #12 tab


 PRN Reason: Severe Pain


Is patient prescribed a controlled substance at d/c from ED?: Yes


If prescribed controlled substance>3 days was MAPS reviewed?: Prescribed <3 Days


Referrals: 


DANDRE Day DO [Doctor of Osteopathic Medicine] - 1-2 days


Time of Disposition: 14:32

## 2021-01-10 ENCOUNTER — HOSPITAL ENCOUNTER (EMERGENCY)
Dept: HOSPITAL 47 - EC | Age: 86
Discharge: HOME | End: 2021-01-10
Payer: MEDICARE

## 2021-01-10 VITALS — TEMPERATURE: 98.5 F

## 2021-01-10 VITALS — HEART RATE: 95 BPM | SYSTOLIC BLOOD PRESSURE: 106 MMHG | DIASTOLIC BLOOD PRESSURE: 72 MMHG

## 2021-01-10 VITALS — RESPIRATION RATE: 16 BRPM

## 2021-01-10 DIAGNOSIS — R00.0: ICD-10-CM

## 2021-01-10 DIAGNOSIS — Z87.891: ICD-10-CM

## 2021-01-10 DIAGNOSIS — E78.5: ICD-10-CM

## 2021-01-10 DIAGNOSIS — J44.9: ICD-10-CM

## 2021-01-10 DIAGNOSIS — Z88.1: ICD-10-CM

## 2021-01-10 DIAGNOSIS — F41.9: ICD-10-CM

## 2021-01-10 DIAGNOSIS — Z88.0: ICD-10-CM

## 2021-01-10 DIAGNOSIS — Z90.710: ICD-10-CM

## 2021-01-10 DIAGNOSIS — Z95.5: ICD-10-CM

## 2021-01-10 DIAGNOSIS — I11.0: Primary | ICD-10-CM

## 2021-01-10 DIAGNOSIS — I50.9: ICD-10-CM

## 2021-01-10 DIAGNOSIS — I48.91: ICD-10-CM

## 2021-01-10 DIAGNOSIS — J34.0: ICD-10-CM

## 2021-01-10 DIAGNOSIS — Z90.49: ICD-10-CM

## 2021-01-10 LAB
ALBUMIN SERPL-MCNC: 4.7 G/DL (ref 3.5–5)
ALP SERPL-CCNC: 65 U/L (ref 38–126)
ALT SERPL-CCNC: 35 U/L (ref 4–34)
ANION GAP SERPL CALC-SCNC: 10 MMOL/L
AST SERPL-CCNC: 42 U/L (ref 14–36)
BASOPHILS # BLD AUTO: 0 K/UL (ref 0–0.2)
BASOPHILS NFR BLD AUTO: 1 %
BUN SERPL-SCNC: 17 MG/DL (ref 7–17)
CALCIUM SPEC-MCNC: 9.4 MG/DL (ref 8.4–10.2)
CHLORIDE SERPL-SCNC: 99 MMOL/L (ref 98–107)
CO2 SERPL-SCNC: 30 MMOL/L (ref 22–30)
EOSINOPHIL # BLD AUTO: 0.2 K/UL (ref 0–0.7)
EOSINOPHIL NFR BLD AUTO: 4 %
ERYTHROCYTE [DISTWIDTH] IN BLOOD BY AUTOMATED COUNT: 4.48 M/UL (ref 3.8–5.4)
ERYTHROCYTE [DISTWIDTH] IN BLOOD: 13.1 % (ref 11.5–15.5)
GLUCOSE SERPL-MCNC: 107 MG/DL (ref 74–99)
HCT VFR BLD AUTO: 42.7 % (ref 34–46)
HGB BLD-MCNC: 14.7 GM/DL (ref 11.4–16)
LYMPHOCYTES # SPEC AUTO: 0.5 K/UL (ref 1–4.8)
LYMPHOCYTES NFR SPEC AUTO: 11 %
MCH RBC QN AUTO: 32.8 PG (ref 25–35)
MCHC RBC AUTO-ENTMCNC: 34.4 G/DL (ref 31–37)
MCV RBC AUTO: 95.3 FL (ref 80–100)
MONOCYTES # BLD AUTO: 0.3 K/UL (ref 0–1)
MONOCYTES NFR BLD AUTO: 6 %
NEUTROPHILS # BLD AUTO: 3.7 K/UL (ref 1.3–7.7)
NEUTROPHILS NFR BLD AUTO: 77 %
PLATELET # BLD AUTO: 151 K/UL (ref 150–450)
POTASSIUM SERPL-SCNC: 3.8 MMOL/L (ref 3.5–5.1)
PROT SERPL-MCNC: 7.4 G/DL (ref 6.3–8.2)
SODIUM SERPL-SCNC: 139 MMOL/L (ref 137–145)
WBC # BLD AUTO: 4.8 K/UL (ref 3.8–10.6)

## 2021-01-10 PROCEDURE — 71046 X-RAY EXAM CHEST 2 VIEWS: CPT

## 2021-01-10 PROCEDURE — 36415 COLL VENOUS BLD VENIPUNCTURE: CPT

## 2021-01-10 PROCEDURE — 85025 COMPLETE CBC W/AUTO DIFF WBC: CPT

## 2021-01-10 PROCEDURE — 83880 ASSAY OF NATRIURETIC PEPTIDE: CPT

## 2021-01-10 PROCEDURE — 80053 COMPREHEN METABOLIC PANEL: CPT

## 2021-01-10 PROCEDURE — 99284 EMERGENCY DEPT VISIT MOD MDM: CPT

## 2021-01-10 PROCEDURE — 93005 ELECTROCARDIOGRAM TRACING: CPT

## 2021-01-10 PROCEDURE — 84484 ASSAY OF TROPONIN QUANT: CPT

## 2021-01-10 NOTE — XR
EXAMINATION TYPE: XR chest 2V

 

DATE OF EXAM: 1/10/2021

 

COMPARISON: 7/7/2017

 

HISTORY: Short of breath

 

TECHNIQUE:

 

FINDINGS: There is no heart failure nor confluent pneumonic infiltrate. Costophrenic angles are clear
. There are no hilar masses. Bony thorax is intact. There are chest leads.

 

IMPRESSION: No active cardiopulmonary disease. Normal heart. No adverse change.

## 2021-01-10 NOTE — ED
Extremity Problem HPI





- General


Chief complaint: Extremity Problem,Nontraumatic


Stated complaint: Bilateral Feet Swelling,CHF


Time Seen by Provider: 01/10/21 13:19


Source: patient, RN notes reviewed


Mode of arrival: ambulatory


Limitations: no limitations





- History of Present Illness


Initial comments: 


86-year-old female bent emergency Department chief complaint of leg swelling, 5 

pound weight gain.  Patient does have history of CHF she took her Lasix this 

morning she states that she has not missed any doses no dietary changes.  She 

states that she feels bloated also.  Patient states she initially called her 

orthopedic surgeon secondary to leave and she had ALLERGIC reaction to her 

Norco.  She's been taking Norco for a long period time patient states that she's

had some swelling to her nose and thought it was related to her medications no 

chest pain no significant increasing shortness of breath.  She states she's 

always short of breath related to her COPD.








- Related Data


                                Home Medications











 Medication  Instructions  Recorded  Confirmed


 


Fluticasone/Salmeterol [Advair 1 puff INHALATION RT-BID 01/07/17 05/15/18





250-50 Diskus]   


 


Amiodarone [Cordarone] 200 mg PO DAILY 04/17/17 05/15/18


 


Apixaban [Eliquis] 2.5 mg PO BID 04/17/17 05/15/18


 


Furosemide [Lasix] 40 mg PO BID 06/03/17 05/15/18


 


Tiotropium 18 Mcg/Puff [Spiriva] 1 cap INHALATION RT-DAILY 06/03/17 05/15/18


 


Cholecalciferol (Vitamin D3) 4,000 unit PO DAILY 02/08/18 05/15/18





[Vitamin D3]   


 


LORazepam [Ativan] 1 mg PO HS 02/08/18 05/15/18


 


Multivitamins, Thera [Multivitamin 1 tab PO DAILY 02/08/18 05/15/18





(formulary)]   


 


Acetaminophen [Tylenol Extra 500 mg PO DAILY PRN 05/11/18 05/15/18





Strength]   


 


Ferrous Sulfate [Feosol] 325 mg PO DAILY 05/11/18 05/15/18








                                  Previous Rx's











 Medication  Instructions  Recorded


 


Metoprolol Tartrate [Lopressor] 25 mg PO BID #60 tab 17


 


predniSONE 50 mg PO DAILY #4 tab 20


 


HYDROcodone/APAP 5-325MG [Norco 1 tab PO Q6HR PRN 3 Days #12 tab 21





5-325]  


 


Sulfamethox-Tmp 800-160Mg [Bactrim 1 each PO Q12HR #14 tab 01/10/21





Ds]  











                                    Allergies











Allergy/AdvReac Type Severity Reaction Status Date / Time


 


amoxicillin [From Augmentin] Allergy  Unknown Verified 01/10/21 13:18


 


cefuroxime axetil Allergy  Unknown Verified 01/10/21 13:18





[From Ceftin]     


 


clavulanic acid Allergy  Unknown Verified 01/10/21 13:18





[From Augmentin]     














Review of Systems


ROS Statement: 


Those systems with pertinent positive or pertinent negative responses have been 

documented in the HPI.





ROS Other: All systems not noted in ROS Statement are negative.





Past Medical History


Past Medical History: Atrial Fibrillation, Coronary Artery Disease (CAD), Heart 

Failure, COPD, GERD/Reflux, GI Bleed, Hearing Disorder / Deafness, Hyperli

pidemia, Hypertension, Myocardial Infarction (MI), Osteoarthritis (OA), 

Pneumonia, Renal Disease, Respiratory Disorder


Additional Past Medical History / Comment(s): HEARING AIDS., CHF , HX OF C-DIFF 

("A FEW YRS AGO")., DIVERTICULOSIS, BRONCHITIS,  02 @ 2 LITERS N/C AT NIGHT, 

ROSACEA., STATES DIARRHEA FOR 7 WEEKS-STOOLS DARK., STATES CHECKING FOR C-DIFF.


Last Myocardial Infarction Date:: unsure of date


History of Any Multi-Drug Resistant Organisms: None Reported


Past Surgical History: Adenoidectomy, Appendectomy, Heart Catheterization, 

Hysterectomy, Orthopedic Surgery, Tonsillectomy


Additional Past Surgical History / Comment(s): Right knee arthroscopy for torn 

meniscus. Colonoscopies ,  Bilateral eyelid surgery. PARTIAL HYSTERECTOMY STILL 

HAS PART OF LT OVARY, EGD.


Past Anesthesia/Blood Transfusion Reactions: No Reported Reaction


Additional Past Anesthesia/Blood Transfusion Reaction / Comment(s): .


Past Psychological History: Anxiety


Smoking Status: Former smoker


Past Alcohol Use History: Daily


Past Drug Use History: None Reported





- Past Family History


  ** Brother(s)


Additional Family Medical History / Comment(s): Patient had 3 brothers and one 

is living and 93 years of age and [place] with history of Alzheimer's dementia, 

coronary artery disease, CHF, A. fib, COPD.  Patient has 2 brothers that have 

passed one from alcoholism and one from a traumatic head injury from a fall.





  ** Sister(s)


Additional Family Medical History / Comment(s): Patient has one sister that 

of ovarian cancer.  Patient has one son that  from alcoholic complications.





  ** Father


Family Medical History: Cancer


Additional Family Medical History / Comment(s): Father  at age 77yrs of [facility]

er which pt believes may have started in his throat.





  ** Mother


Family Medical History: Dementia


Additional Family Medical History / Comment(s): Mother  at age 95yrs.  She 

was very healthy most of her life- she had dementia at the very end of her life.





General Exam


Limitations: no limitations


General appearance: alert, in no apparent distress


Head exam: Present: atraumatic, normocephalic, normal inspection


Eye exam: Present: normal appearance, PERRL, EOMI.  Absent: scleral icterus, 

conjunctival injection, periorbital swelling


ENT exam: Present: normal exam, normal oropharynx, mucous membranes moist


Neck exam: Present: normal inspection, full ROM.  Absent: tenderness, men

ingismus, lymphadenopathy


Respiratory exam: Present: normal lung sounds bilaterally.  Absent: respiratory 

distress, wheezes, rales, rhonchi, stridor


Cardiovascular Exam: Present: normal rhythm, tachycardia, normal heart sounds.  

Absent: systolic murmur, diastolic murmur, rubs, gallop, clicks


Extremities exam: Present: pedal edema.  Absent: calf tenderness


Neurological exam: Present: alert, oriented X3, CN II-XII intact


Skin exam: Present: warm, dry, intact, normal color.  Absent: rash





Course


                                   Vital Signs











  01/10/21 01/10/21





  13:16 14:15


 


Temperature 98.5 F 


 


Pulse Rate 110 H 96


 


Respiratory 20 16





Rate  


 


Blood Pressure 129/81 127/79


 


O2 Sat by Pulse 99 97





Oximetry  














Medical Decision Making





- Medical Decision Making


X-ray is unremarkable there is no signs of CHF exacerbation patient had 5 pound 

weight gain with minimal leg swelling.  Patient advised take an extra dose of 20

mg Lasix today.  Patient used to be on twice a day Lasix.  Patient does have 

some erythema and what appears acting related to her nose we discussed 

possibility from wearing mass.  Patient we discharged with medications return 

parameters were discussed.








- Lab Data


Result diagrams: 


                                 01/10/21 13:41





                                 01/10/21 13:41


                                   Lab Results











  01/10/21 01/10/21 01/10/21 Range/Units





  13:41 13:41 13:41 


 


WBC  4.8    (3.8-10.6)  k/uL


 


RBC  4.48    (3.80-5.40)  m/uL


 


Hgb  14.7    (11.4-16.0)  gm/dL


 


Hct  42.7    (34.0-46.0)  %


 


MCV  95.3    (80.0-100.0)  fL


 


MCH  32.8    (25.0-35.0)  pg


 


MCHC  34.4    (31.0-37.0)  g/dL


 


RDW  13.1    (11.5-15.5)  %


 


Plt Count  151    (150-450)  k/uL


 


MPV  7.7    


 


Neutrophils %  77    %


 


Lymphocytes %  11    %


 


Monocytes %  6    %


 


Eosinophils %  4    %


 


Basophils %  1    %


 


Neutrophils #  3.7    (1.3-7.7)  k/uL


 


Lymphocytes #  0.5 L    (1.0-4.8)  k/uL


 


Monocytes #  0.3    (0-1.0)  k/uL


 


Eosinophils #  0.2    (0-0.7)  k/uL


 


Basophils #  0.0    (0-0.2)  k/uL


 


Sodium   139   (137-145)  mmol/L


 


Potassium   3.8   (3.5-5.1)  mmol/L


 


Chloride   99   ()  mmol/L


 


Carbon Dioxide   30   (22-30)  mmol/L


 


Anion Gap   10   mmol/L


 


BUN   17   (7-17)  mg/dL


 


Creatinine   1.14 H   (0.52-1.04)  mg/dL


 


Est GFR (CKD-EPI)AfAm   51   (>60 ml/min/1.73 sqM)  


 


Est GFR (CKD-EPI)NonAf   44   (>60 ml/min/1.73 sqM)  


 


Glucose   107 H   (74-99)  mg/dL


 


Calcium   9.4   (8.4-10.2)  mg/dL


 


Total Bilirubin   1.0   (0.2-1.3)  mg/dL


 


AST   42 H   (14-36)  U/L


 


ALT   35 H   (4-34)  U/L


 


Alkaline Phosphatase   65   ()  U/L


 


Troponin I     (0.000-0.034)  ng/mL


 


NT-Pro-B Natriuret Pep    1370  pg/mL


 


Total Protein   7.4   (6.3-8.2)  g/dL


 


Albumin   4.7   (3.5-5.0)  g/dL














  01/10/21 Range/Units





  13:41 


 


WBC   (3.8-10.6)  k/uL


 


RBC   (3.80-5.40)  m/uL


 


Hgb   (11.4-16.0)  gm/dL


 


Hct   (34.0-46.0)  %


 


MCV   (80.0-100.0)  fL


 


MCH   (25.0-35.0)  pg


 


MCHC   (31.0-37.0)  g/dL


 


RDW   (11.5-15.5)  %


 


Plt Count   (150-450)  k/uL


 


MPV   


 


Neutrophils %   %


 


Lymphocytes %   %


 


Monocytes %   %


 


Eosinophils %   %


 


Basophils %   %


 


Neutrophils #   (1.3-7.7)  k/uL


 


Lymphocytes #   (1.0-4.8)  k/uL


 


Monocytes #   (0-1.0)  k/uL


 


Eosinophils #   (0-0.7)  k/uL


 


Basophils #   (0-0.2)  k/uL


 


Sodium   (137-145)  mmol/L


 


Potassium   (3.5-5.1)  mmol/L


 


Chloride   ()  mmol/L


 


Carbon Dioxide   (22-30)  mmol/L


 


Anion Gap   mmol/L


 


BUN   (7-17)  mg/dL


 


Creatinine   (0.52-1.04)  mg/dL


 


Est GFR (CKD-EPI)AfAm   (>60 ml/min/1.73 sqM)  


 


Est GFR (CKD-EPI)NonAf   (>60 ml/min/1.73 sqM)  


 


Glucose   (74-99)  mg/dL


 


Calcium   (8.4-10.2)  mg/dL


 


Total Bilirubin   (0.2-1.3)  mg/dL


 


AST   (14-36)  U/L


 


ALT   (4-34)  U/L


 


Alkaline Phosphatase   ()  U/L


 


Troponin I  <0.012  (0.000-0.034)  ng/mL


 


NT-Pro-B Natriuret Pep   pg/mL


 


Total Protein   (6.3-8.2)  g/dL


 


Albumin   (3.5-5.0)  g/dL














Disposition


Clinical Impression: 


 Cellulitis of nasal tip, CHF (congestive heart failure)





Disposition: HOME SELF-CARE


Condition: Stable


Instructions (If sedation given, give patient instructions):  Cellulitis (ED)


Additional Instructions: 


Please return to the Emergency Department if symptoms worsen or any other 

concerns.


Prescriptions: 


Sulfamethox-Tmp 800-160Mg [Bactrim Ds] 1 each PO Q12HR #14 tab


Is patient prescribed a controlled substance at d/c from ED?: No


Referrals: 


Lenin Dailey MD [Primary Care Provider] - 1-2 days


Time of Disposition: 14:42

## 2021-12-31 ENCOUNTER — HOSPITAL ENCOUNTER (EMERGENCY)
Dept: HOSPITAL 47 - EC | Age: 86
LOS: 1 days | Discharge: HOME | End: 2022-01-01
Payer: MEDICARE

## 2021-12-31 VITALS
TEMPERATURE: 98.7 F | RESPIRATION RATE: 20 BRPM | SYSTOLIC BLOOD PRESSURE: 155 MMHG | HEART RATE: 112 BPM | DIASTOLIC BLOOD PRESSURE: 81 MMHG

## 2021-12-31 DIAGNOSIS — Z87.891: ICD-10-CM

## 2021-12-31 DIAGNOSIS — E78.5: ICD-10-CM

## 2021-12-31 DIAGNOSIS — M19.90: ICD-10-CM

## 2021-12-31 DIAGNOSIS — J02.9: Primary | ICD-10-CM

## 2021-12-31 DIAGNOSIS — Z79.01: ICD-10-CM

## 2021-12-31 DIAGNOSIS — I50.9: ICD-10-CM

## 2021-12-31 DIAGNOSIS — I11.0: ICD-10-CM

## 2021-12-31 DIAGNOSIS — I25.2: ICD-10-CM

## 2021-12-31 DIAGNOSIS — F41.9: ICD-10-CM

## 2021-12-31 DIAGNOSIS — K21.9: ICD-10-CM

## 2021-12-31 DIAGNOSIS — J44.9: ICD-10-CM

## 2021-12-31 DIAGNOSIS — Z90.49: ICD-10-CM

## 2021-12-31 DIAGNOSIS — Z88.1: ICD-10-CM

## 2021-12-31 DIAGNOSIS — I25.10: ICD-10-CM

## 2021-12-31 DIAGNOSIS — Z90.710: ICD-10-CM

## 2021-12-31 DIAGNOSIS — I48.91: ICD-10-CM

## 2021-12-31 PROCEDURE — 87635 SARS-COV-2 COVID-19 AMP PRB: CPT

## 2021-12-31 PROCEDURE — 87081 CULTURE SCREEN ONLY: CPT

## 2021-12-31 PROCEDURE — 87430 STREP A AG IA: CPT

## 2021-12-31 PROCEDURE — 99283 EMERGENCY DEPT VISIT LOW MDM: CPT

## 2022-01-01 NOTE — ED
Allergic Reaction HPI





- General


Chief complaint: Allergic Reaction


Stated complaint: Sore Throat


Time Seen by Provider: 22 02:21


Source: patient


Mode of arrival: ambulatory


Limitations: no limitations





- History of Present Illness


Initial Comments: 





Patient is an 87-year-old woman who presents because she believes she is 

developing ALLERGIC reaction to an antibiotic.  The patient states she had gone 

to see her physician for sore throat and was started on antibiotic.  The patient

states the sore throat is been going on a couple of days now.  She started the 

antibiotic earlier tonight.  Now she is feeling weak and fatigued.  She is 

having some nausea.  Patient continues to have sore throat, mainly pain with 

swallowing.  She has not noted change in her voice.


MD Complaint: allergic reaction, other (Sore throat)


Onset/Timin


-: days(s)


Exposure: medication


Severity: mild


Treatment Prior to Arrival: none





- Related Data


                                Home Medications











 Medication  Instructions  Recorded  Confirmed


 


Fluticasone/Salmeterol [Advair 1 puff INHALATION RT-BID 01/07/17 01/10/22





250-50 Diskus]   


 


Furosemide [Lasix] 40 mg PO BID 06/03/17 01/10/22


 


Tiotropium 18 Mcg/Puff [Spiriva] 1 cap INHALATION RT-DAILY 06/03/17 01/10/22


 


Cholecalciferol (Vitamin D3) 2,000 unit PO DAILY 02/08/18 01/10/22





[Vitamin D3]   


 


Ferrous Sulfate [Iron (65  mg PO DAILY 05/11/18 01/10/22





Elemental)]   








                                  Previous Rx's











 Medication  Instructions  Recorded


 


Albuterol Inhaler [Ventolin Hfa 1 puff INHALATION RT-QID #1 gm 22





Inhaler]  


 


Apixaban [Eliquis] 2.5 mg PO BID #60 tablet 22


 


Atorvastatin [Lipitor] 20 mg PO HS #30 tab 22


 


Amiodarone [Cordarone] 200 mg PO DAILY  tab 22


 


Dexamethasone [Decadron] 6 mg PO DAILY #5 tablet 22


 


LORazepam [Ativan] 1 mg PO HS #3 tab 22


 


Melatonin 6 mg PO HS  tablet 22


 


Azithromycin [Zithromax] 500 mg PO DAILY@1800 #5 tab 22


 


Metoprolol Tartrate [Lopressor] 75 mg PO TID  tab 22











                                    Allergies











Allergy/AdvReac Type Severity Reaction Status Date / Time


 


amoxicillin [From Augmentin] Allergy  Unknown Verified 01/10/22 12:36


 


cefuroxime axetil Allergy  Unknown Verified 01/10/22 12:36





[From Ceftin]     


 


clavulanic acid Allergy  Unknown Verified 01/10/22 12:36





[From Augmentin]     














Review of Systems


ROS Statement: 


Those systems with pertinent positive or pertinent negative responses have been 

documented in the HPI.





ROS Other: All systems not noted in ROS Statement are negative.


Constitutional: Reports: weakness.  Denies: fever, chills


Eyes: Denies: eye pain, vision change


ENT: Reports: throat pain.  Denies: congestion


Respiratory: Denies: cough, dyspnea, wheezes


Cardiovascular: Denies: chest pain, palpitations


Gastrointestinal: Reports: nausea.  Denies: abdominal pain, vomiting, diarrhea


Skin: Denies: rash


Neurological: Denies: headache





Past Medical History


Past Medical History: Atrial Fibrillation, Coronary Artery Disease (CAD), Heart 

Failure, COPD, GERD/Reflux, GI Bleed, Hearing Disorder / Deafness, 

Hyperlipidemia, Hypertension, Myocardial Infarction (MI), Osteoarthritis (OA), 

Pneumonia, Renal Disease, Respiratory Disorder


Additional Past Medical History / Comment(s): HEARING AIDS., CHF , HX OF C-DIFF 

("A FEW YRS AGO")., DIVERTICULOSIS, BRONCHITIS,  02 @ 2 LITERS N/C AT NIGHT, 

ROSACEA., STATES DIARRHEA FOR 7 WEEKS-STOOLS DARK., STATES CHECKING FOR C-DIFF.


Last Myocardial Infarction Date:: unsure of date


History of Any Multi-Drug Resistant Organisms: None Reported


Past Surgical History: Adenoidectomy, Appendectomy, Heart Catheterization, 

Hysterectomy, Orthopedic Surgery, Tonsillectomy


Additional Past Surgical History / Comment(s): Right knee arthroscopy for torn 

meniscus. Colonoscopies ,  Bilateral eyelid surgery. PARTIAL HYSTERECTOMY STILL 

HAS PART OF LT OVARY, EGD.


Past Anesthesia/Blood Transfusion Reactions: No Reported Reaction


Additional Past Anesthesia/Blood Transfusion Reaction / Comment(s): .


Past Psychological History: Anxiety


Smoking Status: Former smoker


Past Alcohol Use History: Daily


Past Drug Use History: None Reported





- Past Family History


  ** Brother(s)


Additional Family Medical History / Comment(s): Patient had 3 brothers and one 

is living and 93 years of age and [place] with history of Alzheimer's dementia, 

coronary artery disease, CHF, A. fib, COPD.  Patient has 2 brothers that have 

passed one from alcoholism and one from a traumatic head injury from a fall.





  ** Sister(s)


Additional Family Medical History / Comment(s): Patient has one sister that 

of ovarian cancer.  Patient has one son that  from alcoholic complications.





  ** Father


Family Medical History: Cancer


Additional Family Medical History / Comment(s): Father  at age 77yrs of 

cancer which pt believes may have started in his throat.





  ** Mother


Family Medical History: Dementia


Additional Family Medical History / Comment(s): Mother  at age 95yrs.  She 

was very healthy most of her life- she had dementia at the very end of her life.





General Exam


Limitations: no limitations


General appearance: alert, in no apparent distress


Head exam: Present: atraumatic, normocephalic


Eye exam: Present: normal appearance.  Absent: scleral icterus, conjunctival 

injection


ENT exam: Present: mucous membranes moist, other (Pharynx is injected.  Uvula is

midline with no edema.  No evidence of peritonsillar abscess.)


Neck exam: Present: normal inspection, tenderness, full ROM, lymphadenopathy.  

Absent: meningismus


Respiratory exam: Present: normal lung sounds bilaterally.  Absent: respiratory 

distress, wheezes, rales, rhonchi, stridor


Cardiovascular Exam: Present: regular rate, normal rhythm, normal heart sounds


GI/Abdominal exam: Present: soft.  Absent: distended, tenderness, guarding, 

organomegaly


Neurological exam: Present: alert


Skin exam: Present: warm, dry, intact, normal color.  Absent: rash





Course


                                   Vital Signs











  21





  18:49


 


Temperature 98.7 F


 


Pulse Rate 112 H


 


Respiratory 20





Rate 


 


Blood Pressure 155/81


 


O2 Sat by Pulse 98





Oximetry 














Medical Decision Making





- Medical Decision Making





Patient is 87-year-old woman who believes she is not tolerating her current 

antibiotic for pharyngitis.  In accord with the patient wishes we'll change 

antibiotic and have her follow with her physician.  There is no concrete 

evidence however of ALLERGIC reaction at this point.





- Lab Data


                                   Lab Results











  21 Range/Units





  18:55 02:48 


 


Coronavirus (PCR)  Not Detected   (Not Detectd)  


 


Group A Strep Rapid   Negative  (Negative)  














Disposition


Clinical Impression: 


 Pharyngitis





Disposition: HOME SELF-CARE


Condition: Good


Instructions (If sedation given, give patient instructions):  Pharyngitis (ED)


Is patient prescribed a controlled substance at d/c from ED?: No


Referrals: 


Lenin Dailey MD [Primary Care Provider] - 1-2 days

## 2022-01-04 ENCOUNTER — HOSPITAL ENCOUNTER (INPATIENT)
Dept: HOSPITAL 47 - EC | Age: 87
LOS: 4 days | Discharge: HOME | DRG: 177 | End: 2022-01-08
Attending: INTERNAL MEDICINE | Admitting: INTERNAL MEDICINE
Payer: MEDICARE

## 2022-01-04 VITALS — BODY MASS INDEX: 21.7 KG/M2

## 2022-01-04 DIAGNOSIS — J96.21: ICD-10-CM

## 2022-01-04 DIAGNOSIS — Z79.01: ICD-10-CM

## 2022-01-04 DIAGNOSIS — Z99.81: ICD-10-CM

## 2022-01-04 DIAGNOSIS — I13.0: ICD-10-CM

## 2022-01-04 DIAGNOSIS — J12.82: ICD-10-CM

## 2022-01-04 DIAGNOSIS — Z87.891: ICD-10-CM

## 2022-01-04 DIAGNOSIS — Z80.41: ICD-10-CM

## 2022-01-04 DIAGNOSIS — I27.20: ICD-10-CM

## 2022-01-04 DIAGNOSIS — M19.041: ICD-10-CM

## 2022-01-04 DIAGNOSIS — I08.3: ICD-10-CM

## 2022-01-04 DIAGNOSIS — Z79.51: ICD-10-CM

## 2022-01-04 DIAGNOSIS — Z79.899: ICD-10-CM

## 2022-01-04 DIAGNOSIS — F41.9: ICD-10-CM

## 2022-01-04 DIAGNOSIS — I42.8: ICD-10-CM

## 2022-01-04 DIAGNOSIS — H91.90: ICD-10-CM

## 2022-01-04 DIAGNOSIS — Z91.19: ICD-10-CM

## 2022-01-04 DIAGNOSIS — I48.19: ICD-10-CM

## 2022-01-04 DIAGNOSIS — Z82.0: ICD-10-CM

## 2022-01-04 DIAGNOSIS — Z82.5: ICD-10-CM

## 2022-01-04 DIAGNOSIS — J44.1: ICD-10-CM

## 2022-01-04 DIAGNOSIS — M19.042: ICD-10-CM

## 2022-01-04 DIAGNOSIS — N18.30: ICD-10-CM

## 2022-01-04 DIAGNOSIS — Z82.49: ICD-10-CM

## 2022-01-04 DIAGNOSIS — R74.01: ICD-10-CM

## 2022-01-04 DIAGNOSIS — J44.0: ICD-10-CM

## 2022-01-04 DIAGNOSIS — U07.1: Primary | ICD-10-CM

## 2022-01-04 DIAGNOSIS — I50.23: ICD-10-CM

## 2022-01-04 DIAGNOSIS — E78.5: ICD-10-CM

## 2022-01-04 DIAGNOSIS — I25.10: ICD-10-CM

## 2022-01-04 DIAGNOSIS — Z90.711: ICD-10-CM

## 2022-01-04 DIAGNOSIS — I25.2: ICD-10-CM

## 2022-01-04 LAB
ALBUMIN SERPL-MCNC: 4.3 G/DL (ref 3.5–5)
ALP SERPL-CCNC: 55 U/L (ref 38–126)
ALT SERPL-CCNC: 126 U/L (ref 4–34)
ANION GAP SERPL CALC-SCNC: 11 MMOL/L
APTT BLD: 23 SEC (ref 22–30)
AST SERPL-CCNC: 235 U/L (ref 14–36)
BASOPHILS # BLD AUTO: 0 K/UL (ref 0–0.2)
BASOPHILS NFR BLD AUTO: 1 %
BUN SERPL-SCNC: 34 MG/DL (ref 7–17)
CALCIUM SPEC-MCNC: 8.8 MG/DL (ref 8.4–10.2)
CHLORIDE SERPL-SCNC: 92 MMOL/L (ref 98–107)
CO2 SERPL-SCNC: 29 MMOL/L (ref 22–30)
EOSINOPHIL # BLD AUTO: 0.1 K/UL (ref 0–0.7)
EOSINOPHIL NFR BLD AUTO: 2 %
ERYTHROCYTE [DISTWIDTH] IN BLOOD BY AUTOMATED COUNT: 3.77 M/UL (ref 3.8–5.4)
ERYTHROCYTE [DISTWIDTH] IN BLOOD: 13.2 % (ref 11.5–15.5)
GLUCOSE SERPL-MCNC: 123 MG/DL (ref 74–99)
HCT VFR BLD AUTO: 37.6 % (ref 34–46)
HGB BLD-MCNC: 12.2 GM/DL (ref 11.4–16)
INR PPP: 1.1 (ref ?–1.2)
LYMPHOCYTES # SPEC AUTO: 0.5 K/UL (ref 1–4.8)
LYMPHOCYTES NFR SPEC AUTO: 12 %
MAGNESIUM SPEC-SCNC: 2.1 MG/DL (ref 1.6–2.3)
MCH RBC QN AUTO: 32.5 PG (ref 25–35)
MCHC RBC AUTO-ENTMCNC: 32.5 G/DL (ref 31–37)
MCV RBC AUTO: 99.8 FL (ref 80–100)
MONOCYTES # BLD AUTO: 0.3 K/UL (ref 0–1)
MONOCYTES NFR BLD AUTO: 7 %
NEUTROPHILS # BLD AUTO: 3.2 K/UL (ref 1.3–7.7)
NEUTROPHILS NFR BLD AUTO: 77 %
PLATELET # BLD AUTO: 144 K/UL (ref 150–450)
POTASSIUM SERPL-SCNC: 4.7 MMOL/L (ref 3.5–5.1)
PROT SERPL-MCNC: 6.9 G/DL (ref 6.3–8.2)
PT BLD: 11.8 SEC (ref 9–12)
SODIUM SERPL-SCNC: 132 MMOL/L (ref 137–145)
WBC # BLD AUTO: 4.2 K/UL (ref 3.8–10.6)

## 2022-01-04 PROCEDURE — 83605 ASSAY OF LACTIC ACID: CPT

## 2022-01-04 PROCEDURE — 93308 TTE F-UP OR LMTD: CPT

## 2022-01-04 PROCEDURE — 83735 ASSAY OF MAGNESIUM: CPT

## 2022-01-04 PROCEDURE — 85730 THROMBOPLASTIN TIME PARTIAL: CPT

## 2022-01-04 PROCEDURE — 94640 AIRWAY INHALATION TREATMENT: CPT

## 2022-01-04 PROCEDURE — 96375 TX/PRO/DX INJ NEW DRUG ADDON: CPT

## 2022-01-04 PROCEDURE — 85610 PROTHROMBIN TIME: CPT

## 2022-01-04 PROCEDURE — 87186 SC STD MICRODIL/AGAR DIL: CPT

## 2022-01-04 PROCEDURE — 99291 CRITICAL CARE FIRST HOUR: CPT

## 2022-01-04 PROCEDURE — 87086 URINE CULTURE/COLONY COUNT: CPT

## 2022-01-04 PROCEDURE — 87077 CULTURE AEROBIC IDENTIFY: CPT

## 2022-01-04 PROCEDURE — 85025 COMPLETE CBC W/AUTO DIFF WBC: CPT

## 2022-01-04 PROCEDURE — 87635 SARS-COV-2 COVID-19 AMP PRB: CPT

## 2022-01-04 PROCEDURE — 83880 ASSAY OF NATRIURETIC PEPTIDE: CPT

## 2022-01-04 PROCEDURE — 81003 URINALYSIS AUTO W/O SCOPE: CPT

## 2022-01-04 PROCEDURE — 71045 X-RAY EXAM CHEST 1 VIEW: CPT

## 2022-01-04 PROCEDURE — 36415 COLL VENOUS BLD VENIPUNCTURE: CPT

## 2022-01-04 PROCEDURE — 85027 COMPLETE CBC AUTOMATED: CPT

## 2022-01-04 PROCEDURE — 87040 BLOOD CULTURE FOR BACTERIA: CPT

## 2022-01-04 PROCEDURE — 3E0333Z INTRODUCTION OF ANTI-INFLAMMATORY INTO PERIPHERAL VEIN, PERCUTANEOUS APPROACH: ICD-10-PCS

## 2022-01-04 PROCEDURE — 96361 HYDRATE IV INFUSION ADD-ON: CPT

## 2022-01-04 PROCEDURE — 93005 ELECTROCARDIOGRAM TRACING: CPT

## 2022-01-04 PROCEDURE — 96374 THER/PROPH/DIAG INJ IV PUSH: CPT

## 2022-01-04 PROCEDURE — 84484 ASSAY OF TROPONIN QUANT: CPT

## 2022-01-04 PROCEDURE — 80053 COMPREHEN METABOLIC PANEL: CPT

## 2022-01-04 RX ADMIN — FUROSEMIDE SCH MG: 10 INJECTION, SOLUTION INTRAMUSCULAR; INTRAVENOUS at 21:40

## 2022-01-04 RX ADMIN — DILTIAZEM HYDROCHLORIDE SCH MLS/HR: 5 INJECTION INTRAVENOUS at 20:25

## 2022-01-04 NOTE — XR
EXAMINATION TYPE: XR chest 1V portable

 

DATE OF EXAM: 1/4/2022

 

COMPARISON: 1/10/2021

 

HISTORY: Short of breath

 

TECHNIQUE:

 

FINDINGS: There is some mild pulmonary interstitial edema. Heart size is top normal. There is slight 
blunting of the right costophrenic angle. There are chest leads.

 

IMPRESSION: There is pulmonary interstitial edema which is new compared to old exam and could be acut
e heart failure. Acute interstitial pneumonia also possible.

## 2022-01-04 NOTE — ED
General Adult HPI





- General


Chief complaint: Shortness of Breath


Stated complaint: ALIDA


Time Seen by Provider: 22 18:51


Source: patient, EMS


Mode of arrival: EMS


Limitations: no limitations





- History of Present Illness


Initial comments: 


Dictation was produced using dragon dictation software. please excuse any 

grammatical, word or spelling errors. 











Chief Complaint: 87-year-old female presents emergency department for cough, 

shortness of breath and fevers





History of Present Illness: Patient is a 87-year-old female she has multiple 

comorbidities including atrial fibrillation, coronary artery disease, heart 

failure and COPD.  She presents to the emergency department for cough or 

shortness of breath.  Patient states that over the last couple days symptoms 

have been progressively worsening.  Patient is a productive cough.  She is 

vaccinated for coronavirus.  No obvious exposures.  She does complain of 

constitutional symptoms including fevers.  No nausea or vomiting.  No abdominal 

pain.  Patient also complaining of some swelling to his lower extremities.  

States is typical of her heart failure exacerbations.  Denies any runny nose or 

sore throat.








The ROS documented in this emergency department record has been reviewed and 

confirmed by me.  Those systems with pertinent positive or negative responses 

have been documented in the HPI.  All other systems are other negative and/or 

noncontributory.








PHYSICAL EXAM:


General Impression: Alert and oriented x3, acute distress secondary to coughing


HEENT: Normocephalic atraumatic, extra-ocular movements intact, pupils equal and

reactive to light bilaterally, mucous membranes moist.


Cardiovascular: Heart regular rate and rhythm


Chest: Able to complete full sentences, no retractions, no tachypnea, diffuse 

lung crackles


Abdomen: abdomen soft, non-tender, non-distended, no organomegaly


Musculoskeletal: Pulses present and equal in all extremities, 1+ peripheral 

edema


Motor:  no focal deficits noted


Neurological: CN II-XII grossly intact, no focal motor or sensory deficits noted


Skin: Intact with no visualized rashes


Psych: Normal affect and mood





ED course: 87-year-old female with multiple cardiopulmonary comorbid disease 

presents to emergency department for cough shortness of breath.  Vital signs 

upon arrival shows Septra 101.2, heart rate of 135, normal blood pressure with 

94% oxygen saturation 4 L is cannula.  Patient reports that she wears oxygen at 

home.  Pharmacy technician approached me and states that patient is noncompliant

with her and a correlation medications for A. fib crit she believes that she 

does not have A. fib.


Laboratory evaluation obtained.  CBC remarkable.  Coag panel is negative.  

Metabolic panel shows findings within acceptable limits.  Troponin is 0.067.  

Patient is history of elevated troponin in 2017.  Hasn't had a positive troponin

since 2017.  Slightly secondary to A. fib.  Brain natruretic peptide is 

9570.  Patient is chronically worse positive.  Patient states she's been 

asymptomatic for 4 weeks.  Patient on a candidate for monoclonal antibodies.  

Patient given Decadron patient started Cardizem for RVR control.  Patient placed

back on 2 L nasal cannula which is her home oxygen found to become hypoxic into 

the high 80s.  Patient's oxygen via nasal cannula was increased to 4 L.  Patient

maintained good saturations in the low 90s with 4 L.  Patient will be admitted 

for treatment of A. fib RVR, hypoxic respiratory failure secondary to COVID-19. 

Case discussed with Dr. Dailey is willing to accept patients care.  Patient is 

here with multiple issues causing her are multifactorial secondary to A. fib, 

COVID-19 and heart failure.  Patient treated with Lasix, Decadron.  Patient 

placed back on heparin embolic prophylaxis.





EKG interpretation: Ventricular rate 135, A. fib with rapid ventricular rate, 

, QTc 501. No NV prolongation, no QTC prolongation, no ST or T-wave 

changes noted.  EKG compared to 10/20/2021 showing no changes.  Overall, this 

EKG is unremarkable











- Related Data


                                Home Medications











 Medication  Instructions  Recorded  Confirmed


 


Fluticasone/Salmeterol [Advair 1 puff INHALATION RT-BID 17





250-50 Diskus]   


 


Furosemide [Lasix] 40 mg PO BID 17


 


Tiotropium 18 Mcg/Puff [Spiriva] 1 cap INHALATION RT-DAILY 17


 


Cholecalciferol (Vitamin D3) 2,000 unit PO DAILY 18





[Vitamin D3]   


 


LORazepam [Ativan] 1 mg PO HS 18


 


Ferrous Sulfate [Feosol] 325 mg PO DAILY 18


 


Amiodarone [Cordarone] 50 mg PO DAILY 22


 


Azithromycin [Zithromax Z-pack (6 See Taper PO DAILY 22





tabs)]   


 


Metoprolol Tartrate [Lopressor] 25 mg PO BID 22


 


Simvastatin [Zocor] 40 mg PO HS 22











                                    Allergies











Allergy/AdvReac Type Severity Reaction Status Date / Time


 


amoxicillin [From Augmentin] Allergy  Unknown Verified 22 19:34


 


cefuroxime axetil Allergy  Unknown Verified 22 19:34





[From Ceftin]     


 


clavulanic acid Allergy  Unknown Verified 22 19:34





[From Augmentin]     














Review of Systems


ROS Statement: 


Those systems with pertinent positive or pertinent negative responses have been 

documented in the HPI.





ROS Other: All systems not noted in ROS Statement are negative.





Past Medical History


Past Medical History: Atrial Fibrillation, Coronary Artery Disease (CAD), Heart 

Failure, COPD, GERD/Reflux, GI Bleed, Hearing Disorder / Deafness, Hyperlipidemi

a, Hypertension, Myocardial Infarction (MI), Osteoarthritis (OA), Pneumonia, 

Renal Disease, Respiratory Disorder


Additional Past Medical History / Comment(s): HEARING AIDS., CHF , HX OF C-DIFF 

("A FEW YRS AGO")., DIVERTICULOSIS, BRONCHITIS,  02 @ 2 LITERS N/C AT NIGHT, 

ROSACEA., STATES DIARRHEA FOR 7 WEEKS-STOOLS DARK., STATES CHECKING FOR C-DIFF.


Last Myocardial Infarction Date:: unsure of date


History of Any Multi-Drug Resistant Organisms: None Reported


Past Surgical History: Adenoidectomy, Appendectomy, Heart Catheterization, 

Hysterectomy, Orthopedic Surgery, Tonsillectomy


Additional Past Surgical History / Comment(s): Right knee arthroscopy for torn 

meniscus. Colonoscopies ,  Bilateral eyelid surgery. PARTIAL HYSTERECTOMY STILL 

HAS PART OF LT OVARY, EGD.


Past Anesthesia/Blood Transfusion Reactions: No Reported Reaction


Additional Past Anesthesia/Blood Transfusion Reaction / Comment(s): .


Past Psychological History: Anxiety


Smoking Status: Former smoker


Past Alcohol Use History: Daily


Past Drug Use History: None Reported





- Past Family History


  ** Brother(s)


Additional Family Medical History / Comment(s): Patient had 3 brothers and one 

is living and 93 years of age and [place] with history of Alzheimer's dementia, 

coronary artery disease, CHF, A. fib, COPD.  Patient has 2 brothers that have 

passed one from alcoholism and one from a traumatic head injury from a fall.





  ** Sister(s)


Additional Family Medical History / Comment(s): Patient has one sister that 

of ovarian cancer.  Patient has one son that  from alcoholic complications.





  ** Father


Family Medical History: Cancer


Additional Family Medical History / Comment(s): Father  at age 77yrs of 

cancer which pt believes may have started in his throat.





  ** Mother


Family Medical History: Dementia


Additional Family Medical History / Comment(s): Mother  at age 95yrs.  She 

was very healthy most of her life- she had dementia at the very end of her life.





General Exam


Limitations: no limitations





Course


                                   Vital Signs











  22





  18:36


 


Temperature 101.2 F H


 


Pulse Rate 135 H


 


Respiratory 20





Rate 


 


Blood Pressure 134/82


 


O2 Sat by Pulse 94 L





Oximetry 














Medical Decision Making





- Lab Data


Result diagrams: 


                                 22 19:08





                                 22 19:08


                                   Lab Results











  22 Range/Units





  18:53 19:08 19:08 


 


WBC   4.2   (3.8-10.6)  k/uL


 


RBC   3.77 L   (3.80-5.40)  m/uL


 


Hgb   12.2   (11.4-16.0)  gm/dL


 


Hct   37.6   (34.0-46.0)  %


 


MCV   99.8   (80.0-100.0)  fL


 


MCH   32.5   (25.0-35.0)  pg


 


MCHC   32.5   (31.0-37.0)  g/dL


 


RDW   13.2   (11.5-15.5)  %


 


Plt Count   144 L   (150-450)  k/uL


 


MPV   8.5   


 


Neutrophils %   77   %


 


Lymphocytes %   12   %


 


Monocytes %   7   %


 


Eosinophils %   2   %


 


Basophils %   1   %


 


Neutrophils #   3.2   (1.3-7.7)  k/uL


 


Lymphocytes #   0.5 L   (1.0-4.8)  k/uL


 


Monocytes #   0.3   (0-1.0)  k/uL


 


Eosinophils #   0.1   (0-0.7)  k/uL


 


Basophils #   0.0   (0-0.2)  k/uL


 


PT    11.8  (9.0-12.0)  sec


 


INR    1.1  (<1.2)  


 


APTT    23.0  (22.0-30.0)  sec


 


Sodium     (137-145)  mmol/L


 


Potassium     (3.5-5.1)  mmol/L


 


Chloride     ()  mmol/L


 


Carbon Dioxide     (22-30)  mmol/L


 


Anion Gap     mmol/L


 


BUN     (7-17)  mg/dL


 


Creatinine     (0.52-1.04)  mg/dL


 


Est GFR (CKD-EPI)AfAm     (>60 ml/min/1.73 sqM)  


 


Est GFR (CKD-EPI)NonAf     (>60 ml/min/1.73 sqM)  


 


Glucose     (74-99)  mg/dL


 


Plasma Lactic Acid Brandon     (0.7-2.0)  mmol/L


 


Calcium     (8.4-10.2)  mg/dL


 


Magnesium     (1.6-2.3)  mg/dL


 


Total Bilirubin     (0.2-1.3)  mg/dL


 


AST     (14-36)  U/L


 


ALT     (4-34)  U/L


 


Alkaline Phosphatase     ()  U/L


 


Troponin I     (0.000-0.034)  ng/mL


 


NT-Pro-B Natriuret Pep     pg/mL


 


Total Protein     (6.3-8.2)  g/dL


 


Albumin     (3.5-5.0)  g/dL


 


Coronavirus (PCR)  Detected A    (Not Detectd)  














  22 Range/Units





  19:08 19:08 19:08 


 


WBC     (3.8-10.6)  k/uL


 


RBC     (3.80-5.40)  m/uL


 


Hgb     (11.4-16.0)  gm/dL


 


Hct     (34.0-46.0)  %


 


MCV     (80.0-100.0)  fL


 


MCH     (25.0-35.0)  pg


 


MCHC     (31.0-37.0)  g/dL


 


RDW     (11.5-15.5)  %


 


Plt Count     (150-450)  k/uL


 


MPV     


 


Neutrophils %     %


 


Lymphocytes %     %


 


Monocytes %     %


 


Eosinophils %     %


 


Basophils %     %


 


Neutrophils #     (1.3-7.7)  k/uL


 


Lymphocytes #     (1.0-4.8)  k/uL


 


Monocytes #     (0-1.0)  k/uL


 


Eosinophils #     (0-0.7)  k/uL


 


Basophils #     (0-0.2)  k/uL


 


PT     (9.0-12.0)  sec


 


INR     (<1.2)  


 


APTT     (22.0-30.0)  sec


 


Sodium  132 L    (137-145)  mmol/L


 


Potassium  4.7    (3.5-5.1)  mmol/L


 


Chloride  92 L    ()  mmol/L


 


Carbon Dioxide  29    (22-30)  mmol/L


 


Anion Gap  11    mmol/L


 


BUN  34 H    (7-17)  mg/dL


 


Creatinine  1.20 H    (0.52-1.04)  mg/dL


 


Est GFR (CKD-EPI)AfAm  47    (>60 ml/min/1.73 sqM)  


 


Est GFR (CKD-EPI)NonAf  41    (>60 ml/min/1.73 sqM)  


 


Glucose  123 H    (74-99)  mg/dL


 


Plasma Lactic Acid Brandon   1.5   (0.7-2.0)  mmol/L


 


Calcium  8.8    (8.4-10.2)  mg/dL


 


Magnesium  2.1    (1.6-2.3)  mg/dL


 


Total Bilirubin  1.4 H    (0.2-1.3)  mg/dL


 


AST  235 H    (14-36)  U/L


 


ALT  126 H    (4-34)  U/L


 


Alkaline Phosphatase  55    ()  U/L


 


Troponin I    0.067 H*  (0.000-0.034)  ng/mL


 


NT-Pro-B Natriuret Pep     pg/mL


 


Total Protein  6.9    (6.3-8.2)  g/dL


 


Albumin  4.3    (3.5-5.0)  g/dL


 


Coronavirus (PCR)     (Not Detectd)  














  22 Range/Units





  19:08 


 


WBC   (3.8-10.6)  k/uL


 


RBC   (3.80-5.40)  m/uL


 


Hgb   (11.4-16.0)  gm/dL


 


Hct   (34.0-46.0)  %


 


MCV   (80.0-100.0)  fL


 


MCH   (25.0-35.0)  pg


 


MCHC   (31.0-37.0)  g/dL


 


RDW   (11.5-15.5)  %


 


Plt Count   (150-450)  k/uL


 


MPV   


 


Neutrophils %   %


 


Lymphocytes %   %


 


Monocytes %   %


 


Eosinophils %   %


 


Basophils %   %


 


Neutrophils #   (1.3-7.7)  k/uL


 


Lymphocytes #   (1.0-4.8)  k/uL


 


Monocytes #   (0-1.0)  k/uL


 


Eosinophils #   (0-0.7)  k/uL


 


Basophils #   (0-0.2)  k/uL


 


PT   (9.0-12.0)  sec


 


INR   (<1.2)  


 


APTT   (22.0-30.0)  sec


 


Sodium   (137-145)  mmol/L


 


Potassium   (3.5-5.1)  mmol/L


 


Chloride   ()  mmol/L


 


Carbon Dioxide   (22-30)  mmol/L


 


Anion Gap   mmol/L


 


BUN   (7-17)  mg/dL


 


Creatinine   (0.52-1.04)  mg/dL


 


Est GFR (CKD-EPI)AfAm   (>60 ml/min/1.73 sqM)  


 


Est GFR (CKD-EPI)NonAf   (>60 ml/min/1.73 sqM)  


 


Glucose   (74-99)  mg/dL


 


Plasma Lactic Acid Brandon   (0.7-2.0)  mmol/L


 


Calcium   (8.4-10.2)  mg/dL


 


Magnesium   (1.6-2.3)  mg/dL


 


Total Bilirubin   (0.2-1.3)  mg/dL


 


AST   (14-36)  U/L


 


ALT   (4-34)  U/L


 


Alkaline Phosphatase   ()  U/L


 


Troponin I   (0.000-0.034)  ng/mL


 


NT-Pro-B Natriuret Pep  9570  pg/mL


 


Total Protein   (6.3-8.2)  g/dL


 


Albumin   (3.5-5.0)  g/dL


 


Coronavirus (PCR)   (Not Detectd)  














Critical Care Time


Critical Care Time: Yes


Total Critical Care Time: 33





Disposition


Clinical Impression: 


 COVID-19, Heart failure, Hypoxia





Disposition: ADMITTED AS IP TO THIS [facility]


Condition: Critical


Referrals: 


Lenin Dailey MD [Primary Care Provider] - 1-2 days

## 2022-01-05 LAB
PH UR: 5.5 [PH] (ref 5–8)
SP GR UR: 1.01 (ref 1–1.03)
UROBILINOGEN UR QL STRIP: <2 MG/DL (ref ?–2)

## 2022-01-05 RX ADMIN — Medication SCH MG: at 09:43

## 2022-01-05 RX ADMIN — ALBUTEROL SULFATE SCH: 90 AEROSOL, METERED RESPIRATORY (INHALATION) at 12:51

## 2022-01-05 RX ADMIN — FUROSEMIDE SCH MG: 10 INJECTION, SOLUTION INTRAMUSCULAR; INTRAVENOUS at 21:59

## 2022-01-05 RX ADMIN — AMIODARONE HYDROCHLORIDE SCH MG: 100 TABLET ORAL at 09:44

## 2022-01-05 RX ADMIN — TIOTROPIUM BROMIDE INHALATION SPRAY SCH: 3.12 SPRAY, METERED RESPIRATORY (INHALATION) at 10:54

## 2022-01-05 RX ADMIN — BUDESONIDE AND FORMOTEROL FUMARATE DIHYDRATE SCH: 160; 4.5 AEROSOL RESPIRATORY (INHALATION) at 21:48

## 2022-01-05 RX ADMIN — LEVOFLOXACIN SCH MLS/HR: 250 INJECTION, SOLUTION INTRAVENOUS at 22:08

## 2022-01-05 RX ADMIN — APIXABAN SCH MG: 2.5 TABLET, FILM COATED ORAL at 21:59

## 2022-01-05 RX ADMIN — Medication SCH MCG: at 09:43

## 2022-01-05 RX ADMIN — DEXTROSE SCH MG: 50 INJECTION, SOLUTION INTRAVENOUS at 09:44

## 2022-01-05 RX ADMIN — OXYCODONE HYDROCHLORIDE AND ACETAMINOPHEN SCH MG: 500 TABLET ORAL at 21:59

## 2022-01-05 RX ADMIN — FUROSEMIDE SCH MG: 10 INJECTION, SOLUTION INTRAMUSCULAR; INTRAVENOUS at 09:45

## 2022-01-05 RX ADMIN — DILTIAZEM HYDROCHLORIDE SCH MLS/HR: 5 INJECTION INTRAVENOUS at 11:52

## 2022-01-05 RX ADMIN — ALBUTEROL SULFATE SCH PUFF: 90 AEROSOL, METERED RESPIRATORY (INHALATION) at 17:17

## 2022-01-05 RX ADMIN — ALBUTEROL SULFATE SCH: 90 AEROSOL, METERED RESPIRATORY (INHALATION) at 21:47

## 2022-01-05 RX ADMIN — OXYCODONE HYDROCHLORIDE AND ACETAMINOPHEN SCH MG: 500 TABLET ORAL at 09:44

## 2022-01-05 RX ADMIN — ATORVASTATIN CALCIUM SCH MG: 20 TABLET, FILM COATED ORAL at 21:59

## 2022-01-05 RX ADMIN — METOPROLOL TARTRATE SCH MG: 25 TABLET, FILM COATED ORAL at 21:59

## 2022-01-05 RX ADMIN — APIXABAN SCH MG: 2.5 TABLET, FILM COATED ORAL at 09:43

## 2022-01-05 RX ADMIN — METOPROLOL TARTRATE SCH MG: 25 TABLET, FILM COATED ORAL at 09:43

## 2022-01-05 NOTE — ECHOF
Referral Reason:shortness of breath



MEASUREMENTS

--------

HEIGHT: 172.7 cm

WEIGHT: 61.2 kg

BP: 90/62

RVIDd:   3.2 cm     (< 3.3)

IVSd:   0.9 cm     (0.6 - 1.1)

LVIDd:   3.7 cm     (3.9 - 5.3)

LVPWd:   1.1 cm     (0.6 - 1.1)

IVSs:   1.3 cm

LVIDs:   3.0 cm

LVPWs:   1.3 cm

LA Diam:   3.8 cm     (2.7 - 3.8)

LAESV Index (A-L):   30.10 ml/m

RAP:   5.00 mmHg

RVSP:   61.41 mmHg







FINDINGS

--------

Atrial fibrillation.

This was a technically adequate study.   Limited Study

The left ventricular size is normal.   Left ventricular wall thickness is normal.   Overall left vent
ricular systolic function is moderate-severely impaired with, an EF between 30 - 35 %.

LA is midly dilated 29-33ml/m2.

The right atrium is mildly enlarged.

There is mild aortic regurgitation.

Moderate mitral regurgitation is present.

Severe tricuspid regurgitation present.   There is severe pulmonary hypertension.   The right ventric
ular systolic pressure, as measured by Doppler, is 61.41mmHg.

There is no pericardial effusion.



CONCLUSIONS

--------

1. The left ventricular size is normal.

2. Left ventricular wall thickness is normal.

3. Overall left ventricular systolic function is moderate-severely impaired with, an EF between 30 - 
35 %.

4. LA is midly dilated 29-33ml/m2.

5. The right atrium is mildly enlarged.

6. There is mild aortic regurgitation.

7. Moderate mitral regurgitation is present.

8. Severe tricuspid regurgitation present.

9. There is severe pulmonary hypertension.

10. The right ventricular systolic pressure, as measured by Doppler, is 61.41mmHg.





SONOGRAPHER: eCe Garcia RD

## 2022-01-05 NOTE — P.HPIM
History of Present Illness


H&P Date: 22








HISTORY OF PRESENT ILLNESS


This is an 87-year-old female patient of Dr. Dailey and Dr. Pope with past 

medical history of chronic persistent atrial fibrillation, chronic systolic 

heart failure, pulmonary hypertension, COPD with chronic hypoxic respiratory 

failure on home O2 at 3 L as needed, hypertension, hyperlipidemia, chronic 

kidney disease stage III, generalized osteoarthritis


Patient gives history that starting 4 weeks ago she developed a sore throat and 

the following week he was significantly worse.  She called the office for 

appointment but was unable to come into the building due to need for Covid 

testing which she completed and came back negative.  She was complaining by the 

time of fever and earache and was placed on a steroid.  She subsequently 

developed a rash and presented to the emergency center on  for possible

ALLERGIC reaction and was subsequently placed on a Z-Daniel.  Covid testing at that

time was also negative.  Regarding anticoagulation.  She states she was quite 

anxious reading the side effects of eliquis and decided to stop taking it and 

instead was taking aspirin 81 mg.  Patient has been fully vaccinated but has not

received a booster which she was due for.  


She presented again on  to the emergency center due to cough shortness 

of breath and fevers for the past couple days and gradually worsening.  She 

denied having any abdominal pain, nausea or vomiting.  EKG was atrial 

fibrillation with RVR.  Heart rate was running in the 120s and 130s and patient 

was started on Cardizem drip, heparin drip and IV Lasix 40 mg twice daily.  CBC 

was unremarkable except for a platelet count of 144.  Sodium 132, potassium 4.7,

chloride 92, CO2 29, BUN 34 creatinine 1.2.  Blood sugar 123.  INR 1.1.  Total 

bilirubin 1.4, , .  Alkaline phosphatase 55.  Troponin 0.067.  

Lactic acid 1.5.  Coronal virus PCR detected.  ProBNP 9570.  


Chest x-ray reveals pulmonary interstitial edema could be acute heart failure.  

Acute interstitial pneumonia also possible.


Echocardiogram reveals EF of 30-35%, mild aortic regurgitation, moderate mitral 

regurgitation, severe tricuspid regurgitation, severe pulmonary hypertension.


Patient has been seen by cardiology as well as by pulmonary medicine.





REVIEW OF SYSTEMS


Constitutional: No fever, no chills, no night sweats.  No weight change.  No 

weakness, fatigue or lethargy.  No daytime sleepiness.


EENT: No headache.  No blurred vision or double vision, no loss of vision.  No 

loss of Hearing, no ringing in the ears, no dizziness.  No nasal drainage or 

congestion.  No epistaxis.  No sore throat.


Lungs: Reports shortness of breath, reports cough, no sputum production.  No 

wheezing.


Cardiovascular: No chest pain, no lower extremity edema.  No palpitations.  No 

paroxysmal nocturnal dyspnea.  No orthopnea.  No lightheadedness or dizziness.  

No syncopal episodes.


Abdominal: No abdominal pain.  No nausea, vomiting.  No diarrhea.  No 

constipation.  No bloody or tarry stools.  No loss of appetite.


Genitourinary: No dysuria, increased frequency, urgency.  No urinary retention.


Musculoskeletal: No myalgias.  No muscle weakness, no gait dysfunction, no 

frequent falls.  No back pain.  No neck pain.


Integumentary: No wounds, no lesions.  No rash or pruritus.  No unusual 

bruising.  No change in hair or nails.


Neurologic: No aphasia. No facial droop. No change in mentation. No head injury.

No headache. No paralysis. No paresthesia.


Psychiatric: No depression.  Reports anxiety.  No mood swings.


Endocrine: No abnormal blood sugars.  No weight change.  No excessive sweating 

or thirst.  No cold intolerance.  





SOCIAL HISTORY


Patient was a smoker for 55 years and quit in .  She states she drinks 1-2 

glasses of wine most nights. 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  She 

denies any recent travel.  She has no pets in the home.  She is  and is 

independent.





FAMILY HISTORY


Mother  at age 95yrs.  She was very healthy most of her life- she had 

dementia at the very end of her life. Father  at age 77yrs of cancer which 

pt believes may have started in his throat. Patient had 3 brothers and one is 

living and 93 years of age and [place] with history of Alzheimer's dementia, 

coronary artery disease, CHF, A. fib, COPD.  Patient has 2 brothers that have 

passed one from alcoholism and one from a traumatic head injury from a fall. 

Patient has one sister that  of ovarian cancer.  Patient has one son that 

 from alcoholic complications.





PHYSICAL EXAMINATION


Gen: This is a thin 87-year-old  female.  She is resting in bed appears

to be comfortable and in no acute distress.


HEENT: Head is atraumatic, normocephalic. Pupils equal, round. Sclerae is 

anicteric. 


NECK: Supple. No JVD. No lymphadenopathy. No thyromegaly. 


LUNGS: Clear to auscultation. No wheezes or rhonchi.  No intercostal 

retractions.


HEART: Irregularly irregular rate and rhythm. No murmur. 


ABDOMEN: Soft. Bowel sounds are present. No masses.  No tenderness.


EXTREMITIES: No pedal edema.  No calf tenderness.


NEUROLOGICAL: Patient is awake, alert and oriented x3. Cranial nerves 2 through 

12 are grossly intact. 





ASSESSMENT AND PLAN


1.  Acute on chronic hypoxic respiratory failure secondary to a combination of 

acute exacerbation of systolic heart failure, possible Covid 19 pneumonia, acute

exacerbation of COPD, A. fib with RVR.  Continue oxygen therapy





2.  Acute exacerbation of systolic heart failure.  Continue Lasix 40 mg IV every

12 hours, I&O and daily weights, monitor renal function and electrolytes.  

Cardiology consult appreciated.





3.  Possible Covid 19 pneumonia.  Pulmonary consult appreciated.  Patient's been

started on Ventolin inhaler 4 times daily, vitamin supplements, Symbicort 2 

puffs twice daily, dexamethasone 6 mg IV push daily, Spiriva.





4.  Acute exacerbation of COPD.  Continue Symbicort, Spiriva, albuterol, 

dexamethasone.





5.  Chronic persistent atrial fibrillation presenting with RVR.  IV heparin has 

been transitioned eliquis 2.5 mg twice daily, continue Cardizem drip, continue 

amiodarone 50 mg daily, Lopressor 25 mg twice daily.





6.  Transaminitis possibly related to Covid 19.  Continue to monitor.





7.  Hypertension.  Continue Lopressor.





8.  Hyperlipidemia.  Continue Lipitor 20 mg at bedtime.





9.  Chronic kidney disease stage III.  Monitor renal function, avoid nephrotoxic

agents.





10.  Valvular heart disease with  mild aortic regurgitation, moderate mitral 

regurgitation, severe tricuspid regurgitation.





11.  Severe pulmonary hypertension.





12.  GI prophylaxis.  Protonix.





13.  DVT prophylaxis.  Eliquis.





14.  COVID-19 testing negative.  Patient has been hospitalized during a 

pandemic.





Patient will be admitted to the hospital for a minimum of 2 night stay.





DISCHARGE PLAN


Home.





Impression and plan of care have been directed as dictated by the signing 

physician.  Carol Hicks nurse practitioner acting as scribe for signing 

physician.





Past Medical History


Past Medical History: Atrial Fibrillation, Coronary Artery Disease (CAD), Heart 

Failure, COPD, Eye Disorder, GERD/Reflux, GI Bleed, Hearing Disorder / Deafness,

Hyperlipidemia, Hypertension, Myocardial Infarction (MI), Osteoarthritis (OA), 

Pneumonia, Renal Disease, Respiratory Disorder, Skin Disorder


Additional Past Medical History / Comment(s): Covid+ 21 at [facility].   Pt 

diagnosed 21 with pharyngitis and is on antibiotic, nonischemic 

cardiomyopathy, pulmonary HTN, bronchitis, O2 at 2L/HS, gastritis, diverticular 

disease, Habematolel/aides bilaterally, bilateral eye glaucoma, arthritis bilateral 

hands, CKD, rosacia, past lumbar slipped disc/pain/improved now.


Last Myocardial Infarction Date:: 


History of Any Multi-Drug Resistant Organisms: None Reported


Past Surgical History: Adenoidectomy, Appendectomy, Heart Catheterization, 

Hysterectomy, Orthopedic Surgery, Tonsillectomy


Additional Past Surgical History / Comment(s): 2017 cardiac cath/treated 

medically, partial hysterectomy/still has part of L ovary, R knee 

arthroscopy/torn meniscus, EGD, colonoscopies, bilateral blepharoplasties, 

bilateral eye cataract removal/laser surgery for glaucoma.


Past Anesthesia/Blood Transfusion Reactions: No Reported Reaction


Additional Past Anesthesia/Blood Transfusion Reaction / Comment(s): .


Smoking Status: Former smoker





- Past Family History


  ** Brother(s)


Additional Family Medical History / Comment(s): Patient had 3 brothers and one 

is living and 93 years of age and [place] with history of Alzheimer's dementia, 

coronary artery disease, CHF, A. fib, COPD.  Patient has 2 brothers that have 

passed one from alcoholism and one from a traumatic head injury from a fall.





  ** Sister(s)


Additional Family Medical History / Comment(s): Patient has one sister that 

of ovarian cancer.  Patient has one son that  from alcoholic complications.





  ** Father


Family Medical History: Cancer


Additional Family Medical History / Comment(s): Father  at age 77yrs of 

cancer which pt believes may have started in his throat.





  ** Mother


Family Medical History: Dementia


Additional Family Medical History / Comment(s): Mother  at age 95yrs.  She 

was very healthy most of her life- she had dementia at the very end of her life.





Medications and Allergies


                                Home Medications











 Medication  Instructions  Recorded  Confirmed  Type


 


Fluticasone/Salmeterol [Advair 1 puff INHALATION RT-BID 17 

History





250-50 Diskus]    


 


Furosemide [Lasix] 40 mg PO BID 17 History


 


Tiotropium 18 Mcg/Puff [Spiriva] 1 cap INHALATION RT-DAILY 17 

History


 


Cholecalciferol (Vitamin D3) 2,000 unit PO DAILY 18 History





[Vitamin D3]    


 


LORazepam [Ativan] 1 mg PO HS 18 History


 


Ferrous Sulfate [Feosol] 325 mg PO DAILY 18 History


 


Amiodarone [Cordarone] 50 mg PO DAILY 22 History


 


Azithromycin [Zithromax Z-pack (6 See Taper PO DAILY 22 History





tabs)]    


 


Metoprolol Tartrate [Lopressor] 25 mg PO BID 22 History


 


Simvastatin [Zocor] 40 mg PO HS 22 History








                                    Allergies











Allergy/AdvReac Type Severity Reaction Status Date / Time


 


amoxicillin [From Augmentin] Allergy  Unknown Verified 22 19:34


 


cefuroxime axetil Allergy  Unknown Verified 22 19:34





[From Ceftin]     


 


clavulanic acid Allergy  Unknown Verified 22 19:34





[From Augmentin]     














Physical Exam


Vitals: 


                                   Vital Signs











  Temp Pulse Resp BP Pulse Ox


 


 22 08:00    18  


 


 22 07:46  98.9 F  112 H  18  90/62  95


 


 22 07:28   112 H  18  90/62  95


 


 22 05:42   127 H  20  84/62  96


 


 22 02:20   128 H  18  87/54  93 L


 


 22 02:00   131 H  16  79/61  95


 


 22 00:30   144 H  17  89/71  94 L


 


 22 00:00   142 H  22  92/70  94 L


 


 22 23:30   131 H  19  92/55  94 L


 


 22 23:00   133 H  19  99/61  93 L


 


 22 22:30   124 H  18  102/69  94 L


 


 22 22:00  98.9 F  111 H  18  98/64  92 L


 


 22 21:30   120 H  16  83/51  91 L


 


 22 21:00   135 H  20  96/53  92 L


 


 22 20:30   128 H  17  133/74  91 L


 


 22 20:00   138 H  17  131/77  92 L


 


 22 18:36  101.2 F H  135 H  20  134/82  94 L








                                Intake and Output











 22





 22:59 06:59 14:59


 


Intake Total   398.022


 


Output Total   300


 


Balance   98.022


 


Intake:   


 


  Intake, IV Titration   158.022





  Amount   


 


    Diltiazem 125 mg In   66.417





    Sodium Chloride 0.9% 100   





    ml @ 10 MG/HR 10 mls/hr   





    IV .[id number] [facility] Rx#:   





    [id number]   


 


    Heparin Sod,Pork in 0.45%   91.605





    NaCl 25,000 unit In 0.45   





    % NaCl 1 250ml.bag @ 12   





    UNITS/KG/HR 7.348 mls/hr   





    IV .Q24H [facility] Rx#:   





    [id number]   


 


  Oral   240


 


Output:   


 


  Urine   300


 


Other:   


 


  Weight 61.235 kg  61.235 kg














Results


CBC & Chem 7: 


                                 22 19:08





                                 22 19:08


Labs: 


                  Abnormal Lab Results - Last 24 Hours (Table)











  22 Range/Units





  18:53 19:08 19:08 


 


RBC   3.77 L   (3.80-5.40)  m/uL


 


Plt Count   144 L   (150-450)  k/uL


 


Lymphocytes #   0.5 L   (1.0-4.8)  k/uL


 


APTT     (22.0-30.0)  sec


 


Sodium    132 L  (137-145)  mmol/L


 


Chloride    92 L  ()  mmol/L


 


BUN    34 H  (7-17)  mg/dL


 


Creatinine    1.20 H  (0.52-1.04)  mg/dL


 


Glucose    123 H  (74-99)  mg/dL


 


Total Bilirubin    1.4 H  (0.2-1.3)  mg/dL


 


AST    235 H  (14-36)  U/L


 


ALT    126 H  (4-34)  U/L


 


Troponin I     (0.000-0.034)  ng/mL


 


Coronavirus (PCR)  Detected A    (Not Detectd)  














  22 Range/Units





  19:08 01:43 07:26 


 


RBC     (3.80-5.40)  m/uL


 


Plt Count     (150-450)  k/uL


 


Lymphocytes #     (1.0-4.8)  k/uL


 


APTT   46.7 H  45.4 H  (22.0-30.0)  sec


 


Sodium     (137-145)  mmol/L


 


Chloride     ()  mmol/L


 


BUN     (7-17)  mg/dL


 


Creatinine     (0.52-1.04)  mg/dL


 


Glucose     (74-99)  mg/dL


 


Total Bilirubin     (0.2-1.3)  mg/dL


 


AST     (14-36)  U/L


 


ALT     (4-34)  U/L


 


Troponin I  0.067 H*    (0.000-0.034)  ng/mL


 


Coronavirus (PCR)     (Not Detectd)  














Thrombosis Risk Factor Assmnt





- Choose All That Apply


Any of the Below Risk Factors Present?: Yes


Each Factor Represents 1 point: Abnormal pulmonary function (COPD), Heart 

failure (<1month), Serious lung disease incl. pneumonia (< 1month)


Other Risk Factors: Yes


Each Risk Factor Represents 3 Points: Age 75 years or older


Other congenital or acquired thrombophilia - If yes, enter type in comment: No


Thrombosis Risk Factor Assessment Total Risk Factor Score: 6


Thrombosis Risk Factor Assessment Level: High Risk

## 2022-01-05 NOTE — P.CNPUL
History of Present Illness


Consult date: 22


Requesting physician: Tobin Mcconnell


Reason for consult: dyspnea


Chief complaint: Dyspnea


History of present illness: 


 This is a 87-year-old white female patient of Dr. Dailey, with past medical h

istory of moderately severe COPD, Gold stage III disease at baseline on home 

oxygen at 3 L/m on as-needed basis, chronic systolic CHF with EF of 20-25%, 

paroxysmal atrial fibrillation used to be on Eliquis, which is not on her home 

med list now, anxiety, pulmonary hypertension related to chronic lung disease 

and chronic CHF, chronic kidney disease stage III, glaucoma and osteoarthritis. 

Patient follows with Dr. Portillo in the pulmonary clinic, she is maintained on 

Advair 250/50 Diskus and Spiriva on a daily basis.  She came into the emergency 

department on 2022 at 1850 1 in the afternoon per EMS for evaluation of 

cough, shortness of breath and fevers.  Patient states over the last couple of 

days or shortness of breath has been progressively worse, she has a productive 

cough, she isn't vaccinated for COVID 19, denies any obvious exposure.  She is 

complaining about constitutional symptoms including fevers, no nausea or 

vomiting, no abdominal pain, she has some swelling in her bilateral lower 

extremities, denied any chest pain.  Denies any runny nose or sore throat.  She 

was noted to be in A. fib with RVR and was started on Cardizem drip and heparin 

infusion, her chest x-ray showing pulmonary interstitial edema.  She also tested

positive for COVID 19.  The rest of the blood work showed normal white count of 

4.2, hemoglobin 12.2, lymphocyte count of 0.5, sodium is 132, potassium is 4.7, 

chloride is 92, CO2 is 29, BUN is 34, creatinine is 1.2, plasma lactic acid is 

1.5, AST was 235, ALT was 126, alk phos was 55, troponin was 0.067, proBNP was 

elevated at 9570.  She did have a fever of 101.2F.  Currently remains in A. fib

with RVR with a rate of 120 to 1:30 BPM, continues on Cardizem infusion and 5 

mg, she is currently on 3 L of oxygen pulse ox is 94%, her blood pressures are 

on the lower side with a BP of 89/71.  Echocardiogram has been ordered and 

pending, patient was started on IV Lasix 40 mg twice daily.  We started the on 

Decadron 6 mg daily, she continues on heparin infusion, cardiology consultation 

is pending, she appears to be in no acute distress. 








Review of Systems


All systems: negative


Constitutional: Denies chills, Denies fever


Eyes: denies blurred vision, denies pain


Ears, nose, mouth and throat: Denies headache, Denies sore throat


Cardiovascular: Denies chest pain, Denies shortness of breath


Respiratory: Reports cough with sputum, Reports dyspnea, Reports home oxygen, 

Denies cough


Gastrointestinal: Denies abdominal pain, Denies diarrhea, Denies nausea, Denies 

vomiting


Genitourinary: Denies dysuria, Denies hematuria


Musculoskeletal: Denies myalgias


Integumentary: Denies pruritus, Denies rash


Neurological: Denies numbness, Denies weakness


Psychiatric: Denies anxiety, Denies depression


Endocrine: Denies fatigue, Denies weight change





Past Medical History


Past Medical History: Atrial Fibrillation, Coronary Artery Disease (CAD), Heart 

Failure, COPD, Eye Disorder, GERD/Reflux, GI Bleed, Hearing Disorder / Deafness,

Hyperlipidemia, Hypertension, Myocardial Infarction (MI), Osteoarthritis (OA), 

Pneumonia, Renal Disease, Respiratory Disorder, Skin Disorder


Additional Past Medical History / Comment(s): Covid+ 21 at [facility].   Pt 

diagnosed 21 with pharyngitis and is on antibiotic, nonischemic 

cardiomyopathy, pulmonary HTN, bronchitis, O2 at 2L/HS, gastritis, diverticular 

disease, [place]/aides bilaterally, bilateral eye glaucoma, arthritis bilateral 

hands, CKD, rosacia, past lumbar slipped disc/pain/improved now.


Last Myocardial Infarction Date:: 


History of Any Multi-Drug Resistant Organisms: None Reported


Past Surgical History: Adenoidectomy, Appendectomy, Heart Catheterization, 

Hysterectomy, Orthopedic Surgery, Tonsillectomy


Additional Past Surgical History / Comment(s): 2017 cardiac cath/treated 

medically, partial hysterectomy/still has part of L ovary, R knee 

arthroscopy/torn meniscus, EGD, colonoscopies, bilateral blepharoplasties, 

bilateral eye cataract removal/laser surgery for glaucoma.


Past Anesthesia/Blood Transfusion Reactions: No Reported Reaction


Additional Past Anesthesia/Blood Transfusion Reaction / Comment(s): .


Smoking Status: Former smoker





- Past Family History


  ** Brother(s)


Additional Family Medical History / Comment(s): Patient had 3 brothers and one 

is living and 93 years of age and [place] with history of Alzheimer's dementia, 

coronary artery disease, CHF, A. fib, COPD.  Patient has 2 brothers that have 

passed one from alcoholism and one from a traumatic head injury from a fall.





  ** Sister(s)


Additional Family Medical History / Comment(s): Patient has one sister that 

of ovarian cancer.  Patient has one son that  from alcoholic complications.





  ** Father


Family Medical History: Cancer


Additional Family Medical History / Comment(s): Father  at age 77yrs of 

cancer which pt believes may have started in his throat.





  ** Mother


Family Medical History: Dementia


Additional Family Medical History / Comment(s): Mother  at age 95yrs.  She 

was very healthy most of her life- she had dementia at the very end of her life.





Medications and Allergies


                                Home Medications











 Medication  Instructions  Recorded  Confirmed  Type


 


Fluticasone/Salmeterol [Advair 1 puff INHALATION RT-BID 17 

History





250-50 Diskus]    


 


Furosemide [Lasix] 40 mg PO BID 17 History


 


Tiotropium 18 Mcg/Puff [Spiriva] 1 cap INHALATION RT-DAILY 17 H

istory


 


Cholecalciferol (Vitamin D3) 2,000 unit PO DAILY 18 History





[Vitamin D3]    


 


LORazepam [Ativan] 1 mg PO HS 18 History


 


Ferrous Sulfate [Feosol] 325 mg PO DAILY 18 History


 


Amiodarone [Cordarone] 50 mg PO DAILY 22 History


 


Azithromycin [Zithromax Z-pack (6 See Taper PO DAILY 22 History





tabs)]    


 


Metoprolol Tartrate [Lopressor] 25 mg PO BID 22 History


 


Simvastatin [Zocor] 40 mg PO HS 22 History








                                    Allergies











Allergy/AdvReac Type Severity Reaction Status Date / Time


 


amoxicillin [From Augmentin] Allergy  Unknown Verified 22 19:34


 


cefuroxime axetil Allergy  Unknown Verified 22 19:34





[From Ceftin]     


 


clavulanic acid Allergy  Unknown Verified 22 19:34





[From Augmentin]     














Physical Exam


Vitals: 


                                   Vital Signs











  Temp Pulse Resp BP Pulse Ox


 


 22 07:46  98.9 F  112 H  18  90/62  95


 


 22 07:28   112 H  18  90/62  95


 


 22 05:42   127 H  20  84/62  96


 


 22 02:20   128 H  18  87/54  93 L


 


 22 02:00   131 H  16  79/61  95


 


 22 00:30   144 H  17  89/71  94 L


 


 22 00:00   142 H  22  92/70  94 L


 


 22 23:30   131 H  19  92/55  94 L


 


 22 23:00   133 H  19  99/61  93 L


 


 22 22:30   124 H  18  102/69  94 L


 


 22 22:00  98.9 F  111 H  18  98/64  92 L


 


 22 21:30   120 H  16  83/51  91 L


 


 22 21:00   135 H  20  96/53  92 L


 


 22 20:30   128 H  17  133/74  91 L


 


 22 20:00   138 H  17  131/77  92 L


 


 22 18:36  101.2 F H  135 H  20  134/82  94 L








                                Intake and Output











 22





 22:59 06:59 14:59


 


Intake Total   158.022


 


Balance   158.022


 


Intake:   


 


  Intake, IV Titration   158.022





  Amount   


 


    Diltiazem 125 mg In   66.417





    Sodium Chloride 0.9% 100   





    ml @ 10 MG/HR 10 mls/hr   





    IV .[id number] [facility] Rx#:   





    [id number]   


 


    Heparin Sod,Pork in 0.45%   91.605





    NaCl 25,000 unit In 0.45   





    % NaCl 1 250ml.bag @ 12   





    UNITS/KG/HR 7.348 mls/hr   





    IV .Q24H [facility] Rx#:   





    [id number]   


 


Other:   


 


  Weight 61.235 kg  61.235 kg











 GENERAL EXAM: Alert, hard of hearing, pleasant, 87-year-old white female, on 3 

L of oxygen the pulse ox of 94% comfortable in no apparent distress.


HEAD: Normocephalic/atraumatic.


EYES: Normal reaction of pupils, equal size.  Conjunctiva pink, sclera white.


NOSE: Clear with pink turbinates.


THROAT: No erythema or exudates.


NECK: No masses, no JVD, no thyroid enlargement, no adenopathy.


CHEST: No chest wall deformity.  Symmetrical expansion. 


LUNGS: Equal air entry with diffuse crackles


CVS: Irregular rate and rhythm, normal S1 and S2, no gallops, no murmurs, no 

rubs


ABDOMEN: Soft, nontender.  No hepatosplenomegaly, normal bowel sounds, no 

guarding or rigidity.


EXTREMITIES: No clubbing, mild lower extremity edema no cyanosis, 2+ pulses and 

upper and lower extremities.


MUSCULOSKELETAL: Muscle strength and tone normal.


SPINE: No scoliosis or deformity


SKIN: No rashes


CENTRAL NERVOUS SYSTEM: Alert and oriented -3.  No focal deficits, tone is 

normal in all 4 extremities.


PSYCHIATRIC: Alert and oriented -3.  Appropriate affect.  Intact judgment and 

insight.











Results





- Laboratory Findings


CBC and BMP: 


                                 22 19:08





                                 22 19:08


PT/INR, D-dimer











PT  11.8 sec (9.0-12.0)   22  19:08    


 


INR  1.1  (<1.2)   22  19:08    








Abnormal lab findings: 


                                  Abnormal Labs











  22





  18:53 19:08 19:08


 


RBC   3.77 L 


 


Plt Count   144 L 


 


Lymphocytes #   0.5 L 


 


APTT   


 


Sodium    132 L


 


Chloride    92 L


 


BUN    34 H


 


Creatinine    1.20 H


 


Glucose    123 H


 


Total Bilirubin    1.4 H


 


AST    235 H


 


ALT    126 H


 


Troponin I   


 


Coronavirus (PCR)  Detected A  














  22





  19:08 01:43 07:26


 


RBC   


 


Plt Count   


 


Lymphocytes #   


 


APTT   46.7 H  45.4 H


 


Sodium   


 


Chloride   


 


BUN   


 


Creatinine   


 


Glucose   


 


Total Bilirubin   


 


AST   


 


ALT   


 


Troponin I  0.067 H*  


 


Coronavirus (PCR)   














- Diagnostic Findings


Chest x-ray: report reviewed, image reviewed





Assessment and Plan


Plan: 


 Assessment:





#1.  Acute on chronic hypoxic respiratory failure, multifactorial, related to 

acute exacerbation of systolic CHF, possibly COVID-19 pneumonia, and acute 

exacerbation of COPD.  Patient presented to the hospital on 2022, with 

multiple issues, as found to have positive for COVID-19, at the same time 

patient had other complex medical issues including A. fib with RVR, acute 

systolic CHF, and COPD exacerbation.  She will be treated with Decadron, and 

anticoagulation.  She is vaccinated Against COVID 19.  Not a candidate for 

Remdesivir in view of her low GFR, and multiple other medical issues





#2.  A. fib with RVR, was started on Cardizem infusion and heparin infusion





#3.  Elevated troponin, rule out possibility of non-ST elevated myocardial 

infarction, please refer to the cardiology consultation





#4.  Acute exacerbation of systolic CHF, and patient has nonischemic 

cardiomyopathy with EF of 20-25%





#5.  Pulmonary hypertension, related to chronic lung and heart disease





#6.  History of paroxysmal atrial fibrillation was on Eliquis in the past





#7.  Stage III COPD, patient is on home oxygen at 3 L on as-needed basis





#8.  Hypertension





#9.  Previous history of myocardial infarction





#10.  Osteoarthritis





#11.  Chronic kidney disease stage III





#12.  History of GI bleeding





#13.  Former smoking history, she carries 55-pack-year smoking history, 

currently in remission





#14.  Anxiety





Plan:





Patient will continue on Decadron 6 mg daily


Agree with Lasix at 40 mg twice daily


Anticoagulation per cardiology


We will add vitamins D, C and zinc


We'll add Symbicort, continue patient's home dose Spiriva


Home medications have been restarted


Cardiology consultation has been requested


Echo has been ordered


continue to follow her clinical course and make further recommendations





I performed a history & physical examination of the patient and discussed their 

management with my nurse practitioner, Yeimy Herrera.  I reviewed the nurse 

practitioner's note and agree with the documented findings and plan of care.  

Lung sounds are positive for dim breath sounds throughout the lung fields.  The 

findings and the impression was discussed with the patient.  I attest to the 

documentation by the nurse practitioner. 


Time with Patient: Greater than 30

## 2022-01-05 NOTE — P.CRDCN
History of Present Illness


Consult date: 22


History of present illness: 





CHIEF COMPLAINT:  A. fib with RVR





HISTORY OF PRESENT ILLNESS:  This is a 87-year-old female with a past medical 

history significant for chronic persistent atrial fibrillation, congestive heart

failure, hypertension, and hyperlipidemia. Patient follows in the office with 

Dr. Pope. We have been asked to see the patient in consultation for afib with 

RVR. She presented to the hospital secondary to SOB. Patient was found to be 

positive for Covid. Patient is currently in atrial fibrillation with heart rates

in the 130s. She is on a Cardizem drip at 5mg/hr and a Heparin drip. She is also

receiving IV lasix Q12 hours. She is on 3L NC. Blood pressure 60/62.





DIAGNOSTICS:


EKG reveals atrial fibrillation with RVR


Chest xray pulmonary interstitial edema which is new compared to old exam and 

could be acute heart failure.  Acute interstitial pneumonia also possible.


Laboratory data: WBC 4.2.  Hemoglobin 12.2.  Platelet count 144.  Sodium 132.  

Potassium 4.7.  BUN 34.  Creatinine 1.2.  ProBNP 9570.  Troponin 0.067.


Current home cardiac medications include Zocor 40 mg at night, metoprolol 

titrate 25 mg twice a day, Lasix 40 mg twice a day, amiodarone 50 mg daily


Echocardiogram completed in 2020 to cardiology office revealed ejection

fraction 55%, mild to moderate aortic regurgitation, moderate mitral 

regurgitation, severe tricuspid regurgitation


She underwent cardiac catheterization in 2017 revealing minimal coronary artery 

disease





REVIEW OF SYSTEMS: 


Thorough review of systems not completed secondary to limited 

evaluation/examination due to Covid19





PHYSICAL EXAM: 


Thorough physical exam not completed secondary to limited evaluation/examination

due to Covid19





ASSESSMENT: 


Covid 19


Acute hypoxic respiratory failure 


Abnormal troponin, not suggestive of ACS


Acute on chronic diastolic congestive heart failure, EF 55% in 2020


Chronic persistent atrial fibrillation with RVR


Hypertension


Hyperlipidemia


Valvular heart disease


COPD





PLAN: 


Obtain 2D echo to assess cardiac structure and function


Discontinue IV heparin. Begin Eliquis 2.5mg BID


Give Cardizem bolus 7.5mg now and increase infusion to 10mg/hr


Continue IV lasix


Continue additional home cardiac medications


Further recommendations pending patient course





Nurse practitioner note has been reviewed by physician. Signing provider agrees 

with the documented findings, assessment, and plan of care. 








Past Medical History


Past Medical History: Atrial Fibrillation, Coronary Artery Disease (CAD), Heart 

Failure, COPD, Eye Disorder, GERD/Reflux, GI Bleed, Hearing Disorder / Deafness,

Hyperlipidemia, Hypertension, Myocardial Infarction (MI), Osteoarthritis (OA), 

Pneumonia, Renal Disease, Respiratory Disorder, Skin Disorder


Additional Past Medical History / Comment(s): Covid+ 21 at [facility].   Pt 

diagnosed 21 with pharyngitis and is on antibiotic, nonischemic 

cardiomyopathy, pulmonary HTN, bronchitis, O2 at 2L/HS, gastritis, diverticular 

disease, [place]/aides bilaterally, bilateral eye glaucoma, arthritis bilateral 

hands, CKD, rosacia, past lumbar slipped disc/pain/improved now.


Last Myocardial Infarction Date:: 


History of Any Multi-Drug Resistant Organisms: None Reported


Past Surgical History: Adenoidectomy, Appendectomy, Heart Catheterization, 

Hysterectomy, Orthopedic Surgery, Tonsillectomy


Additional Past Surgical History / Comment(s): 2017 cardiac cath/treated 

medically, partial hysterectomy/still has part of L ovary, R knee 

arthroscopy/torn meniscus, EGD, colonoscopies, bilateral blepharoplasties, 

bilateral eye cataract removal/laser surgery for glaucoma.


Past Anesthesia/Blood Transfusion Reactions: No Reported Reaction


Additional Past Anesthesia/Blood Transfusion Reaction / Comment(s): .


Smoking Status: Former smoker





- Past Family History


  ** Brother(s)


Additional Family Medical History / Comment(s): Patient had 3 brothers and one 

is living and 93 years of age and [place] with history of Alzheimer's dementia, 

coronary artery disease, CHF, A. fib, COPD.  Patient has 2 brothers that have 

passed one from alcoholism and one from a traumatic head injury from a fall.





  ** Sister(s)


Additional Family Medical History / Comment(s): Patient has one sister that 

of ovarian cancer.  Patient has one son that  from alcoholic complications.





  ** Father


Family Medical History: Cancer


Additional Family Medical History / Comment(s): Father  at age 77yrs of 

cancer which pt believes may have started in his throat.





  ** Mother


Family Medical History: Dementia


Additional Family Medical History / Comment(s): Mother  at age 95yrs.  She 

was very healthy most of her life- she had dementia at the very end of her life.





Medications and Allergies


                                Home Medications











 Medication  Instructions  Recorded  Confirmed  Type


 


Fluticasone/Salmeterol [Advair 1 puff INHALATION RT-BID 17 

History





250-50 Diskus]    


 


Furosemide [Lasix] 40 mg PO BID 17 History


 


Tiotropium 18 Mcg/Puff [Spiriva] 1 cap INHALATION RT-DAILY 17 

History


 


Cholecalciferol (Vitamin D3) 2,000 unit PO DAILY 18 History





[Vitamin D3]    


 


LORazepam [Ativan] 1 mg PO HS 18 History


 


Ferrous Sulfate [Feosol] 325 mg PO DAILY 18 History


 


Amiodarone [Cordarone] 50 mg PO DAILY 22 History


 


Azithromycin [Zithromax Z-pack (6 See Taper PO DAILY 22 History





tabs)]    


 


Metoprolol Tartrate [Lopressor] 25 mg PO BID 22 History


 


Simvastatin [Zocor] 40 mg PO HS 22 History








                                    Allergies











Allergy/AdvReac Type Severity Reaction Status Date / Time


 


amoxicillin [From Augmentin] Allergy  Unknown Verified 22 19:34


 


cefuroxime axetil Allergy  Unknown Verified 22 19:34





[From Ceftin]     


 


clavulanic acid Allergy  Unknown Verified 22 19:34





[From Augmentin]     














Physical Exam


Vitals: 


                                   Vital Signs











  Temp Pulse Resp BP Pulse Ox


 


 22 07:46  98.9 F  112 H  18  90/62  95


 


 22 07:28   112 H  18  90/62  95


 


 22 05:42   127 H  20  84/62  96


 


 22 02:20   128 H  18  87/54  93 L


 


 22 02:00   131 H  16  79/61  95


 


 22 00:30   144 H  17  89/71  94 L


 


 22 00:00   142 H  22  92/70  94 L


 


 22 23:30   131 H  19  92/55  94 L


 


 22 23:00   133 H  19  99/61  93 L


 


 22 22:30   124 H  18  102/69  94 L


 


 22 22:00  98.9 F  111 H  18  98/64  92 L


 


 22 21:30   120 H  16  83/51  91 L


 


 22 21:00   135 H  20  96/53  92 L


 


 22 20:30   128 H  17  133/74  91 L


 


 22 20:00   138 H  17  131/77  92 L


 


 22 18:36  101.2 F H  135 H  20  134/82  94 L








                                Intake and Output











 22





 22:59 06:59 14:59


 


Intake Total   398.022


 


Output Total   300


 


Balance   98.022


 


Intake:   


 


  Intake, IV Titration   158.022





  Amount   


 


    Diltiazem 125 mg In   66.417





    Sodium Chloride 0.9% 100   





    ml @ 10 MG/HR 10 mls/hr   





    IV .[id number] [facility] Rx#:   





    [id number]   


 


    Heparin Sod,Pork in 0.45%   91.605





    NaCl 25,000 unit In 0.45   





    % NaCl 1 250ml.bag @ 12   





    UNITS/KG/HR 7.348 mls/hr   





    IV .Q24H [facility] Rx#:   





    [id number]   


 


  Oral   240


 


Output:   


 


  Urine   300


 


Other:   


 


  Weight 61.235 kg  61.235 kg














Results





                                 22 19:08





                                 22 19:08


                                 Cardiac Enzymes











  22 Range/Units





  19:08 19:08 


 


AST  235 H   (14-36)  U/L


 


Troponin I   0.067 H*  (0.000-0.034)  ng/mL








                                   Coagulation











  22 Range/Units





  19:08 01:43 07:26 


 


PT  11.8    (9.0-12.0)  sec


 


APTT  23.0  46.7 H  45.4 H  (22.0-30.0)  sec








                                       CBC











  22 Range/Units





  19:08 


 


WBC  4.2  (3.8-10.6)  k/uL


 


RBC  3.77 L  (3.80-5.40)  m/uL


 


Hgb  12.2  (11.4-16.0)  gm/dL


 


Hct  37.6  (34.0-46.0)  %


 


Plt Count  144 L  (150-450)  k/uL








                          Comprehensive Metabolic Panel











  22 Range/Units





  19:08 


 


Sodium  132 L  (137-145)  mmol/L


 


Potassium  4.7  (3.5-5.1)  mmol/L


 


Chloride  92 L  ()  mmol/L


 


Carbon Dioxide  29  (22-30)  mmol/L


 


BUN  34 H  (7-17)  mg/dL


 


Creatinine  1.20 H  (0.52-1.04)  mg/dL


 


Glucose  123 H  (74-99)  mg/dL


 


Calcium  8.8  (8.4-10.2)  mg/dL


 


AST  235 H  (14-36)  U/L


 


ALT  126 H  (4-34)  U/L


 


Alkaline Phosphatase  55  ()  U/L


 


Total Protein  6.9  (6.3-8.2)  g/dL


 


Albumin  4.3  (3.5-5.0)  g/dL








                               Current Medications











Generic Name Dose Route Start Last Admin





  Trade Name Freq  PRN Reason Stop Dose Admin


 


Albuterol Sulfate  1 puff  22 12:00 





  Albuterol Hfa Inhaler  INHALATION  





  RT-QID [facility]  


 


Amiodarone HCl  50 mg  22 09:00  22 09:44





  Amiodarone 100 Mg Tab  PO   50 mg





  DAILY [facility]   Administration


 


Apixaban  2.5 mg  22 09:00  22 09:43





  Apixaban 2.5 Mg Tablet  PO   2.5 mg





  BID [facility]   Administration





  Protocol  


 


Ascorbic Acid  500 mg  22 09:00  22 09:44





  Ascorbic Acid 500 Mg Tab  PO   500 mg





  BID [facility]   Administration


 


Atorvastatin Calcium  20 mg  22 21:00 





  Atorvastatin 20 Mg Tab  PO  





  [facility]  


 


Budesonide/Formoterol Fumarate  2 puff  22 20:00 





  Symbicort 160-4.5 Mcg Inhaler  INHALATION  





  RT-BID [facility]  


 


Cholecalciferol  50 mcg  22 09:00  22 09:43





  Cholecalciferol 25 Mcg (1000 Iu) Tablet  PO   50 mcg





  DAILY [facility]   Administration


 


Dexamethasone Sodium Phosphate  6 mg  22 09:00  22 09:44





  Dexamethasone Sod Phosphate 10 Mg/Ml 1 Ml Vial  IVP   6 mg





  DAILY [facility]   Administration


 


Furosemide  40 mg  22 21:00  22 09:45





  Furosemide 10 Mg/Ml 4 Ml Vial  IV   40 mg





  Q12H [facility]   Administration


 


Heparin Sodium (Porcine)  0 unit  22 19:36 





  Heparin Sodium 1,000 Un/Ml (10ml Vl)  IV  





  PER PROTOCOL PRN  





  Low PTT  





  Protocol  


 


Diltiazem HCl 125 mg/ Sodium  125 mls @ 10 mls/hr  22 20:00  22 

09:42





  Chloride  IV   10 mg/hr





  .[id number] [facility]   10 mls/hr





    Infusion





  10 MG/HR  


 


Metoprolol Tartrate  25 mg  22 09:00  22 09:43





  Metoprolol Tartrate 25 Mg Tab  PO   25 mg





  BID [facility]   Administration


 


Tiotropium Bromide  2 puff  22 08:00  22 10:54





  Tiotropium 2.5 Mcg Inhaler  INHALATION   Not Given





  RT-DAILY [facility]  


 


Zinc Sulfate  220 mg  22 09:00  22 09:43





  Zinc Sulfate 220 Mg Cap  PO   220 mg





  DAILY [facility]   Administration








                                Intake and Output











 22





 22:59 06:59 14:59


 


Intake Total   398.022


 


Output Total   300


 


Balance   98.022


 


Intake:   


 


  Intake, IV Titration   158.022





  Amount   


 


    Diltiazem 125 mg In   66.417





    Sodium Chloride 0.9% 100   





    ml @ 10 MG/HR 10 mls/hr   





    IV .[id number] [facility] Rx#:   





    [id number]   


 


    Heparin Sod,Pork in 0.45%   91.605





    NaCl 25,000 unit In 0.45   





    % NaCl 1 250ml.bag @ 12   





    UNITS/KG/HR 7.348 mls/hr   





    IV .Q24H [facility] Rx#:   





    [id number]   


 


  Oral   240


 


Output:   


 


  Urine   300


 


Other:   


 


  Weight 61.235 kg  61.235 kg








                                 Patient Weight











 22





 06:59


 


Weight 61.235 kg








                                        





                                 22 19:08 





                                 22 19:08

## 2022-01-06 RX ADMIN — ALBUTEROL SULFATE SCH PUFF: 90 AEROSOL, METERED RESPIRATORY (INHALATION) at 11:11

## 2022-01-06 RX ADMIN — OXYCODONE HYDROCHLORIDE AND ACETAMINOPHEN SCH MG: 500 TABLET ORAL at 21:34

## 2022-01-06 RX ADMIN — METOPROLOL TARTRATE SCH MG: 25 TABLET, FILM COATED ORAL at 08:19

## 2022-01-06 RX ADMIN — AMIODARONE HYDROCHLORIDE SCH MG: 200 TABLET ORAL at 21:34

## 2022-01-06 RX ADMIN — LEVOFLOXACIN SCH MLS/HR: 250 INJECTION, SOLUTION INTRAVENOUS at 17:12

## 2022-01-06 RX ADMIN — DEXTROSE SCH MG: 50 INJECTION, SOLUTION INTRAVENOUS at 08:19

## 2022-01-06 RX ADMIN — FUROSEMIDE SCH MG: 10 INJECTION, SOLUTION INTRAMUSCULAR; INTRAVENOUS at 08:19

## 2022-01-06 RX ADMIN — OXYCODONE HYDROCHLORIDE AND ACETAMINOPHEN SCH MG: 500 TABLET ORAL at 08:19

## 2022-01-06 RX ADMIN — DILTIAZEM HYDROCHLORIDE SCH MLS/HR: 5 INJECTION INTRAVENOUS at 02:34

## 2022-01-06 RX ADMIN — APIXABAN SCH MG: 2.5 TABLET, FILM COATED ORAL at 08:19

## 2022-01-06 RX ADMIN — TIOTROPIUM BROMIDE INHALATION SPRAY SCH PUFF: 3.12 SPRAY, METERED RESPIRATORY (INHALATION) at 07:30

## 2022-01-06 RX ADMIN — ATORVASTATIN CALCIUM SCH MG: 20 TABLET, FILM COATED ORAL at 21:34

## 2022-01-06 RX ADMIN — Medication SCH MCG: at 08:19

## 2022-01-06 RX ADMIN — Medication SCH MG: at 08:19

## 2022-01-06 RX ADMIN — SENNOSIDES SCH MG: 8.6 TABLET, FILM COATED ORAL at 21:34

## 2022-01-06 RX ADMIN — ALBUTEROL SULFATE SCH PUFF: 90 AEROSOL, METERED RESPIRATORY (INHALATION) at 19:47

## 2022-01-06 RX ADMIN — APIXABAN SCH MG: 2.5 TABLET, FILM COATED ORAL at 21:34

## 2022-01-06 RX ADMIN — METOPROLOL TARTRATE SCH MG: 50 TABLET, FILM COATED ORAL at 17:12

## 2022-01-06 RX ADMIN — METOPROLOL TARTRATE SCH MG: 50 TABLET, FILM COATED ORAL at 21:34

## 2022-01-06 RX ADMIN — BUDESONIDE AND FORMOTEROL FUMARATE DIHYDRATE SCH PUFF: 160; 4.5 AEROSOL RESPIRATORY (INHALATION) at 07:30

## 2022-01-06 RX ADMIN — FUROSEMIDE SCH MG: 10 INJECTION, SOLUTION INTRAMUSCULAR; INTRAVENOUS at 21:34

## 2022-01-06 RX ADMIN — ALBUTEROL SULFATE SCH PUFF: 90 AEROSOL, METERED RESPIRATORY (INHALATION) at 07:29

## 2022-01-06 RX ADMIN — AMIODARONE HYDROCHLORIDE SCH MG: 100 TABLET ORAL at 08:19

## 2022-01-06 RX ADMIN — BUDESONIDE AND FORMOTEROL FUMARATE DIHYDRATE SCH PUFF: 160; 4.5 AEROSOL RESPIRATORY (INHALATION) at 19:47

## 2022-01-06 RX ADMIN — ALBUTEROL SULFATE SCH PUFF: 90 AEROSOL, METERED RESPIRATORY (INHALATION) at 15:43

## 2022-01-06 NOTE — P.PN
Subjective


Progress Note Date: 01/06/22





HISTORY OF PRESENT ILLNESS


This is an 87-year-old female patient of Dr. Dialey and Dr. Pope with past 

medical history of chronic persistent atrial fibrillation, chronic systolic hear

t failure, pulmonary hypertension, COPD with chronic hypoxic respiratory failure

on home O2 at 3 L as needed, hypertension, hyperlipidemia, chronic kidney 

disease stage III, generalized osteoarthritis


Patient gives history that starting 4 weeks ago she developed a sore throat and 

the following week he was significantly worse.  She called the office for 

appointment but was unable to come into the building due to need for Covid 

testing which she completed and came back negative.  She was complaining by the 

time of fever and earache and was placed on a steroid.  She subsequently 

developed a rash and presented to the emergency center on January 1 for possible

ALLERGIC reaction and was subsequently placed on a Z-Daniel.  Covid testing at that

time was also negative.  Regarding anticoagulation.  She states she was quite 

anxious reading the side effects of eliquis and decided to stop taking it and 

instead was taking aspirin 81 mg.  Patient has been fully vaccinated but has not

received a booster which she was due for.  


She presented again on January 4 to the emergency center due to cough shortness 

of breath and fevers for the past couple days and gradually worsening.  She 

denied having any abdominal pain, nausea or vomiting.  EKG was atrial 

fibrillation with RVR.  Heart rate was running in the 120s and 130s and patient 

was started on Cardizem drip, heparin drip and IV Lasix 40 mg twice daily.  CBC 

was unremarkable except for a platelet count of 144.  Sodium 132, potassium 4.7,

chloride 92, CO2 29, BUN 34 creatinine 1.2.  Blood sugar 123.  INR 1.1.  Total 

bilirubin 1.4, , .  Alkaline phosphatase 55.  Troponin 0.067.  

Lactic acid 1.5.  Coronal virus PCR detected.  ProBNP 9570.  


Chest x-ray reveals pulmonary interstitial edema could be acute heart failure.  

Acute interstitial pneumonia also possible.


Echocardiogram reveals EF of 30-35%, mild aortic regurgitation, moderate mitral 

regurgitation, severe tricuspid regurgitation, severe pulmonary hypertension.


Patient has been seen by cardiology as well as by pulmonary medicine.





1/6: Patient's heart rate is still running 100-130 bpm and A. fib.  Cardiology 

saw the patient and discontinued Cardizem drip, increased amiodarone and 

Lopressor.  Patient is very upset this morning regarding Ativan dosing for 

bedtime which she states she  takes 1.5 tablets at bedtime and a half a tablet 

in the morning.  We will make medication changes.  Discussed discharge planning 

and PT has recommended home with home care.   has arranged for [facility].


Patient has also been seen by pulmonary medicine.  Anticipate discharge home 

tomorrow if heart rate is controlled.  She is continued on IV Lasix 40 mg twice 

daily.





REVIEW OF SYSTEMS


Constitutional: No fever, no chills, no night sweats.  No weight change.  No 

weakness, fatigue or lethargy.  No daytime sleepiness.


EENT: No headache.  No blurred vision or double vision, no loss of vision.  No 

loss of Hearing, no ringing in the ears, no dizziness.  No nasal drainage or 

congestion.  No epistaxis.  No sore throat.


Lungs: Reports shortness of breath, reports cough, no sputum production.  No 

wheezing.


Cardiovascular: No chest pain, no lower extremity edema.  No palpitations.  No 

paroxysmal nocturnal dyspnea.  No orthopnea.  No lightheadedness or dizziness.  

No syncopal episodes.


Abdominal: No abdominal pain.  No nausea, vomiting.  No diarrhea.  No 

constipation.  No bloody or tarry stools.  No loss of appetite.


Genitourinary: No dysuria, increased frequency, urgency.  No urinary retention.


Musculoskeletal: No myalgias.  No muscle weakness, no gait dysfunction, no 

frequent falls.  No back pain.  No neck pain.


Integumentary: No wounds, no lesions.  No rash or pruritus.  No unusual 

bruising.  No change in hair or nails.


Neurologic: No aphasia. No facial droop. No change in mentation. No head injury.

No headache. No paralysis. No paresthesia.


Psychiatric: No depression.  Reports anxiety.  No mood swings.  Reports insomnia


Endocrine: No abnormal blood sugars.  No weight change.  No excessive sweating 

or thirst.  No cold intolerance.  





PHYSICAL EXAMINATION


Gen: This is a thin 87-year-old  female.  She is resting in recliner 

appears to be comfortable and in no acute distress.


HEENT: Head is atraumatic, normocephalic. Pupils equal, round. Sclerae is 

anicteric. 


NECK: Supple. No JVD. No lymphadenopathy. No thyromegaly. 


LUNGS: Clear to auscultation. No wheezes or rhonchi.  No intercostal retrac

tions.


HEART: Irregularly irregular rate and rhythm. No murmur. 


ABDOMEN: Soft. Bowel sounds are present. No masses.  No tenderness.


EXTREMITIES: No pedal edema.  No calf tenderness.


NEUROLOGICAL: Patient is awake, alert and oriented x3. Cranial nerves 2 through 

12 are grossly intact. 





ASSESSMENT AND PLAN


1.  Acute on chronic hypoxic respiratory failure secondary to a combination of 

acute exacerbation of systolic heart failure, possible Covid 19 pneumonia, acute

exacerbation of COPD, A. fib with RVR.  Continue oxygen therapy





2.  Acute exacerbation of systolic heart failure.  Continue Lasix 40 mg IV every

12 hours, I&O and daily weights, monitor renal function and electrolytes.  

Cardiology consult appreciated.





3.  Possible Covid 19 pneumonia.  Pulmonary consult appreciated.  Patient's been

started on Ventolin inhaler 4 times daily, vitamin supplements, Symbicort 2 

puffs twice daily, dexamethasone 6 mg IV push daily, Spiriva.





4.  Acute exacerbation of COPD.  Continue Symbicort, Spiriva, albuterol, 

dexamethasone.





5.  Chronic persistent atrial fibrillation presenting with RVR.  IV heparin has 

been transitioned eliquis 2.5 mg twice daily, Cardizem drip is continued, 

continue amiodarone increased to 200 mg twice daily and continue Lopressor 

increased to 50 mg 3 times daily. 





6.  Transaminitis possibly related to Covid 19.  Continue to monitor.





7.  Hypertension.  Continue Lopressor.





8.  Hyperlipidemia.  Continue Lipitor 20 mg at bedtime.





9.  Chronic kidney disease stage III.  Monitor renal function, avoid nephrotoxic

agents.





10.  Valvular heart disease with  mild aortic regurgitation, moderate mitral 

regurgitation, severe tricuspid regurgitation.





11.  Severe pulmonary hypertension.





12.  GI prophylaxis.  Protonix.





13.  DVT prophylaxis.  Eliquis.








DISCHARGE PLAN


Home.





Impression and plan of care have been directed as dictated by the signing 

physician.  Carol Hicks nurse practitioner acting as scribe for signing 

physician.





Objective





- Vital Signs


Vital signs: 


                                   Vital Signs











Temp  98.1 F   01/06/22 04:00


 


Pulse  110 H  01/06/22 04:00


 


Resp  18   01/06/22 04:00


 


BP  98/74   01/06/22 04:00


 


Pulse Ox  97   01/06/22 04:00








                                 Intake & Output











 01/05/22 01/06/22 01/06/22





 18:59 06:59 18:59


 


Intake Total 539.689 125 480


 


Output Total 300  300


 


Balance 239.689 125 180


 


Weight 61.235 kg  


 


Intake:   


 


  Intake, IV Titration 179.689 125 





  Amount   


 


    Diltiazem 125 mg In 88.084 125 





    Sodium Chloride 0.9% 100   





    ml @ 10 MG/HR 10 mls/hr   





    IV .[id number] [facility] Rx#:   





    [id number]   


 


    Heparin Sod,Pork in 0.45% 91.605  





    NaCl 25,000 unit In 0.45   





    % NaCl 1 250ml.bag @ 12   





    UNITS/KG/HR 7.348 mls/hr   





    IV .Q24H [facility] Rx#:   





    [id number]   


 


  Oral 360  480


 


Output:   


 


  Urine 300  300


 


Other:   


 


  Voiding Method  Toilet 





  Diaper 


 


  # Voids  2 1














- Labs


CBC & Chem 7: 


                                 01/04/22 19:08





                                 01/04/22 19:08


Labs: 


                      Microbiology - Last 24 Hours (Table)











 01/05/22 20:13 Urine Culture - Preliminary





 Urine,Clean Catch 


 


 01/04/22 19:08 Blood Culture Gram Stain - Preliminary





 Blood Blood Culture - Preliminary





    Coagulase Negative Staph


 


 01/04/22 19:08 Blood Culture Gram Stain - Preliminary





 Blood 


 


 01/04/22 19:08 Blood Culture - Final





 Blood 


 


 01/04/22 19:08 Blood Culture - Final





 Blood

## 2022-01-06 NOTE — P.PN
Subjective


Progress Note Date: 01/06/22





CHIEF COMPLAINT:  A. fib with RVR





HISTORY OF PRESENT ILLNESS:  This is a 87-year-old female with a past medical 

history significant for chronic persistent atrial fibrillation, congestive heart

failure, hypertension, and hyperlipidemia. Patient follows in the office with 

Dr. Pope. We have been asked to see the patient in consultation for afib with 

RVR. She presented to the hospital secondary to SOB. Patient was found to be 

positive for Covid. Patient is currently in atrial fibrillation with heart rates

in the 130s. She is on a Cardizem drip at 5mg/hr and a Heparin drip. She is also

receiving IV lasix Q12 hours. She is on 3L NC. Blood pressure 60/62.





DIAGNOSTICS:


EKG reveals atrial fibrillation with RVR


Chest xray pulmonary interstitial edema which is new compared to old exam and 

could be acute heart failure.  Acute interstitial pneumonia also possible.


Laboratory data: WBC 4.2.  Hemoglobin 12.2.  Platelet count 144.  Sodium 132.  

Potassium 4.7.  BUN 34.  Creatinine 1.2.  ProBNP 9570.  Troponin 0.067.


Current home cardiac medications include Zocor 40 mg at night, metoprolol 

titrate 25 mg twice a day, Lasix 40 mg twice a day, amiodarone 50 mg daily


Echocardiogram completed in November 2020 to cardiology office revealed ejection

fraction 55%, mild to moderate aortic regurgitation, moderate mitral 

regurgitation, severe tricuspid regurgitation


She underwent cardiac catheterization in 2017 revealing minimal coronary artery 

disease





1/6/2022


Patient remains on the cardiac stepdown unit. Telemetry reveals atrial 

fibrillation with a heart rate around 120. She remains on IV Cardizem. No 

complaints of chest pain or pressure.  Echocardiogram completed revealing 

ejection fraction 30-35%, mild aortic regurgitation, moderate mitral 

regurgitation, severe tricuspid regurgitation, and severe pulmonary hypertension





PHYSICAL EXAM: 


Thorough physical exam not completed secondary to limited evaluation/examination

due to Covid19





ASSESSMENT: 


Covid 19


Acute hypoxic respiratory failure 


Abnormal troponin, not suggestive of ACS


Acute on chronic systolic congestive heart failure, EF 55% in December 2020, now

30-35%


Chronic persistent atrial fibrillation with RVR


Hypertension


Hyperlipidemia


Valvular heart disease


COPD





PLAN: 


Discontinue IV cardizem


Increase amio to 200mg BID


Increase metoprolol to 50mg TID


Continue telemetry monitoring


Further recommendations pending patient course





Nurse practitioner note has been reviewed by physician. Signing provider agrees 

with the documented findings, assessment, and plan of care. 











Objective





- Vital Signs


Vital signs: 


                                   Vital Signs











Temp  98.6 F   01/06/22 08:00


 


Pulse  104 H  01/06/22 12:00


 


Resp  18   01/06/22 12:00


 


BP  130/75   01/06/22 12:00


 


Pulse Ox  95   01/06/22 12:00








                                 Intake & Output











 01/05/22 01/06/22 01/06/22





 18:59 06:59 18:59


 


Intake Total 539.689 125 480


 


Output Total 300  300


 


Balance 239.689 125 180


 


Weight 61.235 kg  


 


Intake:   


 


  Intake, IV Titration 179.689 125 





  Amount   


 


    Diltiazem 125 mg In 88.084 125 





    Sodium Chloride 0.9% 100   





    ml @ 10 MG/HR 10 mls/hr   





    IV .[id number] [facility] Rx#:   





    [id number]   


 


    Heparin Sod,Pork in 0.45% 91.605  





    NaCl 25,000 unit In 0.45   





    % NaCl 1 250ml.bag @ 12   





    UNITS/KG/HR 7.348 mls/hr   





    IV .Q24H [facility] Rx#:   





    [id number]   


 


  Oral 360  480


 


Output:   


 


  Urine 300  300


 


Other:   


 


  Voiding Method  Toilet 





  Diaper 


 


  # Voids  2 1














- Labs


CBC & Chem 7: 


                                 01/04/22 19:08





                                 01/04/22 19:08


Labs: 


                      Microbiology - Last 24 Hours (Table)











 01/05/22 20:13 Urine Culture - Preliminary





 Urine,Clean Catch 


 


 01/04/22 19:08 Blood Culture Gram Stain - Preliminary





 Blood Blood Culture - Preliminary





    Coagulase Negative Staph


 


 01/04/22 19:08 Blood Culture Gram Stain - Preliminary





 Blood 


 


 01/04/22 19:08 Blood Culture - Final





 Blood 


 


 01/04/22 19:08 Blood Culture - Final





 Blood

## 2022-01-06 NOTE — P.PN
Subjective


Progress Note Date: 01/06/22


Principal diagnosis: 





Respiratory failure.





This is a 87-year-old white female patient of Dr. Dailey, with past medical 

history of moderately severe COPD, Gold stage III disease at baseline on home 

oxygen at 3 L/m on as-needed basis, chronic systolic CHF with EF of 20-25%, 

paroxysmal atrial fibrillation used to be on Eliquis, which is not on her home 

med list now, anxiety, pulmonary hypertension related to chronic lung disease 

and chronic CHF, chronic kidney disease stage III, glaucoma and osteoarthritis. 

Patient follows with Dr. Portillo in the pulmonary clinic, she is maintained on 

Advair 250/50 Diskus and Spiriva on a daily basis.  She came into the emergency 

department on 01/04/2022 at 1850 1 in the afternoon per EMS for evaluation of 

cough, shortness of breath and fevers.  Patient states over the last couple of 

days or shortness of breath has been progressively worse, she has a productive 

cough, she isn't vaccinated for COVID 19, denies any obvious exposure.  She is 

complaining about constitutional symptoms including fevers, no nausea or 

vomiting, no abdominal pain, she has some swelling in her bilateral lower 

extremities, denied any chest pain.  Denies any runny nose or sore throat.  She 

was noted to be in A. fib with RVR and was started on Cardizem drip and heparin 

infusion, her chest x-ray showing pulmonary interstitial edema.  She also tested

positive for COVID 19.  The rest of the blood work showed normal white count of 

4.2, hemoglobin 12.2, lymphocyte count of 0.5, sodium is 132, potassium is 4.7, 

chloride is 92, CO2 is 29, BUN is 34, creatinine is 1.2, plasma lactic acid is 

1.5, AST was 235, ALT was 126, alk phos was 55, troponin was 0.067, proBNP was 

elevated at 9570.  She did have a fever of 101.2F.  Currently remains in A. fib

with RVR with a rate of 120 to 1:30 BPM, continues on Cardizem infusion and 5 

mg, she is currently on 3 L of oxygen pulse ox is 94%, her blood pressures are 

on the lower side with a BP of 89/71.  Echocardiogram has been ordered and 

pending, patient was started on IV Lasix 40 mg twice daily.  We started the on 

Decadron 6 mg daily, she continues on heparin infusion, cardiology consultation 

is pending, she appears to be in no acute distress. 





Progress note dated 01/06/2022.





87-year-old female, who was seen yesterday in consultation for chronic hypoxemic

respiratory failure, secondary to CHF, COPD exacerbation, and coronavirus 

associated pneumonia.  The patient currently is seen today again in room 380.  

She is on 3 L nasal cannula.  She is getting saline at 20 mL an hour.  She's got

Cardizem running at 10 mg an hour.  The patient's only complaint is that of 

shortness of breath, dry cough, and sore throat.  She denies any chest pain or 

chest discomfort.  No new laboratory data today.  No additional chest x-rays 

today.





Objective





- Vital Signs


Vital signs: 


                                   Vital Signs











Temp  98.6 F   01/06/22 08:00


 


Pulse  104 H  01/06/22 12:00


 


Resp  18   01/06/22 12:00


 


BP  130/75   01/06/22 12:00


 


Pulse Ox  95   01/06/22 12:00








                                 Intake & Output











 01/05/22 01/06/22 01/06/22





 18:59 06:59 18:59


 


Intake Total 539.689 125 480


 


Output Total 300  300


 


Balance 239.689 125 180


 


Weight 61.235 kg  


 


Intake:   


 


  Intake, IV Titration 179.689 125 





  Amount   


 


    Diltiazem 125 mg In 88.084 125 





    Sodium Chloride 0.9% 100   





    ml @ 10 MG/HR 10 mls/hr   





    IV .[id number] [facility] Rx#:   





    [id number]   


 


    Heparin Sod,Pork in 0.45% 91.605  





    NaCl 25,000 unit In 0.45   





    % NaCl 1 250ml.bag @ 12   





    UNITS/KG/HR 7.348 mls/hr   





    IV .Q24H [facility] Rx#:   





    [id number]   


 


  Oral 360  480


 


Output:   


 


  Urine 300  300


 


Other:   


 


  Voiding Method  Toilet 





  Diaper 


 


  # Voids  2 1














- Exam





No acute distress, oriented 3.  Currently on 3 L nasal cannula with saturations

of 95%.





HEENT examination is grossly unremarkable. 





Neck supple.  Full range of motion.  No adenopathy thyromegaly or neck vein 

distention.





Cardiovascular examination reveals an irregular rhythm and rate.  Heart rate 104

bpm.  S1-S2 normal.  No S3 or S4.  No discernible murmur noted.





Lungs reveal scattered bilateral rhonchi.  There is basilar crackles.  A few 

scattered high-pitched wheezes are also noted.  Breath sounds are equal 

bilaterally.  





Abdomen soft bowel sounds are heard.  No masses or tenderness.





Extremities are intact.  No cyanosis or clubbing.  Mild, 1+, edema is noted in 

the lower extremities.





Skin is without rash or lesion.





Neurologic examination is brief but nonfocal.





- Labs


CBC & Chem 7: 


                                 01/04/22 19:08





                                 01/04/22 19:08


Labs: 


                      Microbiology - Last 24 Hours (Table)











 01/05/22 20:13 Urine Culture - Preliminary





 Urine,Clean Catch 


 


 01/04/22 19:08 Blood Culture Gram Stain - Preliminary





 Blood Blood Culture - Preliminary





    Coagulase Negative Staph


 


 01/04/22 19:08 Blood Culture Gram Stain - Preliminary





 Blood 


 


 01/04/22 19:08 Blood Culture - Final





 Blood 


 


 01/04/22 19:08 Blood Culture - Final





 Blood 














Assessment and Plan


Assessment: 





Acute on chronic hypoxemic respiratory failure, secondary to systolic CHF, 

coronavirus associated pneumonia COPD exacerbation, and atrial fibrillation with

RVR.





Rule out non-ST segment elevation myocardial infarction.





Cardiomyopathy, with ejection fraction of 20-25%.





History of pulmonary hypertension.





History of paroxysmal atrial fibrillation.





History of stage III COPD.





History of hypertension.





Prior history of myocardial infarction.





Osteoarthritis.





Stage III chronic kidney disease.





History of GI bleed.





Chronic anxiety.





55-pack-year history of tobacco use.


Plan: 





Plan dated 01/06/2022.





From the pulmonary standpoint, patient is doing well.  The patient appears to be

relatively stable.  We will continue to follow and make recommendations where 

appropriate.  The patient remains on 3 L nasal cannula.  The patient's getting 

saline at 20 mL an hour, and a Cardizem drip at 10 mg an hour.  Prognosis is 

guarded.  Medications, labs, and x-rays all reviewed.  Prognosis is certainly 

guarded.


Time with Patient: Less than 30

## 2022-01-07 RX ADMIN — DEXTROSE SCH MG: 50 INJECTION, SOLUTION INTRAVENOUS at 08:18

## 2022-01-07 RX ADMIN — BUDESONIDE AND FORMOTEROL FUMARATE DIHYDRATE SCH PUFF: 160; 4.5 AEROSOL RESPIRATORY (INHALATION) at 21:02

## 2022-01-07 RX ADMIN — ALBUTEROL SULFATE SCH PUFF: 90 AEROSOL, METERED RESPIRATORY (INHALATION) at 11:03

## 2022-01-07 RX ADMIN — METOPROLOL TARTRATE SCH MG: 25 TABLET, FILM COATED ORAL at 21:54

## 2022-01-07 RX ADMIN — AMIODARONE HYDROCHLORIDE SCH MG: 200 TABLET ORAL at 21:53

## 2022-01-07 RX ADMIN — METOPROLOL TARTRATE SCH MG: 50 TABLET, FILM COATED ORAL at 08:18

## 2022-01-07 RX ADMIN — OXYCODONE HYDROCHLORIDE AND ACETAMINOPHEN SCH MG: 500 TABLET ORAL at 08:18

## 2022-01-07 RX ADMIN — SENNOSIDES SCH: 8.6 TABLET, FILM COATED ORAL at 21:54

## 2022-01-07 RX ADMIN — AMIODARONE HYDROCHLORIDE SCH MG: 200 TABLET ORAL at 08:18

## 2022-01-07 RX ADMIN — METOPROLOL TARTRATE SCH MG: 25 TABLET, FILM COATED ORAL at 16:18

## 2022-01-07 RX ADMIN — FUROSEMIDE SCH MG: 10 INJECTION, SOLUTION INTRAMUSCULAR; INTRAVENOUS at 21:54

## 2022-01-07 RX ADMIN — ALBUTEROL SULFATE SCH PUFF: 90 AEROSOL, METERED RESPIRATORY (INHALATION) at 16:02

## 2022-01-07 RX ADMIN — APIXABAN SCH MG: 2.5 TABLET, FILM COATED ORAL at 21:54

## 2022-01-07 RX ADMIN — ALBUTEROL SULFATE SCH PUFF: 90 AEROSOL, METERED RESPIRATORY (INHALATION) at 07:28

## 2022-01-07 RX ADMIN — Medication SCH MCG: at 08:18

## 2022-01-07 RX ADMIN — Medication SCH MG: at 08:18

## 2022-01-07 RX ADMIN — OXYCODONE HYDROCHLORIDE AND ACETAMINOPHEN SCH MG: 500 TABLET ORAL at 21:53

## 2022-01-07 RX ADMIN — BUDESONIDE AND FORMOTEROL FUMARATE DIHYDRATE SCH PUFF: 160; 4.5 AEROSOL RESPIRATORY (INHALATION) at 07:28

## 2022-01-07 RX ADMIN — FUROSEMIDE SCH MG: 10 INJECTION, SOLUTION INTRAMUSCULAR; INTRAVENOUS at 08:18

## 2022-01-07 RX ADMIN — ATORVASTATIN CALCIUM SCH MG: 20 TABLET, FILM COATED ORAL at 21:53

## 2022-01-07 RX ADMIN — SENNOSIDES SCH: 8.6 TABLET, FILM COATED ORAL at 08:28

## 2022-01-07 RX ADMIN — ALBUTEROL SULFATE SCH PUFF: 90 AEROSOL, METERED RESPIRATORY (INHALATION) at 21:02

## 2022-01-07 RX ADMIN — TIOTROPIUM BROMIDE INHALATION SPRAY SCH PUFF: 3.12 SPRAY, METERED RESPIRATORY (INHALATION) at 07:28

## 2022-01-07 RX ADMIN — APIXABAN SCH MG: 2.5 TABLET, FILM COATED ORAL at 08:18

## 2022-01-07 NOTE — P.PN
Subjective


Progress Note Date: 01/07/22


Principal diagnosis: 





Respiratory failure.





This is a 87-year-old white female patient of Dr. Dailey, with past medical 

history of moderately severe COPD, Gold stage III disease at baseline on home 

oxygen at 3 L/m on as-needed basis, chronic systolic CHF with EF of 20-25%, 

paroxysmal atrial fibrillation used to be on Eliquis, which is not on her home 

med list now, anxiety, pulmonary hypertension related to chronic lung disease 

and chronic CHF, chronic kidney disease stage III, glaucoma and osteoarthritis. 

Patient follows with Dr. Portillo in the pulmonary clinic, she is maintained on 

Advair 250/50 Diskus and Spiriva on a daily basis.  She came into the emergency 

department on 01/04/2022 at 1850 1 in the afternoon per EMS for evaluation of 

cough, shortness of breath and fevers.  Patient states over the last couple of 

days or shortness of breath has been progressively worse, she has a productive 

cough, she isn't vaccinated for COVID 19, denies any obvious exposure.  She is 

complaining about constitutional symptoms including fevers, no nausea or 

vomiting, no abdominal pain, she has some swelling in her bilateral lower 

extremities, denied any chest pain.  Denies any runny nose or sore throat.  She 

was noted to be in A. fib with RVR and was started on Cardizem drip and heparin 

infusion, her chest x-ray showing pulmonary interstitial edema.  She also tested

positive for COVID 19.  The rest of the blood work showed normal white count of 

4.2, hemoglobin 12.2, lymphocyte count of 0.5, sodium is 132, potassium is 4.7, 

chloride is 92, CO2 is 29, BUN is 34, creatinine is 1.2, plasma lactic acid is 

1.5, AST was 235, ALT was 126, alk phos was 55, troponin was 0.067, proBNP was 

elevated at 9570.  She did have a fever of 101.2F.  Currently remains in A. fib

with RVR with a rate of 120 to 1:30 BPM, continues on Cardizem infusion and 5 

mg, she is currently on 3 L of oxygen pulse ox is 94%, her blood pressures are 

on the lower side with a BP of 89/71.  Echocardiogram has been ordered and 

pending, patient was started on IV Lasix 40 mg twice daily.  We started the on 

Decadron 6 mg daily, she continues on heparin infusion, cardiology consultation 

is pending, she appears to be in no acute distress. 





Progress note dated 01/06/2022.





87-year-old female, who was seen yesterday in consultation for chronic hypoxemic

respiratory failure, secondary to CHF, COPD exacerbation, and coronavirus 

associated pneumonia.  The patient currently is seen today again in room 380.  

She is on 3 L nasal cannula.  She is getting saline at 20 mL an hour.  She's got

Cardizem running at 10 mg an hour.  The patient's only complaint is that of 

shortness of breath, dry cough, and sore throat.  She denies any chest pain or 

chest discomfort.  No new laboratory data today.  No additional chest x-rays 

today.





Progress note dated 01/07/2022.





87-year-old female, seen in consultation 2 days ago.  She is seen today in room 

380.  The patient is currently on 3 L nasal cannula.  She's not receiving any IV

fluids.  The patient was seen for chronic hypoxemic respiratory failure, 

secondary to CHF, COPD, and coronavirus pneumonia.  The patient appears to be 

relatively stable.  She is speaking in full sentences.  There is no evidence of 

use of accessory muscles.  No new labs today.  No new chest x-ray today.





Objective





- Vital Signs


Vital signs: 


                                   Vital Signs











Temp  98 F   01/07/22 11:37


 


Pulse  120 H  01/07/22 11:37


 


Resp  19   01/07/22 11:37


 


BP  125/76   01/07/22 11:37


 


Pulse Ox  95   01/07/22 11:37








                                 Intake & Output











 01/06/22 01/07/22 01/07/22





 18:59 06:59 18:59


 


Intake Total 720  540


 


Output Total 300  


 


Balance 420  540


 


Intake:   


 


  Oral 720  540


 


Output:   


 


  Urine 300  


 


Other:   


 


  Voiding Method  Toilet 





  Diaper 


 


  # Voids 1 1 2


 


  # Bowel Movements   1














- Exam





No acute distress, oriented 3.  Currently on 3 L nasal cannula with saturations

of 95%.





HEENT examination is grossly unremarkable. 





Neck supple.  Full range of motion.  No adenopathy thyromegaly or neck vein 

distention.





Cardiovascular examination reveals an irregular rhythm and rate.  Heart rate 110

 bpm.  S1-S2 normal.  No S3 or S4.  No discernible murmur noted.





Lungs reveal scattered bilateral rhonchi.  There is basilar crackles.  A few 

scattered high-pitched wheezes are also noted.  Breath sounds are equal 

bilaterally.  





Abdomen soft bowel sounds are heard.  No masses or tenderness.





Extremities are intact.  No cyanosis or clubbing.  Mild, 1+, edema is noted in 

the lower extremities.





Skin is without rash or lesion.





Neurologic examination is brief but nonfocal.





- Labs


CBC & Chem 7: 


                                 01/04/22 19:08





                                 01/04/22 19:08


Labs: 


                      Microbiology - Last 24 Hours (Table)











 01/04/22 19:08 Blood Culture Gram Stain - Preliminary





 Blood Blood Culture - Preliminary





    Coagulase Negative Staph


 


 01/05/22 17:51 Blood Culture - Preliminary





 Blood    No Growth after 24 hours














Assessment and Plan


Assessment: 





Acute on chronic hypoxemic respiratory failure, secondary to systolic CHF, 

coronavirus associated pneumonia COPD exacerbation, and atrial fibrillation with

RVR.





Rule out non-ST segment elevation myocardial infarction.





Cardiomyopathy, with ejection fraction of 20-25%.





History of pulmonary hypertension.





History of paroxysmal atrial fibrillation.





History of stage III COPD.





History of hypertension.





Prior history of myocardial infarction.





Osteoarthritis.





Stage III chronic kidney disease.





History of GI bleed.





Chronic anxiety.





55-pack-year history of tobacco use.


Plan: 





Plan dated 01/06/2022.





From the pulmonary standpoint, patient is doing well.  The patient appears to be

relatively stable.  We will continue to follow and make recommendations where 

appropriate.  The patient remains on 3 L nasal cannula.  The patient's getting 

saline at 20 mL an hour, and a Cardizem drip at 10 mg an hour.  Prognosis is 

guarded.  Medications, labs, and x-rays all reviewed.  Prognosis is certainly 

guarded.





Plan dated 01/07/2022.





The patient appears to be doing relatively well.  She's not receiving any IV 

fluids.  She is on 3 L nasal cannula.  Saturations are in the mid 90s.  The 

Cardizem drip has been discontinued.  The patient is getting vitamin C, vitamin 

D3, and zinc.  In addition, the patient is on Eliquis, and Decadron.  The 

patient is stable from the pulmonary standpoint.  She's lying flat in bed, 

without any respiratory distress or difficulty.  Additional recommendations and 

suggestions are forthcoming.  We will continue to follow make recommendations 

where appropriate.  Prognosis is guarded.


Time with Patient: Less than 30

## 2022-01-07 NOTE — P.PN
Subjective


Progress Note Date: 01/07/22





HISTORY OF PRESENT ILLNESS


This is an 87-year-old female patient of Dr. Dailey and Dr. Pope with past 

medical history of chronic persistent atrial fibrillation, chronic systolic hear

t failure, pulmonary hypertension, COPD with chronic hypoxic respiratory failure

on home O2 at 3 L as needed, hypertension, hyperlipidemia, chronic kidney 

disease stage III, generalized osteoarthritis


Patient gives history that starting 4 weeks ago she developed a sore throat and 

the following week he was significantly worse.  She called the office for 

appointment but was unable to come into the building due to need for Covid 

testing which she completed and came back negative.  She was complaining by the 

time of fever and earache and was placed on a steroid.  She subsequently 

developed a rash and presented to the emergency center on January 1 for possible

ALLERGIC reaction and was subsequently placed on a Z-Daniel.  Covid testing at that

time was also negative.  Regarding anticoagulation.  She states she was quite 

anxious reading the side effects of eliquis and decided to stop taking it and 

instead was taking aspirin 81 mg.  Patient has been fully vaccinated but has not

received a booster which she was due for.  


She presented again on January 4 to the emergency center due to cough shortness 

of breath and fevers for the past couple days and gradually worsening.  She 

denied having any abdominal pain, nausea or vomiting.  EKG was atrial 

fibrillation with RVR.  Heart rate was running in the 120s and 130s and patient 

was started on Cardizem drip, heparin drip and IV Lasix 40 mg twice daily.  CBC 

was unremarkable except for a platelet count of 144.  Sodium 132, potassium 4.7,

chloride 92, CO2 29, BUN 34 creatinine 1.2.  Blood sugar 123.  INR 1.1.  Total 

bilirubin 1.4, , .  Alkaline phosphatase 55.  Troponin 0.067.  

Lactic acid 1.5.  Coronal virus PCR detected.  ProBNP 9570.  


Chest x-ray reveals pulmonary interstitial edema could be acute heart failure.  

Acute interstitial pneumonia also possible.


Echocardiogram reveals EF of 30-35%, mild aortic regurgitation, moderate mitral 

regurgitation, severe tricuspid regurgitation, severe pulmonary hypertension.


Patient has been seen by cardiology as well as by pulmonary medicine.





1/6: Patient's heart rate is still running 100-130 bpm and A. fib.  Cardiology 

saw the patient and discontinued Cardizem drip, increased amiodarone and 

Lopressor.  Patient is very upset this morning regarding Ativan dosing for 

bedtime which she states she  takes 1.5 tablets at bedtime and a half a tablet 

in the morning.  We will make medication changes.  Discussed discharge planning 

and PT has recommended home with home care.   has arranged for [facility].


Patient has also been seen by pulmonary medicine.  Anticipate discharge home 

tomorrow if heart rate is controlled.  She is continued on IV Lasix 40 mg twice 

daily.





1/7: She remains in atrial fibrillation in the 120s and 130s.  Cardizem drip was

discontinued yesterday.  Cardiology has increased Lopressor to 75 mg twice daily

and continued amiodarone 200 mg twice daily.  Patient has been started on 

eliquis.  She has been afebrile, blood pressure 125/76, pulse ox 95% on 3 L 

nasal cannula.  Patient is also followed by pulmonary medicine as well.  Patient

is continued on IV Lasix 40 mg every 12 hours.








REVIEW OF SYSTEMS


Constitutional: No fever, no chills, no night sweats.  No weight change.  No 

weakness, fatigue or lethargy.  No daytime sleepiness.


EENT: No headache.  No blurred vision or double vision, no loss of vision.  No 

loss of Hearing, no ringing in the ears, no dizziness.  No nasal drainage or 

congestion.  No epistaxis.  No sore throat.


Lungs: Reports shortness of breath, reports cough, no sputum production.  No 

wheezing.


Cardiovascular: No chest pain, no lower extremity edema.  No palpitations.  No 

paroxysmal nocturnal dyspnea.  No orthopnea.  No lightheadedness or dizziness.  

No syncopal episodes.


Abdominal: No abdominal pain.  No nausea, vomiting.  No diarrhea.  No 

constipation.  No bloody or tarry stools.  No loss of appetite.


Genitourinary: No dysuria, increased frequency, urgency.  No urinary retention.


Musculoskeletal: No myalgias.  No muscle weakness, no gait dysfunction, no 

frequent falls.  No back pain.  No neck pain.


Integumentary: No wounds, no lesions.  No rash or pruritus. 


Neurologic: No aphasia. No facial droop. No change in mentation. No head injury.

No headache. No paralysis. No paresthesia.


Psychiatric: No depression.  Reports anxiety.  No mood swings.  Reports insomnia


Endocrine: No abnormal blood sugars.  No weight change.  No excessive sweating 

or thirst.  No cold intolerance.  





PHYSICAL EXAMINATION


Gen: This is a thin 87-year-old  female.  She is resting in recliner 

appears to be comfortable and in no acute distress.


HEENT: Head is atraumatic, normocephalic. Pupils equal, round. Sclerae is 

anicteric. 


NECK: Supple. No JVD. No lymphadenopathy. No thyromegaly. 


LUNGS: Few scattered rhonchi.  No intercostal retractions.


HEART: Irregularly irregular rate and rhythm. No murmur. 


ABDOMEN: Soft. Bowel sounds are present. No masses.  No tenderness.


EXTREMITIES: No pedal edema.  No calf tenderness.


NEUROLOGICAL: Patient is awake, alert and oriented x3. Cranial nerves 2 through 

12 are grossly intact. 





ASSESSMENT AND PLAN


1.  Acute on chronic hypoxic respiratory failure secondary to a combination of 

acute exacerbation of systolic heart failure, possible Covid 19 pneumonia, acute

exacerbation of COPD, A. fib with RVR.  Continue oxygen therapy





2.  Acute exacerbation of systolic heart failure.  Continue Lasix 40 mg IV every

12 hours, I&O and daily weights, monitor renal function and electrolytes.  

Cardiology consult appreciated.





3.  Possible Covid 19 pneumonia.  Pulmonary consult appreciated.  Patient's been

started on Ventolin inhaler 4 times daily, vitamin supplements, Symbicort 2 

puffs twice daily, dexamethasone 6 mg IV push daily, Spiriva.





4.  Acute exacerbation of COPD.  Continue Symbicort, Spiriva, albuterol, 

dexamethasone.





5.  Chronic persistent atrial fibrillation presenting with RVR.  cONTINUE 

eliquis 2.5 mg twice daily, Cardizem drip is continued, continue amiodarone 200 

mg twice daily and continue Lopressor increased to 75 mg 3 times daily. 





6.  Transaminitis possibly related to Covid 19.  Continue to monitor.





7.  Hypertension.  Continue Lopressor.





8.  Hyperlipidemia.  Continue Lipitor 20 mg at bedtime.





9.  Chronic kidney disease stage III.  Monitor renal function, avoid nephrotoxic

agents.





10.  Valvular heart disease with  mild aortic regurgitation, moderate mitral 

regurgitation, severe tricuspid regurgitation.





11.  Severe pulmonary hypertension.





12.  GI prophylaxis.  Protonix.





13.  DVT prophylaxis.  Eliquis.








DISCHARGE PLAN


Home possibly on Saturday.





Impression and plan of care have been directed as dictated by the signing 

physician.  Carol Hicks nurse practitioner acting as scribe for signing 

physician.





Objective





- Vital Signs


Vital signs: 


                                   Vital Signs











Temp  98 F   01/07/22 11:37


 


Pulse  120 H  01/07/22 11:37


 


Resp  19   01/07/22 11:37


 


BP  125/76   01/07/22 11:37


 


Pulse Ox  95   01/07/22 11:37








                                 Intake & Output











 01/06/22 01/07/22 01/07/22





 18:59 06:59 18:59


 


Intake Total 720  0


 


Output Total 300  


 


Balance 420  0


 


Intake:   


 


  Oral 720  0


 


Output:   


 


  Urine 300  


 


Other:   


 


  Voiding Method  Toilet 





  Diaper 


 


  # Voids 1 1 2


 


  # Bowel Movements   1














- Labs


CBC & Chem 7: 


                                 01/04/22 19:08





                                 01/04/22 19:08


Labs: 


                      Microbiology - Last 24 Hours (Table)











 01/04/22 19:08 Blood Culture Gram Stain - Preliminary





 Blood Blood Culture - Preliminary





    Coagulase Negative Staph


 


 01/05/22 17:51 Blood Culture - Preliminary





 Blood    No Growth after 24 hours

## 2022-01-07 NOTE — P.PN
Subjective


Progress Note Date: 01/07/22





CHIEF COMPLAINT:  A. fib with RVR





HISTORY OF PRESENT ILLNESS:  This is a 87-year-old female with a past medical 

history significant for chronic persistent atrial fibrillation, congestive heart

failure, hypertension, and hyperlipidemia. Patient follows in the office with 

Dr. Pope. We have been asked to see the patient in consultation for afib with 

RVR. She presented to the hospital secondary to SOB. Patient was found to be 

positive for Covid. Patient is currently in atrial fibrillation with heart rates

in the 130s. She is on a Cardizem drip at 5mg/hr and a Heparin drip. She is also

receiving IV lasix Q12 hours. She is on 3L NC. Blood pressure 60/62.





DIAGNOSTICS:


EKG reveals atrial fibrillation with RVR


Chest xray pulmonary interstitial edema which is new compared to old exam and 

could be acute heart failure.  Acute interstitial pneumonia also possible.


Laboratory data: WBC 4.2.  Hemoglobin 12.2.  Platelet count 144.  Sodium 132.  

Potassium 4.7.  BUN 34.  Creatinine 1.2.  ProBNP 9570.  Troponin 0.067.


Current home cardiac medications include Zocor 40 mg at night, metoprolol 

titrate 25 mg twice a day, Lasix 40 mg twice a day, amiodarone 50 mg daily


Echocardiogram completed in November 2020 to cardiology office revealed ejection

fraction 55%, mild to moderate aortic regurgitation, moderate mitral 

regurgitation, severe tricuspid regurgitation


She underwent cardiac catheterization in 2017 revealing minimal coronary artery 

disease





1/6/2022


Patient remains on the cardiac stepdown unit. Telemetry reveals atrial 

fibrillation with a heart rate around 120. She remains on IV Cardizem. No 

complaints of chest pain or pressure.  Echocardiogram completed revealing 

ejection fraction 30-35%, mild aortic regurgitation, moderate mitral 

regurgitation, severe tricuspid regurgitation, and severe pulmonary hypertension





1/7/2022


Patient remains hospitalized in the cardiac stepdown unit.  Telemetry reveals 

atrial fibrillation with heart rates ranging between 100-130.  IV Cardizem was 

discontinued yesterday.  Patients blood pressure remains stable.





PHYSICAL EXAM: 


Thorough physical exam not completed secondary to limited evaluation/examination

due to Covid19





ASSESSMENT: 


Covid 19


Acute hypoxic respiratory failure 


Abnormal troponin, not suggestive of ACS


Acute on chronic systolic congestive heart failure, EF 55% in December 2020, now

30-35%


Chronic persistent atrial fibrillation with RVR


Hypertension


Hyperlipidemia


Valvular heart disease


COPD





PLAN: 


Increase metoprolol to 75 mg 3 times a day.  Given additional dose of 25 mg now


Continue telemetry monitoring


Continue IV Lasix


Daily weights


Accurate I&O


Monitor kidney function. Check BMP in the AM


Further recommendations pending patient course





Nurse practitioner note has been reviewed by physician. Signing provider agrees 

with the documented findings, assessment, and plan of care. 











Objective





- Vital Signs


Vital signs: 


                                   Vital Signs











Temp  98 F   01/07/22 11:37


 


Pulse  120 H  01/07/22 11:37


 


Resp  19   01/07/22 11:37


 


BP  125/76   01/07/22 11:37


 


Pulse Ox  95   01/07/22 11:37








                                 Intake & Output











 01/06/22 01/07/22 01/07/22





 18:59 06:59 18:59


 


Intake Total 720  0


 


Output Total 300  


 


Balance 420  0


 


Intake:   


 


  Oral 720  0


 


Output:   


 


  Urine 300  


 


Other:   


 


  Voiding Method  Toilet 





  Diaper 


 


  # Voids 1 1 2


 


  # Bowel Movements   1














- Labs


CBC & Chem 7: 


                                 01/04/22 19:08





                                 01/04/22 19:08


Labs: 


                      Microbiology - Last 24 Hours (Table)











 01/04/22 19:08 Blood Culture Gram Stain - Preliminary





 Blood Blood Culture - Preliminary





    Coagulase Negative Staph


 


 01/05/22 17:51 Blood Culture - Preliminary





 Blood    No Growth after 24 hours

## 2022-01-08 VITALS — RESPIRATION RATE: 18 BRPM

## 2022-01-08 VITALS — SYSTOLIC BLOOD PRESSURE: 113 MMHG | HEART RATE: 124 BPM | DIASTOLIC BLOOD PRESSURE: 66 MMHG

## 2022-01-08 VITALS — TEMPERATURE: 97.5 F

## 2022-01-08 LAB
ALBUMIN SERPL-MCNC: 3.6 G/DL (ref 3.5–5)
ALP SERPL-CCNC: 68 U/L (ref 38–126)
ALT SERPL-CCNC: 83 U/L (ref 4–34)
ANION GAP SERPL CALC-SCNC: 8 MMOL/L
AST SERPL-CCNC: 58 U/L (ref 14–36)
BUN SERPL-SCNC: 51 MG/DL (ref 7–17)
CALCIUM SPEC-MCNC: 9 MG/DL (ref 8.4–10.2)
CHLORIDE SERPL-SCNC: 96 MMOL/L (ref 98–107)
CO2 SERPL-SCNC: 33 MMOL/L (ref 22–30)
ERYTHROCYTE [DISTWIDTH] IN BLOOD BY AUTOMATED COUNT: 3.44 M/UL (ref 3.8–5.4)
ERYTHROCYTE [DISTWIDTH] IN BLOOD: 13.1 % (ref 11.5–15.5)
GLUCOSE SERPL-MCNC: 151 MG/DL (ref 74–99)
HCT VFR BLD AUTO: 34.9 % (ref 34–46)
HGB BLD-MCNC: 11.1 GM/DL (ref 11.4–16)
MCH RBC QN AUTO: 32.1 PG (ref 25–35)
MCHC RBC AUTO-ENTMCNC: 31.6 G/DL (ref 31–37)
MCV RBC AUTO: 101.4 FL (ref 80–100)
PLATELET # BLD AUTO: 162 K/UL (ref 150–450)
POTASSIUM SERPL-SCNC: 3.8 MMOL/L (ref 3.5–5.1)
PROT SERPL-MCNC: 5.9 G/DL (ref 6.3–8.2)
SODIUM SERPL-SCNC: 137 MMOL/L (ref 137–145)
WBC # BLD AUTO: 5.9 K/UL (ref 3.8–10.6)

## 2022-01-08 RX ADMIN — Medication SCH MG: at 09:30

## 2022-01-08 RX ADMIN — SENNOSIDES SCH MG: 8.6 TABLET, FILM COATED ORAL at 09:30

## 2022-01-08 RX ADMIN — DEXTROSE SCH MG: 50 INJECTION, SOLUTION INTRAVENOUS at 09:28

## 2022-01-08 RX ADMIN — FUROSEMIDE SCH MG: 10 INJECTION, SOLUTION INTRAMUSCULAR; INTRAVENOUS at 09:30

## 2022-01-08 RX ADMIN — TIOTROPIUM BROMIDE INHALATION SPRAY SCH PUFF: 3.12 SPRAY, METERED RESPIRATORY (INHALATION) at 07:51

## 2022-01-08 RX ADMIN — AMIODARONE HYDROCHLORIDE SCH MG: 200 TABLET ORAL at 09:30

## 2022-01-08 RX ADMIN — OXYCODONE HYDROCHLORIDE AND ACETAMINOPHEN SCH MG: 500 TABLET ORAL at 09:30

## 2022-01-08 RX ADMIN — METOPROLOL TARTRATE SCH MG: 25 TABLET, FILM COATED ORAL at 09:30

## 2022-01-08 RX ADMIN — SENNOSIDES SCH: 8.6 TABLET, FILM COATED ORAL at 10:18

## 2022-01-08 RX ADMIN — ALBUTEROL SULFATE SCH PUFF: 90 AEROSOL, METERED RESPIRATORY (INHALATION) at 07:51

## 2022-01-08 RX ADMIN — ALBUTEROL SULFATE SCH PUFF: 90 AEROSOL, METERED RESPIRATORY (INHALATION) at 12:20

## 2022-01-08 RX ADMIN — Medication SCH MCG: at 09:30

## 2022-01-08 RX ADMIN — BUDESONIDE AND FORMOTEROL FUMARATE DIHYDRATE SCH PUFF: 160; 4.5 AEROSOL RESPIRATORY (INHALATION) at 07:51

## 2022-01-08 RX ADMIN — APIXABAN SCH MG: 2.5 TABLET, FILM COATED ORAL at 09:30

## 2022-01-08 NOTE — P.DS
Providers


Date of admission: 


01/04/22 20:19





Attending physician: 


Lenin Dailey





Consults: 





                                        





01/04/22 20:19


Consult Physician Routine 


   Consulting Provider: Miguel Barnard


   Consult Reason/Comments: covid, hypoxia


   Do you want consulting provider notified?: Yes


Consult Physician Routine 


   Consulting Provider: Tina Davis


   Consult Reason/Comments: heart failure, afib rvr


   Do you want consulting provider notified?: Yes











Primary care physician: 


[facility] Course: 


HISTORY OF PRESENT ILLNESS


This is an 87-year-old female patient of Dr. Dailey and Dr. Pope with past 

medical history of chronic persistent atrial fibrillation, chronic systolic 

heart failure, pulmonary hypertension, COPD with chronic hypoxic respiratory 

failure on home O2 at 3 L as needed, hypertension, hyperlipidemia, chronic 

kidney disease stage III, generalized osteoarthritis


Patient gives history that starting 4 weeks ago she developed a sore throat and 

the following week he was significantly worse.  She called the office for holger

ointment but was unable to come into the building due to need for Covid testing 

which she completed and came back negative.  She was complaining by the time of 

fever and earache and was placed on a steroid.  She subsequently developed a 

rash and presented to the emergency center on January 1 for possible ALLERGIC 

reaction and was subsequently placed on a Z-Daniel.  Covid testing at that time was

also negative.  Regarding anticoagulation.  She states she was quite anxious 

reading the side effects of eliquis and decided to stop taking it and instead 

was taking aspirin 81 mg.  Patient has been fully vaccinated but has not 

received a booster which she was due for.  


She presented again on January 4 to the emergency center due to cough shortness 

of breath and fevers for the past couple days and gradually worsening.  She 

denied having any abdominal pain, nausea or vomiting.  EKG was atrial 

fibrillation with RVR.  Heart rate was running in the 120s and 130s and patient 

was started on Cardizem drip, heparin drip and IV Lasix 40 mg twice daily.  CBC 

was unremarkable except for a platelet count of 144.  Sodium 132, potassium 4.7,

chloride 92, CO2 29, BUN 34 creatinine 1.2.  Blood sugar 123.  INR 1.1.  Total 

bilirubin 1.4, , .  Alkaline phosphatase 55.  Troponin 0.067.  

Lactic acid 1.5.  Coronal virus PCR detected.  ProBNP 9570.  


Chest x-ray reveals pulmonary interstitial edema could be acute heart failure.  

Acute interstitial pneumonia also possible.


Echocardiogram reveals EF of 30-35%, mild aortic regurgitation, moderate mitral 

regurgitation, severe tricuspid regurgitation, severe pulmonary hypertension.


Patient has been seen by cardiology as well as by pulmonary medicine.





1/6: Patient's heart rate is still running 100-130 bpm and A. fib.  Cardiology 

saw the patient and discontinued Cardizem drip, increased amiodarone and 

Lopressor.  Patient is very upset this morning regarding Ativan dosing for 

bedtime which she states she  takes 1.5 tablets at bedtime and a half a tablet 

in the morning.  We will make medication changes.  Discussed discharge planning 

and PT has recommended home with home care.   has arranged for [facility].


Patient has also been seen by pulmonary medicine.  Anticipate discharge home 

tomorrow if heart rate is controlled.  She is continued on IV Lasix 40 mg twice 

daily.





1/7: She remains in atrial fibrillation in the 120s and 130s.  Cardizem drip was

discontinued yesterday.  Cardiology has increased Lopressor to 75 mg twice daily

and continued amiodarone 200 mg twice daily.  Patient has been started on 

eliquis.  She has been afebrile, blood pressure 125/76, pulse ox 95% on 3 L 

nasal cannula.  Patient is also followed by pulmonary medicine as well.  Patient

is continued on IV Lasix 40 mg every 12 hours.





1/8: Patient remains in atrial fibrillation.  She is found sitting up in bed 

resting comfortably.  Patient states that she has not been sleeping very well.  

She also is complaining of the stool softener is working too well.  She is 

anxious to get home.





Discharge diagnosis:


1.  Acute on chronic hypoxic respiratory failure secondary to a combination of 

acute exacerbation of systolic heart failure, possible Covid 19 pneumonia, acute

exacerbation of COPD, A. fib with RVR.  


2.  Acute exacerbation of systolic heart failure. 


3.  Possible Covid 19 pneumonia.  


4.  Acute exacerbation of COPD.  


5.  Chronic persistent atrial fibrillation presenting with RVR.  


6.  Transaminitis possibly related to Covid 19.


7.  Hypertension.


8.  Hyperlipidemia. 


9.  Chronic kidney disease stage III. 


10.  Valvular heart disease with  mild aortic regurgitation, moderate mitral 

regurgitation, severe tricuspid regurgitation.


11.  Severe pulmonary hypertension.





DISCHARGE DISPOSITION


Home with homecare





Impression and plan of care have been directed as dictated by the signing 

physician.  Jeniffer Kamendat nurse practitioner acting as scribe for signing 

physician.








Patient Condition at Discharge: Critical





Plan - Discharge Summary


Discharge Rx Participant: No


New Discharge Prescriptions: 


Continue


   Fluticasone/Salmeterol [Advair 250-50 Diskus] 1 puff INHALATION RT-BID


   Furosemide [Lasix] 40 mg PO BID


   Tiotropium 18 Mcg/Puff [Spiriva] 1 cap INHALATION RT-DAILY


   Cholecalciferol (Vitamin D3) [Vitamin D3] 2,000 unit PO DAILY


   LORazepam [Ativan] 1 mg PO HS


   Ferrous Sulfate [Iron (65 MG Elemental)] 325 mg PO DAILY


   Amiodarone [Cordarone] 50 mg PO DAILY


   Azithromycin [Zithromax Z-pack (6 tabs)] See Taper PO DAILY


   Metoprolol Tartrate [Lopressor] 25 mg PO BID


   Simvastatin [Zocor] 40 mg PO HS


Discharge Medication List





Fluticasone/Salmeterol [Advair 250-50 Diskus] 1 puff INHALATION RT-BID 01/07/17 

[History]


Furosemide [Lasix] 40 mg PO BID 06/03/17 [History]


Tiotropium 18 Mcg/Puff [Spiriva] 1 cap INHALATION RT-DAILY 06/03/17 [History]


Cholecalciferol (Vitamin D3) [Vitamin D3] 2,000 unit PO DAILY 02/08/18 [History]


LORazepam [Ativan] 1 mg PO HS 02/08/18 [History]


Ferrous Sulfate [Iron (65 MG Elemental)] 325 mg PO DAILY 05/11/18 [History]


Amiodarone [Cordarone] 50 mg PO DAILY 01/04/22 [History]


Azithromycin [Zithromax Z-pack (6 tabs)] See Taper PO DAILY 01/04/22 [History]


Metoprolol Tartrate [Lopressor] 25 mg PO BID 01/04/22 [History]


Simvastatin [Zocor] 40 mg PO HS 01/04/22 [History]








Follow up Appointment(s)/Referral(s): 


Lenin Dailey MD [Primary Care Provider] - 1-2 days


[facility] Visiting Nurse, [NON-STAFF] - 


Activity/Diet/Wound Care/Special Instructions: 


Patient will need a wheel chair van ride home at d/c. (They do not run on 

Sundays)





Continue with oxygen at home.





Continue with home care


Discharge/Stand Alone Forms:  Who Do I Call?, Community Resources, Help In The 

Home, Outpatient Counseling

## 2022-01-08 NOTE — P.PN
Subjective


Progress Note Date: 01/08/22


Principal diagnosis: 





Respiratory failure.





This is a 87-year-old white female patient of Dr. Dailey, with past medical 

history of moderately severe COPD, Gold stage III disease at baseline on home 

oxygen at 3 L/m on as-needed basis, chronic systolic CHF with EF of 20-25%, 

paroxysmal atrial fibrillation used to be on Eliquis, which is not on her home 

med list now, anxiety, pulmonary hypertension related to chronic lung disease 

and chronic CHF, chronic kidney disease stage III, glaucoma and osteoarthritis. 

Patient follows with Dr. Portillo in the pulmonary clinic, she is maintained on 

Advair 250/50 Diskus and Spiriva on a daily basis.  She came into the emergency 

department on 01/04/2022 at 1850 1 in the afternoon per EMS for evaluation of 

cough, shortness of breath and fevers.  Patient states over the last couple of 

days or shortness of breath has been progressively worse, she has a productive 

cough, she isn't vaccinated for COVID 19, denies any obvious exposure.  She is 

complaining about constitutional symptoms including fevers, no nausea or 

vomiting, no abdominal pain, she has some swelling in her bilateral lower 

extremities, denied any chest pain.  Denies any runny nose or sore throat.  She 

was noted to be in A. fib with RVR and was started on Cardizem drip and heparin 

infusion, her chest x-ray showing pulmonary interstitial edema.  She also tested

positive for COVID 19.  The rest of the blood work showed normal white count of 

4.2, hemoglobin 12.2, lymphocyte count of 0.5, sodium is 132, potassium is 4.7, 

chloride is 92, CO2 is 29, BUN is 34, creatinine is 1.2, plasma lactic acid is 

1.5, AST was 235, ALT was 126, alk phos was 55, troponin was 0.067, proBNP was 

elevated at 9570.  She did have a fever of 101.2F.  Currently remains in A. fib

with RVR with a rate of 120 to 1:30 BPM, continues on Cardizem infusion and 5 

mg, she is currently on 3 L of oxygen pulse ox is 94%, her blood pressures are 

on the lower side with a BP of 89/71.  Echocardiogram has been ordered and 

pending, patient was started on IV Lasix 40 mg twice daily.  We started the on 

Decadron 6 mg daily, she continues on heparin infusion, cardiology consultation 

is pending, she appears to be in no acute distress. 





Progress note dated 01/06/2022.





87-year-old female, who was seen yesterday in consultation for chronic hypoxemic

respiratory failure, secondary to CHF, COPD exacerbation, and coronavirus 

associated pneumonia.  The patient currently is seen today again in room 380.  

She is on 3 L nasal cannula.  She is getting saline at 20 mL an hour.  She's got

Cardizem running at 10 mg an hour.  The patient's only complaint is that of 

shortness of breath, dry cough, and sore throat.  She denies any chest pain or 

chest discomfort.  No new laboratory data today.  No additional chest x-rays 

today.





Progress note dated 01/07/2022.





87-year-old female, seen in consultation 2 days ago.  She is seen today in room 

380.  The patient is currently on 3 L nasal cannula.  She's not receiving any IV

fluids.  The patient was seen for chronic hypoxemic respiratory failure, 

secondary to CHF, COPD, and coronavirus pneumonia.  The patient appears to be 

relatively stable.  She is speaking in full sentences.  There is no evidence of 

use of accessory muscles.  No new labs today.  No new chest x-ray today.





Progress note dated 01/08/2022.





87-year-old female seen in consultation 3 days ago.  She is again seen in room 

380.  She's been in the hospital now for 4 days.  The patient is currently on 3 

L nasal cannula.  She's not receiving any IV fluids.  Her biggest complaint is 

that she feels very tired.  She denies any shortness of breath.  Laboratory data

includes a white count 5.9, hemoglobin 11.1, hematocrit 34.9, and platelet count

162,000.  Sodium 137, potassium 3.8, chlorides 96, CO2 33, anion gap 8, BUN 51, 

creatinine 1.40.  AST is 58, ALP 83.





Objective





- Vital Signs


Vital signs: 


                                   Vital Signs











Temp  97.5 F L  01/08/22 08:00


 


Pulse  124 H  01/08/22 08:00


 


Resp  18   01/08/22 08:00


 


BP  113/66   01/08/22 08:00


 


Pulse Ox  98   01/08/22 08:00








                                 Intake & Output











 01/07/22 01/08/22 01/08/22





 18:59 06:59 18:59


 


Intake Total 540  360


 


Balance 540  360


 


Weight  62 kg 


 


Intake:   


 


  Oral 540  360


 


Other:   


 


  Voiding Method  Toilet Toilet





  Diaper Diaper


 


  # Voids 2 1 3


 


  # Bowel Movements 1 1 














- Exam





No acute distress, oriented 3.  Currently on 3 L nasal cannula with saturations

of 98%.





HEENT examination is grossly unremarkable. 





Neck supple.  Full range of motion.  No adenopathy thyromegaly or neck vein 

distention.





Cardiovascular examination reveals an irregular rhythm and rate.  Heart rate 99 

bpm.  S1-S2 normal.  No S3 or S4.  No discernible murmur noted.





Lungs reveal scattered bilateral rhonchi.  There is basilar crackles.  A few 

scattered high-pitched wheezes are also noted.  Breath sounds are equal 

bilaterally.  





Abdomen soft bowel sounds are heard.  No masses or tenderness.





Extremities are intact.  No cyanosis or clubbing.  Mild, 1+, edema is noted in 

the lower extremities.





Skin is without rash or lesion.





Neurologic examination is brief but nonfocal.





- Labs


CBC & Chem 7: 


                                 01/08/22 08:56





                                 01/08/22 08:56


Labs: 


                  Abnormal Lab Results - Last 24 Hours (Table)











  01/08/22 01/08/22 Range/Units





  08:56 08:56 


 


RBC  3.44 L   (3.80-5.40)  m/uL


 


Hgb  11.1 L   (11.4-16.0)  gm/dL


 


MCV  101.4 H   (80.0-100.0)  fL


 


Chloride   96 L  ()  mmol/L


 


Carbon Dioxide   33 H  (22-30)  mmol/L


 


BUN   51 H  (7-17)  mg/dL


 


Creatinine   1.40 H  (0.52-1.04)  mg/dL


 


Glucose   151 H  (74-99)  mg/dL


 


AST   58 H  (14-36)  U/L


 


ALT   83 H  (4-34)  U/L


 


Total Protein   5.9 L  (6.3-8.2)  g/dL








                      Microbiology - Last 24 Hours (Table)











 01/05/22 17:51 Blood Culture - Preliminary





 Blood    No Growth after 48 hours


 


 01/05/22 20:13 Urine Culture - Final





 Urine,Clean Catch 


 


 01/04/22 19:08 Blood Culture Gram Stain - Preliminary





 Blood Blood Culture - Preliminary





    Coagulase Negative Staph














Assessment and Plan


Assessment: 





Acute on chronic hypoxemic respiratory failure, secondary to systolic CHF, 

coronavirus associated pneumonia COPD exacerbation, and atrial fibrillation with

RVR.





Rule out non-ST segment elevation myocardial infarction.





Cardiomyopathy, with ejection fraction of 20-25%.





History of pulmonary hypertension.





History of paroxysmal atrial fibrillation.





History of stage III COPD.





History of hypertension.





Prior history of myocardial infarction.





Osteoarthritis.





Stage III chronic kidney disease.





History of GI bleed.





Chronic anxiety.





55-pack-year history of tobacco use.


Plan: 





Plan dated 01/06/2022.





From the pulmonary standpoint, patient is doing well.  The patient appears to be

relatively stable.  We will continue to follow and make recommendations where 

appropriate.  The patient remains on 3 L nasal cannula.  The patient's getting 

saline at 20 mL an hour, and a Cardizem drip at 10 mg an hour.  Prognosis is 

guarded.  Medications, labs, and x-rays all reviewed.  Prognosis is certainly g

uarded.





Plan dated 01/07/2022.





The patient appears to be doing relatively well.  She's not receiving any IV 

fluids.  She is on 3 L nasal cannula.  Saturations are in the mid 90s.  The 

Cardizem drip has been discontinued.  The patient is getting vitamin C, vitamin 

D3, and zinc.  In addition, the patient is on Eliquis, and Decadron.  The 

patient is stable from the pulmonary standpoint.  She's lying flat in bed, 

without any respiratory distress or difficulty.  Additional recommendations and 

suggestions are forthcoming.  We will continue to follow make recommendations 

where appropriate.  Prognosis is guarded.





Plan dated 01/08/2022.





The patient's doing well.  She's been weaned down to 3 L nasal cannula.  She's 

not receiving any IV fluids.  Her only complaint today is that she's very tired.

 The patient is on vitamin C, vitamin D3, and zinc.  In addition, she is on 

Eliquis and Decadron.  From the pulmonary standpoint, she is very stable.  She 

is laying flat in bed without any respiratory distress or difficulty.  There is 

no conversational dyspnea or use of accessory muscles.  We will continue to 

follow and make recommendations where appropriate.  Prognosis is certainly 

guarded given her age.





Time with Patient: Less than 30

## 2022-01-10 ENCOUNTER — HOSPITAL ENCOUNTER (INPATIENT)
Dept: HOSPITAL 47 - EC | Age: 87
LOS: 4 days | Discharge: NURSING, REHAB OR LONG-TERM CARE | DRG: 177 | End: 2022-01-14
Attending: FAMILY MEDICINE | Admitting: FAMILY MEDICINE
Payer: MEDICARE

## 2022-01-10 DIAGNOSIS — U07.1: Primary | ICD-10-CM

## 2022-01-10 DIAGNOSIS — I25.10: ICD-10-CM

## 2022-01-10 DIAGNOSIS — N18.30: ICD-10-CM

## 2022-01-10 DIAGNOSIS — J44.1: ICD-10-CM

## 2022-01-10 DIAGNOSIS — Z98.41: ICD-10-CM

## 2022-01-10 DIAGNOSIS — I27.20: ICD-10-CM

## 2022-01-10 DIAGNOSIS — M19.041: ICD-10-CM

## 2022-01-10 DIAGNOSIS — J96.21: ICD-10-CM

## 2022-01-10 DIAGNOSIS — I42.8: ICD-10-CM

## 2022-01-10 DIAGNOSIS — Z87.891: ICD-10-CM

## 2022-01-10 DIAGNOSIS — J44.0: ICD-10-CM

## 2022-01-10 DIAGNOSIS — M19.042: ICD-10-CM

## 2022-01-10 DIAGNOSIS — I48.19: ICD-10-CM

## 2022-01-10 DIAGNOSIS — R53.81: ICD-10-CM

## 2022-01-10 DIAGNOSIS — Z98.42: ICD-10-CM

## 2022-01-10 DIAGNOSIS — Z80.41: ICD-10-CM

## 2022-01-10 DIAGNOSIS — Z82.49: ICD-10-CM

## 2022-01-10 DIAGNOSIS — Z90.711: ICD-10-CM

## 2022-01-10 DIAGNOSIS — F41.9: ICD-10-CM

## 2022-01-10 DIAGNOSIS — Z82.5: ICD-10-CM

## 2022-01-10 DIAGNOSIS — I08.3: ICD-10-CM

## 2022-01-10 DIAGNOSIS — Z82.0: ICD-10-CM

## 2022-01-10 DIAGNOSIS — Z98.890: ICD-10-CM

## 2022-01-10 DIAGNOSIS — E86.0: ICD-10-CM

## 2022-01-10 DIAGNOSIS — J12.82: ICD-10-CM

## 2022-01-10 DIAGNOSIS — Z99.81: ICD-10-CM

## 2022-01-10 DIAGNOSIS — I13.0: ICD-10-CM

## 2022-01-10 DIAGNOSIS — H91.90: ICD-10-CM

## 2022-01-10 DIAGNOSIS — I25.2: ICD-10-CM

## 2022-01-10 DIAGNOSIS — Z88.1: ICD-10-CM

## 2022-01-10 DIAGNOSIS — I50.23: ICD-10-CM

## 2022-01-10 DIAGNOSIS — E78.5: ICD-10-CM

## 2022-01-10 DIAGNOSIS — Z79.899: ICD-10-CM

## 2022-01-10 DIAGNOSIS — E87.1: ICD-10-CM

## 2022-01-10 DIAGNOSIS — Z79.51: ICD-10-CM

## 2022-01-10 DIAGNOSIS — Z79.01: ICD-10-CM

## 2022-01-10 LAB
ALBUMIN SERPL-MCNC: 3.7 G/DL (ref 3.5–5)
ALP SERPL-CCNC: 72 U/L (ref 38–126)
ALT SERPL-CCNC: 64 U/L (ref 4–34)
ANION GAP SERPL CALC-SCNC: 5 MMOL/L
APTT BLD: 24.8 SEC (ref 22–30)
AST SERPL-CCNC: 51 U/L (ref 14–36)
BASOPHILS # BLD AUTO: 0 K/UL (ref 0–0.2)
BASOPHILS NFR BLD AUTO: 0 %
BUN SERPL-SCNC: 44 MG/DL (ref 7–17)
CALCIUM SPEC-MCNC: 8.8 MG/DL (ref 8.4–10.2)
CHLORIDE SERPL-SCNC: 89 MMOL/L (ref 98–107)
CO2 SERPL-SCNC: 38 MMOL/L (ref 22–30)
EOSINOPHIL # BLD AUTO: 0.1 K/UL (ref 0–0.7)
EOSINOPHIL NFR BLD AUTO: 1 %
ERYTHROCYTE [DISTWIDTH] IN BLOOD BY AUTOMATED COUNT: 4.09 M/UL (ref 3.8–5.4)
ERYTHROCYTE [DISTWIDTH] IN BLOOD: 13 % (ref 11.5–15.5)
GLUCOSE SERPL-MCNC: 99 MG/DL (ref 74–99)
HCT VFR BLD AUTO: 40 % (ref 34–46)
HGB BLD-MCNC: 13.1 GM/DL (ref 11.4–16)
INR PPP: 1.2 (ref ?–1.2)
LDH SPEC-CCNC: 774 U/L (ref 313–618)
LYMPHOCYTES # SPEC AUTO: 0.5 K/UL (ref 1–4.8)
LYMPHOCYTES NFR SPEC AUTO: 6 %
MAGNESIUM SPEC-SCNC: 2.4 MG/DL (ref 1.6–2.3)
MCH RBC QN AUTO: 32 PG (ref 25–35)
MCHC RBC AUTO-ENTMCNC: 32.8 G/DL (ref 31–37)
MCV RBC AUTO: 97.7 FL (ref 80–100)
MONOCYTES # BLD AUTO: 0.3 K/UL (ref 0–1)
MONOCYTES NFR BLD AUTO: 4 %
NEUTROPHILS # BLD AUTO: 6.3 K/UL (ref 1.3–7.7)
NEUTROPHILS NFR BLD AUTO: 87 %
PH UR: 7.5 [PH] (ref 5–8)
PLATELET # BLD AUTO: 258 K/UL (ref 150–450)
POTASSIUM SERPL-SCNC: 3.8 MMOL/L (ref 3.5–5.1)
PROT SERPL-MCNC: 6.4 G/DL (ref 6.3–8.2)
PT BLD: 12.1 SEC (ref 9–12)
SODIUM SERPL-SCNC: 132 MMOL/L (ref 137–145)
SP GR UR: 1.01 (ref 1–1.03)
UROBILINOGEN UR QL STRIP: <2 MG/DL (ref ?–2)
WBC # BLD AUTO: 7.2 K/UL (ref 3.8–10.6)

## 2022-01-10 PROCEDURE — 85652 RBC SED RATE AUTOMATED: CPT

## 2022-01-10 PROCEDURE — 36415 COLL VENOUS BLD VENIPUNCTURE: CPT

## 2022-01-10 PROCEDURE — 93005 ELECTROCARDIOGRAM TRACING: CPT

## 2022-01-10 PROCEDURE — 83605 ASSAY OF LACTIC ACID: CPT

## 2022-01-10 PROCEDURE — 94640 AIRWAY INHALATION TREATMENT: CPT

## 2022-01-10 PROCEDURE — 80048 BASIC METABOLIC PNL TOTAL CA: CPT

## 2022-01-10 PROCEDURE — 83735 ASSAY OF MAGNESIUM: CPT

## 2022-01-10 PROCEDURE — 71045 X-RAY EXAM CHEST 1 VIEW: CPT

## 2022-01-10 PROCEDURE — 83615 LACTATE (LD) (LDH) ENZYME: CPT

## 2022-01-10 PROCEDURE — 87040 BLOOD CULTURE FOR BACTERIA: CPT

## 2022-01-10 PROCEDURE — 85730 THROMBOPLASTIN TIME PARTIAL: CPT

## 2022-01-10 PROCEDURE — 85379 FIBRIN DEGRADATION QUANT: CPT

## 2022-01-10 PROCEDURE — 84484 ASSAY OF TROPONIN QUANT: CPT

## 2022-01-10 PROCEDURE — 80053 COMPREHEN METABOLIC PANEL: CPT

## 2022-01-10 PROCEDURE — 82728 ASSAY OF FERRITIN: CPT

## 2022-01-10 PROCEDURE — 86140 C-REACTIVE PROTEIN: CPT

## 2022-01-10 PROCEDURE — 85610 PROTHROMBIN TIME: CPT

## 2022-01-10 PROCEDURE — 99285 EMERGENCY DEPT VISIT HI MDM: CPT

## 2022-01-10 PROCEDURE — 85025 COMPLETE CBC W/AUTO DIFF WBC: CPT

## 2022-01-10 PROCEDURE — 81003 URINALYSIS AUTO W/O SCOPE: CPT

## 2022-01-10 PROCEDURE — 87635 SARS-COV-2 COVID-19 AMP PRB: CPT

## 2022-01-10 RX ADMIN — ACETAMINOPHEN PRN MG: 325 TABLET, FILM COATED ORAL at 15:10

## 2022-01-10 RX ADMIN — APIXABAN SCH MG: 2.5 TABLET, FILM COATED ORAL at 21:22

## 2022-01-10 RX ADMIN — AZITHROMYCIN MONOHYDRATE SCH MLS/HR: 500 INJECTION, POWDER, LYOPHILIZED, FOR SOLUTION INTRAVENOUS at 21:23

## 2022-01-10 RX ADMIN — ALBUTEROL SULFATE SCH PUFF: 90 AEROSOL, METERED RESPIRATORY (INHALATION) at 19:15

## 2022-01-10 RX ADMIN — ATORVASTATIN CALCIUM SCH MG: 20 TABLET, FILM COATED ORAL at 21:22

## 2022-01-10 RX ADMIN — DEXTROSE SCH MG: 50 INJECTION, SOLUTION INTRAVENOUS at 18:41

## 2022-01-10 RX ADMIN — AMIODARONE HYDROCHLORIDE SCH MG: 200 TABLET ORAL at 21:22

## 2022-01-10 RX ADMIN — BUDESONIDE AND FORMOTEROL FUMARATE DIHYDRATE SCH PUFF: 80; 4.5 AEROSOL RESPIRATORY (INHALATION) at 19:16

## 2022-01-10 NOTE — ED
General Adult HPI





- General


Chief complaint: Weakness


Stated complaint: Weakness


Time Seen by Provider: 01/10/22 12:05


Source: patient, EMS, RN notes reviewed, old records reviewed


Mode of arrival: EMS


Limitations: physical limitation





- History of Present Illness


Initial comments: 





87-year-old female presenting for reevaluation.  Patient was recently discharged

from this institution with generalized weakness, and cold with hypoxia.  She was

discharged on home oxygen.  Patient has been living by herself.  She states she 

has not had hardly anything to eat or drink over the past several days and she's

been home.  She denies fever.  She's had some minimal vomiting and diarrhea.  No

chest pain.  Her breathing is unchanged from the time of discharge.





- Related Data


                                Home Medications











 Medication  Instructions  Recorded  Confirmed


 


Fluticasone/Salmeterol [Advair 1 puff INHALATION RT-BID 01/07/17 01/10/22





250-50 Diskus]   


 


Furosemide [Lasix] 40 mg PO BID 06/03/17 01/10/22


 


Tiotropium 18 Mcg/Puff [Spiriva] 1 cap INHALATION RT-DAILY 06/03/17 01/10/22


 


Cholecalciferol (Vitamin D3) 2,000 unit PO DAILY 02/08/18 01/10/22





[Vitamin D3]   


 


LORazepam [Ativan] 1 mg PO HS 02/08/18 01/10/22


 


Ferrous Sulfate [Iron (65  mg PO DAILY 05/11/18 01/10/22





Elemental)]   


 


Amiodarone [Cordarone] See Taper PO AS DIRECTED 01/10/22 01/10/22








                                  Previous Rx's











 Medication  Instructions  Recorded


 


Albuterol Inhaler [Ventolin Hfa 1 puff INHALATION RT-QID #1 gm 22





Inhaler]  


 


Apixaban [Eliquis] 2.5 mg PO BID #60 tablet 22


 


Atorvastatin [Lipitor] 20 mg PO HS #30 tab 22


 


Metoprolol Tartrate [Lopressor] 75 mg PO TID #270 tab 22











                                    Allergies











Allergy/AdvReac Type Severity Reaction Status Date / Time


 


amoxicillin [From Augmentin] Allergy  Unknown Verified 01/10/22 12:36


 


cefuroxime axetil Allergy  Unknown Verified 01/10/22 12:36





[From Ceftin]     


 


clavulanic acid Allergy  Unknown Verified 01/10/22 12:36





[From Augmentin]     














Review of Systems


ROS Statement: 


Those systems with pertinent positive or pertinent negative responses have been 

documented in the HPI.





ROS Other: All systems not noted in ROS Statement are negative.





Past Medical History


Past Medical History: Atrial Fibrillation, Coronary Artery Disease (CAD), Heart 

Failure, COPD, Eye Disorder, GERD/Reflux, GI Bleed, Hearing Disorder / Deafness,

Hyperlipidemia, Hypertension, Myocardial Infarction (MI), Osteoarthritis (OA), 

Pneumonia, Renal Disease, Respiratory Disorder, Skin Disorder


Additional Past Medical History / Comment(s): Covid+ 21 at [facility].   Pt 

diagnosed 21 with pharyngitis and is on antibiotic, nonischemic car

diomyopathy, pulmonary HTN, bronchitis, O2 at 2L/HS, gastritis, diverticular 

disease, [place]/aides bilaterally, bilateral eye glaucoma, arthritis bilateral 

hands, CKD, rosacia, past lumbar slipped disc/pain/improved now.


Last Myocardial Infarction Date:: 


History of Any Multi-Drug Resistant Organisms: None Reported


Past Surgical History: Adenoidectomy, Appendectomy, Heart Catheterization, 

Hysterectomy, Orthopedic Surgery, Tonsillectomy


Additional Past Surgical History / Comment(s): 2017 cardiac cath/treated 

medically, partial hysterectomy/still has part of L ovary, R knee 

arthroscopy/torn meniscus, EGD, colonoscopies, bilateral blepharoplasties, 

bilateral eye cataract removal/laser surgery for glaucoma.


Past Anesthesia/Blood Transfusion Reactions: No Reported Reaction


Additional Past Anesthesia/Blood Transfusion Reaction / Comment(s): .


Past Psychological History: Anxiety


Smoking Status: Former smoker


Past Alcohol Use History: None Reported


Past Drug Use History: None Reported





- Past Family History


  ** Brother(s)


Additional Family Medical History / Comment(s): Patient had 3 brothers and one 

is living and 93 years of age and [place] with history of Alzheimer's dementia, 

coronary artery disease, CHF, A. fib, COPD.  Patient has 2 brothers that have 

passed one from alcoholism and one from a traumatic head injury from a fall.





  ** Sister(s)


Additional Family Medical History / Comment(s): Patient has one sister that 

of ovarian cancer.  Patient has one son that  from alcoholic complications.





  ** Father


Family Medical History: Cancer


Additional Family Medical History / Comment(s): Father  at age 77yrs of 

cancer which pt believes may have started in his throat.





  ** Mother


Family Medical History: Dementia


Additional Family Medical History / Comment(s): Mother  at age 95yrs.  She 

was very healthy most of her life- she had dementia at the very end of her life.





General Exam


Limitations: physical limitation


General appearance: alert, in no apparent distress


Head exam: Present: atraumatic, normocephalic


Eye exam: Present: normal appearance, PERRL


ENT exam: Present: mucous membranes dry


Neck exam: Present: normal inspection.  Absent: tenderness, meningismus


Respiratory exam: Present: rales.  Absent: respiratory distress


Cardiovascular Exam: Present: regular rate, irregular rhythm


GI/Abdominal exam: Present: soft.  Absent: distended, tenderness, guarding, 

rebound


Extremities exam: Present: normal inspection, normal capillary refill


Neurological exam: Present: alert, oriented X3, CN II-XII intact.  Absent: motor

sensory deficit


Psychiatric exam: Present: normal affect, normal mood


Skin exam: Present: warm, dry, intact.  Absent: cyanosis, diaphoretic





Course


                                   Vital Signs











  01/10/22 01/10/22





  11:57 13:42


 


Temperature 97.1 F L 


 


Pulse Rate 84 108 H


 


Respiratory 18 24





Rate  


 


Blood Pressure 99/53 104/71


 


O2 Sat by Pulse 97 98





Oximetry  














EKG Findings





- EKG Comments:


EKG Findings:: Atrial fibrillation, left axis, LVH, no ST segment elevation, she

has T-wave inversion in the precordial leads as well as inferior leads.  

Ventricular rate of 94, QRS duration 94, 





Medical Decision Making





- Medical Decision Making





87-year-old female history of atrial fibrillation, recent hospital admission for

coronavirus pneumonia with hypoxia requiring supplemental oxygen.  Patient 

returns for reevaluation.  She is currently living alone.  She denies chest pain

does have persistent dyspnea.  She has not been eating or drinking well, 

clinically appears dehydrated.  No respiratory distress.  Chest x-ray shows an 

interstitial infiltrate versus CHF.  She has a normal CBC was stable hemoglobin,

no leukocytosis, creatinine 1.29.  Minimal troponin elevation 0.067.  This level

will be trended.  She is anticoagulated at baseline.  She has no active chest 

pain.  Cardiology will be placed on consult for recommendations.  Patient may 

require rehabilitation.  I did discuss case with Dr. Castro who will admit.





- Lab Data


Result diagrams: 


                                 01/10/22 12:45





                                 01/10/22 12:45


                                   Lab Results











  01/10/22 01/10/22 01/10/22 Range/Units





  12:45 12:45 12:45 


 


WBC  7.2    (3.8-10.6)  k/uL


 


RBC  4.09    (3.80-5.40)  m/uL


 


Hgb  13.1    (11.4-16.0)  gm/dL


 


Hct  40.0    (34.0-46.0)  %


 


MCV  97.7    (80.0-100.0)  fL


 


MCH  32.0    (25.0-35.0)  pg


 


MCHC  32.8    (31.0-37.0)  g/dL


 


RDW  13.0    (11.5-15.5)  %


 


Plt Count  258    (150-450)  k/uL


 


MPV  7.9    


 


Neutrophils %  87    %


 


Lymphocytes %  6    %


 


Monocytes %  4    %


 


Eosinophils %  1    %


 


Basophils %  0    %


 


Neutrophils #  6.3    (1.3-7.7)  k/uL


 


Lymphocytes #  0.5 L    (1.0-4.8)  k/uL


 


Monocytes #  0.3    (0-1.0)  k/uL


 


Eosinophils #  0.1    (0-0.7)  k/uL


 


Basophils #  0.0    (0-0.2)  k/uL


 


PT   12.1 H   (9.0-12.0)  sec


 


INR   1.2 H   (<1.2)  


 


APTT   24.8   (22.0-30.0)  sec


 


Sodium     (137-145)  mmol/L


 


Potassium     (3.5-5.1)  mmol/L


 


Chloride     ()  mmol/L


 


Carbon Dioxide     (22-30)  mmol/L


 


Anion Gap     mmol/L


 


BUN     (7-17)  mg/dL


 


Creatinine     (0.52-1.04)  mg/dL


 


Est GFR (CKD-EPI)AfAm     (>60 ml/min/1.73 sqM)  


 


Est GFR (CKD-EPI)NonAf     (>60 ml/min/1.73 sqM)  


 


Glucose     (74-99)  mg/dL


 


Plasma Lactic Acid Brandon     (0.7-2.0)  mmol/L


 


Calcium     (8.4-10.2)  mg/dL


 


Magnesium     (1.6-2.3)  mg/dL


 


Total Bilirubin     (0.2-1.3)  mg/dL


 


AST     (14-36)  U/L


 


ALT     (4-34)  U/L


 


Alkaline Phosphatase     ()  U/L


 


Troponin I     (0.000-0.034)  ng/mL


 


Total Protein     (6.3-8.2)  g/dL


 


Albumin     (3.5-5.0)  g/dL


 


Urine Color    Light Yellow  


 


Urine Appearance    Clear  (Clear)  


 


Urine pH    7.5  (5.0-8.0)  


 


Ur Specific Gravity    1.010  (1.001-1.035)  


 


Urine Protein    Negative  (Negative)  


 


Urine Glucose (UA)    Negative  (Negative)  


 


Urine Ketones    Negative  (Negative)  


 


Urine Blood    Negative  (Negative)  


 


Urine Nitrite    Negative  (Negative)  


 


Urine Bilirubin    Negative  (Negative)  


 


Urine Urobilinogen    <2.0  (<2.0)  mg/dL


 


Ur Leukocyte Esterase    Negative  (Negative)  














  01/10/22 01/10/22 01/10/22 Range/Units





  12:45 12:45 12:45 


 


WBC     (3.8-10.6)  k/uL


 


RBC     (3.80-5.40)  m/uL


 


Hgb     (11.4-16.0)  gm/dL


 


Hct     (34.0-46.0)  %


 


MCV     (80.0-100.0)  fL


 


MCH     (25.0-35.0)  pg


 


MCHC     (31.0-37.0)  g/dL


 


RDW     (11.5-15.5)  %


 


Plt Count     (150-450)  k/uL


 


MPV     


 


Neutrophils %     %


 


Lymphocytes %     %


 


Monocytes %     %


 


Eosinophils %     %


 


Basophils %     %


 


Neutrophils #     (1.3-7.7)  k/uL


 


Lymphocytes #     (1.0-4.8)  k/uL


 


Monocytes #     (0-1.0)  k/uL


 


Eosinophils #     (0-0.7)  k/uL


 


Basophils #     (0-0.2)  k/uL


 


PT     (9.0-12.0)  sec


 


INR     (<1.2)  


 


APTT     (22.0-30.0)  sec


 


Sodium  132 L    (137-145)  mmol/L


 


Potassium  3.8    (3.5-5.1)  mmol/L


 


Chloride  89 L    ()  mmol/L


 


Carbon Dioxide  38 H    (22-30)  mmol/L


 


Anion Gap  5    mmol/L


 


BUN  44 H    (7-17)  mg/dL


 


Creatinine  1.29 H    (0.52-1.04)  mg/dL


 


Est GFR (CKD-EPI)AfAm  43    (>60 ml/min/1.73 sqM)  


 


Est GFR (CKD-EPI)NonAf  37    (>60 ml/min/1.73 sqM)  


 


Glucose  99    (74-99)  mg/dL


 


Plasma Lactic Acid Brandon   1.0   (0.7-2.0)  mmol/L


 


Calcium  8.8    (8.4-10.2)  mg/dL


 


Magnesium  2.4 H    (1.6-2.3)  mg/dL


 


Total Bilirubin  1.5 H    (0.2-1.3)  mg/dL


 


AST  51 H    (14-36)  U/L


 


ALT  64 H    (4-34)  U/L


 


Alkaline Phosphatase  72    ()  U/L


 


Troponin I    0.092 H*  (0.000-0.034)  ng/mL


 


Total Protein  6.4    (6.3-8.2)  g/dL


 


Albumin  3.7    (3.5-5.0)  g/dL


 


Urine Color     


 


Urine Appearance     (Clear)  


 


Urine pH     (5.0-8.0)  


 


Ur Specific Gravity     (1.001-1.035)  


 


Urine Protein     (Negative)  


 


Urine Glucose (UA)     (Negative)  


 


Urine Ketones     (Negative)  


 


Urine Blood     (Negative)  


 


Urine Nitrite     (Negative)  


 


Urine Bilirubin     (Negative)  


 


Urine Urobilinogen     (<2.0)  mg/dL


 


Ur Leukocyte Esterase     (Negative)  














Disposition


Clinical Impression: 


 COVID-19, Hypoxia, Elevated troponin





Disposition: ADMITTED AS IP TO THIS [facility]


Condition: Stable


Is patient prescribed a controlled substance at d/c from ED?: No


Referrals: 


Lenin Dailey MD [Primary Care Provider] - 1-2 days


Decision to Admit Reason: Admit from EC


Decision Date: 01/10/22


Decision Time: 14:38

## 2022-01-10 NOTE — P.HPIM
History of Present Illness


H&P Date: 01/10/22


Chief Complaint: Weakness, diminished appetite, shortness of breath


This is an 87-year-old female patient of Dr. Dailey and Dr. Pope with past 

medical history of chronic persistent atrial fibrillation, chronic systolic 

heart failure, pulmonary hypertension, COPD with chronic hypoxic respiratory 

failure on home O2 at 3 L as needed, hypertension, hyperlipidemia, chronic 

kidney disease stage III, generalized osteoarthritis





She was admitted, 2022, discharged 2022, secondary to acute Covid 

patient has been vaccinated, however she is missing her booster shot.  For which

she is due for.  She was admitted with acute interstitial pneumonia, coronary 

virus was positive, on 2021, when she presented with cough shortness of 

breath, fever, A. fib with RVR.  At that time, echocardiogram was 30-35%, with 

mild AR, moderate MR, severe TR proBNP of 9570 lactic acid was 1.5, troponin was

0.067.  She was managed for this acute Covid, and the A. fib with RVR, for which

she received Cardizem drip, amiodarone was 3 titrated up, along with the 

Lopressor.  Patient was subsequently discharged to home, with home therapy, 

however patient is weaker than usual, cannot manage on her own, despite visiting

nurses assisting with her care.  She was discharged on 3 L O2 nasal cannula, 

with a discharge creatinine of 1.4, BUN of 51, hemoglobin of 11.1.  Patient 

lives alone, has increasing weakness, no falls, has some shortness of breath 

with exertion, no chest pain, patient denies any new edema





 In the emergency room, she was slightly hyponatremic 132, CO2 is worse, 38, 

creatinine 1.24, BUN 44, admission elevated at 2.4, total bilirubin is elevated 

at 1.5, AST and ALT, I 51 and 64, respectively which is better than previous.  

Troponins elevated at 0.092, and 0.088, as previous.  Urinalysis negative, INR 

of 1.2, no d-dimer is done chest x-ray, correlated for CHF, with diffuse 

interstitial pattern, more confluent density in the right upper lobe, small 

bilateral effusion, heart is enlarged, diffuse osteopenia, cardiomegaly and COPD

changes.  Consult with pulmonary, Dr. Barnard, with PT OT, might need to be 

reevaluated for thromboembolic pulmonary phenomenon, check for d-dimer, might 

need CPAP protocol, however patient is already oneliquis 0.5 mg twice a day.  If

d-dimer is elevated, we'll going to proceed with Doppler legs, might need VQ 

scan.  Heart rate patient's between , pulse oximetry 92-98% between 2-3 L 

nasal cannula consult with social work, PT OT and most likely would need 

subacute rehab








Review of Systems


Constitutional: Reports as per HPI, Reports fatigue, Reports lethargy, Reports 

poor appetite, Reports weakness


Ears, nose, mouth and throat: Reports as per HPI, Reports nasal congestion, 

Denies epistaxis, Denies headache, Denies odynophagia, Denies sinus pressure, 

Denies sore throat


Cardiovascular: Reports as per HPI, Reports lightheadedness, Reports shortness 

of breath, Denies edema, Denies rapid heart beat


Respiratory: Reports as per HPI


Gastrointestinal: Reports as per HPI, Reports early satiety, Reports loss of 

appetite, Denies abdominal pain, Denies change in bowel habits, Denies coffee 

ground emesis, Denies melena, Denies nausea


Genitourinary: Reports as per HPI


Menstruation: Reports as per HPI


Musculoskeletal: Reports as per HPI, Reports gait dysfunction, Reports 

limitation of motion, Reports muscle weakness, Reports myalgias, Denies hot 

joints


Integumentary: Reports as per HPI


Neurological: Reports as per HPI, Reports balance difficulties, Reports gait 

dysfunction, Reports headaches, Reports weakness, Denies convulsions, Denies 

head injury, Denies syncope, Denies vertigo


Psychiatric: Reports as per HPI, Reports change in sleep habits, Reports 

difficulty concentrating, Reports memory loss, Reports sleep disturbances


Endocrine: Reports as per HPI, Denies low blood sugars, Denies weight change


Hematologic/Lymphatic: Reports as per HPI


Allergic/Immunologic: Reports as per HPI





Past Medical History


Past Medical History: Atrial Fibrillation, Coronary Artery Disease (CAD), Heart 

Failure, COPD, Eye Disorder, GERD/Reflux, GI Bleed, Hearing Disorder / Deafness,

Hyperlipidemia, Hypertension, Myocardial Infarction (MI), Osteoarthritis (OA), 

Pneumonia, Renal Disease, Respiratory Disorder, Skin Disorder


Additional Past Medical History / Comment(s): Covid+ 21 at [facility].   Pt diag

nosed 21 with pharyngitis and is on antibiotic, nonischemic 

cardiomyopathy, pulmonary HTN, bronchitis, O2 at 2L/HS, gastritis, diverticular 

disease, [place]/aides bilaterally, bilateral eye glaucoma, arthritis bilateral 

hands, CKD, rosacia, past lumbar slipped disc/pain/improved now.


Last Myocardial Infarction Date:: 


History of Any Multi-Drug Resistant Organisms: None Reported


Past Surgical History: Adenoidectomy, Appendectomy, Heart Catheterization, 

Hysterectomy, Orthopedic Surgery, Tonsillectomy


Additional Past Surgical History / Comment(s): 2017 cardiac cath/treated 

medically, partial hysterectomy/still has part of L ovary, R knee 

arthroscopy/torn meniscus, EGD, colonoscopies, bilateral blepharoplasties, 

bilateral eye cataract removal/laser surgery for glaucoma.


Past Anesthesia/Blood Transfusion Reactions: No Reported Reaction


Additional Past Anesthesia/Blood Transfusion Reaction / Comment(s): .


Past Psychological History: Anxiety


Smoking Status: Former smoker


Past Alcohol Use History: None Reported


Past Drug Use History: None Reported





- Past Family History


  ** Brother(s)


Additional Family Medical History / Comment(s): Patient had 3 brothers and one 

is living and 93 years of age and [place] with history of Alzheimer's dementia, 

coronary artery disease, CHF, A. fib, COPD.  Patient has 2 brothers that have 

passed one from alcoholism and one from a traumatic head injury from a fall.





  ** Sister(s)


Additional Family Medical History / Comment(s): Patient has one sister that 

of ovarian cancer.  Patient has one son that  from alcoholic complications.





  ** Father


Family Medical History: Cancer


Additional Family Medical History / Comment(s): Father  at age 77yrs of 

cancer which pt believes may have started in his throat.





  ** Mother


Family Medical History: Dementia


Additional Family Medical History / Comment(s): Mother  at age 95yrs.  She 

was very healthy most of her life- she had dementia at the very end of her life.





Medications and Allergies


                                Home Medications











 Medication  Instructions  Recorded  Confirmed  Type


 


Fluticasone/Salmeterol [Advair 1 puff INHALATION RT-BID 01/07/17 01/10/22 

History





250-50 Diskus]    


 


Furosemide [Lasix] 40 mg PO BID 06/03/17 01/10/22 History


 


Tiotropium 18 Mcg/Puff [Spiriva] 1 cap INHALATION RT-DAILY 06/03/17 01/10/22 

History


 


Cholecalciferol (Vitamin D3) 2,000 unit PO DAILY 02/08/18 01/10/22 History





[Vitamin D3]    


 


LORazepam [Ativan] 1 mg PO HS 02/08/18 01/10/22 History


 


Ferrous Sulfate [Iron (65  mg PO DAILY 05/11/18 01/10/22 History





Elemental)]    


 


Albuterol Inhaler [Ventolin Hfa 1 puff INHALATION RT-QID #1 gm 01/08/22 01/10/22

 Rx





Inhaler]    


 


Apixaban [Eliquis] 2.5 mg PO BID #60 tablet 01/08/22 01/10/22 Rx


 


Atorvastatin [Lipitor] 20 mg PO HS #30 tab 01/08/22 01/10/22 Rx


 


Metoprolol Tartrate [Lopressor] 75 mg PO TID #270 tab 01/08/22 01/10/22 Rx


 


Amiodarone [Cordarone] See Taper PO AS DIRECTED 01/10/22 01/10/22 History








                                    Allergies











Allergy/AdvReac Type Severity Reaction Status Date / Time


 


amoxicillin [From Augmentin] Allergy  Unknown Verified 01/10/22 12:36


 


cefuroxime axetil Allergy  Unknown Verified 01/10/22 12:36





[From Ceftin]     


 


clavulanic acid Allergy  Unknown Verified 01/10/22 12:36





[From Augmentin]     














Physical Exam


Vitals: 


                                   Vital Signs











  Temp Pulse Resp BP Pulse Ox


 


 01/10/22 15:08   104 H  16  98/71  92 L


 


 01/10/22 13:42   108 H  24  104/71  98


 


 01/10/22 11:57  97.1 F L  84  18  99/53  97








                                Intake and Output











 01/10/22 01/10/22 01/10/22





 06:59 14:59 22:59


 


Other:   


 


  Weight  63.503 kg 














- Constitutional


General appearance: cooperative, no acute distress





- EENT


Eyes: EOMI, PERRLA, dentition normal, normal appearance





- Neck


Neck: normal ROM





- Respiratory


Respiratory: bilateral: CTA, negative: diminished, dullness, rales





- Cardiovascular


Rhythm: regular


Heart sounds: normal: S1, S2


Abnormal Heart Sounds: no systolic murmur, no diastolic murmur, no rub, no S3 

Gallop, no S4 Gallop, no click, no other





- Gastrointestinal


General gastrointestinal: normal bowel sounds, soft





- Integumentary


Integumentary: normal





- Neurologic


Neurologic: CNII-XII intact





- Musculoskeletal


Musculoskeletal: gait normal, strength equal bilaterally





- Psychiatric


Psychiatric: A&O x's 3, appropriate affect, intact judgment & insight





Results


CBC & Chem 7: 


                                 01/10/22 12:45





                                 01/10/22 12:45


Labs: 


                  Abnormal Lab Results - Last 24 Hours (Table)











  01/10/22 01/10/22 01/10/22 Range/Units





  12:45 12:45 12:45 


 


Lymphocytes #  0.5 L    (1.0-4.8)  k/uL


 


PT   12.1 H   (9.0-12.0)  sec


 


INR   1.2 H   (<1.2)  


 


Sodium    132 L  (137-145)  mmol/L


 


Chloride    89 L  ()  mmol/L


 


Carbon Dioxide    38 H  (22-30)  mmol/L


 


BUN    44 H  (7-17)  mg/dL


 


Creatinine    1.29 H  (0.52-1.04)  mg/dL


 


Magnesium    2.4 H  (1.6-2.3)  mg/dL


 


Total Bilirubin    1.5 H  (0.2-1.3)  mg/dL


 


AST    51 H  (14-36)  U/L


 


ALT    64 H  (4-34)  U/L


 


Troponin I     (0.000-0.034)  ng/mL














  01/10/22 01/10/22 Range/Units





  12:45 16:09 


 


Lymphocytes #    (1.0-4.8)  k/uL


 


PT    (9.0-12.0)  sec


 


INR    (<1.2)  


 


Sodium    (137-145)  mmol/L


 


Chloride    ()  mmol/L


 


Carbon Dioxide    (22-30)  mmol/L


 


BUN    (7-17)  mg/dL


 


Creatinine    (0.52-1.04)  mg/dL


 


Magnesium    (1.6-2.3)  mg/dL


 


Total Bilirubin    (0.2-1.3)  mg/dL


 


AST    (14-36)  U/L


 


ALT    (4-34)  U/L


 


Troponin I  0.092 H*  0.088 H*  (0.000-0.034)  ng/mL














Thrombosis Risk Factor Assmnt





- DVT/VTE Prophylaxis


DVT/VTE Prophylaxis: Pharmacologic Prophylaxis ordered





- Choose All That Apply


Each Factor Represents 1 point: Heart failure (<1month), Sepsis (< 1month)


Each Risk Factor Represents 3 Points: Age 75 years or older


Thrombosis Risk Factor Assessment Total Risk Factor Score: 5


Thrombosis Risk Factor Assessment Level: High Risk





Assessment and Plan


Plan: 





1.  Acute on chronic hypoxic respiratory failure secondary to a combination of 

acute exacerbation of systolic heart failure,  with Covid 19 pneumonia, acute 

exacerbation of COPD, A. fib with minimal RVR.  Continue oxygen therapy patient 

was treated for CHF, and A. fib RVR, and thought that she had incidental Covid  

in  to 2022.  If this is true, she would be on her sixth day of 

illness, consult Infectious dis, started on dexamethasone 6 mg, check for d-

dimer, Covid cytokine markers,





2.  Acute exacerbation of systolic heart failure small bilateral pleural 

effusion.  Continue Lasix 40 mg IV every 12 hours, I&O and daily weights, 

monitor renal function and electrolytes.   





3.   Covid 19  with hypoxemia, intertial infiltrates vs edema Patient's been 

started on Ventolin inhaler 4 times daily, vitamin supplements, Symbicort 2 

puffs twice daily, dexamethasone 6 mg IV push daily, Spiriva.





4.  Acute exacerbation of COPD.  Continue Symbicort, Spiriva, albuterol, 

dexamethasone.





5.  Chronic persistent atrial fibrillation presenting with RVR.  cONTINUE 

eliquis 2.5 mg twice daily, Cardizem drip is continued, continue amiodarone 200 

mg twice daily and continue Lopressor increased to 75 mg 3 times daily. 





6.  Transaminitis possibly related to Covid 19.  Continue to monitor.





7.  Hypertension.  Continue Lopressor.





8.  Hyperlipidemia.  Continue Lipitor 20 mg at bedtime.





9.  Chronic kidney disease stage III.  Monitor renal function, avoid nephrotoxic

agents.





10.  Valvular heart disease with  mild aortic regurgitation, moderate mitral 

regurgitation, severe tricuspid regurgitation.





11.  Severe pulmonary hypertension.





12.  GI prophylaxis.  Protonix.





13.  DVT prophylaxis.  Eliquis.





14.  Debility with worsening weakness, consult PT OT, social work, might need 

subacute rehab, against therapy.  Social situation is not ideal, patient lives 

alone

## 2022-01-10 NOTE — XR
EXAMINATION TYPE: XR chest 1V portable

 

DATE OF EXAM: 1/10/2022

 

COMPARISON: 1/4/2022

 

HISTORY: Shortness of breath

 

TECHNIQUE: Single frontal view of the chest is obtained.

 

FINDINGS:  A diffuse interstitial pattern with more confluent density in the right upper lobe. Promin
ence of the right hilum. Small bilateral effusions. Heart is enlarged. Atherosclerotic change aorta. 
Diffuse osteopenia.

 

IMPRESSION:  

1. Correlate for CHF otherwise consider interstitial pneumonia.

2. Cardiomegaly and COPD

## 2022-01-11 LAB
ANION GAP SERPL CALC-SCNC: 4 MMOL/L
BUN SERPL-SCNC: 37 MG/DL (ref 7–17)
CALCIUM SPEC-MCNC: 8.4 MG/DL (ref 8.4–10.2)
CHLORIDE SERPL-SCNC: 93 MMOL/L (ref 98–107)
CO2 SERPL-SCNC: 38 MMOL/L (ref 22–30)
FERRITIN SERPL-MCNC: 275 NG/ML (ref 10–291)
GLUCOSE SERPL-MCNC: 173 MG/DL (ref 74–99)
POTASSIUM SERPL-SCNC: 4.6 MMOL/L (ref 3.5–5.1)
SODIUM SERPL-SCNC: 135 MMOL/L (ref 137–145)

## 2022-01-11 RX ADMIN — Medication SCH MCG: at 08:59

## 2022-01-11 RX ADMIN — FUROSEMIDE SCH MG: 40 TABLET ORAL at 15:28

## 2022-01-11 RX ADMIN — AMIODARONE HYDROCHLORIDE SCH MG: 200 TABLET ORAL at 20:53

## 2022-01-11 RX ADMIN — BUDESONIDE AND FORMOTEROL FUMARATE DIHYDRATE SCH PUFF: 80; 4.5 AEROSOL RESPIRATORY (INHALATION) at 22:09

## 2022-01-11 RX ADMIN — Medication SCH MG: at 20:53

## 2022-01-11 RX ADMIN — METOPROLOL TARTRATE SCH MG: 25 TABLET, FILM COATED ORAL at 20:53

## 2022-01-11 RX ADMIN — ALBUTEROL SULFATE SCH: 90 AEROSOL, METERED RESPIRATORY (INHALATION) at 18:56

## 2022-01-11 RX ADMIN — APIXABAN SCH MG: 2.5 TABLET, FILM COATED ORAL at 09:00

## 2022-01-11 RX ADMIN — BUDESONIDE AND FORMOTEROL FUMARATE DIHYDRATE SCH PUFF: 80; 4.5 AEROSOL RESPIRATORY (INHALATION) at 08:17

## 2022-01-11 RX ADMIN — METOPROLOL TARTRATE SCH MG: 25 TABLET, FILM COATED ORAL at 09:00

## 2022-01-11 RX ADMIN — ALBUTEROL SULFATE SCH: 90 AEROSOL, METERED RESPIRATORY (INHALATION) at 11:59

## 2022-01-11 RX ADMIN — ALBUTEROL SULFATE SCH: 90 AEROSOL, METERED RESPIRATORY (INHALATION) at 15:49

## 2022-01-11 RX ADMIN — ALBUTEROL SULFATE SCH PUFF: 90 AEROSOL, METERED RESPIRATORY (INHALATION) at 08:17

## 2022-01-11 RX ADMIN — AMIODARONE HYDROCHLORIDE SCH MG: 200 TABLET ORAL at 08:59

## 2022-01-11 RX ADMIN — TIOTROPIUM BROMIDE INHALATION SPRAY SCH PUFF: 3.12 SPRAY, METERED RESPIRATORY (INHALATION) at 08:18

## 2022-01-11 RX ADMIN — METOPROLOL TARTRATE SCH MG: 25 TABLET, FILM COATED ORAL at 00:57

## 2022-01-11 RX ADMIN — METOPROLOL TARTRATE SCH MG: 25 TABLET, FILM COATED ORAL at 15:27

## 2022-01-11 RX ADMIN — ACETAMINOPHEN PRN MG: 325 TABLET, FILM COATED ORAL at 15:28

## 2022-01-11 RX ADMIN — FUROSEMIDE SCH MG: 40 TABLET ORAL at 08:59

## 2022-01-11 RX ADMIN — DEXTROSE SCH MG: 50 INJECTION, SOLUTION INTRAVENOUS at 08:59

## 2022-01-11 RX ADMIN — ATORVASTATIN CALCIUM SCH MG: 20 TABLET, FILM COATED ORAL at 20:53

## 2022-01-11 RX ADMIN — APIXABAN SCH MG: 2.5 TABLET, FILM COATED ORAL at 20:53

## 2022-01-11 RX ADMIN — AZITHROMYCIN MONOHYDRATE SCH MLS/HR: 500 INJECTION, POWDER, LYOPHILIZED, FOR SOLUTION INTRAVENOUS at 20:53

## 2022-01-11 NOTE — P.CRDCN
History of Present Illness


History of present illness: 


HISTORY OF PRESENT ILLNESS:  This is a 87-year-old female with a past medical 

history significant for chronic persistent atrial fibrillation, congestive heart

failure, hypertension, and hyperlipidemia. Patient follows in the office with 

Dr. Pope. We have been asked to see the patient in consultation for congestive 

heart failure. Patient was recently admitted on 22, Cardiology saw the 

patient for CHF, atrial fibrillation with RVR and patient was treated for covid-

19. She was found to have decreased EF 30-35%. She was treated with IV Lasix, 

and IV Cardizem for rate control. Her metoprolol was increased for rate control.

She was stabilized and switch to PO Lasix and discharged home on home oxygen. 





She presented to the hospital this time secondary generalized weakness, hypoxia,

cough, decreased appetite, nausea, vomiting, not eating and drinking well for 

the past couple days since she was discharged on 2022. She is seen in the 

emergency department, she is lying comfortably in bed. She is on 4L nasal 

cannula oxygen saturations 90%.  She denies any worsening shortness of breath or

chest pain. She denies any further nausea or vomiting 





DIAGNOSTICS:


EKG reveals atrial fibrillation HR 94, T wave inversions in leads I, aVL, II, 

III, aVF, V4-V6. 


Chest xray diffuse interstitial pattern with more confluent density in the right

upper lobe.  Cardiomegaly and COPD. no significant CHF. 


Laboratory data: cbc Unremarkable, d-dimer negative, sodium 135, potassium 4.6, 

BUN 37, serum creatinine 1.6, troponin 0.09, 0.08, 0.08, UA ntegative 


Current home cardiac medications include Lasix 40 mg twice a day, metoprolol 

titrate 75 mg 3 times a day, atorvastatin 20 mg nightly, Eliquis 2.5 mg twice a 

day, amiodarone taper 


22 Echocardiogram completed revealing ejection fraction 30-35%, mild aortic

regurgitation, moderate mitral regurgitation, severe tricuspid regurgitation, 

and severe pulmonary hypertension


She underwent cardiac catheterization in 2017 revealing minimal coronary artery 

disease





PHYSICAL EXAM: 


Blood pressure 113/79, heart rate 99, afebrile, oxygen saturation 90% on 4 L 

nasal cannula


CONSTITUTIONAL: No apparent distress. 


HEENT: Neck Supple. No JVD. 


CHEST EXAMINATION:No chest wall tenderness is noted on palpation or with deep 

breathing. 


HEART EXAMINATION: Irregular rate and rhythm.


ABDOMEN: Soft, nontender. Positive bowel sounds.


EXTREMITIES: non-pitting bilateral lower extremity edema and no calf tenderness.


NEUROLOGIC EXAMINATION: Patient is awake, alert and oriented x3. 





ASSESSMENT: 


Covid 19  


Acute hypoxic respiratory failure 


Abnormal troponin, not suggestive of ACS


Chronic systolic congestive heart failure, EF 55% in 2020, now 30-35%


Chronic persistent atrial fibrillation on Eliquis 


Hypertension


Hyperlipidemia


Valvular heart disease


COPD





PLAN: 


Patient does not appear to be in acute congestive heart failure on exam, 

symptoms likely related to covid-19 infection. 


Started on amiodarone 200mg BID on 22, will continue today, may decrease to

daily tomorrow. 


Will hold off on starting midodrine at this point, monitor patient's blood 

pressure 


Continue metoprolol tartrate 75mg TID 


Continue anticoagulation with Eliquis


Continue atorvastatin, PO Lasix, 


Continue telemetry monitoring


Further recommendations pending patient course





Nurse practitioner note has been reviewed by physician. Signing provider agrees 

with the documented findings, assessment, and plan of care. 











Past Medical History


Past Medical History: Atrial Fibrillation, Coronary Artery Disease (CAD), Heart 

Failure, COPD, Eye Disorder, GERD/Reflux, GI Bleed, Hearing Disorder / Deafness,

Hyperlipidemia, Hypertension, Myocardial Infarction (MI), Osteoarthritis (OA), 

Pneumonia, Renal Disease, Respiratory Disorder, Skin Disorder


Additional Past Medical History / Comment(s): Covid+ 21 at [facility].   Pt 

diagnosed 21 with pharyngitis and is on antibiotic, nonischemic 

cardiomyopathy, pulmonary HTN, bronchitis, O2 at 2L/HS, gastritis, diverticular 

disease, [place]/aides bilaterally, bilateral eye glaucoma, arthritis bilateral 

hands, CKD, rosacia, past lumbar slipped disc/pain/improved now.


Last Myocardial Infarction Date:: 2017


History of Any Multi-Drug Resistant Organisms: None Reported


Past Surgical History: Adenoidectomy, Appendectomy, Heart Catheterization, 

Hysterectomy, Orthopedic Surgery, Tonsillectomy


Additional Past Surgical History / Comment(s): 2017 cardiac cath/treated 

medically, partial hysterectomy/still has part of L ovary, R knee 

arthroscopy/torn meniscus, EGD, colonoscopies, bilateral blepharoplasties, 

bilateral eye cataract removal/laser surgery for glaucoma.


Past Anesthesia/Blood Transfusion Reactions: No Reported Reaction


Additional Past Anesthesia/Blood Transfusion Reaction / Comment(s): .


Past Psychological History: Anxiety


Smoking Status: Former smoker


Past Alcohol Use History: None Reported


Past Drug Use History: None Reported





- Past Family History


  ** Brother(s)


Additional Family Medical History / Comment(s): Patient had 3 brothers and one 

is living and 93 years of age and [place] with history of Alzheimer's dementia, 

coronary artery disease, CHF, A. fib, COPD.  Patient has 2 brothers that have 

passed one from alcoholism and one from a traumatic head injury from a fall.





  ** Sister(s)


Additional Family Medical History / Comment(s): Patient has one sister that 

of ovarian cancer.  Patient has one son that  from alcoholic complications.





  ** Father


Family Medical History: Cancer


Additional Family Medical History / Comment(s): Father  at age 77yrs of 

cancer which pt believes may have started in his throat.





  ** Mother


Family Medical History: Dementia


Additional Family Medical History / Comment(s): Mother  at age 95yrs.  She 

was very healthy most of her life- she had dementia at the very end of her life.





Medications and Allergies


                                Home Medications











 Medication  Instructions  Recorded  Confirmed  Type


 


Fluticasone/Salmeterol [Advair 1 puff INHALATION RT-BID 01/07/17 01/10/22 

History





250-50 Diskus]    


 


Furosemide [Lasix] 40 mg PO BID 06/03/17 01/10/22 History


 


Tiotropium 18 Mcg/Puff [Spiriva] 1 cap INHALATION RT-DAILY 06/03/17 01/10/22 

History


 


Cholecalciferol (Vitamin D3) 2,000 unit PO DAILY 02/08/18 01/10/22 History





[Vitamin D3]    


 


LORazepam [Ativan] 1 mg PO HS 02/08/18 01/10/22 History


 


Ferrous Sulfate [Iron (65  mg PO DAILY 05/11/18 01/10/22 History





Elemental)]    


 


Albuterol Inhaler [Ventolin Hfa 1 puff INHALATION RT-QID #1 gm 01/08/22 01/10/22

 Rx





Inhaler]    


 


Apixaban [Eliquis] 2.5 mg PO BID #60 tablet 01/08/22 01/10/22 Rx


 


Atorvastatin [Lipitor] 20 mg PO HS #30 tab 01/08/22 01/10/22 Rx


 


Metoprolol Tartrate [Lopressor] 75 mg PO TID #270 tab 01/08/22 01/10/22 Rx


 


Amiodarone [Cordarone] See Taper PO AS DIRECTED 01/10/22 01/10/22 History








                                    Allergies











Allergy/AdvReac Type Severity Reaction Status Date / Time


 


amoxicillin [From Augmentin] Allergy  Unknown Verified 01/10/22 12:36


 


cefuroxime axetil Allergy  Unknown Verified 01/10/22 12:36





[From Ceftin]     


 


clavulanic acid Allergy  Unknown Verified 01/10/22 12:36





[From Augmentin]     














Physical Exam


Vitals: 


                                   Vital Signs











  Temp Pulse Resp BP Pulse Ox


 


 22 06:05   99  18  103/88  95


 


 22 04:32   99  18  89/59  93 L


 


 22 03:10   108 H  18  90/64  93 L


 


 22 00:54   109 H  18  102/60  93 L


 


 01/10/22 23:36   110 H  16  102/59  95


 


 01/10/22 21:29  98.0 F  106 H  16  106/67  93 L


 


 01/10/22 19:37  97.9 F  110 H  16  100/69  96


 


 01/10/22 18:59   108 H  18  95/73  95


 


 01/10/22 18:00  97.1 F L  110 H  18  72/57  93 L


 


 01/10/22 15:08   104 H  16  98/71  92 L


 


 01/10/22 13:42   108 H  24  104/71  98


 


 01/10/22 11:57  97.1 F L  84  18  99/53  97














Results





                                 01/10/22 12:45





                                 22 06:31


                                 Cardiac Enzymes











  01/10/22 01/10/22 01/10/22 Range/Units





  12:45 12:45 16:09 


 


AST  51 H    (14-36)  U/L


 


Lactate Dehydrogenase     (313-618)  U/L


 


Troponin I   0.092 H*  0.088 H*  (0.000-0.034)  ng/mL














  01/10/22 01/10/22 Range/Units





  18:17 18:17 


 


AST    (14-36)  U/L


 


Lactate Dehydrogenase   774 H  (313-618)  U/L


 


Troponin I  0.088 H*   (0.000-0.034)  ng/mL








                                   Coagulation











  01/10/22 Range/Units





  12:45 


 


PT  12.1 H  (9.0-12.0)  sec


 


APTT  24.8  (22.0-30.0)  sec








                                       CBC











  01/10/22 Range/Units





  12:45 


 


WBC  7.2  (3.8-10.6)  k/uL


 


RBC  4.09  (3.80-5.40)  m/uL


 


Hgb  13.1  (11.4-16.0)  gm/dL


 


Hct  40.0  (34.0-46.0)  %


 


Plt Count  258  (150-450)  k/uL








                          Comprehensive Metabolic Panel











  01/10/22 Range/Units





  12:45 


 


Sodium  132 L  (137-145)  mmol/L


 


Potassium  3.8  (3.5-5.1)  mmol/L


 


Chloride  89 L  ()  mmol/L


 


Carbon Dioxide  38 H  (22-30)  mmol/L


 


BUN  44 H  (7-17)  mg/dL


 


Creatinine  1.29 H  (0.52-1.04)  mg/dL


 


Glucose  99  (74-99)  mg/dL


 


Calcium  8.8  (8.4-10.2)  mg/dL


 


AST  51 H  (14-36)  U/L


 


ALT  64 H  (4-34)  U/L


 


Alkaline Phosphatase  72  ()  U/L


 


Total Protein  6.4  (6.3-8.2)  g/dL


 


Albumin  3.7  (3.5-5.0)  g/dL








                               Current Medications











Generic Name Dose Route Start Last Admin





  Trade Name Freq  PRN Reason Stop Dose Admin


 


Acetaminophen  650 mg  01/10/22 14:34  01/10/22 15:10





  Acetaminophen Tab 325 Mg Tab  PO   650 mg





  Q6HR PRN   Administration





  Mild Pain or Fever > 100.5  


 


Albuterol Sulfate  1 puff  01/10/22 20:00  01/10/22 19:15





  Albuterol Hfa Inhaler  INHALATION   1 puff





  RT-QID GOYO   Administration


 


Amiodarone HCl  200 mg  01/10/22 21:00  01/10/22 21:22





  Amiodarone 200 Mg Tab  PO   200 mg





  BID GOYO   Administration


 


Apixaban  2.5 mg  01/10/22 21:00  01/10/22 21:22





  Apixaban 2.5 Mg Tablet  PO   2.5 mg





  BID GOYO   Administration





  Protocol  


 


Atorvastatin Calcium  20 mg  01/10/22 21:00  01/10/22 21:22





  Atorvastatin 20 Mg Tab  PO   20 mg





  HS GOYO   Administration


 


Budesonide/Formoterol Fumarate  2 puff  01/10/22 20:00  01/10/22 19:16





  Symbicort 80-4.5 Mcg Inhaler  INHALATION   2 puff





  RT-BID GOYO   Administration


 


Cholecalciferol  50 mcg  22 09:00 





  Cholecalciferol 25 Mcg (1000 Iu) Tablet  PO  





  DAILY [facility]  


 


Dexamethasone Sodium Phosphate  6 mg  01/10/22 18:15  01/10/22 18:41





  Dexamethasone Sod Phosphate 10 Mg/Ml 1 Ml Vial  IVP   6 mg





  DAILY GOYO   Administration


 


Furosemide  40 mg  22 09:00 





  Furosemide 40 Mg Tab  PO  





  BID@0900,1600 [facility]  


 


Azithromycin 500 mg/ Sodium  250 mls @ 250 mls/hr  01/10/22 18:15  01/10/22 

21:23





  Chloride  IVPB   250 mls/hr





  DAILY@1800 [facility]   Administration


 


Lorazepam  1 mg  01/10/22 21:00  01/10/22 21:22





  Lorazepam 1 Mg Tab  PO   1 mg





  HS [facility]   Administration


 


Metoprolol Tartrate  75 mg  01/10/22 22:00  22 00:57





  Metoprolol Tartrate 25 Mg Tab  PO   75 mg





  TID [facility]   Administration


 


Midodrine  10 mg  22 07:30  22 06:43





  Midodrine 5 Mg Tab  PO   10 mg





  AC-TID [facility]   Administration


 


Naloxone HCl  0.2 mg  01/10/22 14:34 





  Naloxone 0.4 Mg/Ml 1 Ml Vial  IV  





  Q2M PRN  





  Opioid Reversal  


 


Tiotropium Bromide  2 puff  22 08:00 





  Tiotropium 2.5 Mcg Inhaler  INHALATION  





  RT-DAILY [facility]  








                                        





                                 01/10/22 12:45 





                                 01/10/22 12:45

## 2022-01-11 NOTE — P.PN
Progress Note - Text


Progress Note Date: 01/11/22


REASON FOR FOLLOWUP: .  COVID-19 pneumonia





INTERVAL HISTORY:


The patient remains to be afebrile.  The continues to be complaining of feeling 

weak tired and no energy, but denies having any chest pain she did have a cough 

that began with sputum no abdominal pain no diarrhea





PHYSICAL EXAMINATION:


On examination, blood pressure 110/60 with a pulse of 90, temperature 97.9. She 

is 93% on 3 L nasal cannula 


.


GENERAL DESCRIPTION: General description is an elderly female up in the chair in

no


distress.


RESPIRATORY SYSTEM: Unlabored breathing. Decreased breath sounds at the bases. 

No wheeze.


HEART: S1, S2.  Regular rate and rhythm.


ABDOMEN:  Soft. No tenderness.


EXTREMITIES: Some chronic swelling. No redness.





LABS:   Reviewed





DIAGNOSTIC IMPRESSION AND PLAN: Patient presented to hospital with generalized 

weakness which is multifactorial in this patient have component of dehydration 

also with COVID-19 pneumonia however symptom has been going on for more than 7 

days and did not qualify for remdesivir this admission, patient to continue the 

current supportive treatment of Eliquis dexamethasone signal ascorbic acid and 

respiratory support

## 2022-01-11 NOTE — P.PN
Subjective


Progress Note Date: 01/11/22





HISTORY OF PRESENT ILLNESS


This is an 87-year-old female patient of Dr. Dailey and Dr. Pope with past medic

al history of chronic persistent atrial fibrillation, chronic systolic heart 

failure, pulmonary hypertension, COPD with chronic hypoxic respiratory failure 

on home O2 at 3 L as needed, hypertension, hyperlipidemia, chronic kidney 

disease stage III, generalized osteoarthritis





She was admitted, 01/04/2022, discharged 01/08/2022, secondary to acute Covid 

patient has been vaccinated, however she is missing her booster shot.  For which

she is due for.  She was admitted with acute interstitial pneumonia, coronary 

virus was positive, on 01/04/2021, when she presented with cough shortness of 

breath, fever, A. fib with RVR.  At that time, echocardiogram was 30-35%, with 

mild AR, moderate MR, severe TR proBNP of 9570 lactic acid was 1.5, troponin was

0.067.  She was managed for this acute Covid, and the A. fib with RVR, for which

she received Cardizem drip, amiodarone was 3 titrated up, along with the 

Lopressor.  Patient was subsequently discharged to home, with home therapy, 

however patient is weaker than usual, cannot manage on her own, despite visiting

nurses assisting with her care.  She was discharged on 3 L O2 nasal cannula, 

with a discharge creatinine of 1.4, BUN of 51, hemoglobin of 11.1.  Patient 

lives alone, has increasing weakness, no falls, has some shortness of breath 

with exertion, no chest pain, patient denies any new edema





 In the emergency room, she was slightly hyponatremic 132, CO2 is worse, 38, 

creatinine 1.24, BUN 44, admission elevated at 2.4, total bilirubin is elevated 

at 1.5, AST and ALT, I 51 and 64, respectively which is better than previous.  

Troponins elevated at 0.092, and 0.088, as previous.  Urinalysis negative, INR 

of 1.2, no d-dimer is done chest x-ray, correlated for CHF, with diffuse 

interstitial pattern, more confluent density in the right upper lobe, small 

bilateral effusion, heart is enlarged, diffuse osteopenia, cardiomegaly and COPD

changes.  Consult with pulmonary, Dr. Barnard, with PT OT, might need to be 

reevaluated for thromboembolic pulmonary phenomenon, check for d-dimer, might 

need CPAP protocol, however patient is already oneliquis 0.5 mg twice a day.  If

d-dimer is elevated, we'll going to proceed with Doppler legs, might need VQ 

scan.  Heart rate patient's between , pulse oximetry 92-98% between 2-3 L 

nasal cannula consult with social work, PT OT and most likely would need 

subacute rehab





1/11: Patient has been seen and followed by cardiology regarding abnormal 

troponin, acute coronary syndrome has been ruled out.  Patient has been 

continued on anticoagulation with eliquis for atrial fibrillation.  Consult with

Dr. Vernon has been added and repeat chest x-ray ordered for tomorrow.  Patient 

states she did not sleep last night and seems quite anxious today.  Pulse ox is 

95% on 3 and half liters, afebrile, heart rate 101, blood pressure 116/56.  

Repeat blood work reveals sodium 135, potassium 4.6, chloride 93, CO2 38, BUN 37

and creatinine 1.06.  





REVIEW OF SYSTEMS


Constitutional: No fever, no chills, no night sweats.  No weight change.  No 

weakness, fatigue or lethargy.  No daytime sleepiness.


EENT: No headache.  No blurred vision or double vision, no loss of vision.  No 

loss of Hearing, no ringing in the ears, no dizziness.  No nasal drainage or 

congestion.  No epistaxis.  No sore throat.


Lungs: No shortness of breath, cough, no sputum production.  No wheezing.


Cardiovascular: No chest pain, no lower extremity edema.  No palpitations.  No 

paroxysmal nocturnal dyspnea.  No orthopnea.  No lightheadedness or dizziness.  

No syncopal episodes.


Abdominal: No abdominal pain.  No nausea, vomiting.  No diarrhea.  No 

constipation.  No bloody or tarry stools.  No loss of appetite.


Genitourinary: No dysuria, increased frequency, urgency.  No urinary retention.


Musculoskeletal: No myalgias.  No muscle weakness, no gait dysfunction, no 

frequent falls.  No back pain.  No neck pain.


Integumentary: No wounds, no lesions.  No rash or pruritus.  No unusual 

bruising.  No change in hair or nails.


Neurologic: No aphasia. No facial droop. No change in mentation. No head injury.

No headache. No paralysis. No paresthesia.


Psychiatric: No depression.  No anxiety.  No mood swings.


Endocrine: No abnormal blood sugars.  No weight change.  No excessive sweating 

or thirst.  No cold intolerance.  





PHYSICAL EXAMINATION


Gen: This is an 87-year-old male in mild respiratory distress, in the emergency 

center currently on 4 L nasal cannula


HEENT: Head is atraumatic, normocephalic. Pupils equal, round. Sclerae is 

anicteric. 


NECK: Supple. No JVD. No lymphadenopathy. No thyromegaly. 


LUNGS: Clear to auscultation. No wheezes or rhonchi.  No intercostal 

retractions.


HEART: Irregular rate and rhythm. No murmur. 


ABDOMEN: Soft. Bowel sounds are present. No masses.  No tenderness.


EXTREMITIES: No pedal edema.  No calf tenderness.


NEUROLOGICAL: Patient is awake, alert and oriented x3. Cranial nerves 2 through 

12 are grossly intact. 





ASSESSMENT AND PLAN


1.  Acute on chronic hypoxic respiratory failure secondary to a combination of 

acute exacerbation of systolic heart failure,  with Covid 19 pneumonia, acute 

exacerbation of COPD, A. fib with minimal RVR.  Continue oxygen therapy patient 

was treated for CHF, and A. fib RVR, and thought that she had incidental Covid  

in January 4 to 01/08/2022.  If this is true, she would be on her sixth day of 

illness, consult Infectious dis, started on dexamethasone 6 mg, check for d-

dimer, Covid cytokine markers,





2.  Acute exacerbation of systolic heart failure small bilateral pleural 

effusion.  Continue Lasix 40 mg IV every 12 hours, I&O and daily weights, mo

nitor renal function and electrolytes.   





3.   Covid 19  with hypoxemia, intertial infiltrates vs edema Patient's been 

started on Ventolin inhaler 4 times daily, vitamin supplements, Symbicort 2 puff

s twice daily, dexamethasone 6 mg IV push daily, Spiriva.





4.  Acute exacerbation of COPD.  Continue Symbicort, Spiriva, albuterol, 

dexamethasone.





5.  Chronic persistent atrial fibrillation presenting with RVR.  cONTINUE 

eliquis 2.5 mg twice daily, Cardizem drip is continued, continue amiodarone 200 

mg twice daily and continue Lopressor increased to 75 mg 3 times daily. 





6.  Transaminitis possibly related to Covid 19.  Continue to monitor.





7.  Hypertension.  Continue Lopressor.





8.  Hyperlipidemia.  Continue Lipitor 20 mg at bedtime.





9.  Chronic kidney disease stage III.  Monitor renal function, avoid nephrotoxic

agents.





10.  Valvular heart disease with  mild aortic regurgitation, moderate mitral 

regurgitation, severe tricuspid regurgitation.





11.  Severe pulmonary hypertension.





12.  GI prophylaxis.  Protonix.





13.  DVT prophylaxis.  Eliquis.





14.  Debility with worsening weakness, consult PT OT, social work, might need 

subacute rehab, against therapy.  Social situation is not ideal, patient lives 

alone








DISCHARGE PLAN


Likely return home.  Consult with PT in place..





Impression and plan of care have been directed as dictated by the signing 

physician.  Carol Hicks nurse practitioner acting as scribe for signing 

physician.





Objective





- Vital Signs


Vital signs: 


                                   Vital Signs











Temp  97.9 F   01/11/22 08:55


 


Pulse  112 H  01/11/22 08:55


 


Resp  18   01/11/22 08:55


 


BP  113/79   01/11/22 08:55


 


Pulse Ox  90 L  01/11/22 08:55








                                 Intake & Output











 01/10/22 01/11/22 01/11/22





 18:59 06:59 18:59


 


Weight 63.503 kg  














- Labs


CBC & Chem 7: 


                                 01/10/22 12:45





                                 01/11/22 06:31


Labs: 


                  Abnormal Lab Results - Last 24 Hours (Table)











  01/10/22 01/10/22 01/10/22 Range/Units





  12:45 12:45 12:45 


 


Lymphocytes #  0.5 L    (1.0-4.8)  k/uL


 


ESR     (0-20)  mm/hr


 


PT   12.1 H   (9.0-12.0)  sec


 


INR   1.2 H   (<1.2)  


 


Sodium    132 L  (137-145)  mmol/L


 


Chloride    89 L  ()  mmol/L


 


Carbon Dioxide    38 H  (22-30)  mmol/L


 


BUN    44 H  (7-17)  mg/dL


 


Creatinine    1.29 H  (0.52-1.04)  mg/dL


 


Glucose     (74-99)  mg/dL


 


Magnesium    2.4 H  (1.6-2.3)  mg/dL


 


Total Bilirubin    1.5 H  (0.2-1.3)  mg/dL


 


AST    51 H  (14-36)  U/L


 


ALT    64 H  (4-34)  U/L


 


Lactate Dehydrogenase     (313-618)  U/L


 


Troponin I     (0.000-0.034)  ng/mL


 


C-Reactive Protein     (<1.0)  mg/dL














  01/10/22 01/10/22 01/10/22 Range/Units





  12:45 16:09 18:17 


 


Lymphocytes #     (1.0-4.8)  k/uL


 


ESR     (0-20)  mm/hr


 


PT     (9.0-12.0)  sec


 


INR     (<1.2)  


 


Sodium     (137-145)  mmol/L


 


Chloride     ()  mmol/L


 


Carbon Dioxide     (22-30)  mmol/L


 


BUN     (7-17)  mg/dL


 


Creatinine     (0.52-1.04)  mg/dL


 


Glucose     (74-99)  mg/dL


 


Magnesium     (1.6-2.3)  mg/dL


 


Total Bilirubin     (0.2-1.3)  mg/dL


 


AST     (14-36)  U/L


 


ALT     (4-34)  U/L


 


Lactate Dehydrogenase     (313-618)  U/L


 


Troponin I  0.092 H*  0.088 H*  0.088 H*  (0.000-0.034)  ng/mL


 


C-Reactive Protein     (<1.0)  mg/dL














  01/10/22 01/10/22 01/11/22 Range/Units





  18:17 18:17 06:31 


 


Lymphocytes #     (1.0-4.8)  k/uL


 


ESR  32 H    (0-20)  mm/hr


 


PT     (9.0-12.0)  sec


 


INR     (<1.2)  


 


Sodium    135 L  (137-145)  mmol/L


 


Chloride    93 L  ()  mmol/L


 


Carbon Dioxide    38 H  (22-30)  mmol/L


 


BUN    37 H  (7-17)  mg/dL


 


Creatinine    1.06 H  (0.52-1.04)  mg/dL


 


Glucose    173 H  (74-99)  mg/dL


 


Magnesium     (1.6-2.3)  mg/dL


 


Total Bilirubin     (0.2-1.3)  mg/dL


 


AST     (14-36)  U/L


 


ALT     (4-34)  U/L


 


Lactate Dehydrogenase   774 H   (313-618)  U/L


 


Troponin I     (0.000-0.034)  ng/mL


 


C-Reactive Protein   6.0 H   (<1.0)  mg/dL

## 2022-01-11 NOTE — P.CONS
History of Present Illness





- Reason for Consult


Consult date: 01/10/22


covid 19


Requesting physician: Dior Castro





- Chief Complaint


weakness x few days





- History of Present Illness


History of present illness : Patient is a 87-year  female who was 

recently admitted at this facility and was treated for COVID-19 pneumonia 

patient was not considered to be candidate for remdesivir per pulmonary and the 

patient was discharged home on 2022, patient is presented back to the

hospital within 48 hours for evaluation of generalized weakness patient seen to 

be very frustrated as saying that she cannot do anything for herself patient 

denies having any headache or URI symptoms.  Denies having any chest pain no 

worsening shortness of breath she did have minimal cough not bringing up any 

sputum no nausea no vomiting no abdominal pain or Diarrhea patient on 

presentation to the hospital was afebrile patient was mildly hypoxic need for 

supplemental oxygen currently on 3 L nasal cannula patient did have a normal 

white count with lymphopenia D-dimer was normal BUN/creatinine is a mildly 

elevated lipids of the mildly elevated as well as troponin CRP 6.0.  Has been 

negative corona PCR came back positive blood cultures obtained which are 

currently pending patient did have a chest x-ray correlate for CHF otherwise 

considered sedation pneumonia cardiomegaly and COPD infectious disease was 

consulted for possible COVID might qualify for remdesivir, patient diagnosis was

on 2022 however per pulmonary note from last admission she did have symptoms

going on for few days prior to that














Review of system:


 CONSTITUTIONAL:  Positive for weakness denies high-grade fever.


EYES:  No complaint.


ENT:  No complaint.


RESPIRATORY: As per history of present illness.


CARDIOVASCULAR:  No complaint.


GENITOURINARY:  No complaint.


GASTROINTESTINAL: As per history of present illness.


MUSCULOSKELETAL: No complaint.


INTEGUMENTARY: No complaint.


PSYCHOLOGIC: No complaint.


ENDOCRINE: No complaint.


NEUROLOGIC:  No complaint.








Past medical history : Reviewed, documented below


Past surgical history : Reviewed, documented below


Social history: Reviewed, documented below


Medications: Reviewed, as documented below








EXAMINATION:


Vital sigans=  Reviewed and documented below


GENERAL DESCRIPTION: Elderly female lying in bed, no distress. No tachypnea or 

accessory muscle of respiration use.


HEENT: Shows Pallor , no scleral icterus. Oral mucous membrane is dry.


NECK: Trachea central, no thyromegaly.


LUNGS: Unlabored breathing decrease intensity of breath sounds. No wheeze or 

crackle.


HEART: S1, S2, regular rate and rhythm.


ABDOMEN: Soft, no tenderness , guarding or rigidity


EXTREMITIES: No edema of feet.


SKIN: No rash, no masses palpable.


NEUROLOGICAL: The patient is awake, alert, oriented x3, mood and affect normal.





LABS AND RADIOLOGY: Reviewed results see below





Assessment : Patient presented to hospital with generalized weakness which is 

likely multifactorial in this patient with a recent diagnosis of COVID-19 

pneumonia for the patient was admitted to hospital from  through  and 

did not received remdesivir during that admission, patient is currently more 

than 7 days of symptom onset and would not qualify for remdesivir per [facility] 

policy clinically no evidence of any secondary bacterial pneumonia patient also 

have significant elevated BUN and creatinine with a dehydration possibly playing

a role in her current symptomatology











Plan: 1-patient to continue with Eliquis dexamethasone zinc and ascorbic acid


2-droplet isolation and respiratory support


3-no need for systemic antibiotic therapy


We will follow on clinical condition and cultures to further adjust medication 

if needed


Thank you for this consultation we will follow the patient along with you








Past Medical History


Past Medical History: Atrial Fibrillation, Coronary Artery Disease (CAD), Heart 

Failure, COPD, Eye Disorder, GERD/Reflux, GI Bleed, Hearing Disorder / Deafness,

Hyperlipidemia, Hypertension, Myocardial Infarction (MI), Osteoarthritis (OA), 

Pneumonia, Renal Disease, Respiratory Disorder, Skin Disorder


Additional Past Medical History / Comment(s): Covid+ 21 at [facility].   Pt 

diagnosed 21 with pharyngitis and is on antibiotic, nonischemic 

cardiomyopathy, pulmonary HTN, bronchitis, O2 at 2L/HS, gastritis, diverticular 

disease, Solomon/aides bilaterally, bilateral eye glaucoma, arthritis bilateral 

hands, CKD, rosacia, past lumbar slipped disc/pain/improved now.


Last Myocardial Infarction Date:: 


History of Any Multi-Drug Resistant Organisms: None Reported


Past Surgical History: Adenoidectomy, Appendectomy, Heart Catheterization, 

Hysterectomy, Orthopedic Surgery, Tonsillectomy


Additional Past Surgical History / Comment(s): 2017 cardiac cath/treated 

medically, partial hysterectomy/still has part of L ovary, R knee 

arthroscopy/torn meniscus, EGD, colonoscopies, bilateral blepharoplasties, 

bilateral eye cataract removal/laser surgery for glaucoma.


Past Anesthesia/Blood Transfusion Reactions: No Reported Reaction


Additional Past Anesthesia/Blood Transfusion Reaction / Comm: .


Past Psychological History: Anxiety


Smoking Status: Former smoker


Past Alcohol Use History: None Reported


Past Drug Use History: None Reported





- Past Family History


  ** Brother(s)


Additional Family Medical History / Comment(s): Patient had 3 brothers and one 

is living and 93 years of age and [place] with history of Alzheimer's dementia, 

coronary artery disease, CHF, A. fib, COPD.  Patient has 2 brothers that have 

passed one from alcoholism and one from a traumatic head injury from a fall.





  ** Sister(s)


Additional Family Medical History / Comment(s): Patient has one sister that 

of ovarian cancer.  Patient has one son that  from alcoholic complications.





  ** Father


Family Medical History: Cancer


Additional Family Medical History / Comment(s): Father  at age 77yrs of 

cancer which pt believes may have started in his throat.





  ** Mother


Family Medical History: Dementia


Additional Family Medical History / Comment(s): Mother  at age 95yrs.  She 

was very healthy most of her life- she had dementia at the very end of her life.





Medications and Allergies


                                Home Medications











 Medication  Instructions  Recorded  Confirmed  Type


 


Fluticasone/Salmeterol [Advair 1 puff INHALATION RT-BID 01/07/17 01/10/22 

History





250-50 Diskus]    


 


Furosemide [Lasix] 40 mg PO BID 06/03/17 01/10/22 History


 


Tiotropium 18 Mcg/Puff [Spiriva] 1 cap INHALATION RT-DAILY 06/03/17 01/10/22 

History


 


Cholecalciferol (Vitamin D3) 2,000 unit PO DAILY 02/08/18 01/10/22 History





[Vitamin D3]    


 


LORazepam [Ativan] 1 mg PO HS 02/08/18 01/10/22 History


 


Ferrous Sulfate [Iron (65  mg PO DAILY 05/11/18 01/10/22 History





Elemental)]    


 


Albuterol Inhaler [Ventolin Hfa 1 puff INHALATION RT-QID #1 gm 01/08/22 01/10/22

 Rx





Inhaler]    


 


Apixaban [Eliquis] 2.5 mg PO BID #60 tablet 01/08/22 01/10/22 Rx


 


Atorvastatin [Lipitor] 20 mg PO HS #30 tab 01/08/22 01/10/22 Rx


 


Metoprolol Tartrate [Lopressor] 75 mg PO TID #270 tab 01/08/22 01/10/22 Rx


 


Amiodarone [Cordarone] See Taper PO AS DIRECTED 01/10/22 01/10/22 History








                                    Allergies











Allergy/AdvReac Type Severity Reaction Status Date / Time


 


amoxicillin [From Augmentin] Allergy  Unknown Verified 01/10/22 12:36


 


cefuroxime axetil Allergy  Unknown Verified 01/10/22 12:36





[From Ceftin]     


 


clavulanic acid Allergy  Unknown Verified 01/10/22 12:36





[From Augmentin]     














Physical Exam


Vitals: 


                                   Vital Signs











  Temp Pulse Resp BP Pulse Ox


 


 01/10/22 16:00  97.1 F L  110 H  18  72/57  93 L


 


 01/10/22 15:08   104 H  16  98/71  92 L


 


 01/10/22 13:42   108 H  24  104/71  98


 


 01/10/22 11:57  97.1 F L  84  18  99/53  97








                                Intake and Output











 01/10/22 01/10/22 01/10/22





 06:59 14:59 22:59


 


Other:   


 


  Weight  63.503 kg 














Results


CBC & Chem 7: 


                                 01/10/22 12:45





                                 22 06:31


Labs: 


                  Abnormal Lab Results - Last 24 Hours (Table)











  01/10/22 01/10/22 01/10/22 Range/Units





  12:45 12:45 12:45 


 


Lymphocytes #  0.5 L    (1.0-4.8)  k/uL


 


PT   12.1 H   (9.0-12.0)  sec


 


INR   1.2 H   (<1.2)  


 


Sodium    132 L  (137-145)  mmol/L


 


Chloride    89 L  ()  mmol/L


 


Carbon Dioxide    38 H  (22-30)  mmol/L


 


BUN    44 H  (7-17)  mg/dL


 


Creatinine    1.29 H  (0.52-1.04)  mg/dL


 


Magnesium    2.4 H  (1.6-2.3)  mg/dL


 


Total Bilirubin    1.5 H  (0.2-1.3)  mg/dL


 


AST    51 H  (14-36)  U/L


 


ALT    64 H  (4-34)  U/L


 


Troponin I     (0.000-0.034)  ng/mL














  01/10/22 01/10/22 Range/Units





  12:45 16:09 


 


Lymphocytes #    (1.0-4.8)  k/uL


 


PT    (9.0-12.0)  sec


 


INR    (<1.2)  


 


Sodium    (137-145)  mmol/L


 


Chloride    ()  mmol/L


 


Carbon Dioxide    (22-30)  mmol/L


 


BUN    (7-17)  mg/dL


 


Creatinine    (0.52-1.04)  mg/dL


 


Magnesium    (1.6-2.3)  mg/dL


 


Total Bilirubin    (0.2-1.3)  mg/dL


 


AST    (14-36)  U/L


 


ALT    (4-34)  U/L


 


Troponin I  0.092 H*  0.088 H*  (0.000-0.034)  ng/mL

## 2022-01-12 LAB
ANION GAP SERPL CALC-SCNC: 7 MMOL/L
BUN SERPL-SCNC: 42 MG/DL (ref 7–17)
CALCIUM SPEC-MCNC: 8.5 MG/DL (ref 8.4–10.2)
CHLORIDE SERPL-SCNC: 94 MMOL/L (ref 98–107)
CO2 SERPL-SCNC: 33 MMOL/L (ref 22–30)
GLUCOSE SERPL-MCNC: 144 MG/DL (ref 74–99)
POTASSIUM SERPL-SCNC: 4.2 MMOL/L (ref 3.5–5.1)
SODIUM SERPL-SCNC: 134 MMOL/L (ref 137–145)

## 2022-01-12 RX ADMIN — METOPROLOL TARTRATE SCH MG: 25 TABLET, FILM COATED ORAL at 09:08

## 2022-01-12 RX ADMIN — Medication SCH MG: at 20:15

## 2022-01-12 RX ADMIN — ALBUTEROL SULFATE SCH PUFF: 90 AEROSOL, METERED RESPIRATORY (INHALATION) at 16:45

## 2022-01-12 RX ADMIN — ATORVASTATIN CALCIUM SCH MG: 20 TABLET, FILM COATED ORAL at 20:14

## 2022-01-12 RX ADMIN — AMIODARONE HYDROCHLORIDE SCH MG: 200 TABLET ORAL at 09:08

## 2022-01-12 RX ADMIN — ACETAMINOPHEN PRN MG: 325 TABLET, FILM COATED ORAL at 05:12

## 2022-01-12 RX ADMIN — ALBUTEROL SULFATE SCH PUFF: 90 AEROSOL, METERED RESPIRATORY (INHALATION) at 11:39

## 2022-01-12 RX ADMIN — APIXABAN SCH MG: 2.5 TABLET, FILM COATED ORAL at 09:08

## 2022-01-12 RX ADMIN — AZITHROMYCIN MONOHYDRATE SCH MLS/HR: 500 INJECTION, POWDER, LYOPHILIZED, FOR SOLUTION INTRAVENOUS at 17:21

## 2022-01-12 RX ADMIN — APIXABAN SCH MG: 2.5 TABLET, FILM COATED ORAL at 20:15

## 2022-01-12 RX ADMIN — DEXTROSE SCH MG: 50 INJECTION, SOLUTION INTRAVENOUS at 09:08

## 2022-01-12 RX ADMIN — ALBUTEROL SULFATE SCH PUFF: 90 AEROSOL, METERED RESPIRATORY (INHALATION) at 08:39

## 2022-01-12 RX ADMIN — BUDESONIDE AND FORMOTEROL FUMARATE DIHYDRATE SCH PUFF: 80; 4.5 AEROSOL RESPIRATORY (INHALATION) at 20:19

## 2022-01-12 RX ADMIN — FUROSEMIDE SCH MG: 40 TABLET ORAL at 09:08

## 2022-01-12 RX ADMIN — BUDESONIDE AND FORMOTEROL FUMARATE DIHYDRATE SCH PUFF: 80; 4.5 AEROSOL RESPIRATORY (INHALATION) at 08:39

## 2022-01-12 RX ADMIN — TIOTROPIUM BROMIDE INHALATION SPRAY SCH PUFF: 3.12 SPRAY, METERED RESPIRATORY (INHALATION) at 08:39

## 2022-01-12 RX ADMIN — ALBUTEROL SULFATE SCH PUFF: 90 AEROSOL, METERED RESPIRATORY (INHALATION) at 20:19

## 2022-01-12 RX ADMIN — FUROSEMIDE SCH MG: 40 TABLET ORAL at 17:21

## 2022-01-12 RX ADMIN — METOPROLOL TARTRATE SCH MG: 25 TABLET, FILM COATED ORAL at 17:21

## 2022-01-12 RX ADMIN — METOPROLOL TARTRATE SCH MG: 25 TABLET, FILM COATED ORAL at 20:15

## 2022-01-12 RX ADMIN — Medication SCH MCG: at 09:08

## 2022-01-12 NOTE — P.PN
Subjective


Progress Note Date: 01/12/22





HISTORY OF PRESENT ILLNESS


This is an 87-year-old female patient of Dr. Dailey and Dr. Pope with past medic

al history of chronic persistent atrial fibrillation, chronic systolic heart 

failure, pulmonary hypertension, COPD with chronic hypoxic respiratory failure 

on home O2 at 3 L as needed, hypertension, hyperlipidemia, chronic kidney 

disease stage III, generalized osteoarthritis





She was admitted, 01/04/2022, discharged 01/08/2022, secondary to acute Covid 

patient has been vaccinated, however she is missing her booster shot.  For which

she is due for.  She was admitted with acute interstitial pneumonia, coronary 

virus was positive, on 01/04/2021, when she presented with cough shortness of 

breath, fever, A. fib with RVR.  At that time, echocardiogram was 30-35%, with 

mild AR, moderate MR, severe TR proBNP of 9570 lactic acid was 1.5, troponin was

0.067.  She was managed for this acute Covid, and the A. fib with RVR, for which

she received Cardizem drip, amiodarone was 3 titrated up, along with the 

Lopressor.  Patient was subsequently discharged to home, with home therapy, 

however patient is weaker than usual, cannot manage on her own, despite visiting

nurses assisting with her care.  She was discharged on 3 L O2 nasal cannula, 

with a discharge creatinine of 1.4, BUN of 51, hemoglobin of 11.1.  Patient 

lives alone, has increasing weakness, no falls, has some shortness of breath 

with exertion, no chest pain, patient denies any new edema





 In the emergency room, she was slightly hyponatremic 132, CO2 is worse, 38, 

creatinine 1.24, BUN 44, admission elevated at 2.4, total bilirubin is elevated 

at 1.5, AST and ALT, I 51 and 64, respectively which is better than previous.  

Troponins elevated at 0.092, and 0.088, as previous.  Urinalysis negative, INR 

of 1.2, no d-dimer is done chest x-ray, correlated for CHF, with diffuse 

interstitial pattern, more confluent density in the right upper lobe, small 

bilateral effusion, heart is enlarged, diffuse osteopenia, cardiomegaly and COPD

changes.  Consult with pulmonary, Dr. Barnard, with PT OT, might need to be 

reevaluated for thromboembolic pulmonary phenomenon, check for d-dimer, might 

need CPAP protocol, however patient is already oneliquis 0.5 mg twice a day.  If

d-dimer is elevated, we'll going to proceed with Doppler legs, might need VQ 

scan.  Heart rate patient's between , pulse oximetry 92-98% between 2-3 L 

nasal cannula consult with social work, PT OT and most likely would need 

subacute rehab





1/11: Patient has been seen and followed by cardiology regarding abnormal 

troponin, acute coronary syndrome has been ruled out.  Patient has been 

continued on anticoagulation with eliquis for atrial fibrillation.  Consult with

Dr. Vernon has been added and repeat chest x-ray ordered for tomorrow.  Patient 

states she did not sleep last night and seems quite anxious today.  Pulse ox is 

95% on 3 and half liters, afebrile, heart rate 101, blood pressure 116/56.  

Repeat blood work reveals sodium 135, potassium 4.6, chloride 93, CO2 38, BUN 37

and creatinine 1.06.  





1/12: Repeat chest x-ray today reveals mild cardiomegaly with bilateral 

multifocal increased opacities greater in the periphery consistent with Covid 19

infection redemonstrated.  No significant change from most recent x-ray 2 days 

ago.  Patient has been seen by Dr. Vernon and recommend continuing current 

supportive treatment.  Patient complains of feeling weak and tired.  She does 

have a cough.  No chest pain.  Discharge plan if for subacute rehab at all 3 

facilities have no beds available until Saturday.  Social work is following and 

will plan discharge if beds open up prior to Saturday.  PO 93-95% on 3 L nasal 

cannula.








REVIEW OF SYSTEMS


Constitutional: No fever, no chills, no night sweats.  No weight change.  

Reports weakness, Reports fatigue.  No daytime sleepiness.


EENT: No headache.  No blurred vision or double vision, no loss of vision.  No 

loss of Hearing, no ringing in the ears, no dizziness.  No nasal drainage or 

congestion.  No epistaxis.  No sore throat.


Lungs: No shortness of breath, Reportscough, no sputum production.  No wheezing.


Cardiovascular: No chest pain, no lower extremity edema.  No palpitations.  No 

paroxysmal nocturnal dyspnea.  No orthopnea.  No lightheadedness or dizziness.  

No syncopal episodes.


Abdominal: No abdominal pain.  No nausea, vomiting.  No diarrhea.  No 

constipation.  No bloody or tarry stools.  No loss of appetite.


Genitourinary: No dysuria, increased frequency, urgency.  No urinary retention.


Musculoskeletal: No myalgias.  No muscle weakness, no gait dysfunction, no 

frequent falls.  No back pain.  No neck pain.


Integumentary: No wounds, no lesions.  No rash or pruritus.  No unusual bruis

ing.  No change in hair or nails.


Neurologic: No aphasia. No facial droop. No change in mentation. No head injury.

No headache. No paralysis. No paresthesia.


Psychiatric: No depression.  Noted anxiety.  No mood swings.


Endocrine: No abnormal blood sugars.  No weight change.  No excessive sweating 

or thirst.  No cold intolerance.  





PHYSICAL EXAMINATION


Gen: This is an 87-year-old female in no respiratory distress, resting in bed on

3 L nasal cannula


HEENT: Head is atraumatic, normocephalic. Pupils equal, round. Sclerae is 

anicteric. 


NECK: Supple. No JVD. No lymphadenopathy. No thyromegaly. 


LUNGS: Clear to auscultation. No wheezes or rhonchi.  No intercostal 

retractions.


HEART: Irregular rate and rhythm. No murmur. 


ABDOMEN: Soft. Bowel sounds are present. No masses.  No tenderness.


EXTREMITIES: No pedal edema.  No calf tenderness.


NEUROLOGICAL: Patient is awake, alert and oriented x3. Cranial nerves 2 through 

12 are grossly intact. 





ASSESSMENT AND PLAN


1.  Acute on chronic hypoxic respiratory failure secondary to a combination of 

acute exacerbation of systolic heart failure,  with Covid 19 pneumonia, acute 

exacerbation of COPD, A. fib with minimal RVR.  Continue oxygen therapy patient 

was treated for CHF, and A. fib RVR, and thought that she had incidental Covid  

in January 4 to 01/08/2022.  Continue on dexamethasone 6 mg, check for d-dimer, 

Covid cytokine markers,





2.  Acute exacerbation of systolic heart failure small bilateral pleural 

effusion.  Continue Lasix 40 mg IV every 12 hours, I&O and daily weights, 

monitor renal function and electrolytes.   





3.   Covid 19  with hypoxemia, intertial infiltrates vs edema Patient's been 

started on Ventolin inhaler 4 times daily, vitamin supplements, Symbicort 2 

puffs twice daily, dexamethasone 6 mg IV push daily, Spiriva.





4.  Acute exacerbation of COPD.  Continue Symbicort, Spiriva, albuterol, 

dexamethasone.





5.  Chronic persistent atrial fibrillation presenting with RVR.  cONTINUE 

eliquis 2.5 mg twice daily, continue amiodarone 200 mg daily and continue 

Lopressor 75 mg 3 times daily. 





6.  Transaminitis possibly related to Covid 19.  Continue to monitor.





7.  Hypertension.  Continue Lopressor.





8.  Hyperlipidemia.  Continue Lipitor 20 mg at bedtime.





9.  Chronic kidney disease stage III.  Monitor renal function, avoid nephrotoxic

agents.





10.  Valvular heart disease with  mild aortic regurgitation, moderate mitral 

regurgitation, severe tricuspid regurgitation.





11.  Severe pulmonary hypertension.





12.  GI prophylaxis.  Protonix.





13.  DVT prophylaxis.  Eliquis.





14.  Debility with worsening weakness, consult PT OT, social work, might need 

subacute rehab, against therapy.  Social situation is not ideal, patient lives 

alone








DISCHARGE PLAN


Subacute rehab once bed is available





Impression and plan of care have been directed as dictated by the signing 

physician.  Carol Hicks nurse practitioner acting as scribe for signing 

physician.





Objective





- Vital Signs


Vital signs: 


                                   Vital Signs











Temp  97.6 F   01/12/22 08:00


 


Pulse  97   01/12/22 08:00


 


Resp  18   01/12/22 08:00


 


BP  101/58   01/12/22 08:00


 


Pulse Ox  93 L  01/12/22 08:00








                                 Intake & Output











 01/11/22 01/12/22 01/12/22





 18:59 06:59 18:59


 


Intake Total  250 


 


Balance  250 


 


Weight  63.503 kg 


 


Intake:   


 


  Intake, IV Titration  250 





  Amount   


 


    Azithromycin 500 mg In  250 





    Sodium Chloride 0.9% 250   





    ml @ 250 mls/hr IVPB   





    DAILY@[facility] Rx#:   





    [id number]   


 


Other:   


 


  # Voids  1 














- Labs


CBC & Chem 7: 


                                 01/10/22 12:45





                                 01/12/22 07:18


Labs: 


                  Abnormal Lab Results - Last 24 Hours (Table)











  01/11/22 01/12/22 Range/Units





  16:06 07:18 


 


Sodium   134 L  (137-145)  mmol/L


 


Chloride   94 L  ()  mmol/L


 


Carbon Dioxide   33 H  (22-30)  mmol/L


 


BUN   42 H  (7-17)  mg/dL


 


Creatinine   1.11 H  (0.52-1.04)  mg/dL


 


Glucose   144 H  (74-99)  mg/dL


 


Coronavirus (PCR)  Detected A   (Not Detectd)  








                      Microbiology - Last 24 Hours (Table)











 01/10/22 19:07 Blood Culture - Preliminary





 Blood    No Growth after 24 hours


 


 01/10/22 19:07 Blood Culture - Preliminary





 Blood    No Growth after 24 hours

## 2022-01-12 NOTE — P.DS
Providers


Date of admission: 


01/11/22 12:28





Expected date of discharge: 01/14/22


Attending physician: 


Dior Castro





Consults: 





                                        





01/10/22 14:36


Consult Physician Routine 


   Consulting Provider: Herberth Jauregui


   Consult Reason/Comments: TRop elevated, chf


   Do you want consulting provider notified?: Yes





01/10/22 18:00


Consult Physician Routine 


   Consulting Provider: Garth Vernon


   Consult Reason/Comments: covid incidental from1/4, might qualify for 

remdesivir


   Do you want consulting provider notified?: Yes











Primary care physician: 


[facility] Course: 





HISTORY OF PRESENT ILLNESS


This is an 87-year-old female patient of Dr. Dailey and Dr. Pope with past 

medical history of chronic persistent atrial fibrillation, chronic systolic 

heart failure, pulmonary hypertension, COPD with chronic hypoxic respiratory 

failure on home O2 at 3 L as needed, hypertension, hyperlipidemia, chronic 

kidney disease stage III, generalized osteoarthritis





She was admitted, 01/04/2022, discharged 01/08/2022, secondary to acute Covid 

patient has been vaccinated, however she is missing her booster shot.  For which

she is due for.  She was admitted with acute interstitial pneumonia, coronary 

virus was positive, on 01/04/2021, when she presented with cough shortness of 

breath, fever, A. fib with RVR.  At that time, echocardiogram was 30-35%, with 

mild AR, moderate MR, severe TR proBNP of 9570 lactic acid was 1.5, troponin was

0.067.  She was managed for this acute Covid, and the A. fib with RVR, for which

she received Cardizem drip, amiodarone was 3 titrated up, along with the 

Lopressor.  Patient was subsequently discharged to home, with home therapy, 

however patient is weaker than usual, cannot manage on her own, despite visiting

nurses assisting with her care.  She was discharged on 3 L O2 nasal cannula, 

with a discharge creatinine of 1.4, BUN of 51, hemoglobin of 11.1.  Patient 

lives alone, has increasing weakness, no falls, has some shortness of breath 

with exertion, no chest pain, patient denies any new edema





 In the emergency room, she was slightly hyponatremic 132, CO2 is worse, 38, 

creatinine 1.24, BUN 44, admission elevated at 2.4, total bilirubin is elevated 

at 1.5, AST and ALT, I 51 and 64, respectively which is better than previous.  

Troponins elevated at 0.092, and 0.088, as previous.  Urinalysis negative, INR 

of 1.2, no d-dimer is done chest x-ray, correlated for CHF, with diffuse 

interstitial pattern, more confluent density in the right upper lobe, small 

bilateral effusion, heart is enlarged, diffuse osteopenia, cardiomegaly and COPD

changes.  Consult with pulmonary, Dr. Barnard, with PT OT, might need to be 

reevaluated for thromboembolic pulmonary phenomenon, check for d-dimer, might 

need CPAP protocol, however patient is already oneliquis 0.5 mg twice a day.  If

d-dimer is elevated, we'll going to proceed with Doppler legs, might need VQ 

scan.  Heart rate patient's between , pulse oximetry 92-98% between 2-3 L 

nasal cannula consult with social work, PT OT and most likely would need 

subacute rehab





1/11: Patient has been seen and followed by cardiology regarding abnormal 

troponin, acute coronary syndrome has been ruled out.  Patient has been 

continued on anticoagulation with eliquis for atrial fibrillation.  Consult with

Dr. Vernon has been added and repeat chest x-ray ordered for tomorrow.  Patient 

states she did not sleep last night and seems quite anxious today.  Pulse ox is 

95% on 3 and half liters, afebrile, heart rate 101, blood pressure 116/56.  

Repeat blood work reveals sodium 135, potassium 4.6, chloride 93, CO2 38, BUN 37

and creatinine 1.06.  





1/12: Repeat chest x-ray today reveals mild cardiomegaly with bilateral 

multifocal increased opacities greater in the periphery consistent with Covid 19

infection redemonstrated.  No significant change from most recent x-ray 2 days 

ago.  Patient has been seen by Dr. Vernon and recommend continuing current 

supportive treatment.  Patient complains of feeling weak and tired.  She does 

have a cough.  No chest pain.  Discharge plan if for subacute rehab at all 3 

facilities have no beds available until Saturday.  Social work is following and 

will plan discharge if beds open up prior to Saturday.  PO 93-95% on 3 L nasal 

cannula.





1/13: She remains afebrile, heart rate 94, blood pressure 122/81, pulse ox 91% 

on 3 L nasal cannula.  Sodium 134, potassium 4.2, chloride 94, CO2 33, BUN 42 

and creatinine 1.11.  Carotid virus PCR on 1/11 is positive.  Dr. Vernon 

recommends continuing current medications.  No evidence of secondary bacterial 

pneumonia and no need for systemic antibiotic therapy.  Despite a discharge to 

rehab on Saturday.





1/14: No new concerns from the patient.  She's been afebrile, heart rate 114, 

blood pressure 144/96, segs 92% on 3 L nasal cannula.  Dr. Vernon is recommended 

continuing dexamethasone, zinc and vitamin C.  Patient has been waiting for bed 

availability at subacute rehab and one is available today at [facility].  Patient 

will be discharged to subacute rehab today in stable condition.








DISCHARGE DIAGNOSES


1.  Acute on chronic hypoxic respiratory failure secondary to a combination of 

acute exacerbation of systolic heart failure,  with Covid 19 pneumonia, acute 

exacerbation of COPD, A. fib with minimal RVR.  


2.  Acute exacerbation of systolic heart failure small bilateral pleural 

effusion.  


3.   Covid 19  with hypoxemia, intertial infiltrates vs edema


4.  Acute exacerbation of COPD.  


5.  Chronic persistent atrial fibrillation presenting with RVR.  


6.  Transaminitis possibly related to Covid 19.  


7.  Hypertension.  


8.  Hyperlipidemia.  


9.  Chronic kidney disease stage III. 


10.  Valvular heart disease with  mild aortic regurgitation, moderate mitral 

regurgitation, severe tricuspid regurgitation.


11.  Severe pulmonary hypertension.


12.  Debility with worsening weakness, consult PT OT, social work, might need 

subacute rehab, against therapy.  Social situation is not ideal, patient lives 

alone








DISCHARGE PLAN


[facility]


Greater than 35 minutes was utilized and coordinating patient's discharge.


Impression and plan of care have been directed as dictated by the signing 

physician.  Carol Hicks nurse practitioner acting as scribe for signing 

physician.


Patient Condition at Discharge: Stable





Plan - Discharge Summary


Discharge Rx Participant: No


New Discharge Prescriptions: 


New


   Amiodarone [Cordarone] 200 mg PO DAILY  tab


   Metoprolol Tartrate [Lopressor] 75 mg PO TID  tab


   Azithromycin [Zithromax] 500 mg PO DAILY@1800 #5 tab


   Melatonin 6 mg PO HS  tablet


   Dexamethasone [Decadron] 6 mg PO DAILY #5 tablet





Continue


   Fluticasone/Salmeterol [Advair 250-50 Diskus] 1 puff INHALATION RT-BID


   Furosemide [Lasix] 40 mg PO BID


   Tiotropium 18 Mcg/Puff [Spiriva] 1 cap INHALATION RT-DAILY


   Cholecalciferol (Vitamin D3) [Vitamin D3] 2,000 unit PO DAILY


   Ferrous Sulfate [Iron (65 MG Elemental)] 325 mg PO DAILY


   Atorvastatin [Lipitor] 20 mg PO HS #30 tab


   Albuterol Inhaler [Ventolin Hfa Inhaler] 1 puff INHALATION RT-QID #1 gm


   LORazepam [Ativan] 1 mg PO HS #3 tab


   Apixaban [Eliquis] 2.5 mg PO BID #60 tablet





Discontinued


   Metoprolol Tartrate [Lopressor] 75 mg PO TID #270 tab


   Amiodarone [Cordarone] See Taper PO AS DIRECTED


Discharge Medication List





Fluticasone/Salmeterol [Advair 250-50 Diskus] 1 puff INHALATION RT-BID 01/07/17 

[History]


Furosemide [Lasix] 40 mg PO BID 06/03/17 [History]


Tiotropium 18 Mcg/Puff [Spiriva] 1 cap INHALATION RT-DAILY 06/03/17 [History]


Cholecalciferol (Vitamin D3) [Vitamin D3] 2,000 unit PO DAILY 02/08/18 [History]


Ferrous Sulfate [Iron (65 MG Elemental)] 325 mg PO DAILY 05/11/18 [History]


Albuterol Inhaler [Ventolin Hfa Inhaler] 1 puff INHALATION RT-QID #1 gm 01/08/22

[Rx]


Apixaban [Eliquis] 2.5 mg PO BID #60 tablet 01/08/22 [Rx]


Atorvastatin [Lipitor] 20 mg PO HS #30 tab 01/08/22 [Rx]


Amiodarone [Cordarone] 200 mg PO DAILY  tab 01/12/22 [Rx]


Dexamethasone [Decadron] 6 mg PO DAILY #5 tablet 01/12/22 [Rx]


LORazepam [Ativan] 1 mg PO HS #3 tab 01/12/22 [Rx]


Melatonin 6 mg PO HS  tablet 01/12/22 [Rx]


Azithromycin [Zithromax] 500 mg PO DAILY@1800 #5 tab 01/14/22 [Rx]


Metoprolol Tartrate [Lopressor] 75 mg PO TID  tab 01/14/22 [Rx]








Follow up Appointment(s)/Referral(s): 


Adonis Pope MD [STAFF PHYSICIAN] - 3 Weeks


Lenin Dailey MD [Primary Care Provider] - 1 Week (after discharge from [facility])


Discharge Disposition: TRANSFER TO SNF/ECF

## 2022-01-12 NOTE — PN
PROGRESS NOTE



DATE OF SERVICE:

01/12/2022



REASON FOR FOLLOW UP:

Covid 19 pneumonia.



INTERVAL HISTORY:

Patient is afebrile. The patient is breathing comfortably. The patient denies having

any chest pain.  She did have a cough but no worsening though. No nausea, vomiting.  No

abdominal pain.  No diarrhea.



PHYSICAL EXAMINATION:

Blood pressure is 114/75 with a pulse of 110, temp 97.9.  She is 91% on 3 L nasal

cannula. General description is an elderly female up in the bed in no distress.

Respiratory system: Unlabored breathing, decreased intensity of breath sounds.  No

wheeze. Heart S1, S2.  Regular rate and rhythm.  Abdomen soft. No tenderness.

Extremities: No edema of the feet.



LABS:

BUN is 4, _____



DIAGNOSTIC IMPRESSION AND PLAN:

Patient with acute COVID-19 pneumonia in this patient with slow clinical improvement.

The patient is currently on Eliquis, dexamethasone, zinc, ascorbic acid, to continue

along with respiratory support and monitor clinical course closely.  No evidence of any

secondary bacterial pneumonia and no need for systemic antibiotic therapy.





MMODL / IJN: [id number] / Job#: [id number]

## 2022-01-12 NOTE — XR
EXAMINATION TYPE: XR chest 1V portable

 

DATE OF EXAM: 1/12/2022

 

CLINICAL HISTORY: Difficulty breathing and covid progress study.  

 

TECHNIQUE: Single AP portable upright view of the chest is obtained.

 

COMPARISON: Chest x-ray from 2 days earlier and older studies.

 

FINDINGS:  Persistent bilateral multifocal increased opacities greatest in the periphery. Cardiac yeison
houette size is stable and mildly enlarged. Osseous structures are intact. Background chronic emphyse
matous change is suspected.

 

IMPRESSION: Mild cardiomegaly with bilateral multifocal increased opacities greatest in the periphery
 consistent with covid-19 infection are redemonstrated. No significant change from most recent x-ray 
2 days earlier.

## 2022-01-12 NOTE — CDI
Documentation Clarification Form



Date: 01/12/2022 01:44:32 PM

From: Yeimy Pradhan CCS, CCDS

Phone: [phone]

MRN: [id number]

Admit Date: 01/11/2022 12:28:00 PM

Patient Name: Rosana Valenzuela

Visit Number: [id number]

Discharge Date:  





ATTENTION: The Clinical Documentation Specialists (CDI) and [facility] Coding Staff 
appreciate your assistance in clarifying documentation. Please respond to the 
clarification below the line at the bottom and electronically sign. The CDI & 
[facility] Coding staff will review the response and follow-up if needed. Please note: 
Queries are made part of the Legal Health Record. If you have any questions, 
please contact the author of this message via ITS.



Dr. Dior Castro: 



Conflicting documentation has been found in the medical record regarding the 
diagnosis of CHF. 



Per the 1/10 Attending History & Physical, subsequent Progress Note and the 1/12
Discharge Summary: Acute Exacerbation of Systolic Heart Failure is documented.  

Per the 1/11 Cardiology Consult and subsequent Progress Note: Chronic Systolic 
Heart Failure. Does not appear to be in acute CHF on exam, symptoms likely 
related to COVID 19 infection. 



As attending physician, please provide clarification.



History/Risk Factors per the 1/10 H/P: Atrial Fibrillation, CAD, CHF, COPD, 
GERD, GI Bleed, [place], Hyperlipidemia, Hypertension, MI, OA Bilateral Hands, CKD, 
Pulmonary Hypertension. 



Clinical Indicators: Presented to the ED on 1/10 with Weakness, Hypoxia. Patient
was recently discharged to home on Home O2 after admission for COVID Pneumonia.

Admit with COVID 19, Hypoxia, Elevated Troponin



1/10 VS: T 97.1, P 84 - 108, R 18 (cough), BP 99/53, PO 97 2Lnc, BMI: 22.6

1/10 LAB: Lymph 0.5, ESR 32; PT 12.1, INR 1.2; Na 132, Cl 89, CO2 38, BUN 44, Cr
1.29, Mag 2.4, T Bili 1.5, AST 51, ALT 64, , Troponin 0.092, 0.088; CRP 
6.0.

BNP: not done.

1/10: Not tested for COVID

1/11: COVID test positive

1/10 CXR: Correlate for CHF otherwise consider interstitial pneumonia, 
Cardiomegaly & COPD. 

1/12 CXR: Mild cardiomegaly with bilateral multifocal increased opacities 
greatest in the periphery consistent with COVID 19 infection, redemonstrated. 



Treatment 1/10: Telemetry, O2 2Lnc, Blood culture, INH Ventolin QID, po 
Zithromax, INH Symbicort BID, Vit D3, IV Decadron Daily, po Lasix 40 mg BID. 



Please clarify which diagnosis is most appropriate:



[  ]  Acute on Chronic Systolic Heart Failure

[  ]  Chronic Systolic Heart Failure

[  ]  Other (please specify) __________

[  ]  Unable to determine 



(Template Last Revised: March 2021)

___________________________________________________________________________

1/17 Query response documented in 1/14 Discharge Summary: Acute Exacerbation 
Systolic Heart Failure. (CDS: MA)

[place]

## 2022-01-13 VITALS — TEMPERATURE: 97.7 F

## 2022-01-13 RX ADMIN — ALBUTEROL SULFATE SCH: 90 AEROSOL, METERED RESPIRATORY (INHALATION) at 21:45

## 2022-01-13 RX ADMIN — FUROSEMIDE SCH MG: 40 TABLET ORAL at 09:18

## 2022-01-13 RX ADMIN — Medication SCH MG: at 20:46

## 2022-01-13 RX ADMIN — BUDESONIDE AND FORMOTEROL FUMARATE DIHYDRATE SCH PUFF: 80; 4.5 AEROSOL RESPIRATORY (INHALATION) at 08:10

## 2022-01-13 RX ADMIN — BUDESONIDE AND FORMOTEROL FUMARATE DIHYDRATE SCH: 80; 4.5 AEROSOL RESPIRATORY (INHALATION) at 21:45

## 2022-01-13 RX ADMIN — ALBUTEROL SULFATE SCH PUFF: 90 AEROSOL, METERED RESPIRATORY (INHALATION) at 12:23

## 2022-01-13 RX ADMIN — ALBUTEROL SULFATE SCH PUFF: 90 AEROSOL, METERED RESPIRATORY (INHALATION) at 16:11

## 2022-01-13 RX ADMIN — FUROSEMIDE SCH MG: 40 TABLET ORAL at 18:10

## 2022-01-13 RX ADMIN — ACETAMINOPHEN PRN MG: 325 TABLET, FILM COATED ORAL at 00:05

## 2022-01-13 RX ADMIN — METOPROLOL TARTRATE SCH MG: 25 TABLET, FILM COATED ORAL at 20:46

## 2022-01-13 RX ADMIN — DEXTROSE SCH MG: 50 INJECTION, SOLUTION INTRAVENOUS at 09:18

## 2022-01-13 RX ADMIN — METOPROLOL TARTRATE SCH MG: 25 TABLET, FILM COATED ORAL at 18:10

## 2022-01-13 RX ADMIN — APIXABAN SCH MG: 2.5 TABLET, FILM COATED ORAL at 20:46

## 2022-01-13 RX ADMIN — ALBUTEROL SULFATE SCH PUFF: 90 AEROSOL, METERED RESPIRATORY (INHALATION) at 08:10

## 2022-01-13 RX ADMIN — Medication SCH MCG: at 09:18

## 2022-01-13 RX ADMIN — APIXABAN SCH MG: 2.5 TABLET, FILM COATED ORAL at 09:18

## 2022-01-13 RX ADMIN — METOPROLOL TARTRATE SCH MG: 25 TABLET, FILM COATED ORAL at 09:18

## 2022-01-13 RX ADMIN — AMIODARONE HYDROCHLORIDE SCH MG: 200 TABLET ORAL at 09:18

## 2022-01-13 RX ADMIN — TIOTROPIUM BROMIDE INHALATION SPRAY SCH PUFF: 3.12 SPRAY, METERED RESPIRATORY (INHALATION) at 08:10

## 2022-01-13 RX ADMIN — ATORVASTATIN CALCIUM SCH MG: 20 TABLET, FILM COATED ORAL at 20:46

## 2022-01-13 NOTE — PN
PROGRESS NOTE



DATE OF SERVICE:

01/13/2022



REASON FOR FOLLOWUP:

COVID-19 pneumonia.



INTERIM HISTORY:

The patient is afebrile.  The patient is breathing more comfortably.  The patient

denies having any chest pain.  She did have a cough.  No sputum production.  No

abdominal pain or diarrhea.



PHYSICAL EXAMINATION:

Blood pressure 107/65, pulse of 110, temperature 97.5.  She is 92% on 15 L nasal

cannula.  General description is an elderly female up in the bed in no distress.

Respiratory system: Unlabored breathing, decreased intensity of the breath sounds, no

wheeze . Heart S1, S2.  Regular rate and rhythm.  Abdomen soft, no tenderness.



LABS:

BUN of 42, creatinine 1.11.



DIAGNOSTIC IMPRESSION AND PLAN:

Patient with acute respiratory failure of Covid-19 pneumonia.  Slow clinical

improvement.  Patient to continue with Dexamethasone, Levaquin, zinc and ascorbic acid

and respiratory support and monitor clinical course closely.





MMODL / IJN: [id number] / Job#: [id number]

## 2022-01-13 NOTE — P.PN
Subjective


Progress Note Date: 01/13/22





HISTORY OF PRESENT ILLNESS


This is an 87-year-old female patient of Dr. Daliey and Dr. Pope with past medic

al history of chronic persistent atrial fibrillation, chronic systolic heart 

failure, pulmonary hypertension, COPD with chronic hypoxic respiratory failure 

on home O2 at 3 L as needed, hypertension, hyperlipidemia, chronic kidney 

disease stage III, generalized osteoarthritis





She was admitted, 01/04/2022, discharged 01/08/2022, secondary to acute Covid 

patient has been vaccinated, however she is missing her booster shot.  For which

she is due for.  She was admitted with acute interstitial pneumonia, coronary 

virus was positive, on 01/04/2021, when she presented with cough shortness of 

breath, fever, A. fib with RVR.  At that time, echocardiogram was 30-35%, with 

mild AR, moderate MR, severe TR proBNP of 9570 lactic acid was 1.5, troponin was

0.067.  She was managed for this acute Covid, and the A. fib with RVR, for which

she received Cardizem drip, amiodarone was 3 titrated up, along with the 

Lopressor.  Patient was subsequently discharged to home, with home therapy, 

however patient is weaker than usual, cannot manage on her own, despite visiting

nurses assisting with her care.  She was discharged on 3 L O2 nasal cannula, 

with a discharge creatinine of 1.4, BUN of 51, hemoglobin of 11.1.  Patient 

lives alone, has increasing weakness, no falls, has some shortness of breath 

with exertion, no chest pain, patient denies any new edema





 In the emergency room, she was slightly hyponatremic 132, CO2 is worse, 38, 

creatinine 1.24, BUN 44, admission elevated at 2.4, total bilirubin is elevated 

at 1.5, AST and ALT, I 51 and 64, respectively which is better than previous.  

Troponins elevated at 0.092, and 0.088, as previous.  Urinalysis negative, INR 

of 1.2, no d-dimer is done chest x-ray, correlated for CHF, with diffuse 

interstitial pattern, more confluent density in the right upper lobe, small 

bilateral effusion, heart is enlarged, diffuse osteopenia, cardiomegaly and COPD

changes.  Consult with pulmonary, Dr. Barnard, with PT OT, might need to be 

reevaluated for thromboembolic pulmonary phenomenon, check for d-dimer, might 

need CPAP protocol, however patient is already oneliquis 0.5 mg twice a day.  If

d-dimer is elevated, we'll going to proceed with Doppler legs, might need VQ 

scan.  Heart rate patient's between , pulse oximetry 92-98% between 2-3 L 

nasal cannula consult with social work, PT OT and most likely would need 

subacute rehab





1/11: Patient has been seen and followed by cardiology regarding abnormal 

troponin, acute coronary syndrome has been ruled out.  Patient has been 

continued on anticoagulation with eliquis for atrial fibrillation.  Consult with

Dr. Vernon has been added and repeat chest x-ray ordered for tomorrow.  Patient 

states she did not sleep last night and seems quite anxious today.  Pulse ox is 

95% on 3 and half liters, afebrile, heart rate 101, blood pressure 116/56.  

Repeat blood work reveals sodium 135, potassium 4.6, chloride 93, CO2 38, BUN 37

and creatinine 1.06.  





1/12: Repeat chest x-ray today reveals mild cardiomegaly with bilateral 

multifocal increased opacities greater in the periphery consistent with Covid 19

infection redemonstrated.  No significant change from most recent x-ray 2 days 

ago.  Patient has been seen by Dr. Vernon and recommend continuing current 

supportive treatment.  Patient complains of feeling weak and tired.  She does 

have a cough.  No chest pain.  Discharge plan if for subacute rehab at all 3 

facilities have no beds available until Saturday.  Social work is following and 

will plan discharge if beds open up prior to Saturday.  PO 93-95% on 3 L nasal 

cannula.





1/13: She remains afebrile, heart rate 94, blood pressure 122/81, pulse ox 91% 

on 3 L nasal cannula.  Sodium 134, potassium 4.2, chloride 94, CO2 33, BUN 42 

and creatinine 1.11.  Carotid virus PCR on 1/11 is positive.  Dr. Vernon 

recommends continuing current medications.  No evidence of secondary bacterial 

pneumonia and no need for systemic antibiotic therapy.  Despite a discharge to 

rehab on Saturday.








REVIEW OF SYSTEMS


Constitutional: No fever, no chills, no night sweats.  No weight change.  

Reports weakness, Reports fatigue.  No daytime sleepiness.


EENT: No headache.  No blurred vision or double vision, no loss of vision.  No 

loss of Hearing, no ringing in the ears, no dizziness.  No nasal drainage or 

congestion.  No epistaxis.  No sore throat.


Lungs: No shortness of breath-, Reports cough, no sputum production.  No 

wheezing.


Cardiovascular: No chest pain, no lower extremity edema.  No palpitations.  No 

paroxysmal nocturnal dyspnea.  No orthopnea.  No lightheadedness or dizziness.  

No syncopal episodes.


Abdominal: No abdominal pain.  No nausea, vomiting.  No diarrhea.  No 

constipation.  No bloody or tarry stools.  No loss of appetite.


Genitourinary: No dysuria, increased frequency, urgency.  No urinary retention.


Musculoskeletal: No myalgias.  No muscle weakness, no gait dysfunction, no 

frequent falls.  No back pain.  No neck pain.


Integumentary: No wounds, no lesions.  No rash or pruritus.  No unusual 

bruising.  No change in hair or nails.


Neurologic: No aphasia. No facial droop. No change in mentation. No head injury.

No headache. No paralysis. No paresthesia.


Psychiatric: No depression.  Noted anxiety.  No mood swings.


Endocrine: No abnormal blood sugars.  No weight change.  No excessive sweating 

or thirst.  No cold intolerance.  





PHYSICAL EXAMINATION


Gen: This is an 87-year-old female in no respiratory distress, resting in bed on

3 L nasal cannula


HEENT: Head is atraumatic, normocephalic. Pupils equal, round. Sclerae is 

anicteric. 


NECK: Supple. No JVD. No lymphadenopathy. No thyromegaly. 


LUNGS: Clear to auscultation. No wheezes or rhonchi.  No intercostal 

retractions.


HEART: Irregular rate and rhythm. No murmur. 


ABDOMEN: Soft. Bowel sounds are present. No masses.  No tenderness.


EXTREMITIES: No pedal edema.  No calf tenderness.


NEUROLOGICAL: Patient is awake, alert and oriented x3. Cranial nerves 2 through 

12 are grossly intact. 





ASSESSMENT AND PLAN


1.  Acute on chronic hypoxic respiratory failure secondary to a combination of 

acute exacerbation of systolic heart failure,  with Covid 19 pneumonia, acute 

exacerbation of COPD, A. fib with minimal RVR.  Continue oxygen therapy patient 

was treated for CHF, and A. fib RVR, and thought that she had incidental Covid  

in January 4 to 01/08/2022.  Continue on dexamethasone 6 mg, check for d-dimer, 

Covid cytokine markers, consult with Dr. Saulo appreciated.





2.  Acute exacerbation of systolic heart failure small bilateral pleural 

effusion.  Continue Lasix 40 mg IV every 12 hours, I&O and daily weights, 

monitor renal function and electrolytes.   





3.   Covid 19  with hypoxemia, intertial infiltrates vs edema Patient's been 

started on Ventolin inhaler 4 times daily, vitamin supplements, Symbicort 2 

puffs twice daily, dexamethasone 6 mg IV push daily, Spiriva.





4.  Acute exacerbation of COPD.  Continue Symbicort, Spiriva, albuterol, 

dexamethasone.





5.  Chronic persistent atrial fibrillation presenting with RVR.  cONTINUE 

eliquis 2.5 mg twice daily, continue amiodarone 200 mg daily and continue 

Lopressor 75 mg 3 times daily. 





6.  Transaminitis possibly related to Covid 19.  Continue to monitor.





7.  Hypertension.  Continue Lopressor.





8.  Hyperlipidemia.  Continue Lipitor 20 mg at bedtime.





9.  Chronic kidney disease stage III.  Monitor renal function, avoid nephrotoxic

agents.





10.  Valvular heart disease with  mild aortic regurgitation, moderate mitral 

regurgitation, severe tricuspid regurgitation.





11.  Severe pulmonary hypertension.





12.  GI prophylaxis.  Protonix.





13.  DVT prophylaxis.  Eliquis.





14.  Debility with worsening weakness, consult PT OT, social work, might need 

subacute rehab, against therapy.  Social situation is not ideal, patient lives 

alone








DISCHARGE PLAN


Subacute rehab once bed is available, most likely on Saturday





Impression and plan of care have been directed as dictated by the signing 

physician.  Carol Hicks nurse practitioner acting as scribe for signing 

physician.





Objective





- Vital Signs


Vital signs: 


                                   Vital Signs











Temp  98.1 F   01/13/22 08:00


 


Pulse  110 H  01/13/22 08:00


 


Resp  18   01/13/22 08:00


 


BP  133/85   01/13/22 08:00


 


Pulse Ox  93 L  01/13/22 08:00








                                 Intake & Output











 01/12/22 01/13/22 01/13/22





 18:59 06:59 18:59


 


Intake Total 960 250 


 


Output Total  800 


 


Balance 960 -550 


 


Intake:   


 


  Intake, IV Titration  250 





  Amount   


 


    Azithromycin 500 mg In  250 





    Sodium Chloride 0.9% 250   





    ml @ 250 mls/hr IVPB   





    DAILY@1800 [facility] Rx#:   





    [id number]   


 


  Oral 960  


 


Output:   


 


  Urine  800 


 


Other:   


 


  # Voids 1  


 


  # Bowel Movements 1 1 














- Labs


CBC & Chem 7: 


                                 01/10/22 12:45





                                 01/12/22 07:18


Labs: 


                      Microbiology - Last 24 Hours (Table)











 01/10/22 19:07 Blood Culture - Preliminary





 Blood    No Growth after 48 hours


 


 01/10/22 19:07 Blood Culture - Preliminary





 Blood    No Growth after 48 hours

## 2022-01-14 VITALS — DIASTOLIC BLOOD PRESSURE: 96 MMHG | SYSTOLIC BLOOD PRESSURE: 144 MMHG | HEART RATE: 114 BPM

## 2022-01-14 VITALS — RESPIRATION RATE: 16 BRPM

## 2022-01-14 RX ADMIN — ALBUTEROL SULFATE SCH PUFF: 90 AEROSOL, METERED RESPIRATORY (INHALATION) at 08:57

## 2022-01-14 RX ADMIN — DEXTROSE SCH MG: 50 INJECTION, SOLUTION INTRAVENOUS at 09:10

## 2022-01-14 RX ADMIN — AMIODARONE HYDROCHLORIDE SCH MG: 200 TABLET ORAL at 09:09

## 2022-01-14 RX ADMIN — Medication SCH MCG: at 09:09

## 2022-01-14 RX ADMIN — APIXABAN SCH MG: 2.5 TABLET, FILM COATED ORAL at 09:09

## 2022-01-14 RX ADMIN — BUDESONIDE AND FORMOTEROL FUMARATE DIHYDRATE SCH PUFF: 80; 4.5 AEROSOL RESPIRATORY (INHALATION) at 08:57

## 2022-01-14 RX ADMIN — FUROSEMIDE SCH MG: 40 TABLET ORAL at 09:09

## 2022-01-14 RX ADMIN — METOPROLOL TARTRATE SCH MG: 25 TABLET, FILM COATED ORAL at 09:10

## 2022-01-14 RX ADMIN — TIOTROPIUM BROMIDE INHALATION SPRAY SCH PUFF: 3.12 SPRAY, METERED RESPIRATORY (INHALATION) at 08:57

## 2022-01-14 RX ADMIN — ACETAMINOPHEN PRN MG: 325 TABLET, FILM COATED ORAL at 02:40

## 2022-01-14 NOTE — PN
PROGRESS NOTE



DATE OF SERVICE:

01/14/2022



REASON FOR FOLLOWUP:

COVID-19 pneumonia.



INTERVAL HISTORY:

The patient was seen on rounds early this afternoon. The patient has been afebrile. She

has been breathing comfortably. Still complaining of feeling weak. _____ No chest pain.

No worsening cough or sputum production. No abdominal pain or diarrhea.



PHYSICAL EXAMINATION:

Blood pressure 114/96, pulse _____, temperature 96.7. She is 92% _____ General

description is is an elderly female up in the chair in no distress. Respiratory system:

Unlabored breathing, decreased intensity of breath sounds. No wheeze.  Heart S1, S2.

Regular rate and rhythm.  Abdomen soft, no tenderness.



LABS:

No new labs have been obtained today. Blood cultures have been negative.



DIAGNOSTIC IMPRESSION AND PLAN:

Patient with acute COVID-19 pneumonia in this patient with slow clinical improvement.

_____ course of oral dexamethasone, zinc and ascorbic acid. No need for systemic

antibiotic on discharge. Continue with supportive care.





MMODL / IJN: [id number] / Job#: [id number]

## 2022-02-06 ENCOUNTER — HOSPITAL ENCOUNTER (INPATIENT)
Dept: HOSPITAL 47 - EC | Age: 87
LOS: 8 days | Discharge: NURSING, REHAB OR LONG-TERM CARE | DRG: 193 | End: 2022-02-14
Attending: INTERNAL MEDICINE | Admitting: INTERNAL MEDICINE
Payer: MEDICARE

## 2022-02-06 VITALS — BODY MASS INDEX: 21 KG/M2

## 2022-02-06 DIAGNOSIS — I50.22: ICD-10-CM

## 2022-02-06 DIAGNOSIS — N18.30: ICD-10-CM

## 2022-02-06 DIAGNOSIS — Z79.51: ICD-10-CM

## 2022-02-06 DIAGNOSIS — J96.21: ICD-10-CM

## 2022-02-06 DIAGNOSIS — J84.10: ICD-10-CM

## 2022-02-06 DIAGNOSIS — Z90.711: ICD-10-CM

## 2022-02-06 DIAGNOSIS — R07.2: ICD-10-CM

## 2022-02-06 DIAGNOSIS — F41.9: ICD-10-CM

## 2022-02-06 DIAGNOSIS — J43.9: ICD-10-CM

## 2022-02-06 DIAGNOSIS — R33.9: ICD-10-CM

## 2022-02-06 DIAGNOSIS — E78.5: ICD-10-CM

## 2022-02-06 DIAGNOSIS — R77.8: ICD-10-CM

## 2022-02-06 DIAGNOSIS — J18.9: Primary | ICD-10-CM

## 2022-02-06 DIAGNOSIS — E86.0: ICD-10-CM

## 2022-02-06 DIAGNOSIS — J93.83: ICD-10-CM

## 2022-02-06 DIAGNOSIS — M19.042: ICD-10-CM

## 2022-02-06 DIAGNOSIS — Z87.891: ICD-10-CM

## 2022-02-06 DIAGNOSIS — M19.041: ICD-10-CM

## 2022-02-06 DIAGNOSIS — Z79.01: ICD-10-CM

## 2022-02-06 DIAGNOSIS — Z79.899: ICD-10-CM

## 2022-02-06 DIAGNOSIS — Z82.5: ICD-10-CM

## 2022-02-06 DIAGNOSIS — U09.9: ICD-10-CM

## 2022-02-06 DIAGNOSIS — I27.20: ICD-10-CM

## 2022-02-06 DIAGNOSIS — I25.10: ICD-10-CM

## 2022-02-06 DIAGNOSIS — D63.8: ICD-10-CM

## 2022-02-06 DIAGNOSIS — I13.0: ICD-10-CM

## 2022-02-06 DIAGNOSIS — Z82.0: ICD-10-CM

## 2022-02-06 DIAGNOSIS — I25.2: ICD-10-CM

## 2022-02-06 DIAGNOSIS — K57.90: ICD-10-CM

## 2022-02-06 DIAGNOSIS — Z99.81: ICD-10-CM

## 2022-02-06 DIAGNOSIS — H91.90: ICD-10-CM
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
## 2022-02-06 DIAGNOSIS — Z82.49: ICD-10-CM

## 2022-02-06 DIAGNOSIS — Z20.822: ICD-10-CM

## 2022-02-06 DIAGNOSIS — I08.3: ICD-10-CM

## 2022-02-06 DIAGNOSIS — T83.84XA: ICD-10-CM

## 2022-02-06 DIAGNOSIS — I42.8: ICD-10-CM

## 2022-02-06 DIAGNOSIS — I48.19: ICD-10-CM

## 2022-02-06 DIAGNOSIS — M51.9: ICD-10-CM

## 2022-02-06 DIAGNOSIS — Z80.41: ICD-10-CM

## 2022-02-06 LAB
ALBUMIN SERPL-MCNC: 2.6 G/DL (ref 3.5–5)
ALP SERPL-CCNC: 76 U/L (ref 38–126)
ALT SERPL-CCNC: 17 U/L (ref 4–34)
ANION GAP SERPL CALC-SCNC: 5 MMOL/L
APTT BLD: 24.5 SEC (ref 22–30)
AST SERPL-CCNC: 31 U/L (ref 14–36)
BASOPHILS # BLD AUTO: 0 K/UL (ref 0–0.2)
BASOPHILS # BLD AUTO: 0 K/UL (ref 0–0.2)
BASOPHILS NFR BLD AUTO: 0 %
BASOPHILS NFR BLD AUTO: 0 %
BUN SERPL-SCNC: 30 MG/DL (ref 7–17)
CALCIUM SPEC-MCNC: 8.1 MG/DL (ref 8.4–10.2)
CHLORIDE SERPL-SCNC: 97 MMOL/L (ref 98–107)
CO2 SERPL-SCNC: 28 MMOL/L (ref 22–30)
EOSINOPHIL # BLD AUTO: 0 K/UL (ref 0–0.7)
EOSINOPHIL # BLD AUTO: 0.1 K/UL (ref 0–0.7)
EOSINOPHIL NFR BLD AUTO: 0 %
EOSINOPHIL NFR BLD AUTO: 1 %
ERYTHROCYTE [DISTWIDTH] IN BLOOD BY AUTOMATED COUNT: 2.85 M/UL (ref 3.8–5.4)
ERYTHROCYTE [DISTWIDTH] IN BLOOD BY AUTOMATED COUNT: 2.94 M/UL (ref 3.8–5.4)
ERYTHROCYTE [DISTWIDTH] IN BLOOD: 16.8 % (ref 11.5–15.5)
ERYTHROCYTE [DISTWIDTH] IN BLOOD: 16.9 % (ref 11.5–15.5)
GLUCOSE BLD-MCNC: 153 MG/DL (ref 75–99)
GLUCOSE SERPL-MCNC: 140 MG/DL (ref 74–99)
HCT VFR BLD AUTO: 29.2 % (ref 34–46)
HCT VFR BLD AUTO: 29.4 % (ref 34–46)
HGB BLD-MCNC: 9.1 GM/DL (ref 11.4–16)
HGB BLD-MCNC: 9.3 GM/DL (ref 11.4–16)
INR PPP: 1.2 (ref ?–1.2)
LYMPHOCYTES # SPEC AUTO: 0.5 K/UL (ref 1–4.8)
LYMPHOCYTES # SPEC AUTO: 0.5 K/UL (ref 1–4.8)
LYMPHOCYTES NFR SPEC AUTO: 6 %
LYMPHOCYTES NFR SPEC AUTO: 7 %
MCH RBC QN AUTO: 31.7 PG (ref 25–35)
MCH RBC QN AUTO: 31.8 PG (ref 25–35)
MCHC RBC AUTO-ENTMCNC: 30.9 G/DL (ref 31–37)
MCHC RBC AUTO-ENTMCNC: 31.8 G/DL (ref 31–37)
MCV RBC AUTO: 103 FL (ref 80–100)
MCV RBC AUTO: 99.5 FL (ref 80–100)
MONOCYTES # BLD AUTO: 0.2 K/UL (ref 0–1)
MONOCYTES # BLD AUTO: 0.4 K/UL (ref 0–1)
MONOCYTES NFR BLD AUTO: 3 %
MONOCYTES NFR BLD AUTO: 5 %
NEUTROPHILS # BLD AUTO: 6.3 K/UL (ref 1.3–7.7)
NEUTROPHILS # BLD AUTO: 6.6 K/UL (ref 1.3–7.7)
NEUTROPHILS NFR BLD AUTO: 86 %
NEUTROPHILS NFR BLD AUTO: 88 %
PH UR: 6 [PH] (ref 5–8)
PLATELET # BLD AUTO: 353 K/UL (ref 150–450)
PLATELET # BLD AUTO: 358 K/UL (ref 150–450)
POTASSIUM SERPL-SCNC: 4.2 MMOL/L (ref 3.5–5.1)
PROT SERPL-MCNC: 5.3 G/DL (ref 6.3–8.2)
PT BLD: 12.8 SEC (ref 9–12)
SODIUM SERPL-SCNC: 130 MMOL/L (ref 137–145)
SP GR UR: 1.02 (ref 1–1.03)
UROBILINOGEN UR QL STRIP: <2 MG/DL (ref ?–2)
WBC # BLD AUTO: 7.1 K/UL (ref 3.8–10.6)
WBC # BLD AUTO: 7.7 K/UL (ref 3.8–10.6)

## 2022-02-06 PROCEDURE — 80306 DRUG TEST PRSMV INSTRMNT: CPT

## 2022-02-06 PROCEDURE — 5A0935A ASSISTANCE WITH RESPIRATORY VENTILATION, LESS THAN 24 CONSECUTIVE HOURS, HIGH NASAL FLOW/VELOCITY: ICD-10-PCS

## 2022-02-06 PROCEDURE — 94760 N-INVAS EAR/PLS OXIMETRY 1: CPT

## 2022-02-06 PROCEDURE — 84145 PROCALCITONIN (PCT): CPT

## 2022-02-06 PROCEDURE — 80053 COMPREHEN METABOLIC PANEL: CPT

## 2022-02-06 PROCEDURE — 80048 BASIC METABOLIC PNL TOTAL CA: CPT

## 2022-02-06 PROCEDURE — 81003 URINALYSIS AUTO W/O SCOPE: CPT

## 2022-02-06 PROCEDURE — 36415 COLL VENOUS BLD VENIPUNCTURE: CPT

## 2022-02-06 PROCEDURE — 71046 X-RAY EXAM CHEST 2 VIEWS: CPT

## 2022-02-06 PROCEDURE — 85027 COMPLETE CBC AUTOMATED: CPT

## 2022-02-06 PROCEDURE — 86900 BLOOD TYPING SEROLOGIC ABO: CPT

## 2022-02-06 PROCEDURE — 85730 THROMBOPLASTIN TIME PARTIAL: CPT

## 2022-02-06 PROCEDURE — 99285 EMERGENCY DEPT VISIT HI MDM: CPT

## 2022-02-06 PROCEDURE — 85025 COMPLETE CBC W/AUTO DIFF WBC: CPT

## 2022-02-06 PROCEDURE — 85610 PROTHROMBIN TIME: CPT

## 2022-02-06 PROCEDURE — 85379 FIBRIN DEGRADATION QUANT: CPT

## 2022-02-06 PROCEDURE — 86850 RBC ANTIBODY SCREEN: CPT

## 2022-02-06 PROCEDURE — 86901 BLOOD TYPING SEROLOGIC RH(D): CPT

## 2022-02-06 PROCEDURE — 86140 C-REACTIVE PROTEIN: CPT

## 2022-02-06 PROCEDURE — 84484 ASSAY OF TROPONIN QUANT: CPT

## 2022-02-06 PROCEDURE — 87635 SARS-COV-2 COVID-19 AMP PRB: CPT

## 2022-02-06 PROCEDURE — 94640 AIRWAY INHALATION TREATMENT: CPT

## 2022-02-06 PROCEDURE — 96360 HYDRATION IV INFUSION INIT: CPT

## 2022-02-06 PROCEDURE — 94660 CPAP INITIATION&MGMT: CPT

## 2022-02-06 PROCEDURE — 83615 LACTATE (LD) (LDH) ENZYME: CPT

## 2022-02-06 PROCEDURE — 93005 ELECTROCARDIOGRAM TRACING: CPT

## 2022-02-06 PROCEDURE — 71045 X-RAY EXAM CHEST 1 VIEW: CPT

## 2022-02-06 PROCEDURE — 71275 CT ANGIOGRAPHY CHEST: CPT

## 2022-02-06 PROCEDURE — 93970 EXTREMITY STUDY: CPT

## 2022-02-06 NOTE — P.CNPUL
History of Present Illness


Consult date: 22


Reason for consult: hypoxemia


History of present illness: 





87-year-old female patient hospitalized for Covid 19 related long-term 

complications of pulmonary infiltration and hypoxemia.  Note that the patient 

was Hospital as back in 2022.  [place] [facility] [place] and she has been on oxygen at 3 L since then.  The 

patient was diagnosed having acute coronary 19 related pneumonia, decompensated 

CHF and COPD exacerbation.  She is known to have multiple medical problems and 

comorbidities.  The patient was brought into the emergency.  Her shortness of 

breath is vaccinated and waning although she feels that overall she has been 

stable over the past 1 month.  She has no altered mentation.  The daughter at 

the bedside tells that her mentation fluctuates.  At times she is more lucid and

other times more confused.  Based on my evaluation this afternoon, the patient 

is fully alert and awake in oriented to time and place and people.  Chest x-ray 

was still showing diffuse bilateral pulmonary infiltrates and a chest x-ray 

findings are essentially stable compared to the earlier evaluation from 2022.  She has been on Eliquis 2.5 mg by mouth twice a day and Lasix 20 mg by 

mouth twice daily on outpatient basis.  No signs of any fluid overload.  No 

angina.  No palpitations.





Review of Systems


Constitutional: Reports fatigue, Reports poor appetite, Reports weakness, 

Reports weight loss


Eyes: denies as per HPI, denies blurred vision, denies bulging eye, denies 

decreased vision, denies diplopia, denies discharge, denies dry eye, denies 

irritation, denies itching, denies pain, denies photophobia, denies loss of 

peripheral vision, denies loss of vision, denies tunnel vision/blind spots


Ears: deny: decreased hearing, ear discharge, earache, tinnitus


Ears, nose, mouth and throat: Reports as per HPI


Breasts: absent: as per HPI, change in shape, gynecomastia, masses, nipple 

discharge, pain, skin changes, swelling


Cardiovascular: Reports decreased exercise tolerance, Reports irregular heart 

beat


Respiratory: Reports home oxygen


Gastrointestinal: Reports as per HPI


Genitourinary: Reports as per HPI


Menstruation: Reports as per HPI


Musculoskeletal: Reports as per HPI


Musculoskeletal: absent: ankle pain, ankle stiffness, ankle swelling, as per 

HPI, elbow pain, elbow stiffness, elbow swelling, foot pain, foot stiffness, 

foot swelling, hand pain, hand stiffness, hand swelling, hip pain, hip 

stiffness, hip swelling, knee pain, knee stiffness, knee swelling, shoulder pa

in, shoulder stiffness, shoulder swelling, wrist pain, wrist stiffness, wrist 

swelling


Integumentary: Reports as per HPI


Neurological: Reports confusion


Psychiatric: Reports as per HPI


Endocrine: Reports as per HPI


Hematologic/Lymphatic: Reports as per HPI


Allergic/Immunologic: Reports as per HPI





Past Medical History


Past Medical History: Atrial Fibrillation, Coronary Artery Disease (CAD), Heart 

Failure, COPD, Eye Disorder, GERD/Reflux, GI Bleed, Hearing Disorder / Deafness,

Hyperlipidemia, Hypertension, Myocardial Infarction (MI), Osteoarthritis (OA), 

Pneumonia, Renal Disease, Respiratory Disorder, Skin Disorder


Additional Past Medical History / Comment(s): Covid+ 21 at [facility].   Pt 

diagnosed 21 with pharyngitis and is on antibiotic, nonischemic 

cardiomyopathy, pulmonary HTN, bronchitis, O2 at 2L/HS, gastritis, diverticular 

disease, [place]/aides bilaterally, bilateral eye glaucoma, arthritis bilateral 

hands, CKD, rosacia, past lumbar slipped disc/pain/improved now.


Last Myocardial Infarction Date:: 


History of Any Multi-Drug Resistant Organisms: None Reported


Past Surgical History: Adenoidectomy, Appendectomy, Heart Catheterization, 

Hysterectomy, Orthopedic Surgery, Tonsillectomy


Additional Past Surgical History / Comment(s): 2017 cardiac cath/treated med

ically, partial hysterectomy/still has part of L ovary, R knee arthroscopy/torn 

meniscus, EGD, colonoscopies, bilateral blepharoplasties, bilateral eye cataract

removal/laser surgery for glaucoma.


Past Anesthesia/Blood Transfusion Reactions: No Reported Reaction


Additional Past Anesthesia/Blood Transfusion Reaction / Comment(s): .


Past Psychological History: Anxiety


Past Alcohol Use History: None Reported





- Past Family History


  ** Brother(s)


Additional Family Medical History / Comment(s): Patient had 3 brothers and one 

is living and 93 years of age and [place] with history of Alzheimer's dementia, 

coronary artery disease, CHF, A. fib, COPD.  Patient has 2 brothers that have 

passed one from alcoholism and one from a traumatic head injury from a fall.





  ** Sister(s)


Additional Family Medical History / Comment(s): Patient has one sister that 

of ovarian cancer.  Patient has one son that  from alcoholic complications.





  ** Father


Family Medical History: Cancer


Additional Family Medical History / Comment(s): Father  at age 77yrs of 

cancer which pt believes may have started in his throat.





  ** Mother


Family Medical History: Dementia


Additional Family Medical History / Comment(s): Mother  at age 95yrs.  She 

was very healthy most of her life- she had dementia at the very end of her life.





Medications and Allergies


                                Home Medications











 Medication  Instructions  Recorded  Confirmed  Type


 


Fluticasone/Salmeterol [Advair 1 puff INHALATION RT-BID@0900,17 History





250-50 Diskus]    


 


Tiotropium 18 Mcg/Puff [Spiriva] 1 cap INHALATION RT-DAILY@17 History


 


Ferrous Sulfate [Iron (65  mg PO DAILY@0900 18 History





Elemental)]    


 


Albuterol Inhaler [Ventolin Hfa 1 puff INHALATION RT-QID #1 gm 22

 Rx





Inhaler]    


 


Acetaminophen [Tylenol] 650 mg PO Q4H PRN 22 History


 


Albuterol Nebulized [Ventolin 2.5 mg INHALATION RT-Q6H PRN 22 

History





Nebulized]    


 


Amiodarone [Cordarone] 200 mg PO DAILY@0900 22 History


 


Apixaban [Eliquis] 2.5 mg PO BID@0900,2100 22 History


 


Atorvastatin [Lipitor] 20 mg PO AS DIRECTED 22 History


 


Benzonatate [Tessalon Perles] 100 mg PO TID@0600,1400,2200 22 

History


 


Cholecalciferol [Vitamin D3 (25 50 mcg PO DAILY@0900 22 History





Mcg = 1000 Iu)]    


 


Furosemide [Lasix] 20 mg PO BID@0600,1400 22 History


 


LORazepam [Ativan] 1 mg PO HS@22 History


 


Lactose-Reduced Food [Ensure Plus] 120 ml PO TID@0900,1300,2000 22 History


 


Levofloxacin [Levaquin] 500 mg PO DAILY@1400 22 History


 


Melatonin 6 mg PO HS@2100 22 History


 


Metoprolol Tartrate [Lopressor] 75 mg PO TID@0600,1400,2200 22 

History


 


Sennosides-Docusate Sodium 1 tab PO DAILY@0900 22 History





[Senokot-S]    








                                    Allergies











Allergy/AdvReac Type Severity Reaction Status Date / Time


 


amoxicillin [From Augmentin] Allergy  Unknown Verified 22 13:30


 


cefuroxime axetil Allergy  Unknown Verified 22 13:30





[From Ceftin]     


 


clavulanic acid Allergy  Unknown Verified 22 13:30





[From Augmentin]     














Physical Exam


Vitals: 


                                   Vital Signs











  Temp Pulse Resp BP Pulse Ox


 


 22 15:06   97  18  96/58  92 L


 


 22 14:25     99/52 


 


 22 14:00   92  20  82/54  91 L


 


 22 13:49   94  20   92 L


 


 22 12:45  98.5 F  95  22  96/53  93 L








                                Intake and Output











 22





 06:59 14:59 22:59


 


Other:   


 


  Weight  56.699 kg 

















 GENERAL EXAM: Alert, hard of hearing, pleasant, 87-year-old white female, on 3 

L of oxygen the pulse ox of 94% comfortable in no apparent distress.


HEAD: Normocephalic/atraumatic.


EYES: Normal reaction of pupils, equal size.  Conjunctiva pink, sclera white.


NOSE: Clear with pink turbinates.


THROAT: No erythema or exudates.


NECK: No masses, no JVD, no thyroid enlargement, no adenopathy.


CHEST: No chest wall deformity.  Symmetrical expansion. 


LUNGS: Equal air entry with diffuse crackles


CVS: Irregular rate and rhythm, normal S1 and S2, no gallops, no murmurs, no 

rubs


ABDOMEN: Soft, nontender.  No hepatosplenomegaly, normal bowel sounds, no 

guarding or rigidity.


EXTREMITIES: No clubbing, mild lower extremity edema no cyanosis, 2+ pulses and 

upper and lower extremities.


MUSCULOSKELETAL: Muscle strength and tone normal.


SPINE: No scoliosis or deformity


SKIN: No rashes


CENTRAL NERVOUS SYSTEM: Alert and oriented -3.  No focal deficits, tone is 

normal in all 4 extremities.


PSYCHIATRIC: Alert and oriented -3.  Appropriate affect.  Intact judgment and 

insight.








Results





- Laboratory Findings


CBC and BMP: 


                                 22 13:07





                                 22 13:07


PT/INR, D-dimer











PT  12.8 sec (9.0-12.0)  H  22  13:07    


 


INR  1.2  (<1.2)  H  22  13:07    








Abnormal lab findings: 


                                  Abnormal Labs











  22





  13:07 13:07 13:07


 


RBC  2.94 L  


 


Hgb  9.3 L D  


 


Hct  29.2 L  


 


RDW  16.9 H  


 


Lymphocytes #  0.5 L  


 


PT   12.8 H 


 


INR   1.2 H 


 


Sodium    130 L


 


Chloride    97 L


 


BUN    30 H


 


Creatinine    1.45 H


 


Glucose    140 H


 


POC Glucose (mg/dL)   


 


Calcium    8.1 L


 


Troponin I   


 


Total Protein    5.3 L


 


Albumin    2.6 L


 


Urine Protein   


 


Urine Opiates Screen   


 


U Benzodiazepines Scrn   














  22





  13:07 13:07 13:47


 


RBC   


 


Hgb   


 


Hct   


 


RDW   


 


Lymphocytes #   


 


PT   


 


INR   


 


Sodium   


 


Chloride   


 


BUN   


 


Creatinine   


 


Glucose   


 


POC Glucose (mg/dL)   153 H 


 


Calcium   


 


Troponin I  0.035 H*  


 


Total Protein   


 


Albumin   


 


Urine Protein    Trace H


 


Urine Opiates Screen    Detected H


 


U Benzodiazepines Scrn    Detected H














- Diagnostic Findings


Chest x-ray: image reviewed





Assessment and Plan


Plan: 





1 chronic hypoxic respiratory failure without any interval worsening and the 

patient remains on 3 L of Oxymizer nasal cannula.  Chest x-ray shows residual 

pulmonary infiltrates related to previous Covid 19 related pneumonia that 

occurred in 2021.  The patient was diagnosed having Covid 19 on 2021 

and the patient was hospitalized between 2021 and 2021 and following

that the patient was discharged to a nursing home.  She is still having hypox

emia and oxygen requirements remain unchanged at 3 L.  Chest x-ray findings are 

still stable.  No signs of any decompensated heart failure at this point in 

time.  Superinfection is felt to be less likely.  In fact, her repeat Covid 19 

testing came back negative.





2 CHF with systolic heart failure





3 chronic atrial fibrillation





4 COPD





5 chronic stage III kidney disease





6 hypertension





7 hyperlipidemia





8 bilateral heart disease with mild aortic stenosis, moderate MR and severe TR 

with severe pulmonary hypertension





9 debility with worsening weakness [place] [facility] [place]





10 degenerative arthritis





11 hard of hearing





12 diverticular disease





13 lumbar disc disease





Plan





Monitor the oxygen for another 24 hours


No need for steroids


Continue Eliquis 2.5 mg twice a day


Resume all medications


I believe the chest x-ray findings are chronic.  In fact the patient tested n

egative for Covid 19


We'll continue to follow.  Should be able [place] [facility] [place]

hin next 24 hours

## 2022-02-06 NOTE — ED
General Adult HPI





- General


Chief complaint: Altered Mental Status


Stated complaint: AMS/dehydration


Time Seen by Provider: 22 12:45


Source: patient, EMS, RN notes reviewed, old records reviewed


Mode of arrival: EMS


Limitations: no limitations





- History of Present Illness


Initial comments: 





This is an 87-year-old female who was sent in from the nursing home because of 

altered mental status.  Patient has been recovering from COVID and was post get 

a chest x-ray to follow-up on the COVID pneumonia that she had but they did not 

come to take the x-ray yesterday.  Today the patient is more altered continues 

to cough and so they sent the patient emergency department.  Patient herself is 

alert and oriented 2 in her only complaint is coughing and there is no one else

with her to give any further history on the patient.





- Related Data


                                Home Medications











 Medication  Instructions  Recorded  Confirmed


 


Fluticasone/Salmeterol [Advair 1 puff INHALATION RT-BID@0900,17





250-50 Diskus]   


 


Tiotropium 18 Mcg/Puff [Spiriva] 1 cap INHALATION RT-DAILY@17


 


Ferrous Sulfate [Iron (65  mg PO DAILY@18





Elemental)]   


 


Acetaminophen [Tylenol] 650 mg PO Q4H PRN 22


 


Albuterol Nebulized [Ventolin 2.5 mg INHALATION RT-Q6H PRN 22





Nebulized]   


 


Amiodarone [Cordarone] 200 mg PO DAILY@22


 


Apixaban [Eliquis] 2.5 mg PO BID@0900,2100 22


 


Atorvastatin [Lipitor] 20 mg PO AS DIRECTED 22


 


Benzonatate [Tessalon Perles] 100 mg PO TID@0600,1400,2200 22


 


Cholecalciferol [Vitamin D3 (25 50 mcg PO DAILY@0900 22





Mcg = 1000 Iu)]   


 


Furosemide [Lasix] 20 mg PO BID@0600,1400 22


 


LORazepam [Ativan] 1 mg PO HS@2100 22


 


Lactose-Reduced Food [Ensure Plus] 120 ml PO TID@0900,1300,2000 22


 


Levofloxacin [Levaquin] 500 mg PO DAILY@1400 22


 


Melatonin 6 mg PO HS@2100 22


 


Metoprolol Tartrate [Lopressor] 75 mg PO TID@0600,1400,2200 22


 


Sennosides-Docusate Sodium 1 tab PO DAILY@0900 22





[Senokot-S]   








                                  Previous Rx's











 Medication  Instructions  Recorded


 


Albuterol Inhaler [Ventolin Hfa 1 puff INHALATION RT-QID #1 gm 22





Inhaler]  











                                    Allergies











Allergy/AdvReac Type Severity Reaction Status Date / Time


 


amoxicillin [From Augmentin] Allergy  Unknown Verified 22 13:30


 


cefuroxime axetil Allergy  Unknown Verified 22 13:30





[From Ceftin]     


 


clavulanic acid Allergy  Unknown Verified 22 13:30





[From Augmentin]     














Review of Systems


ROS Statement: 


Those systems with pertinent positive or pertinent negative responses have been 

documented in the HPI.





ROS Other: All systems not noted in ROS Statement are negative.





Past Medical History


Past Medical History: Atrial Fibrillation, Coronary Artery Disease (CAD), Heart 

Failure, COPD, Eye Disorder, GERD/Reflux, GI Bleed, Hearing Disorder / Deafness,

Hyperlipidemia, Hypertension, Myocardial Infarction (MI), Osteoarthritis (OA), 

Pneumonia, Renal Disease, Respiratory Disorder, Skin Disorder


Additional Past Medical History / Comment(s): Covid+ 21 at [facility].   Pt 

diagnosed 21 with pharyngitis and is on antibiotic, nonischemic 

cardiomyopathy, pulmonary HTN, bronchitis, O2 at 2L/HS, gastritis, diverticular 

disease, [place]/aides bilaterally, bilateral eye glaucoma, arthritis bilateral 

hands, CKD, rosacia, past lumbar slipped disc/pain/improved now.


Last Myocardial Infarction Date:: 


History of Any Multi-Drug Resistant Organisms: None Reported


Past Surgical History: Adenoidectomy, Appendectomy, Heart Catheterization, 

Hysterectomy, Orthopedic Surgery, Tonsillectomy


Additional Past Surgical History / Comment(s): 2017 cardiac cath/treated 

medically, partial hysterectomy/still has part of L ovary, R knee 

arthroscopy/torn meniscus, EGD, colonoscopies, bilateral blepharoplasties, 

bilateral eye cataract removal/laser surgery for glaucoma.


Past Anesthesia/Blood Transfusion Reactions: No Reported Reaction


Additional Past Anesthesia/Blood Transfusion Reaction / Comment(s): .


Past Psychological History: Anxiety


Past Alcohol Use History: None Reported





- Past Family History


  ** Brother(s)


Additional Family Medical History / Comment(s): Patient had 3 brothers and one 

is living and 93 years of age and [place] with history of Alzheimer's dementia, 

coronary artery disease, CHF, A. fib, COPD.  Patient has 2 brothers that have 

passed one from alcoholism and one from a traumatic head injury from a fall.





  ** Sister(s)


Additional Family Medical History / Comment(s): Patient has one sister that 

of ovarian cancer.  Patient has one son that  from alcoholic complications.





  ** Father


Family Medical History: Cancer


Additional Family Medical History / Comment(s): Father  at age 77yrs of 

cancer which pt believes may have started in his throat.





  ** Mother


Family Medical History: Dementia


Additional Family Medical History / Comment(s): Mother  at age 95yrs.  She 

was very healthy most of her life- she had dementia at the very end of her life.





General Exam





- General Exam Comments


Initial Comments: 





GENERAL:


Patient is well-developed and well-nourished.  Patient is nontoxic and well-

hydrated and is in no acute distress.





ENT:


Neck is soft and supple.  No significant lymphadenopathy is noted.  Oropharynx 

is clear.  Moist mucous membranes.  Neck has full range of motion without 

eliciting any pain.  





EYES:


The sclera were anicteric and conjunctiva were pink and moist.  Extraocular 

movements were intact and pupils were equal round and reactive to light.  

Eyelids were unremarkable.





PULMONARY:


Patient has crackles bilaterally on the left more than the face on the right 

more crackles in the mid posterior lateral





CARDIOVASCULAR:


There is a regular rate and rhythm without any murmurs gallops or rubs.  





ABDOMEN:


Soft and nontender with normal bowel sounds.  





SKIN:


Skin is clear with no lesions or rashes and otherwise unremarkable.





NEUROLOGIC:


Patient is alert and oriented x3.  Cranial nerves II through XII are grossly 

intact.  Motor and sensory are also intact.  Normal speech, volume and content. 

Symmetrical smile.





MUSCULOSKELETAL:


Normal extremities with adequate strength and full range of motion.  No lower 

extremity swelling or edema.  No calf tenderness.





LYMPHATICS:


No significant lymphadenopathy is noted





PSYCHIATRIC:


Normal psychiatric evaluation.  


Limitations: no limitations





Course


                                   Vital Signs











  22





  12:45 13:49 14:00


 


Temperature 98.5 F  


 


Pulse Rate 95 94 92


 


Respiratory 22 20 20





Rate   


 


Blood Pressure 96/53  82/54


 


O2 Sat by Pulse 93 L 92 L 91 L





Oximetry   














  22





  14:25 15:06 17:00


 


Temperature   


 


Pulse Rate  97 92


 


Respiratory  18 25 H





Rate   


 


Blood Pressure 99/52 96/58 98/56


 


O2 Sat by Pulse  92 L 92 L





Oximetry   














  22





  19:00 21:00 22:00


 


Temperature   


 


Pulse Rate 103 H  


 


Respiratory 25 H  





Rate   


 


Blood Pressure 108/88  


 


O2 Sat by Pulse 89 L 92 L 84 L





Oximetry   














  22





  22:30 23:00 02:00


 


Temperature   


 


Pulse Rate  101 H 96


 


Respiratory  22 18





Rate   


 


Blood Pressure  100/58 96/71


 


O2 Sat by Pulse 85 L 92 L 89 L





Oximetry   














  22





  03:46 04:47 09:08


 


Temperature   


 


Pulse Rate 101 H 102 H 103 H


 


Respiratory 18 18 22





Rate   


 


Blood Pressure 83/62 110/74 126/79


 


O2 Sat by Pulse 91 L 90 L 88 L





Oximetry   














  22





  10:37 11:34 11:47


 


Temperature   


 


Pulse Rate 105 H 95 107 H


 


Respiratory 28 H 24 28 H





Rate   


 


Blood Pressure 109/57 116/77 


 


O2 Sat by Pulse 82 L 88 L 77 L





Oximetry   














  22





  12:49


 


Temperature 


 


Pulse Rate 94


 


Respiratory 24





Rate 


 


Blood Pressure 111/76


 


O2 Sat by Pulse 97





Oximetry 














Medical Decision Making





- Medical Decision Making





Chest x-ray shows worsening COVID pneumonia





Patient also has anemia and a type and cross will be done.





Decadron was given in the emergency department ordered for the floor.





Patient continues to be altered.





EKG shows A. fib at 90 bpm QRS is 100 QT interval 394 QTC is 481.  Patient's EKG

does show some inverted T waves in precordial leads V4 through V5 and V6.





- Lab Data


Result diagrams: 


                                 22 12:31





                                 22 13:07


                                   Lab Results











  22 Range/Units





  13:07 13:07 13:07 


 


WBC  7.7    (3.8-10.6)  k/uL


 


RBC  2.94 L    (3.80-5.40)  m/uL


 


Hgb  9.3 L D    (11.4-16.0)  gm/dL


 


Hct  29.2 L    (34.0-46.0)  %


 


MCV  99.5    (80.0-100.0)  fL


 


MCH  31.7    (25.0-35.0)  pg


 


MCHC  31.8    (31.0-37.0)  g/dL


 


RDW  16.9 H    (11.5-15.5)  %


 


Plt Count  358    (150-450)  k/uL


 


MPV  7.8    


 


Neutrophils %  86    %


 


Lymphocytes %  7    %


 


Monocytes %  5    %


 


Eosinophils %  1    %


 


Basophils %  0    %


 


Neutrophils #  6.6    (1.3-7.7)  k/uL


 


Lymphocytes #  0.5 L    (1.0-4.8)  k/uL


 


Monocytes #  0.4    (0-1.0)  k/uL


 


Eosinophils #  0.1    (0-0.7)  k/uL


 


Basophils #  0.0    (0-0.2)  k/uL


 


Hypochromasia  Slight    


 


Poikilocytosis  Slight    


 


Anisocytosis  Slight    


 


Macrocytosis  Slight    


 


PT   12.8 H   (9.0-12.0)  sec


 


INR   1.2 H   (<1.2)  


 


APTT   24.5   (22.0-30.0)  sec


 


Sodium    130 L  (137-145)  mmol/L


 


Potassium    4.2  (3.5-5.1)  mmol/L


 


Chloride    97 L  ()  mmol/L


 


Carbon Dioxide    28  (22-30)  mmol/L


 


Anion Gap    5  mmol/L


 


BUN    30 H  (7-17)  mg/dL


 


Creatinine    1.45 H  (0.52-1.04)  mg/dL


 


Est GFR (CKD-EPI)AfAm    37  (>60 ml/min/1.73 sqM)  


 


Est GFR (CKD-EPI)NonAf    32  (>60 ml/min/1.73 sqM)  


 


Glucose    140 H  (74-99)  mg/dL


 


POC Glucose (mg/dL)     (75-99)  mg/dL


 


POC Glu Operater ID     


 


Calcium    8.1 L  (8.4-10.2)  mg/dL


 


Total Bilirubin    0.6  (0.2-1.3)  mg/dL


 


AST    31  (14-36)  U/L


 


ALT    17  (4-34)  U/L


 


Alkaline Phosphatase    76  ()  U/L


 


Troponin I     (0.000-0.034)  ng/mL


 


Total Protein    5.3 L  (6.3-8.2)  g/dL


 


Albumin    2.6 L  (3.5-5.0)  g/dL


 


Procalcitonin     (0.02-0.09)  ng/mL


 


Urine Color     


 


Urine Appearance     (Clear)  


 


Urine pH     (5.0-8.0)  


 


Ur Specific Gravity     (1.001-1.035)  


 


Urine Protein     (Negative)  


 


Urine Glucose (UA)     (Negative)  


 


Urine Ketones     (Negative)  


 


Urine Blood     (Negative)  


 


Urine Nitrite     (Negative)  


 


Urine Bilirubin     (Negative)  


 


Urine Urobilinogen     (<2.0)  mg/dL


 


Ur Leukocyte Esterase     (Negative)  


 


Urine Opiates Screen     (NotDetected)  


 


Ur Oxycodone Screen     (NotDetected)  


 


Urine Methadone Screen     (NotDetected)  


 


Ur Propoxyphene Screen     (NotDetected)  


 


Ur Barbiturates Screen     (NotDetected)  


 


U Tricyclic Antidepress     (NotDetected)  


 


Ur Phencyclidine Scrn     (NotDetected)  


 


Ur Amphetamines Screen     (NotDetected)  


 


U Methamphetamines Scrn     (NotDetected)  


 


U Benzodiazepines Scrn     (NotDetected)  


 


Urine Cocaine Screen     (NotDetected)  


 


U Marijuana (THC) Screen     (NotDetected)  














  22 Range/Units





  13:07 13:07 13:07 


 


WBC     (3.8-10.6)  k/uL


 


RBC     (3.80-5.40)  m/uL


 


Hgb     (11.4-16.0)  gm/dL


 


Hct     (34.0-46.0)  %


 


MCV     (80.0-100.0)  fL


 


MCH     (25.0-35.0)  pg


 


MCHC     (31.0-37.0)  g/dL


 


RDW     (11.5-15.5)  %


 


Plt Count     (150-450)  k/uL


 


MPV     


 


Neutrophils %     %


 


Lymphocytes %     %


 


Monocytes %     %


 


Eosinophils %     %


 


Basophils %     %


 


Neutrophils #     (1.3-7.7)  k/uL


 


Lymphocytes #     (1.0-4.8)  k/uL


 


Monocytes #     (0-1.0)  k/uL


 


Eosinophils #     (0-0.7)  k/uL


 


Basophils #     (0-0.2)  k/uL


 


Hypochromasia     


 


Poikilocytosis     


 


Anisocytosis     


 


Macrocytosis     


 


PT     (9.0-12.0)  sec


 


INR     (<1.2)  


 


APTT     (22.0-30.0)  sec


 


Sodium     (137-145)  mmol/L


 


Potassium     (3.5-5.1)  mmol/L


 


Chloride     ()  mmol/L


 


Carbon Dioxide     (22-30)  mmol/L


 


Anion Gap     mmol/L


 


BUN     (7-17)  mg/dL


 


Creatinine     (0.52-1.04)  mg/dL


 


Est GFR (CKD-EPI)AfAm     (>60 ml/min/1.73 sqM)  


 


Est GFR (CKD-EPI)NonAf     (>60 ml/min/1.73 sqM)  


 


Glucose     (74-99)  mg/dL


 


POC Glucose (mg/dL)   153 H   (75-99)  mg/dL


 


POC Glu Operater ID   Willing, Bev   


 


Calcium     (8.4-10.2)  mg/dL


 


Total Bilirubin     (0.2-1.3)  mg/dL


 


AST     (14-36)  U/L


 


ALT     (4-34)  U/L


 


Alkaline Phosphatase     ()  U/L


 


Troponin I  0.035 H*    (0.000-0.034)  ng/mL


 


Total Protein     (6.3-8.2)  g/dL


 


Albumin     (3.5-5.0)  g/dL


 


Procalcitonin    0.13 H  (0.02-0.09)  ng/mL


 


Urine Color     


 


Urine Appearance     (Clear)  


 


Urine pH     (5.0-8.0)  


 


Ur Specific Gravity     (1.001-1.035)  


 


Urine Protein     (Negative)  


 


Urine Glucose (UA)     (Negative)  


 


Urine Ketones     (Negative)  


 


Urine Blood     (Negative)  


 


Urine Nitrite     (Negative)  


 


Urine Bilirubin     (Negative)  


 


Urine Urobilinogen     (<2.0)  mg/dL


 


Ur Leukocyte Esterase     (Negative)  


 


Urine Opiates Screen     (NotDetected)  


 


Ur Oxycodone Screen     (NotDetected)  


 


Urine Methadone Screen     (NotDetected)  


 


Ur Propoxyphene Screen     (NotDetected)  


 


Ur Barbiturates Screen     (NotDetected)  


 


U Tricyclic Antidepress     (NotDetected)  


 


Ur Phencyclidine Scrn     (NotDetected)  


 


Ur Amphetamines Screen     (NotDetected)  


 


U Methamphetamines Scrn     (NotDetected)  


 


U Benzodiazepines Scrn     (NotDetected)  


 


Urine Cocaine Screen     (NotDetected)  


 


U Marijuana (THC) Screen     (NotDetected)  














  22 Range/Units





  13:47 


 


WBC   (3.8-10.6)  k/uL


 


RBC   (3.80-5.40)  m/uL


 


Hgb   (11.4-16.0)  gm/dL


 


Hct   (34.0-46.0)  %


 


MCV   (80.0-100.0)  fL


 


MCH   (25.0-35.0)  pg


 


MCHC   (31.0-37.0)  g/dL


 


RDW   (11.5-15.5)  %


 


Plt Count   (150-450)  k/uL


 


MPV   


 


Neutrophils %   %


 


Lymphocytes %   %


 


Monocytes %   %


 


Eosinophils %   %


 


Basophils %   %


 


Neutrophils #   (1.3-7.7)  k/uL


 


Lymphocytes #   (1.0-4.8)  k/uL


 


Monocytes #   (0-1.0)  k/uL


 


Eosinophils #   (0-0.7)  k/uL


 


Basophils #   (0-0.2)  k/uL


 


Hypochromasia   


 


Poikilocytosis   


 


Anisocytosis   


 


Macrocytosis   


 


PT   (9.0-12.0)  sec


 


INR   (<1.2)  


 


APTT   (22.0-30.0)  sec


 


Sodium   (137-145)  mmol/L


 


Potassium   (3.5-5.1)  mmol/L


 


Chloride   ()  mmol/L


 


Carbon Dioxide   (22-30)  mmol/L


 


Anion Gap   mmol/L


 


BUN   (7-17)  mg/dL


 


Creatinine   (0.52-1.04)  mg/dL


 


Est GFR (CKD-EPI)AfAm   (>60 ml/min/1.73 sqM)  


 


Est GFR (CKD-EPI)NonAf   (>60 ml/min/1.73 sqM)  


 


Glucose   (74-99)  mg/dL


 


POC Glucose (mg/dL)   (75-99)  mg/dL


 


POC Glu Operater ID   


 


Calcium   (8.4-10.2)  mg/dL


 


Total Bilirubin   (0.2-1.3)  mg/dL


 


AST   (14-36)  U/L


 


ALT   (4-34)  U/L


 


Alkaline Phosphatase   ()  U/L


 


Troponin I   (0.000-0.034)  ng/mL


 


Total Protein   (6.3-8.2)  g/dL


 


Albumin   (3.5-5.0)  g/dL


 


Procalcitonin   (0.02-0.09)  ng/mL


 


Urine Color  Yellow  


 


Urine Appearance  Clear  (Clear)  


 


Urine pH  6.0  (5.0-8.0)  


 


Ur Specific Gravity  1.021  (1.001-1.035)  


 


Urine Protein  Trace H  (Negative)  


 


Urine Glucose (UA)  Negative  (Negative)  


 


Urine Ketones  Negative  (Negative)  


 


Urine Blood  Negative  (Negative)  


 


Urine Nitrite  Negative  (Negative)  


 


Urine Bilirubin  Negative  (Negative)  


 


Urine Urobilinogen  <2.0  (<2.0)  mg/dL


 


Ur Leukocyte Esterase  Negative  (Negative)  


 


Urine Opiates Screen  Detected H  (NotDetected)  


 


Ur Oxycodone Screen  Not Detected  (NotDetected)  


 


Urine Methadone Screen  Not Detected  (NotDetected)  


 


Ur Propoxyphene Screen  Not Detected  (NotDetected)  


 


Ur Barbiturates Screen  Not Detected  (NotDetected)  


 


U Tricyclic Antidepress  Not Detected  (NotDetected)  


 


Ur Phencyclidine Scrn  Not Detected  (NotDetected)  


 


Ur Amphetamines Screen  Not Detected  (NotDetected)  


 


U Methamphetamines Scrn  Not Detected  (NotDetected)  


 


U Benzodiazepines Scrn  Detected H  (NotDetected)  


 


Urine Cocaine Screen  Not Detected  (NotDetected)  


 


U Marijuana (THC) Screen  Not Detected  (NotDetected)  














Disposition


Clinical Impression: 


 Pneumonia due to COVID-19 virus, Altered mental status, Anemia, Troponin level 

elevated





Disposition: ADMITTED AS IP TO THIS hospitals


Time of Disposition: 14:18

## 2022-02-06 NOTE — XR
EXAMINATION TYPE: XR chest 2V

 

DATE OF EXAM: 2/6/2022

 

COMPARISON: Chest x-ray January 12, 2022

 

HISTORY: Recent covid with difficulty in breathing and altered mental status

 

TECHNIQUE:  Frontal and lateral views of the chest are obtained.

 

FINDINGS:  There is worsening bilateral multifocal and confluent opacities on background chronic emph
ysematous and pulmonary fibrotic changes.  Small to tiny right greater than left pleural effusions ar
e now present. The cardiac silhouette size is stable and mildly enlarged.   The osseous structures ar
e intact.

 

IMPRESSION:  Worsening bilateral opacities consistent with covid-19 infection progression on backgrou
nd mild cardiomegaly and chronic emphysematous and pulmonary fibrotic changes

## 2022-02-07 LAB
APTT BLD: 23 SEC (ref 22–30)
BASOPHILS # BLD AUTO: 0 K/UL (ref 0–0.2)
BASOPHILS # BLD AUTO: 0 K/UL (ref 0–0.2)
BASOPHILS NFR BLD AUTO: 0 %
BASOPHILS NFR BLD AUTO: 0 %
EOSINOPHIL # BLD AUTO: 0 K/UL (ref 0–0.7)
EOSINOPHIL # BLD AUTO: 0 K/UL (ref 0–0.7)
EOSINOPHIL NFR BLD AUTO: 0 %
EOSINOPHIL NFR BLD AUTO: 0 %
ERYTHROCYTE [DISTWIDTH] IN BLOOD BY AUTOMATED COUNT: 2.95 M/UL (ref 3.8–5.4)
ERYTHROCYTE [DISTWIDTH] IN BLOOD BY AUTOMATED COUNT: 3.32 M/UL (ref 3.8–5.4)
ERYTHROCYTE [DISTWIDTH] IN BLOOD: 16.4 % (ref 11.5–15.5)
ERYTHROCYTE [DISTWIDTH] IN BLOOD: 16.7 % (ref 11.5–15.5)
HCT VFR BLD AUTO: 29.7 % (ref 34–46)
HCT VFR BLD AUTO: 33.8 % (ref 34–46)
HGB BLD-MCNC: 10.3 GM/DL (ref 11.4–16)
HGB BLD-MCNC: 9.4 GM/DL (ref 11.4–16)
INR PPP: 1.1 (ref ?–1.2)
LYMPHOCYTES # SPEC AUTO: 0.4 K/UL (ref 1–4.8)
LYMPHOCYTES # SPEC AUTO: 0.4 K/UL (ref 1–4.8)
LYMPHOCYTES NFR SPEC AUTO: 6 %
LYMPHOCYTES NFR SPEC AUTO: 6 %
MCH RBC QN AUTO: 31.2 PG (ref 25–35)
MCH RBC QN AUTO: 31.8 PG (ref 25–35)
MCHC RBC AUTO-ENTMCNC: 30.6 G/DL (ref 31–37)
MCHC RBC AUTO-ENTMCNC: 31.6 G/DL (ref 31–37)
MCV RBC AUTO: 100.5 FL (ref 80–100)
MCV RBC AUTO: 102 FL (ref 80–100)
MONOCYTES # BLD AUTO: 0.1 K/UL (ref 0–1)
MONOCYTES # BLD AUTO: 0.2 K/UL (ref 0–1)
MONOCYTES NFR BLD AUTO: 1 %
MONOCYTES NFR BLD AUTO: 3 %
NEUTROPHILS # BLD AUTO: 6 K/UL (ref 1.3–7.7)
NEUTROPHILS # BLD AUTO: 6.5 K/UL (ref 1.3–7.7)
NEUTROPHILS NFR BLD AUTO: 91 %
NEUTROPHILS NFR BLD AUTO: 92 %
PLATELET # BLD AUTO: 333 K/UL (ref 150–450)
PLATELET # BLD AUTO: 400 K/UL (ref 150–450)
PT BLD: 12 SEC (ref 9–12)
WBC # BLD AUTO: 6.5 K/UL (ref 3.8–10.6)
WBC # BLD AUTO: 7.1 K/UL (ref 3.8–10.6)

## 2022-02-07 PROCEDURE — 3E0333Z INTRODUCTION OF ANTI-INFLAMMATORY INTO PERIPHERAL VEIN, PERCUTANEOUS APPROACH: ICD-10-PCS

## 2022-02-07 PROCEDURE — 5A09357 ASSISTANCE WITH RESPIRATORY VENTILATION, LESS THAN 24 CONSECUTIVE HOURS, CONTINUOUS POSITIVE AIRWAY PRESSURE: ICD-10-PCS

## 2022-02-07 RX ADMIN — ALBUTEROL SULFATE SCH PUFF: 90 AEROSOL, METERED RESPIRATORY (INHALATION) at 15:25

## 2022-02-07 RX ADMIN — FUROSEMIDE SCH MG: 20 TABLET ORAL at 13:59

## 2022-02-07 RX ADMIN — ALBUTEROL SULFATE SCH PUFF: 90 AEROSOL, METERED RESPIRATORY (INHALATION) at 20:27

## 2022-02-07 RX ADMIN — HEPARIN SODIUM PRN UNIT: 1000 INJECTION, SOLUTION INTRAVENOUS; SUBCUTANEOUS at 21:38

## 2022-02-07 RX ADMIN — BENZONATATE SCH MG: 100 CAPSULE ORAL at 13:59

## 2022-02-07 RX ADMIN — METOPROLOL TARTRATE SCH MG: 25 TABLET, FILM COATED ORAL at 13:59

## 2022-02-07 RX ADMIN — Medication SCH MG: at 21:40

## 2022-02-07 RX ADMIN — METOPROLOL TARTRATE SCH MG: 25 TABLET, FILM COATED ORAL at 21:40

## 2022-02-07 RX ADMIN — ALBUTEROL SULFATE SCH PUFF: 90 AEROSOL, METERED RESPIRATORY (INHALATION) at 12:19

## 2022-02-07 RX ADMIN — BENZONATATE SCH: 100 CAPSULE ORAL at 21:43

## 2022-02-07 RX ADMIN — ACETAMINOPHEN PRN MG: 325 TABLET, FILM COATED ORAL at 21:40

## 2022-02-07 RX ADMIN — BUDESONIDE AND FORMOTEROL FUMARATE DIHYDRATE SCH PUFF: 80; 4.5 AEROSOL RESPIRATORY (INHALATION) at 20:29

## 2022-02-07 RX ADMIN — DEXTROSE SCH MG: 50 INJECTION, SOLUTION INTRAVENOUS at 12:42

## 2022-02-07 NOTE — US
EXAMINATION TYPE: US venous doppler duplex LE BI

 

DATE OF EXAM: 2/7/2022 1:31 PM

 

COMPARISON: NONE

 

CLINICAL HISTORY: recent COVID.

 

SIDE PERFORMED: bilateral   

 

TECHNIQUE:  The lower extremity deep venous system is examined utilizing real time linear array sonog
josé luis with graded compression, doppler sonography and color-flow sonography.

 

VESSELS IMAGED:

Common Femoral Vein

Deep Femoral Vein

Greater Saphenous Vein *

Femoral Vein

Popliteal Vein

Small Saphenous Vein *

Proximal Calf Veins

(* superficial vessels)

 

 

 

Right Leg:  no evidence of DVT as visualized

 

Left Leg:  no evidence of DVT as visualized 

 

 

 

IMPRESSION: No evidence for DVT.

## 2022-02-07 NOTE — P.HPIM
History of Present Illness


H&P Date: 22





HISTORY OF PRESENT ILLNESS


This is an 87-year-old female patient of Dr. Dailey and Dr. Pope with past 

medical history of chronic persistent atrial fibrillation, chronic systolic 

heart failure, pulmonary hypertension, COPD with chronic hypoxic respiratory 

failure on home O2 at 3 L as needed, hypertension, hyperlipidemia, chronic 

kidney disease stage III, generalized osteoarthritis.  A hat hospitalization 

early in January for acute hypoxic respiratory failure secondary to acute 

exacerbation of systolic heart failure, Covid 19 pneumonia and acute 

exacerbation of COPD and A. fib with RVR.  Patient was discharged home with home

care.  She's was subsequently admitted on January 10 for same diagnoses and was 

discharged to [facility].  A is sent to us from [facility] due to confusion.  Patient 

states that she is coughing all the time and has shortness of breath.  She is on

chronic oxygen therapy.


WBC 7.1, hemoglobin 9.1, platelet count 353.  Sodium 130, potassium 4.2, chlorid

e 97, CO2 28, BUN 30 creatinine 1.45.  Glucose 140.  Calcium 8.1.  Total 

bilirubin 0.6, AST 31, ALT 17, alkaline phosphatase 76.  Troponin 0.035.  Pro-

calcitonin 0.13.  Urinalysis trace protein and negative for infection.  Urine 

drug screen positive for opiates and benzodiazepines.  Carotid virus PCR not 

detected.


Patient presented to [facility] emergency center and found to 

be afebrile, heart rate 95, blood pressure 96/53, pulse ox 93% on 3 L nasal 

cannula but subsequently pulse ox dropped to 89% on 4 L nasal cannula and 

patient was placed on high flow nasal cannula 15 L. EKG was atrial fibrillation 

heart rate of 90.  


Chest x-ray reveals worsening bilateral opacities consistent with COVID19 

infection progression on background mild cardiomegaly and chronic emphysematour 

and pulmonary fibrotic changes.


Echocardiogram 2022 revealed EF of 30-35%, mild aortic regurgitation, 

moderate mitral regurgitation, severe tricuspid regurgitation, severe pulmonary 

hypertension.


Patient seen today in the ER waiting for bed on CSD unit. She has been seen by 

pulmonary medicine and was cleared yesterday to return to [facility] prior to the 

drop in pulse ox.





REVIEW OF SYSTEMS


Constitutional: No fever, no chills, no night sweats.  No weight change.  No 

weakness, fatigue or lethargy.  No daytime sleepiness.


EENT: No headache.  No blurred vision or double vision, no loss of vision.  No 

loss of Hearing, no ringing in the ears, no dizziness.  No nasal drainage or 

congestion.  No epistaxis.  No sore throat.


Lungs: Reports shortness of breath, reports cough, no sputum production.  No 

wheezing.


Cardiovascular: No chest pain, no lower extremity edema.  No palpitations.  No 

paroxysmal nocturnal dyspnea.  No orthopnea.  No lightheadedness or dizziness.  

No syncopal episodes.


Abdominal: No abdominal pain.  No nausea, vomiting.  No diarrhea.  No 

constipation.  No bloody or tarry stools.  No loss of appetite.


Genitourinary: No dysuria, increased frequency, urgency.  No urinary retention.


Musculoskeletal: No myalgias.  No muscle weakness, no gait dysfunction, no 

frequent falls.  No back pain.  No neck pain.


Integumentary: No wounds, no lesions.  No rash or pruritus.  No unusual 

bruising.  No change in hair or nails.


Neurologic: No aphasia. No facial droop. Reported change in mentation. No head 

injury. No headache. No paralysis. No paresthesia.


Psychiatric: No depression.  Reports anxiety.  No mood swings.


Endocrine: No abnormal blood sugars.  No weight change.  No excessive sweating 

or thirst.  No cold intolerance.  





SOCIAL HISTORY


Patient was a smoker for 55 years and quit in .  She states she drinks 1-2 

glasses of wine most nights. 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  She 

denies any recent travel.  She has no pets in the home.  She is  and is 

independent.





FAMILY HISTORY


Mother  at age 95yrs.  She was very healthy most of her life- she had 

dementia at the very end of her life. Father  at age 77yrs of cancer which 

pt believes may have started in his throat. Patient had 3 brothers and one is 

living and 93 years of age and [place] with history of Alzheimer's dementia, 

coronary artery disease, CHF, A. fib, COPD.  Patient has 2 brothers that have 

passed one from alcoholism and one from a traumatic head injury from a fall. 

Patient has one sister that  of ovarian cancer.  Patient has one son that 

 from alcoholic complications.





PHYSICAL EXAMINATION


Gen: This is a thin 87-year-old  female.  She is resting on ER 

stretcher and appears to be comfortable and in no acute distress.  He is 

currently on high flow nasal cannula 15 L.


HEENT: Head is atraumatic, normocephalic. Pupils equal, round. Sclerae is 

anicteric. 


NECK: Supple. No JVD. No lymphadenopathy. No thyromegaly. 


LUNGS: Scattered crackles.  No intercostal retractions.


HEART: Irregularly irregular rate and rhythm. No murmur. 


ABDOMEN: Soft. Bowel sounds are present. No masses.  No tenderness.


EXTREMITIES: No pedal edema.  No calf tenderness.  Dorsalis pedis +2 

bilaterally.


NEUROLOGICAL: Patient is awake, alert and oriented x3. Cranial nerves 2 through 

12 are grossly intact. 





ASSESSMENT AND PLAN


1.  Acute on chronic hypoxic respiratory failure secondary to a residual Covid 

19 pneumonia on top of chronic systolic heart failure, COPD, pulmonary fibrosis.

 Continue oxygen therapy





2.  Chronic systolic heart failure.  Continue Lasix 20 mg oral twice daily.  





3.  Possible Covid 19 pneumonia.  Pulmonary consult appreciated.  Patient's been

started on Ventolin inhaler 4 times daily, Symbicort 2 puffs twice daily, dexam

ethasone 6 mgoral daily, Spiriva.





4.  COPD without exacerbation.  Continue Symbicort, Spiriva, albuterol, 

dexamethasone.





5.  Chronic persistent atrial fibrillation.  Continue amiodarone 200 mg daily, 

Lopressor 75 mg 3 times daily.





6.  Anemia of chronic disease.  Continue to monitor.





7.  Hypertension.  Continue Lopressor.





8.  Hyperlipidemia.  Continue Lipitor 20 mg at bedtime.





9.  Chronic kidney disease stage III.  Monitor renal function, avoid nephrotoxic

agents.





10.  Valvular heart disease with  mild aortic regurgitation, moderate mitral 

regurgitation, severe tricuspid regurgitation.





11.  Severe pulmonary hypertension.





12.  GI prophylaxis.  Protonix.





13.  DVT prophylaxis.  Eliquis.





14.  COVID-19 testing negative.  Patient has been hospitalized during a 

pandemic.





Patient will be admitted to the hospital for a minimum of 2 night stay.





DISCHARGE PLAN


Return to [facility].





Impression and plan of care have been directed as dictated by the signing 

physician.  Carol Hicks nurse practitioner acting as scribe for signing 

physician.








Past Medical History


Past Medical History: Atrial Fibrillation, Coronary Artery Disease (CAD), Heart 

Failure, COPD, Eye Disorder, GERD/Reflux, GI Bleed, Hearing Disorder / Deafness,

Hyperlipidemia, Hypertension, Myocardial Infarction (MI), Osteoarthritis (OA), 

Pneumonia, Renal Disease, Respiratory Disorder, Skin Disorder


Additional Past Medical History / Comment(s): Covid+ 21 at [facility].   Pt 

diagnosed 21 with pharyngitis and is on antibiotic, nonischemic 

cardiomyopathy, pulmonary HTN, bronchitis, O2 at 2L/HS, gastritis, diverticular 

disease, [place]/aides bilaterally, bilateral eye glaucoma, arthritis bilateral 

hands, CKD, rosacia, past lumbar slipped disc/pain/improved now.


Last Myocardial Infarction Date:: 


History of Any Multi-Drug Resistant Organisms: None Reported


Past Surgical History: Adenoidectomy, Appendectomy, Heart Catheterization, 

Hysterectomy, Orthopedic Surgery, Tonsillectomy


Additional Past Surgical History / Comment(s): 2017 cardiac cath/treated 

medically, partial hysterectomy/still has part of L ovary, R knee arthros

copy/torn meniscus, EGD, colonoscopies, bilateral blepharoplasties, bilateral 

eye cataract removal/laser surgery for glaucoma.


Past Anesthesia/Blood Transfusion Reactions: No Reported Reaction


Additional Past Anesthesia/Blood Transfusion Reaction / Comment(s): .


Past Psychological History: Anxiety


Past Alcohol Use History: None Reported





- Past Family History


  ** Brother(s)


Additional Family Medical History / Comment(s): Patient had 3 brothers and one 

is living and 93 years of age and [place] with history of Alzheimer's dementia, 

coronary artery disease, CHF, A. fib, COPD.  Patient has 2 brothers that have 

passed one from alcoholism and one from a traumatic head injury from a fall.





  ** Sister(s)


Additional Family Medical History / Comment(s): Patient has one sister that 

of ovarian cancer.  Patient has one son that  from alcoholic complications.





  ** Father


Family Medical History: Cancer


Additional Family Medical History / Comment(s): Father  at age 77yrs of 

cancer which pt believes may have started in his throat.





  ** Mother


Family Medical History: Dementia


Additional Family Medical History / Comment(s): Mother  at age 95yrs.  She 

was very healthy most of her life- she had dementia at the very end of her life.





Medications and Allergies


                                Home Medications











 Medication  Instructions  Recorded  Confirmed  Type


 


Fluticasone/Salmeterol [Advair 1 puff INHALATION RT-BID@0900,2100 17 History





250-50 Diskus]    


 


Tiotropium 18 Mcg/Puff [Spiriva] 1 cap INHALATION RT-DAILY@0900 03/17 

02/06/22 History


 


Ferrous Sulfate [Iron (65  mg PO DAILY@18 History





Elemental)]    


 


Albuterol Inhaler [Ventolin Hfa 1 puff INHALATION RT-QID #1 gm 22

 Rx





Inhaler]    


 


Acetaminophen [Tylenol] 650 mg PO Q4H PRN 22 History


 


Albuterol Nebulized [Ventolin 2.5 mg INHALATION RT-Q6H PRN 22 

History





Nebulized]    


 


Amiodarone [Cordarone] 200 mg PO DAILY@22 History


 


Apixaban [Eliquis] 2.5 mg PO BID@0900,22 History


 


Atorvastatin [Lipitor] 20 mg PO AS DIRECTED 22 History


 


Benzonatate [Tessalon Perles] 100 mg PO TID@0600,1400,22 

History


 


Cholecalciferol [Vitamin D3 (25 50 mcg PO DAILY@22 History





Mcg = 1000 Iu)]    


 


Furosemide [Lasix] 20 mg PO BID@0600,1400 22 History


 


LORazepam [Ativan] 1 mg PO HS@22 History


 


Lactose-Reduced Food [Ensure Plus] 120 ml PO TID@0900,1300,22 History


 


Levofloxacin [Levaquin] 500 mg PO DAILY@22 History


 


Melatonin 6 mg PO HS@22 History


 


Metoprolol Tartrate [Lopressor] 75 mg PO TID@0600,1400,0 22 

History


 


Sennosides-Docusate Sodium 1 tab PO DAILY@00 22 History





[Senokot-S]    








                                    Allergies











Allergy/AdvReac Type Severity Reaction Status Date / Time


 


amoxicillin [From Augmentin] Allergy  Unknown Verified 22 13:30


 


cefuroxime axetil Allergy  Unknown Verified 22 13:30





[From Ceftin]     


 


clavulanic acid Allergy  Unknown Verified 22 13:30





[From Augmentin]     














Physical Exam


Vitals: 


                                   Vital Signs











  Temp Pulse Resp BP Pulse Ox


 


 22 04:47   102 H  18  110/74  90 L


 


 22 03:46   101 H  18  83/62  91 L


 


 22 02:00   96  18  96/71  89 L


 


 22 23:00   101 H  22  100/58  92 L


 


 22 21:00      92 L


 


 22 19:00   103 H  25 H  108/88  89 L


 


 22 17:00   92  25 H  98/56  92 L


 


 22 15:06   97  18  96/58  92 L


 


 22 14:25     99/52 


 


 22 14:00   92  20  82/54  91 L


 


 22 13:49   94  20   92 L


 


 22 12:45  98.5 F  95  22  96/53  93 L














Results


CBC & Chem 7: 


                                 22 00:33





                                 22 13:07


Labs: 


                  Abnormal Lab Results - Last 24 Hours (Table)











  22 Range/Units





  13:07 13:07 13:07 


 


RBC  2.94 L    (3.80-5.40)  m/uL


 


Hgb  9.3 L D    (11.4-16.0)  gm/dL


 


Hct  29.2 L    (34.0-46.0)  %


 


MCV     (80.0-100.0)  fL


 


MCHC     (31.0-37.0)  g/dL


 


RDW  16.9 H    (11.5-15.5)  %


 


Lymphocytes #  0.5 L    (1.0-4.8)  k/uL


 


PT   12.8 H   (9.0-12.0)  sec


 


INR   1.2 H   (<1.2)  


 


Sodium    130 L  (137-145)  mmol/L


 


Chloride    97 L  ()  mmol/L


 


BUN    30 H  (7-17)  mg/dL


 


Creatinine    1.45 H  (0.52-1.04)  mg/dL


 


Glucose    140 H  (74-99)  mg/dL


 


POC Glucose (mg/dL)     (75-99)  mg/dL


 


Calcium    8.1 L  (8.4-10.2)  mg/dL


 


Troponin I     (0.000-0.034)  ng/mL


 


Total Protein    5.3 L  (6.3-8.2)  g/dL


 


Albumin    2.6 L  (3.5-5.0)  g/dL


 


Procalcitonin     (0.02-0.09)  ng/mL


 


Urine Protein     (Negative)  


 


Urine Opiates Screen     (NotDetected)  


 


U Benzodiazepines Scrn     (NotDetected)  














  22 Range/Units





  13:07 13:07 13:07 


 


RBC     (3.80-5.40)  m/uL


 


Hgb     (11.4-16.0)  gm/dL


 


Hct     (34.0-46.0)  %


 


MCV     (80.0-100.0)  fL


 


MCHC     (31.0-37.0)  g/dL


 


RDW     (11.5-15.5)  %


 


Lymphocytes #     (1.0-4.8)  k/uL


 


PT     (9.0-12.0)  sec


 


INR     (<1.2)  


 


Sodium     (137-145)  mmol/L


 


Chloride     ()  mmol/L


 


BUN     (7-17)  mg/dL


 


Creatinine     (0.52-1.04)  mg/dL


 


Glucose     (74-99)  mg/dL


 


POC Glucose (mg/dL)   153 H   (75-99)  mg/dL


 


Calcium     (8.4-10.2)  mg/dL


 


Troponin I  0.035 H*    (0.000-0.034)  ng/mL


 


Total Protein     (6.3-8.2)  g/dL


 


Albumin     (3.5-5.0)  g/dL


 


Procalcitonin    0.13 H  (0.02-0.09)  ng/mL


 


Urine Protein     (Negative)  


 


Urine Opiates Screen     (NotDetected)  


 


U Benzodiazepines Scrn     (NotDetected)  














  22 Range/Units





  13:47 18:23 00:33 


 


RBC   2.85 L  2.95 L  (3.80-5.40)  m/uL


 


Hgb   9.1 L  9.4 L  (11.4-16.0)  gm/dL


 


Hct   29.4 L  29.7 L  (34.0-46.0)  %


 


MCV   103.0 H  100.5 H  (80.0-100.0)  fL


 


MCHC   30.9 L   (31.0-37.0)  g/dL


 


RDW   16.8 H  16.4 H  (11.5-15.5)  %


 


Lymphocytes #   0.5 L  0.4 L  (1.0-4.8)  k/uL


 


PT     (9.0-12.0)  sec


 


INR     (<1.2)  


 


Sodium     (137-145)  mmol/L


 


Chloride     ()  mmol/L


 


BUN     (7-17)  mg/dL


 


Creatinine     (0.52-1.04)  mg/dL


 


Glucose     (74-99)  mg/dL


 


POC Glucose (mg/dL)     (75-99)  mg/dL


 


Calcium     (8.4-10.2)  mg/dL


 


Troponin I     (0.000-0.034)  ng/mL


 


Total Protein     (6.3-8.2)  g/dL


 


Albumin     (3.5-5.0)  g/dL


 


Procalcitonin     (0.02-0.09)  ng/mL


 


Urine Protein  Trace H    (Negative)  


 


Urine Opiates Screen  Detected H    (NotDetected)  


 


U Benzodiazepines Scrn  Detected H    (NotDetected)

## 2022-02-07 NOTE — P.PN
Subjective


Progress Note Date: 02/07/22


Principal diagnosis: 


 Shortness of breath





87-year-old female patient hospitalized for Covid 19 related long-term 

complications of pulmonary infiltration and hypoxemia.  Note that the patient 

was Hospital as back in January 2022.  [place] [facility] [place] and she has been on oxygen at 3 L since then.  The 

patient was diagnosed having acute coronary 19 related pneumonia, decompensated 

CHF and COPD exacerbation.  She is known to have multiple medical problems and 

comorbidities.  The patient was brought into the emergency.  Her shortness of 

breath is vaccinated and waning although she feels that overall she has been 

stable over the past 1 month.  She has no altered mentation.  The daughter at 

the bedside tells that her mentation fluctuates.  At times she is more lucid and

other times more confused.  Based on my evaluation this afternoon, the patient 

is fully alert and awake in oriented to time and place and people.  Chest x-ray 

was still showing diffuse bilateral pulmonary infiltrates and a chest x-ray 

findings are essentially stable compared to the earlier evaluation from January 2022.  She has been on Eliquis 2.5 mg by mouth twice a day and Lasix 20 mg by 

mouth twice daily on outpatient basis.  No signs of any fluid overload.  No 

angina.  No palpitations.





On 02/07/2022 patient seen in follow-up in the emergency department.  She is 

still awaiting a bed on the monitor bed on selective care unit.  This morning 

she had worsening oxygenation, and she was on 15 L per high flow nasal cannula 

and her pulse ox was down in the 70s, subsequently was placed on BiPAP support 

with pressures of 10.5 and FiO2 of 80%, yesterday she received a significant 

amount of IV fluid a total of 4 L, her ejection fraction is in the order of 30-

35% according to her previous echocardiogram.  She was thought to be fluid 

overloaded and was given a dose of IV Lasix she started to diurese 

significantly, she started to feel better, she is already on Decadron 6 mg daily

for recent history of Covid 19 pneumonia.  She did test negative during this 

admission.  Her chest x-ray yesterday showed worsening bilateral opacities on 

background of mild cardiomegaly and chronic emphysematous and pulmonary fibrotic

changes.  Patient is on anticoagulation with Eliquis 2.5 mg twice daily, however

she is no on BiPAP, and she does quickly desaturate when the mask is taken off 

and for that reason she will be placed on IV heparin.  She did have elevated d-

dimer of 3.47 on today's labs.  White blood cell count is 7.1, hemoglobin is 

10.3, patient also continues on home dose Lasix 20 mg twice daily, she is on 

Symbicort and Ventolin inhaler, she is on Levaquin 500 mg once daily.  Her IV 

fluids have been hep-locked.  Home medications have been started again.  She is 

awake and alert, she is able to make her needs known, does not appear to be in 

acute respiratory distress.








Objective





- Vital Signs


Vital signs: 


                                   Vital Signs











Temp  98.5 F   02/06/22 12:45


 


Pulse  94   02/07/22 12:49


 


Resp  24   02/07/22 12:49


 


BP  111/76   02/07/22 12:49


 


Pulse Ox  97   02/07/22 12:49








                                 Intake & Output











 02/06/22 02/07/22 02/07/22





 18:59 06:59 18:59


 


Output Total   900


 


Balance   -900


 


Weight 56.699 kg  56.699 kg


 


Output:   


 


  Urine   900














- Exam


 GENERAL EXAM: Alert, pleasant, 87-year-old white female, currently on BiPAP 

support pressures of 10/5 and FiO2 of 80%, comfortable in no apparent distress.


HEAD: Normocephalic/atraumatic.


EYES: Normal reaction of pupils, equal size.  Conjunctiva pink, sclera white.


NOSE: Clear with pink turbinates.


THROAT: No erythema or exudates.


NECK: No masses, no JVD, no thyroid enlargement, no adenopathy.


CHEST: No chest wall deformity.  Symmetrical expansion. 


LUNGS: Equal air entry with diffuse crackles


CVS: Regular rate and rhythm, normal S1 and S2, no gallops, no murmurs, no rubs


ABDOMEN: Soft, nontender.  No hepatosplenomegaly, normal bowel sounds, no 

guarding or rigidity.


EXTREMITIES: No clubbing, no edema, no cyanosis, 2+ pulses and upper and lower 

extremities.


MUSCULOSKELETAL: Muscle strength and tone normal.


SPINE: No scoliosis or deformity


SKIN: No rashes


CENTRAL NERVOUS SYSTEM: Alert and oriented -3.  No focal deficits, tone is 

normal in all 4 extremities.


PSYCHIATRIC: Alert and oriented -3.  Appropriate affect.  Intact judgment and 

insight.











- Labs


CBC & Chem 7: 


                                 02/07/22 12:31





                                 02/06/22 13:07


Labs: 


                  Abnormal Lab Results - Last 24 Hours (Table)











  02/06/22 02/06/22 02/06/22 Range/Units





  13:07 13:07 13:07 


 


RBC     (3.80-5.40)  m/uL


 


Hgb     (11.4-16.0)  gm/dL


 


Hct     (34.0-46.0)  %


 


MCV     (80.0-100.0)  fL


 


MCHC     (31.0-37.0)  g/dL


 


RDW     (11.5-15.5)  %


 


Lymphocytes #     (1.0-4.8)  k/uL


 


PT  12.8 H    (9.0-12.0)  sec


 


INR  1.2 H    (<1.2)  


 


D-Dimer     (<0.60)  mg/L FEU


 


Sodium   130 L   (137-145)  mmol/L


 


Chloride   97 L   ()  mmol/L


 


BUN   30 H   (7-17)  mg/dL


 


Creatinine   1.45 H   (0.52-1.04)  mg/dL


 


Glucose   140 H   (74-99)  mg/dL


 


Calcium   8.1 L   (8.4-10.2)  mg/dL


 


Troponin I    0.035 H*  (0.000-0.034)  ng/mL


 


Total Protein   5.3 L   (6.3-8.2)  g/dL


 


Albumin   2.6 L   (3.5-5.0)  g/dL


 


Procalcitonin     (0.02-0.09)  ng/mL


 


Urine Protein     (Negative)  


 


Urine Opiates Screen     (NotDetected)  


 


U Benzodiazepines Scrn     (NotDetected)  














  02/06/22 02/06/22 02/06/22 Range/Units





  13:07 13:47 18:23 


 


RBC    2.85 L  (3.80-5.40)  m/uL


 


Hgb    9.1 L  (11.4-16.0)  gm/dL


 


Hct    29.4 L  (34.0-46.0)  %


 


MCV    103.0 H  (80.0-100.0)  fL


 


MCHC    30.9 L  (31.0-37.0)  g/dL


 


RDW    16.8 H  (11.5-15.5)  %


 


Lymphocytes #    0.5 L  (1.0-4.8)  k/uL


 


PT     (9.0-12.0)  sec


 


INR     (<1.2)  


 


D-Dimer     (<0.60)  mg/L FEU


 


Sodium     (137-145)  mmol/L


 


Chloride     ()  mmol/L


 


BUN     (7-17)  mg/dL


 


Creatinine     (0.52-1.04)  mg/dL


 


Glucose     (74-99)  mg/dL


 


Calcium     (8.4-10.2)  mg/dL


 


Troponin I     (0.000-0.034)  ng/mL


 


Total Protein     (6.3-8.2)  g/dL


 


Albumin     (3.5-5.0)  g/dL


 


Procalcitonin  0.13 H    (0.02-0.09)  ng/mL


 


Urine Protein   Trace H   (Negative)  


 


Urine Opiates Screen   Detected H   (NotDetected)  


 


U Benzodiazepines Scrn   Detected H   (NotDetected)  














  02/07/22 02/07/22 02/07/22 Range/Units





  00:33 12:31 12:31 


 


RBC  2.95 L  3.32 L   (3.80-5.40)  m/uL


 


Hgb  9.4 L  10.3 L   (11.4-16.0)  gm/dL


 


Hct  29.7 L  33.8 L   (34.0-46.0)  %


 


MCV  100.5 H  102.0 H   (80.0-100.0)  fL


 


MCHC   30.6 L   (31.0-37.0)  g/dL


 


RDW  16.4 H  16.7 H   (11.5-15.5)  %


 


Lymphocytes #  0.4 L  0.4 L   (1.0-4.8)  k/uL


 


PT     (9.0-12.0)  sec


 


INR     (<1.2)  


 


D-Dimer    3.47 H  (<0.60)  mg/L FEU


 


Sodium     (137-145)  mmol/L


 


Chloride     ()  mmol/L


 


BUN     (7-17)  mg/dL


 


Creatinine     (0.52-1.04)  mg/dL


 


Glucose     (74-99)  mg/dL


 


Calcium     (8.4-10.2)  mg/dL


 


Troponin I     (0.000-0.034)  ng/mL


 


Total Protein     (6.3-8.2)  g/dL


 


Albumin     (3.5-5.0)  g/dL


 


Procalcitonin     (0.02-0.09)  ng/mL


 


Urine Protein     (Negative)  


 


Urine Opiates Screen     (NotDetected)  


 


U Benzodiazepines Scrn     (NotDetected)  














Assessment and Plan


Plan: 


 Assessment:





#1.  Acute on chronic hypoxic respiratory failure related to acute exacerbation 

of systolic CHF, and recent history of COVID-19 related pneumonia.  Today 

patient had to be placed on BiPAP support with pressures of 10 and 5 and FiO2 of

80% for worsening hypoxia.  Chest x-ray shows residual pulmonary infiltrates 

related to previous Covid 19 related pneumonia that occurred in June 2021.  The 

patient was diagnosed having Covid 19 on 12/31/2021 and the patient was hospi

talized between 01/04/2021 and 01/14/2021 and following that the patient was 

discharged to a nursing home.  She is still having hypoxemia and oxygen 

requirements remain unchanged at 3 L.  Chest x-ray findings are still stable.  

Repeat Covid 19 testing came back negative.





#2. CHF with systolic heart failure





#3. Chronic atrial fibrillation





#4. COPD





#5. Chronic stage III kidney disease





#6. Hypertension





#7. Hyperlipidemia





#8. Bilateral heart disease with mild aortic stenosis, moderate MR and severe TR

with severe pulmonary hypertension





#9. Debility with worsening weakness [place] [facility] [place]





#10. Degenerative arthritis





#11. Hard of hearing





#12. Diverticular disease





#13. Lumbar disc disease





Plan:





Continue BiPAP support


Hep-Lock IV fluids


Patient was given a dose of Lasix


Switch oral Decadron to IV Decadron 6 mg daily


We'll switch Eliquis to heparin infusion


We'll obtain lower extremity Doppler


We'll continue to follow her condition and make recommendations accordingly


Follow-up chest x-ray and labs tomorrow





I performed a history & physical examination of the patient and discussed their 

management with my nurse practitioner, Yeimy Herrera.  I reviewed the nurse 

practitioner's note and agree with the documented findings and plan of care.  

Lung sounds are positive for dim breath sounds with crackles throughout the lung

fields.  The findings and the impression was discussed with the patient.  I 

attest to the documentation by the nurse practitioner. 








Time with Patient: Less than 30

## 2022-02-08 LAB
ANION GAP SERPL CALC-SCNC: 8 MMOL/L
BASOPHILS # BLD AUTO: 0 K/UL (ref 0–0.2)
BASOPHILS NFR BLD AUTO: 0 %
BUN SERPL-SCNC: 33 MG/DL (ref 7–17)
CALCIUM SPEC-MCNC: 8.2 MG/DL (ref 8.4–10.2)
CHLORIDE SERPL-SCNC: 104 MMOL/L (ref 98–107)
CO2 SERPL-SCNC: 25 MMOL/L (ref 22–30)
EOSINOPHIL # BLD AUTO: 0 K/UL (ref 0–0.7)
EOSINOPHIL NFR BLD AUTO: 0 %
ERYTHROCYTE [DISTWIDTH] IN BLOOD BY AUTOMATED COUNT: 3.15 M/UL (ref 3.8–5.4)
ERYTHROCYTE [DISTWIDTH] IN BLOOD: 16.7 % (ref 11.5–15.5)
GLUCOSE BLD-MCNC: 193 MG/DL (ref 75–99)
GLUCOSE SERPL-MCNC: 134 MG/DL (ref 74–99)
HCT VFR BLD AUTO: 32.3 % (ref 34–46)
HGB BLD-MCNC: 9.8 GM/DL (ref 11.4–16)
INR PPP: 1.2 (ref ?–1.2)
LDH SPEC-CCNC: 871 U/L (ref 313–618)
LYMPHOCYTES # SPEC AUTO: 0.6 K/UL (ref 1–4.8)
LYMPHOCYTES NFR SPEC AUTO: 8 %
MCH RBC QN AUTO: 31 PG (ref 25–35)
MCHC RBC AUTO-ENTMCNC: 30.3 G/DL (ref 31–37)
MCV RBC AUTO: 102.5 FL (ref 80–100)
MONOCYTES # BLD AUTO: 0.3 K/UL (ref 0–1)
MONOCYTES NFR BLD AUTO: 4 %
NEUTROPHILS # BLD AUTO: 6.6 K/UL (ref 1.3–7.7)
NEUTROPHILS NFR BLD AUTO: 87 %
PLATELET # BLD AUTO: 437 K/UL (ref 150–450)
POTASSIUM SERPL-SCNC: 4 MMOL/L (ref 3.5–5.1)
PT BLD: 13 SEC (ref 9–12)
SODIUM SERPL-SCNC: 137 MMOL/L (ref 137–145)
WBC # BLD AUTO: 7.5 K/UL (ref 3.8–10.6)

## 2022-02-08 RX ADMIN — BENZONATATE SCH: 100 CAPSULE ORAL at 05:22

## 2022-02-08 RX ADMIN — TIOTROPIUM BROMIDE INHALATION SPRAY SCH PUFF: 3.12 SPRAY, METERED RESPIRATORY (INHALATION) at 08:50

## 2022-02-08 RX ADMIN — BUDESONIDE AND FORMOTEROL FUMARATE DIHYDRATE SCH PUFF: 80; 4.5 AEROSOL RESPIRATORY (INHALATION) at 08:51

## 2022-02-08 RX ADMIN — METOPROLOL TARTRATE SCH MG: 25 TABLET, FILM COATED ORAL at 19:46

## 2022-02-08 RX ADMIN — Medication SCH MG: at 09:07

## 2022-02-08 RX ADMIN — FUROSEMIDE SCH MG: 20 TABLET ORAL at 06:39

## 2022-02-08 RX ADMIN — Medication SCH MCG: at 09:07

## 2022-02-08 RX ADMIN — INSULIN ASPART SCH UNIT: 100 INJECTION, SOLUTION INTRAVENOUS; SUBCUTANEOUS at 20:58

## 2022-02-08 RX ADMIN — FUROSEMIDE SCH MG: 40 TABLET ORAL at 16:20

## 2022-02-08 RX ADMIN — DEXTROSE SCH MG: 50 INJECTION, SOLUTION INTRAVENOUS at 09:07

## 2022-02-08 RX ADMIN — Medication SCH MG: at 19:46

## 2022-02-08 RX ADMIN — ACETAMINOPHEN PRN MG: 325 TABLET, FILM COATED ORAL at 19:45

## 2022-02-08 RX ADMIN — BENZONATATE SCH MG: 100 CAPSULE ORAL at 12:59

## 2022-02-08 RX ADMIN — ALBUTEROL SULFATE SCH PUFF: 90 AEROSOL, METERED RESPIRATORY (INHALATION) at 16:21

## 2022-02-08 RX ADMIN — ALBUTEROL SULFATE SCH PUFF: 90 AEROSOL, METERED RESPIRATORY (INHALATION) at 08:51

## 2022-02-08 RX ADMIN — ALBUTEROL SULFATE SCH PUFF: 90 AEROSOL, METERED RESPIRATORY (INHALATION) at 12:23

## 2022-02-08 RX ADMIN — DOCUSATE SODIUM AND SENNOSIDES SCH: 50; 8.6 TABLET ORAL at 08:54

## 2022-02-08 RX ADMIN — APIXABAN SCH MG: 2.5 TABLET, FILM COATED ORAL at 12:59

## 2022-02-08 RX ADMIN — BENZONATATE SCH MG: 100 CAPSULE ORAL at 19:46

## 2022-02-08 RX ADMIN — AMIODARONE HYDROCHLORIDE SCH MG: 200 TABLET ORAL at 09:07

## 2022-02-08 RX ADMIN — HEPARIN SODIUM PRN UNIT: 1000 INJECTION, SOLUTION INTRAVENOUS; SUBCUTANEOUS at 00:54

## 2022-02-08 RX ADMIN — METOPROLOL TARTRATE SCH MG: 25 TABLET, FILM COATED ORAL at 06:39

## 2022-02-08 RX ADMIN — LEVOFLOXACIN SCH MG: 250 TABLET, FILM COATED ORAL at 13:00

## 2022-02-08 RX ADMIN — ALBUTEROL SULFATE SCH PUFF: 90 AEROSOL, METERED RESPIRATORY (INHALATION) at 20:46

## 2022-02-08 RX ADMIN — BUDESONIDE AND FORMOTEROL FUMARATE DIHYDRATE SCH PUFF: 80; 4.5 AEROSOL RESPIRATORY (INHALATION) at 20:46

## 2022-02-08 RX ADMIN — APIXABAN SCH MG: 2.5 TABLET, FILM COATED ORAL at 19:46

## 2022-02-08 RX ADMIN — FUROSEMIDE SCH MG: 20 TABLET ORAL at 12:59

## 2022-02-08 RX ADMIN — METOPROLOL TARTRATE SCH MG: 25 TABLET, FILM COATED ORAL at 12:59

## 2022-02-08 NOTE — P.PN
Subjective


Progress Note Date: 02/08/22





HISTORY OF PRESENT ILLNESS


This is an 87-year-old female patient of Dr. Dailey and Dr. Pope with past 

medical history of chronic persistent atrial fibrillation, chronic systolic hear

t failure, pulmonary hypertension, COPD with chronic hypoxic respiratory failure

on home O2 at 3 L as needed, hypertension, hyperlipidemia, chronic kidney 

disease stage III, generalized osteoarthritis.  A hat hospitalization early in 

January for acute hypoxic respiratory failure secondary to acute exacerbation of

systolic heart failure, Covid 19 pneumonia and acute exacerbation of COPD and A.

fib with RVR.  Patient was discharged home with home care.  She's was 

subsequently admitted on January 10 for same diagnoses and was discharged to 

[facility].  A is sent to us from [facility] due to confusion.  Patient states that 

she is coughing all the time and has shortness of breath.  She is on chronic ox

ygen therapy.


WBC 7.1, hemoglobin 9.1, platelet count 353.  Sodium 130, potassium 4.2, 

chloride 97, CO2 28, BUN 30 creatinine 1.45.  Glucose 140.  Calcium 8.1.  Total 

bilirubin 0.6, AST 31, ALT 17, alkaline phosphatase 76.  Troponin 0.035.  Pro-

calcitonin 0.13.  Urinalysis trace protein and negative for infection.  Urine 

drug screen positive for opiates and benzodiazepines.  Carotid virus PCR not 

detected.


Patient presented to [facility] emergency center and found to 

be afebrile, heart rate 95, blood pressure 96/53, pulse ox 93% on 3 L nasal 

cannula but subsequently pulse ox dropped to 89% on 4 L nasal cannula and 

patient was placed on high flow nasal cannula 15 L. EKG was atrial fibrillation 

heart rate of 90.  


Chest x-ray reveals worsening bilateral opacities consistent with COVID19 

infection progression on background mild cardiomegaly and chronic emphysematour 

and pulmonary fibrotic changes.


Echocardiogram 1/5/2022 revealed EF of 30-35%, mild aortic regurgitation, m

oderate mitral regurgitation, severe tricuspid regurgitation, severe pulmonary 

hypertension.


Patient seen today in the ER waiting for bed on CSD unit. She has been seen by 

pulmonary medicine and was cleared yesterday to return to [facility] prior to the 

drop in pulse ox.





2/8: Patient is seen today on the cardiac stepdown unit.  She is now on BiPAP 

with pulse ox 96%.  Patient's been afebrile, heart rate 96, blood pressure 

93/63.  Patient is seen and followed by pulmonary medicine and recommend cont

inuing BiPAP as needed only otherwise nasal cannula, continue diuretics and 

Decadron.  Pulmonary has changed eliquis to heparin infusion.  They ordered 

lower extremity Dopplers yesterday were negative for DVT.


Repeat chest x-ray reveals findings similar to prior exam.  Correlate for 

pneumonia, possible pleural effusions, heart failure not excluded.  Underlying 

emphysema.





REVIEW OF SYSTEMS


Constitutional: No fever, no chills, no night sweats.  No weight change.  Noted 

weakness, noted fatigue noted lethargy.  No daytime sleepiness.


EENT: No headache.  No blurred vision or double vision, no loss of vision.  No 

loss of Hearing, no ringing in the ears, no dizziness.  No nasal drainage or 

congestion.  No epistaxis.  No sore throat.


Lungs: Reports shortness of breath, reports cough, no sputum production.  No 

wheezing.


Cardiovascular: No chest pain, no lower extremity edema.  No palpitations.  No 

paroxysmal nocturnal dyspnea.  No orthopnea.  No lightheadedness or dizziness.  

No syncopal episodes.


Abdominal: No abdominal pain.  No nausea, vomiting.  No diarrhea.  No 

constipation.  No bloody or tarry stools.  No loss of appetite.


Genitourinary: No dysuria, increased frequency, urgency.  No urinary retention.


Musculoskeletal: No myalgias.  No muscle weakness, no gait dysfunction, no 

frequent falls.  No back pain.  No neck pain.


Integumentary: No wounds, no lesions.  No rash or pruritus.  No unusual 

bruising.  No change in hair or nails.


Neurologic: No aphasia. No facial droop. Reported change in mentation. No head 

injury. No headache. No paralysis. No paresthesia.


Psychiatric: No depression.  Reports anxiety.  No mood swings.


Endocrine: No abnormal blood sugars.  No weight change.  No excessive sweating 

or thirst.  No cold intolerance.  





PHYSICAL EXAMINATION


Gen: This is a thin 87-year-old  female.  She is resting on ER 

stretcher and appears to be comfortable and in no acute distress.  He is 

currently on high flow nasal cannula 15 L.


HEENT: Head is atraumatic, normocephalic. Pupils equal, round. Sclerae is 

anicteric. 


NECK: Supple. No JVD. No lymphadenopathy. No thyromegaly. 


LUNGS: Scattered crackles.  No intercostal retractions.


HEART: Irregularly irregular rate and rhythm. No murmur. 


ABDOMEN: Soft. Bowel sounds are present. No masses.  No tenderness.


EXTREMITIES: No pedal edema.  No calf tenderness.  Dorsalis pedis +2 

bilaterally.


NEUROLOGICAL: Patient is awake, alert and oriented x3. Cranial nerves 2 through 

12 are grossly intact. 





ASSESSMENT AND PLAN


1.  Acute on chronic hypoxic respiratory failure secondary to a residual Covid 

19 pneumonia on top of chronic systolic heart failure, COPD, pulmonary fibrosis.

 Continue oxygen therapy, Currently on BiPAP with plan to transition to nasal 

cannula





2.  Chronic systolic heart failure.  Continue Lasix 20 mg oral twice daily.  





3.  Possible Covid 19 pneumonia.  Pulmonary consult appreciated.  Patient's been

started on Ventolin inhaler 4 times daily, Symbicort 2 puffs twice daily, 

dexamethasone 6 mgoral daily, Spiriva.





4.  COPD without exacerbation.  Continue Symbicort, Spiriva, albuterol, 

dexamethasone.





5.  Chronic persistent atrial fibrillation.  Continue amiodarone 200 mg daily, 

Lopressor 75 mg 3 times daily.Continue heparin drip versus eliquis her pulmonary





6.  Anemia of chronic disease.  Continue to monitor.





7.  Hypertension.  Continue Lopressor.





8.  Hyperlipidemia.  Continue Lipitor 20 mg at bedtime.





9.  Chronic kidney disease stage III.  Monitor renal function, avoid nephrotoxic

agents.





10.  Valvular heart disease with  mild aortic regurgitation, moderate mitral 

regurgitation, severe tricuspid regurgitation.





11.  Severe pulmonary hypertension.





12.  GI prophylaxis.  Protonix.





13.  DVT prophylaxis.  Eliquis.





14.  COVID-19 testing negative.  Patient has been hospitalized during a 

pandemic.





DISCHARGE PLAN


Return to [facility].





Impression and plan of care have been directed as dictated by the signing 

physician.  Carol Hikcs nurse practitioner acting as scribe for signing 

physician.





Objective





- Vital Signs


Vital signs: 


                                   Vital Signs











Temp  97.5 F L  02/08/22 03:26


 


Pulse  83   02/08/22 06:27


 


Resp  16   02/08/22 03:26


 


BP  112/67   02/08/22 06:27


 


Pulse Ox  96   02/08/22 03:26








                                 Intake & Output











 02/07/22 02/08/22 02/08/22





 18:59 06:59 18:59


 


Intake Total  87.054 


 


Output Total 2350  450


 


Balance -2350 87.054 -450


 


Weight 56.699 kg 60.5 kg 


 


Intake:   


 


  Intake, IV Titration  87.054 





  Amount   


 


    Heparin Sod,Pork in 0.45%  87.054 





    NaCl 25,000 unit In 0.45   





    % NaCl 1 250ml.bag @ 12   





    UNITS/KG/HR 6.804 mls/hr   





    IV .Q24H [facility] Rx#:   





    [id number]   


 


Output:   


 


  Urine 2350  450


 


Other:   


 


  Voiding Method  Indwelling Catheter 














- Labs


CBC & Chem 7: 


                                 02/08/22 07:21





                                 02/08/22 07:21


Labs: 


                  Abnormal Lab Results - Last 24 Hours (Table)











  02/07/22 02/07/22 02/07/22 Range/Units





  12:31 12:31 18:24 


 


RBC  3.32 L    (3.80-5.40)  m/uL


 


Hgb  10.3 L    (11.4-16.0)  gm/dL


 


Hct  33.8 L    (34.0-46.0)  %


 


MCV  102.0 H    (80.0-100.0)  fL


 


MCHC  30.6 L    (31.0-37.0)  g/dL


 


RDW  16.7 H    (11.5-15.5)  %


 


Lymphocytes #  0.4 L    (1.0-4.8)  k/uL


 


APTT    39.3 H  (22.0-30.0)  sec


 


D-Dimer   3.47 H   (<0.60)  mg/L FEU


 


BUN     (7-17)  mg/dL


 


Creatinine     (0.52-1.04)  mg/dL


 


Glucose     (74-99)  mg/dL


 


Calcium     (8.4-10.2)  mg/dL


 


Lactate Dehydrogenase     (313-618)  U/L


 


C-Reactive Protein     (<1.0)  mg/dL














  02/07/22 02/08/22 Range/Units





  23:50 07:21 


 


RBC    (3.80-5.40)  m/uL


 


Hgb    (11.4-16.0)  gm/dL


 


Hct    (34.0-46.0)  %


 


MCV    (80.0-100.0)  fL


 


MCHC    (31.0-37.0)  g/dL


 


RDW    (11.5-15.5)  %


 


Lymphocytes #    (1.0-4.8)  k/uL


 


APTT  38.3 H   (22.0-30.0)  sec


 


D-Dimer    (<0.60)  mg/L FEU


 


BUN   33 H  (7-17)  mg/dL


 


Creatinine   1.16 H  (0.52-1.04)  mg/dL


 


Glucose   134 H  (74-99)  mg/dL


 


Calcium   8.2 L  (8.4-10.2)  mg/dL


 


Lactate Dehydrogenase   871 H  (313-618)  U/L


 


C-Reactive Protein   4.6 H  (<1.0)  mg/dL

## 2022-02-08 NOTE — P.PN
Subjective


Progress Note Date: 02/08/22


Principal diagnosis: 


 Shortness of breath





87-year-old female patient hospitalized for Covid 19 related long-term 

complications of pulmonary infiltration and hypoxemia.  Note that the patient 

was Hospital as back in January 2022.  [place] [facility] [place] and she has been on oxygen at 3 L since then.  The 

patient was diagnosed having acute coronary 19 related pneumonia, decompensated 

CHF and COPD exacerbation.  She is known to have multiple medical problems and 

comorbidities.  The patient was brought into the emergency.  Her shortness of 

breath is vaccinated and waning although she feels that overall she has been 

stable over the past 1 month.  She has no altered mentation.  The daughter at 

the bedside tells that her mentation fluctuates.  At times she is more lucid and

other times more confused.  Based on my evaluation this afternoon, the patient 

is fully alert and awake in oriented to time and place and people.  Chest x-ray 

was still showing diffuse bilateral pulmonary infiltrates and a chest x-ray 

findings are essentially stable compared to the earlier evaluation from January 2022.  She has been on Eliquis 2.5 mg by mouth twice a day and Lasix 20 mg by 

mouth twice daily on outpatient basis.  No signs of any fluid overload.  No 

angina.  No palpitations.





On 02/07/2022 patient seen in follow-up in the emergency department.  She is 

still awaiting a bed on the monitor bed on selective care unit.  This morning 

she had worsening oxygenation, and she was on 15 L per high flow nasal cannula 

and her pulse ox was down in the 70s, subsequently was placed on BiPAP support 

with pressures of 10.5 and FiO2 of 80%, yesterday she received a significant 

amount of IV fluid a total of 4 L, her ejection fraction is in the order of 30-

35% according to her previous echocardiogram.  She was thought to be fluid 

overloaded and was given a dose of IV Lasix she started to diurese 

significantly, she started to feel better, she is already on Decadron 6 mg daily

for recent history of Covid 19 pneumonia.  She did test negative during this 

admission.  Her chest x-ray yesterday showed worsening bilateral opacities on 

background of mild cardiomegaly and chronic emphysematous and pulmonary fibrotic

changes.  Patient is on anticoagulation with Eliquis 2.5 mg twice daily, however

she is no on BiPAP, and she does quickly desaturate when the mask is taken off 

and for that reason she will be placed on IV heparin.  She did have elevated d-

dimer of 3.47 on today's labs.  White blood cell count is 7.1, hemoglobin is 

10.3, patient also continues on home dose Lasix 20 mg twice daily, she is on 

Symbicort and Ventolin inhaler, she is on Levaquin 500 mg once daily.  Her IV 

fluids have been hep-locked.  Home medications have been started again.  She is 

awake and alert, she is able to make her needs known, does not appear to be in 

acute respiratory distress.





On 02/08/2022 patient seen in follow-up on selective care unit, she is breathing

much more comfortably, she remained on BiPAP support with pressures of 10 of 5 

and FiO2 was cut back down to 50% last night, she is maintaining O2 saturations 

around 96%, she remains on IV diuretics at 40 mg every 12 hours, she is only 

modestly in the negative net fluid balance over the last 24 hours of -450 ML.  

Chest x-ray still showing blunting of the costophrenic angles, bilateral 

groundglass opacity, no evident pneumothorax.  Overall fluid status is improved,

and patient is breathing easier on today's labs.  She remains on IV Decadron 6 

mg daily, she is on multivitamins, inhaled bronchodilators, and IV heparin, her 

lower extremity Dopplers showed no evidence of DVT.  Today's labs have been 

reviewed, white blood cell count is 7.5, hemoglobin is 9.8, platelet count is 

437, INR is 1.2 d-dimer is improved and is down to 1.56 electrolytes are within 

normal limits, B1 is 33 creatinine is 1.16.  LDH is 871, CRP is 4.6.  Pro-

calcitonin level was negative at 0.13.








Objective





- Vital Signs


Vital signs: 


                                   Vital Signs











Temp  98.4 F   02/08/22 08:00


 


Pulse  87   02/08/22 08:00


 


Resp  20   02/08/22 08:00


 


BP  99/61   02/08/22 08:00


 


Pulse Ox  90 L  02/08/22 08:51








                                 Intake & Output











 02/07/22 02/08/22 02/08/22





 18:59 06:59 18:59


 


Intake Total  87.054 76.658


 


Output Total 2350  450


 


Balance -2350 87.054 -373.342


 


Weight 56.699 kg 60.5 kg 


 


Intake:   


 


  Intake, IV Titration  87.054 76.658





  Amount   


 


    Heparin Sod,Pork in 0.45%  87.054 76.658





    NaCl 25,000 unit In 0.45   





    % NaCl 1 250ml.bag @ 12   





    UNITS/KG/HR 6.804 mls/hr   





    IV .Q24H [facility] Rx#:   





    [id number]   


 


Output:   


 


  Urine 2350  450


 


Other:   


 


  Voiding Method  Indwelling Catheter 














- Exam


 GENERAL EXAM: Alert, pleasant, 87-year-old white female, currently on BiPAP 

support pressures of 10/5 and FiO2 of 50%, comfortable in no apparent distress. 

Patient is breathing much easier compared yesterday's exam, and we will be given

a trial on nasal cannula.  Patient is very hard of hearing


HEAD: Normocephalic/atraumatic.


EYES: Normal reaction of pupils, equal size.  Conjunctiva pink, sclera white.


NOSE: Clear with pink turbinates.  Pressure injury from the BiPAP support on the

bridge of the nose, BiPAP mask has been removed and patient was switched to 

nasal cannula


THROAT: No erythema or exudates.


NECK: No masses, no JVD, no thyroid enlargement, no adenopathy.


CHEST: No chest wall deformity.  Symmetrical expansion. 


LUNGS: Equal air entry with diffuse crackles


CVS: Regular rate and rhythm, normal S1 and S2, no gallops, no murmurs, no rubs


ABDOMEN: Soft, nontender.  No hepatosplenomegaly, normal bowel sounds, no 

guarding or rigidity.


EXTREMITIES: No clubbing, no edema, no cyanosis, 2+ pulses and upper and lower 

extremities.


MUSCULOSKELETAL: Muscle strength and tone normal.


SPINE: No scoliosis or deformity


SKIN: No rashes


CENTRAL NERVOUS SYSTEM: Alert and oriented -3.  No focal deficits, tone is 

normal in all 4 extremities.


PSYCHIATRIC: Alert and oriented -3.  Appropriate affect.  Intact judgment and 

insight.











- Labs


CBC & Chem 7: 


                                 02/08/22 07:21





                                 02/08/22 07:21


Labs: 


                  Abnormal Lab Results - Last 24 Hours (Table)











  02/07/22 02/07/22 02/07/22 Range/Units





  12:31 12:31 18:24 


 


RBC  3.32 L    (3.80-5.40)  m/uL


 


Hgb  10.3 L    (11.4-16.0)  gm/dL


 


Hct  33.8 L    (34.0-46.0)  %


 


MCV  102.0 H    (80.0-100.0)  fL


 


MCHC  30.6 L    (31.0-37.0)  g/dL


 


RDW  16.7 H    (11.5-15.5)  %


 


Lymphocytes #  0.4 L    (1.0-4.8)  k/uL


 


PT     (9.0-12.0)  sec


 


INR     (<1.2)  


 


APTT    39.3 H  (22.0-30.0)  sec


 


D-Dimer   3.47 H   (<0.60)  mg/L FEU


 


BUN     (7-17)  mg/dL


 


Creatinine     (0.52-1.04)  mg/dL


 


Glucose     (74-99)  mg/dL


 


Calcium     (8.4-10.2)  mg/dL


 


Lactate Dehydrogenase     (313-618)  U/L


 


C-Reactive Protein     (<1.0)  mg/dL














  02/07/22 02/08/22 02/08/22 Range/Units





  23:50 07:21 07:21 


 


RBC    3.15 L  (3.80-5.40)  m/uL


 


Hgb    9.8 L  (11.4-16.0)  gm/dL


 


Hct    32.3 L  (34.0-46.0)  %


 


MCV    102.5 H  (80.0-100.0)  fL


 


MCHC    30.3 L  (31.0-37.0)  g/dL


 


RDW    16.7 H  (11.5-15.5)  %


 


Lymphocytes #    0.6 L  (1.0-4.8)  k/uL


 


PT   13.0 H   (9.0-12.0)  sec


 


INR   1.2 H   (<1.2)  


 


APTT  38.3 H    (22.0-30.0)  sec


 


D-Dimer   1.56 H   (<0.60)  mg/L FEU


 


BUN     (7-17)  mg/dL


 


Creatinine     (0.52-1.04)  mg/dL


 


Glucose     (74-99)  mg/dL


 


Calcium     (8.4-10.2)  mg/dL


 


Lactate Dehydrogenase     (313-618)  U/L


 


C-Reactive Protein     (<1.0)  mg/dL














  02/08/22 02/08/22 Range/Units





  07:21 07:21 


 


RBC    (3.80-5.40)  m/uL


 


Hgb    (11.4-16.0)  gm/dL


 


Hct    (34.0-46.0)  %


 


MCV    (80.0-100.0)  fL


 


MCHC    (31.0-37.0)  g/dL


 


RDW    (11.5-15.5)  %


 


Lymphocytes #    (1.0-4.8)  k/uL


 


PT    (9.0-12.0)  sec


 


INR    (<1.2)  


 


APTT   35.3 H  (22.0-30.0)  sec


 


D-Dimer    (<0.60)  mg/L FEU


 


BUN  33 H   (7-17)  mg/dL


 


Creatinine  1.16 H   (0.52-1.04)  mg/dL


 


Glucose  134 H   (74-99)  mg/dL


 


Calcium  8.2 L   (8.4-10.2)  mg/dL


 


Lactate Dehydrogenase  871 H   (313-618)  U/L


 


C-Reactive Protein  4.6 H   (<1.0)  mg/dL














Assessment and Plan


Plan: 


 Assessment:





#1.  Acute on chronic hypoxic respiratory failure related to acute exacerbation 

of systolic CHF, and recent history of COVID-19 related pneumonia.  Currently 

improved, and patient is off the BiPAP support and on nasal cannula at 5 L, 

tolerating it well.  Today patient had to be placed on BiPAP support with 

pressures of 10 and 5 and FiO2 of 80% for worsening hypoxia.  Chest x-ray shows 

residual pulmonary infiltrates related to previous Covid 19 related pneumonia 

that occurred in June 2021.  The patient was diagnosed having Covid 19 on 

12/31/2021 and the patient was hospitalized between 01/04/2021 and 01/14/2021 

and following that the patient was discharged to a nursing home.  She is still 

having hypoxemia and oxygen requirements remain unchanged at 3 L.  Chest x-ray 

findings are still stable.  Repeat Covid 19 testing came back negative.





#2. CHF with systolic heart failure





#3. Chronic atrial fibrillation





#4. COPD





#5. Chronic stage III kidney disease





#6. Hypertension





#7. Hyperlipidemia





#8. Bilateral heart disease with mild aortic stenosis, moderate MR and severe TR

with severe pulmonary hypertension





#9. Debility with worsening weakness [place] [facility] [place]





#10. Degenerative arthritis





#11. Hard of hearing





#12. Diverticular disease





#13. Lumbar disc disease





Plan:





Patient is tolerating nasal cannula trials


Continue BiPAP as needed only


Hep-Lock IV fluids


Continue diuretics, continue Decadron


We'll switch to heparin infusion back to Eliquis


Lower extremity Dopplers are negative


Continue multivitamins,


will continue following her clinical course





I performed a history & physical examination of the patient and discussed their 

management with my nurse practitioner, Yeimy Herrera.  I reviewed the nurse 

practitioner's note and agree with the documented findings and plan of care.  

Lung sounds are positive for dim breath sounds with crackles throughout the lung

fields.  The findings and the impression was discussed with the patient.  I 

attest to the documentation by the nurse practitioner. 








Time with Patient: Less than 30

## 2022-02-08 NOTE — XR
EXAMINATION TYPE: XR chest 1V portable

 

DATE OF EXAM: 2/8/2022

 

COMPARISON: Chest x-ray 2/6/2022 and CT chest 2/2/2015

 

HISTORY: Covid pneumonia

 

TECHNIQUE: Single frontal view of the chest is obtained.

 

FINDINGS:  Bibasilar increased attenuation obscures the hemidiaphragms, this blunting the cost phreni
c angles. Bilateral groundglass opacity, and pneumonia changes are present. No evident pneumothorax. 
Cardiac mediastinal silhouette is stable. Aorta is dense. There are overlying artifacts.

 

IMPRESSION:  Findings are similar to prior exam. Correlate for pneumonia, possible pleural effusions,
 congestive heart failure not excluded. There is underlying emphysema.

## 2022-02-09 LAB
GLUCOSE BLD-MCNC: 136 MG/DL (ref 75–99)
GLUCOSE BLD-MCNC: 139 MG/DL (ref 75–99)
GLUCOSE BLD-MCNC: 206 MG/DL (ref 75–99)
GLUCOSE BLD-MCNC: 227 MG/DL (ref 75–99)

## 2022-02-09 RX ADMIN — INSULIN ASPART SCH UNIT: 100 INJECTION, SOLUTION INTRAVENOUS; SUBCUTANEOUS at 17:28

## 2022-02-09 RX ADMIN — METOPROLOL TARTRATE SCH MG: 25 TABLET, FILM COATED ORAL at 20:33

## 2022-02-09 RX ADMIN — AMIODARONE HYDROCHLORIDE SCH MG: 200 TABLET ORAL at 09:14

## 2022-02-09 RX ADMIN — FUROSEMIDE SCH MG: 40 TABLET ORAL at 06:14

## 2022-02-09 RX ADMIN — BENZONATATE SCH MG: 100 CAPSULE ORAL at 20:33

## 2022-02-09 RX ADMIN — TIOTROPIUM BROMIDE INHALATION SPRAY SCH PUFF: 3.12 SPRAY, METERED RESPIRATORY (INHALATION) at 09:02

## 2022-02-09 RX ADMIN — ACETAMINOPHEN PRN MG: 325 TABLET, FILM COATED ORAL at 20:32

## 2022-02-09 RX ADMIN — LEVOFLOXACIN SCH MG: 250 TABLET, FILM COATED ORAL at 15:47

## 2022-02-09 RX ADMIN — Medication SCH MG: at 20:33

## 2022-02-09 RX ADMIN — DOCUSATE SODIUM AND SENNOSIDES SCH EACH: 50; 8.6 TABLET ORAL at 09:14

## 2022-02-09 RX ADMIN — ALBUTEROL SULFATE SCH PUFF: 90 AEROSOL, METERED RESPIRATORY (INHALATION) at 12:37

## 2022-02-09 RX ADMIN — ALBUTEROL SULFATE SCH PUFF: 90 AEROSOL, METERED RESPIRATORY (INHALATION) at 16:07

## 2022-02-09 RX ADMIN — BUDESONIDE AND FORMOTEROL FUMARATE DIHYDRATE SCH PUFF: 80; 4.5 AEROSOL RESPIRATORY (INHALATION) at 09:02

## 2022-02-09 RX ADMIN — METOPROLOL TARTRATE SCH MG: 25 TABLET, FILM COATED ORAL at 15:46

## 2022-02-09 RX ADMIN — ACETAMINOPHEN PRN MG: 325 TABLET, FILM COATED ORAL at 09:23

## 2022-02-09 RX ADMIN — INSULIN ASPART SCH UNIT: 100 INJECTION, SOLUTION INTRAVENOUS; SUBCUTANEOUS at 06:15

## 2022-02-09 RX ADMIN — BUDESONIDE AND FORMOTEROL FUMARATE DIHYDRATE SCH PUFF: 80; 4.5 AEROSOL RESPIRATORY (INHALATION) at 20:55

## 2022-02-09 RX ADMIN — DEXTROSE SCH MG: 50 INJECTION, SOLUTION INTRAVENOUS at 09:15

## 2022-02-09 RX ADMIN — INSULIN ASPART SCH UNIT: 100 INJECTION, SOLUTION INTRAVENOUS; SUBCUTANEOUS at 12:16

## 2022-02-09 RX ADMIN — APIXABAN SCH MG: 2.5 TABLET, FILM COATED ORAL at 09:15

## 2022-02-09 RX ADMIN — ALBUTEROL SULFATE SCH PUFF: 90 AEROSOL, METERED RESPIRATORY (INHALATION) at 09:02

## 2022-02-09 RX ADMIN — Medication SCH MG: at 09:14

## 2022-02-09 RX ADMIN — BENZONATATE SCH MG: 100 CAPSULE ORAL at 06:14

## 2022-02-09 RX ADMIN — Medication SCH MCG: at 09:14

## 2022-02-09 RX ADMIN — ALBUTEROL SULFATE SCH PUFF: 90 AEROSOL, METERED RESPIRATORY (INHALATION) at 20:55

## 2022-02-09 RX ADMIN — FUROSEMIDE SCH MG: 40 TABLET ORAL at 15:46

## 2022-02-09 RX ADMIN — BENZONATATE SCH MG: 100 CAPSULE ORAL at 15:47

## 2022-02-09 RX ADMIN — METOPROLOL TARTRATE SCH MG: 25 TABLET, FILM COATED ORAL at 06:14

## 2022-02-09 RX ADMIN — APIXABAN SCH MG: 2.5 TABLET, FILM COATED ORAL at 20:33

## 2022-02-09 RX ADMIN — INSULIN ASPART SCH UNIT: 100 INJECTION, SOLUTION INTRAVENOUS; SUBCUTANEOUS at 20:33

## 2022-02-09 NOTE — P.PN
Subjective


Progress Note Date: 02/09/22


Principal diagnosis: 


 Shortness of breath





87-year-old female patient hospitalized for Covid 19 related long-term 

complications of pulmonary infiltration and hypoxemia.  Note that the patient 

was Hospital as back in January 2022.  The patient [place] [facility] [place] and she has been on oxygen at 3 L since then.  The 

patient was diagnosed having acute coronary 19 related pneumonia, decompensated 

CHF and COPD exacerbation.  She is known to have multiple medical problems and 

comorbidities.  The patient was brought into the emergency.  Her shortness of 

breath is vaccinated and waning although she feels that overall she has been 

stable over the past 1 month.  She has no altered mentation.  The daughter at 

the bedside tells that her mentation fluctuates.  At times she is more lucid and

other times more confused.  Based on my evaluation this afternoon, the patient 

is fully alert and awake in oriented to time and place and people.  Chest x-ray 

was still showing diffuse bilateral pulmonary infiltrates and a chest x-ray 

findings are essentially stable compared to the earlier evaluation from January 2022.  She has been on Eliquis 2.5 mg by mouth twice a day and Lasix 20 mg by 

mouth twice daily on outpatient basis.  No signs of any fluid overload.  No 

angina.  No palpitations.





On 02/07/2022 patient seen in follow-up in the emergency department.  She is 

still awaiting a bed on the monitor bed on selective care unit.  This morning 

she had worsening oxygenation, and she was on 15 L per high flow nasal cannula 

and her pulse ox was down in the 70s, subsequently was placed on BiPAP support 

with pressures of 10.5 and FiO2 of 80%, yesterday she received a significant 

amount of IV fluid a total of 4 L, her ejection fraction is in the order of 30-

35% according to her previous echocardiogram.  She was thought to be fluid 

overloaded and was given a dose of IV Lasix she started to diurese 

significantly, she started to feel better, she is already on Decadron 6 mg daily

for recent history of Covid 19 pneumonia.  She did test negative during this 

admission.  Her chest x-ray yesterday showed worsening bilateral opacities on 

background of mild cardiomegaly and chronic emphysematous and pulmonary fibrotic

changes.  Patient is on anticoagulation with Eliquis 2.5 mg twice daily, however

she is no on BiPAP, and she does quickly desaturate when the mask is taken off 

and for that reason she will be placed on IV heparin.  She did have elevated d-

dimer of 3.47 on today's labs.  White blood cell count is 7.1, hemoglobin is 

10.3, patient also continues on home dose Lasix 20 mg twice daily, she is on 

Symbicort and Ventolin inhaler, she is on Levaquin 500 mg once daily.  Her IV 

fluids have been hep-locked.  Home medications have been started again.  She is 

awake and alert, she is able to make her needs known, does not appear to be in 

acute respiratory distress.





On 02/08/2022 patient seen in follow-up on selective care unit, she is breathing

much more comfortably, she remained on BiPAP support with pressures of 10 of 5 

and FiO2 was cut back down to 50% last night, she is maintaining O2 saturations 

around 96%, she remains on IV diuretics at 40 mg every 12 hours, she is only 

modestly in the negative net fluid balance over the last 24 hours of -450 ML.  

Chest x-ray still showing blunting of the costophrenic angles, bilateral 

groundglass opacity, no evident pneumothorax.  Overall fluid status is improved,

and patient is breathing easier on today's labs.  She remains on IV Decadron 6 

mg daily, she is on multivitamins, inhaled bronchodilators, and IV heparin, her 

lower extremity Dopplers showed no evidence of DVT.  Today's labs have been 

reviewed, white blood cell count is 7.5, hemoglobin is 9.8, platelet count is 

437, INR is 1.2 d-dimer is improved and is down to 1.56 electrolytes are within 

normal limits, B1 is 33 creatinine is 1.16.  LDH is 871, CRP is 4.6.  Pro-

calcitonin level was negative at 0.13.





On 02/09/2022 patient seen in follow-up on selective care unit, she did wear her

BiPAP support last night, she has been switched over to high flow nasal cannula 

currently at 8 L/min, on socks is 94%, she seems to be breathing comfortable, 

does not appear to be in any acute distress although the patient states that she

does not feel any better since she came into the hospital.  Yesterday we 

increased her maintenance dose of oral Lasix to 40 mg twice daily and she is in 

-378 ml negative net fluid balance over the last 24 hours, as far as COVID-19 

treatment patient remains on Decadron 6 mg daily, she is on Eliquis2.5 

milligrams twice daily, she continues on empiric antibiotics in the form of 

Levaquin, inhaled bronchodilators, Tessalon Perles for cough, although she 

denies any significant cough or production.  Lung sounds reveal diffuse crackles

bilaterally, no wheezing.








Objective





- Vital Signs


Vital signs: 


                                   Vital Signs











Temp  97.9 F   02/09/22 03:53


 


Pulse  93   02/09/22 09:05


 


Resp  20   02/09/22 09:05


 


BP  105/57   02/09/22 09:05


 


Pulse Ox  94 L  02/09/22 09:06








                                 Intake & Output











 02/08/22 02/09/22 02/09/22





 18:59 06:59 18:59


 


Intake Total 796.658  118


 


Output Total 825 350 


 


Balance -28.342 -350 118


 


Weight 60.5 kg 61 kg 


 


Intake:   


 


  Intake, IV Titration 76.658  





  Amount   


 


    Heparin Sod,Pork in 0.45% 76.658  





    NaCl 25,000 unit In 0.45   





    % NaCl 1 250ml.bag @ 12   





    UNITS/KG/HR 6.804 mls/hr   





    IV .Q24H [facility] Rx#:   





    [id number]   


 


  Oral 720  118


 


Output:   


 


  Urine 825 350 


 


    Straight 375  


 


Other:   


 


  Voiding Method  External Catheter 


 


  # Bowel Movements 1  














- Exam


 GENERAL EXAM: Alert, pleasant, 87-year-old white female, currently on liters of

oxygen with a pulse ox of 94% comfortable in no apparent distress.  Patient is 

breathing much easier compared yesterday's exam, and we will be given a trial on

nasal cannula.  Patient is very hard of hearing


HEAD: Normocephalic/atraumatic.


EYES: Normal reaction of pupils, equal size.  Conjunctiva pink, sclera white.


NOSE: Clear with pink turbinates.  Pressure injury from the BiPAP support on the

bridge of the nose, BiPAP mask has been removed and patient was switched to 

nasal cannula


THROAT: No erythema or exudates.


NECK: No masses, no JVD, no thyroid enlargement, no adenopathy.


CHEST: No chest wall deformity.  Symmetrical expansion. 


LUNGS: Equal air entry with diffuse crackles


CVS: Regular rate and rhythm, normal S1 and S2, no gallops, no murmurs, no rubs


ABDOMEN: Soft, nontender.  No hepatosplenomegaly, normal bowel sounds, no 

guarding or rigidity.


EXTREMITIES: No clubbing, no edema, no cyanosis, 2+ pulses and upper and lower 

extremities.


MUSCULOSKELETAL: Muscle strength and tone normal.


SPINE: No scoliosis or deformity


SKIN: No rashes


CENTRAL NERVOUS SYSTEM: Alert and oriented -3.  No focal deficits, tone is 

normal in all 4 extremities.


PSYCHIATRIC: Alert and oriented -3.  Appropriate affect.  Intact judgment and 

insight.











- Labs


CBC & Chem 7: 


                                 02/08/22 07:21





                                 02/08/22 07:21


Labs: 


                  Abnormal Lab Results - Last 24 Hours (Table)











  02/08/22 02/09/22 Range/Units





  19:56 06:03 


 


POC Glucose (mg/dL)  193 H  136 H  (75-99)  mg/dL














Assessment and Plan


Plan: 


 Assessment:





#1.  Acute on chronic hypoxic respiratory failure related to acute exacerbation 

of systolic CHF, and recent history of COVID-19 related pneumonia.  Currently 

improved, and patient is off the BiPAP support and on nasal cannula at 5 L, 

tolerating it well.  Today patient had to be placed on BiPAP support with 

pressures of 10 and 5 and FiO2 of 80% for worsening hypoxia.  Chest x-ray shows 

residual pulmonary infiltrates related to previous Covid 19 related pneumonia 

that occurred in June 2021.  The patient was diagnosed having Covid 19 on 

12/31/2021 and the patient was hospitalized between 01/04/2021 and 01/14/2021 

and following that the patient was discharged to a nursing home.  She is still 

having hypoxemia and oxygen requirements remain unchanged at 3 L.  Chest x-ray 

findings are still stable.  Repeat Covid 19 testing came back negative.





#2. CHF with systolic heart failure





#3. Chronic atrial fibrillation





#4. COPD





#5. Chronic stage III kidney disease





#6. Hypertension





#7. Hyperlipidemia





#8. Bilateral heart disease with mild aortic stenosis, moderate MR and severe TR

with severe pulmonary hypertension





#9. Debility with worsening weakness [place] [facility] [place]





#10. Degenerative arthritis





#11. Hard of hearing





#12. Diverticular disease





#13. Lumbar disc disease





Plan:





We'll continue with oral diuretics with Lasix 40 mg twice daily


Continue current does Decadron 6 mg daily


Continue Eliquis, multivitamins


BiPAP support as needed and at bedtime


Titrate FiO2 to keep O2 sats is at 90%


Increase activity as tolerated


Follow-up chest x-ray and labs tomorrow


Follow-up inflammatory markers and d-dimer tomorrow


We'll follow her clinical course








I performed a history & physical examination of the patient and discussed their 

management with my nurse practitioner, Yeimy Herrera.  I reviewed the nurse 

practitioner's note and agree with the documented findings and plan of care.  

Lung sounds are positive for dim breath sounds with crackles throughout the lung

fields.  The findings and the impression was discussed with the patient.  I at

test to the documentation by the nurse practitioner. 








Time with Patient: Less than 30

## 2022-02-10 LAB
ALBUMIN SERPL-MCNC: 3 G/DL (ref 3.5–5)
ALP SERPL-CCNC: 107 U/L (ref 38–126)
ALT SERPL-CCNC: 124 U/L (ref 4–34)
ANION GAP SERPL CALC-SCNC: 7 MMOL/L
AST SERPL-CCNC: 106 U/L (ref 14–36)
BASOPHILS # BLD AUTO: 0 K/UL (ref 0–0.2)
BASOPHILS NFR BLD AUTO: 0 %
BUN SERPL-SCNC: 42 MG/DL (ref 7–17)
CALCIUM SPEC-MCNC: 8.5 MG/DL (ref 8.4–10.2)
CHLORIDE SERPL-SCNC: 102 MMOL/L (ref 98–107)
CO2 SERPL-SCNC: 26 MMOL/L (ref 22–30)
EOSINOPHIL # BLD AUTO: 0.1 K/UL (ref 0–0.7)
EOSINOPHIL NFR BLD AUTO: 1 %
ERYTHROCYTE [DISTWIDTH] IN BLOOD BY AUTOMATED COUNT: 3.38 M/UL (ref 3.8–5.4)
ERYTHROCYTE [DISTWIDTH] IN BLOOD: 16.2 % (ref 11.5–15.5)
GLUCOSE BLD-MCNC: 175 MG/DL (ref 75–99)
GLUCOSE BLD-MCNC: 216 MG/DL (ref 75–99)
GLUCOSE BLD-MCNC: 264 MG/DL (ref 75–99)
GLUCOSE BLD-MCNC: 82 MG/DL (ref 75–99)
GLUCOSE SERPL-MCNC: 88 MG/DL (ref 74–99)
HCT VFR BLD AUTO: 34.6 % (ref 34–46)
HGB BLD-MCNC: 10.6 GM/DL (ref 11.4–16)
LDH SPEC-CCNC: 853 U/L (ref 313–618)
LYMPHOCYTES # SPEC AUTO: 0.5 K/UL (ref 1–4.8)
LYMPHOCYTES NFR SPEC AUTO: 5 %
MCH RBC QN AUTO: 31.4 PG (ref 25–35)
MCHC RBC AUTO-ENTMCNC: 30.7 G/DL (ref 31–37)
MCV RBC AUTO: 102.5 FL (ref 80–100)
MONOCYTES # BLD AUTO: 0.5 K/UL (ref 0–1)
MONOCYTES NFR BLD AUTO: 5 %
NEUTROPHILS # BLD AUTO: 8.5 K/UL (ref 1.3–7.7)
NEUTROPHILS NFR BLD AUTO: 88 %
PLATELET # BLD AUTO: 422 K/UL (ref 150–450)
POTASSIUM SERPL-SCNC: 4.6 MMOL/L (ref 3.5–5.1)
PROT SERPL-MCNC: 5.8 G/DL (ref 6.3–8.2)
SODIUM SERPL-SCNC: 135 MMOL/L (ref 137–145)
WBC # BLD AUTO: 9.6 K/UL (ref 3.8–10.6)

## 2022-02-10 RX ADMIN — TIOTROPIUM BROMIDE INHALATION SPRAY SCH PUFF: 3.12 SPRAY, METERED RESPIRATORY (INHALATION) at 07:37

## 2022-02-10 RX ADMIN — ACETAMINOPHEN PRN MG: 325 TABLET, FILM COATED ORAL at 23:14

## 2022-02-10 RX ADMIN — ALBUTEROL SULFATE SCH PUFF: 90 AEROSOL, METERED RESPIRATORY (INHALATION) at 19:41

## 2022-02-10 RX ADMIN — BUDESONIDE AND FORMOTEROL FUMARATE DIHYDRATE SCH PUFF: 80; 4.5 AEROSOL RESPIRATORY (INHALATION) at 07:38

## 2022-02-10 RX ADMIN — APIXABAN SCH MG: 2.5 TABLET, FILM COATED ORAL at 07:46

## 2022-02-10 RX ADMIN — INSULIN ASPART SCH UNIT: 100 INJECTION, SOLUTION INTRAVENOUS; SUBCUTANEOUS at 22:05

## 2022-02-10 RX ADMIN — METOPROLOL TARTRATE SCH MG: 25 TABLET, FILM COATED ORAL at 22:06

## 2022-02-10 RX ADMIN — INSULIN ASPART SCH UNIT: 100 INJECTION, SOLUTION INTRAVENOUS; SUBCUTANEOUS at 17:19

## 2022-02-10 RX ADMIN — BENZONATATE SCH MG: 100 CAPSULE ORAL at 17:13

## 2022-02-10 RX ADMIN — LEVOFLOXACIN SCH MG: 250 TABLET, FILM COATED ORAL at 17:13

## 2022-02-10 RX ADMIN — ACETAMINOPHEN PRN MG: 325 TABLET, FILM COATED ORAL at 07:45

## 2022-02-10 RX ADMIN — FUROSEMIDE SCH MG: 40 TABLET ORAL at 04:57

## 2022-02-10 RX ADMIN — BENZONATATE SCH MG: 100 CAPSULE ORAL at 22:06

## 2022-02-10 RX ADMIN — APIXABAN SCH MG: 2.5 TABLET, FILM COATED ORAL at 22:05

## 2022-02-10 RX ADMIN — BUDESONIDE AND FORMOTEROL FUMARATE DIHYDRATE SCH PUFF: 80; 4.5 AEROSOL RESPIRATORY (INHALATION) at 19:41

## 2022-02-10 RX ADMIN — Medication SCH MG: at 07:45

## 2022-02-10 RX ADMIN — Medication SCH MG: at 22:06

## 2022-02-10 RX ADMIN — METOPROLOL TARTRATE SCH MG: 25 TABLET, FILM COATED ORAL at 07:45

## 2022-02-10 RX ADMIN — FUROSEMIDE SCH MG: 40 TABLET ORAL at 17:13

## 2022-02-10 RX ADMIN — Medication SCH MCG: at 07:46

## 2022-02-10 RX ADMIN — ACETAMINOPHEN PRN MG: 325 TABLET, FILM COATED ORAL at 18:20

## 2022-02-10 RX ADMIN — INSULIN ASPART SCH: 100 INJECTION, SOLUTION INTRAVENOUS; SUBCUTANEOUS at 07:28

## 2022-02-10 RX ADMIN — AMIODARONE HYDROCHLORIDE SCH MG: 200 TABLET ORAL at 07:46

## 2022-02-10 RX ADMIN — BENZONATATE SCH MG: 100 CAPSULE ORAL at 04:57

## 2022-02-10 RX ADMIN — INSULIN ASPART SCH UNIT: 100 INJECTION, SOLUTION INTRAVENOUS; SUBCUTANEOUS at 14:33

## 2022-02-10 RX ADMIN — METOPROLOL TARTRATE SCH MG: 25 TABLET, FILM COATED ORAL at 17:13

## 2022-02-10 RX ADMIN — DOCUSATE SODIUM AND SENNOSIDES SCH EACH: 50; 8.6 TABLET ORAL at 07:46

## 2022-02-10 RX ADMIN — ALBUTEROL SULFATE SCH PUFF: 90 AEROSOL, METERED RESPIRATORY (INHALATION) at 11:36

## 2022-02-10 RX ADMIN — DEXTROSE SCH MG: 50 INJECTION, SOLUTION INTRAVENOUS at 07:46

## 2022-02-10 RX ADMIN — ALBUTEROL SULFATE SCH PUFF: 90 AEROSOL, METERED RESPIRATORY (INHALATION) at 07:37

## 2022-02-10 RX ADMIN — ALBUTEROL SULFATE SCH PUFF: 90 AEROSOL, METERED RESPIRATORY (INHALATION) at 15:06

## 2022-02-10 NOTE — P.PN
Subjective


Progress Note Date: 02/10/22


Principal diagnosis: 


 Shortness of breath





87-year-old female patient hospitalized for Covid 19 related long-term 

complications of pulmonary infiltration and hypoxemia.  Note that the patient 

was Hospital as back in January 2022.  [place] [facility] [place] and she has been on oxygen at 3 L since then.  The 

patient was diagnosed having acute coronary 19 related pneumonia, decompensated 

CHF and COPD exacerbation.  She is known to have multiple medical problems and 

comorbidities.  The patient was brought into the emergency.  Her shortness of 

breath is vaccinated and waning although she feels that overall she has been 

stable over the past 1 month.  She has no altered mentation.  The daughter at 

the bedside tells that her mentation fluctuates.  At times she is more lucid and

other times more confused.  Based on my evaluation this afternoon, the patient 

is fully alert and awake in oriented to time and place and people.  Chest x-ray 

was still showing diffuse bilateral pulmonary infiltrates and a chest x-ray 

findings are essentially stable compared to the earlier evaluation from January 2022.  She has been on Eliquis 2.5 mg by mouth twice a day and Lasix 20 mg by 

mouth twice daily on outpatient basis.  No signs of any fluid overload.  No 

angina.  No palpitations.





On 02/07/2022 patient seen in follow-up in the emergency department.  She is 

still awaiting a bed on the monitor bed on selective care unit.  This morning 

she had worsening oxygenation, and she was on 15 L per high flow nasal cannula 

and her pulse ox was down in the 70s, subsequently was placed on BiPAP support 

with pressures of 10.5 and FiO2 of 80%, yesterday she received a significant 

amount of IV fluid a total of 4 L, her ejection fraction is in the order of 30-

35% according to her previous echocardiogram.  She was thought to be fluid 

overloaded and was given a dose of IV Lasix she started to diurese 

significantly, she started to feel better, she is already on Decadron 6 mg daily

for recent history of Covid 19 pneumonia.  She did test negative during this 

admission.  Her chest x-ray yesterday showed worsening bilateral opacities on 

background of mild cardiomegaly and chronic emphysematous and pulmonary fibrotic

changes.  Patient is on anticoagulation with Eliquis 2.5 mg twice daily, however

she is no on BiPAP, and she does quickly desaturate when the mask is taken off 

and for that reason she will be placed on IV heparin.  She did have elevated d-

dimer of 3.47 on today's labs.  White blood cell count is 7.1, hemoglobin is 

10.3, patient also continues on home dose Lasix 20 mg twice daily, she is on 

Symbicort and Ventolin inhaler, she is on Levaquin 500 mg once daily.  Her IV 

fluids have been hep-locked.  Home medications have been started again.  She is 

awake and alert, she is able to make her needs known, does not appear to be in 

acute respiratory distress.





On 02/08/2022 patient seen in follow-up on selective care unit, she is breathing

much more comfortably, she remained on BiPAP support with pressures of 10 of 5 

and FiO2 was cut back down to 50% last night, she is maintaining O2 saturations 

around 96%, she remains on IV diuretics at 40 mg every 12 hours, she is only 

modestly in the negative net fluid balance over the last 24 hours of -450 ML.  

Chest x-ray still showing blunting of the costophrenic angles, bilateral 

groundglass opacity, no evident pneumothorax.  Overall fluid status is improved,

and patient is breathing easier on today's labs.  She remains on IV Decadron 6 

mg daily, she is on multivitamins, inhaled bronchodilators, and IV heparin, her 

lower extremity Dopplers showed no evidence of DVT.  Today's labs have been 

reviewed, white blood cell count is 7.5, hemoglobin is 9.8, platelet count is 

437, INR is 1.2 d-dimer is improved and is down to 1.56 electrolytes are within 

normal limits, B1 is 33 creatinine is 1.16.  LDH is 871, CRP is 4.6.  Pro-

calcitonin level was negative at 0.13.





On 02/09/2022 patient seen in follow-up on selective care unit, she did wear her

BiPAP support last night, she has been switched over to high flow nasal cannula 

currently at 8 L/min, on socks is 94%, she seems to be breathing comfortable, 

does not appear to be in any acute distress although the patient states that she

does not feel any better since she came into the hospital.  Yesterday we 

increased her maintenance dose of oral Lasix to 40 mg twice daily and she is in 

-378 ml negative net fluid balance over the last 24 hours, as far as COVID-19 

treatment patient remains on Decadron 6 mg daily, she is on Eliquis2.5 

milligrams twice daily, she continues on empiric antibiotics in the form of 

Levaquin, inhaled bronchodilators, Tessalon Perles for cough, although she 

denies any significant cough or production.  Lung sounds reveal diffuse crackles

bilaterally, no wheezing.





On 02/10/2022 patient seen in follow-up on that a surgical floor.  She is 

currently sitting up in the recliner, she did wear BiPAP support last night with

pressures of 10 of 5 and FiO2 of 50%, she is currently on high flow nasal 

cannula at 6 L and her pulse ox is 91%.  Appears to be breathing comfortably, 

does not appear to be any need acute distress, no accessory muscle use.  

Occasional cough, mildly congested.  Lung sounds are positive for scattered 

bibasilar crackles, but overall seems to be improved from admission.  Patient 

continues on oral Lasix 40 mg twice daily, she continues on Decadron 6 blood 

gram daily, she is on oral anticoagulation, she is on inhaled bronchodilators.  

She is maintaining negative net fluid balance, she is in -378 mL net fluid 

balance over the last 24 hours, her chest x-ray shows some improvement in the 

appearance of bilateral pleural effusions.  No lower extremity swelling.








Objective





- Vital Signs


Vital signs: 


                                   Vital Signs











Temp  97.4 F L  02/10/22 07:14


 


Pulse  52 L  02/10/22 07:14


 


Resp  16   02/10/22 07:54


 


BP  103/68   02/10/22 07:14


 


Pulse Ox  91 L  02/10/22 07:14








                                 Intake & Output











 02/09/22 02/10/22 02/10/22





 18:59 06:59 18:59


 


Intake Total 354  


 


Output Total 900 975 


 


Balance -546 -975 


 


Weight  60.5 kg 


 


Intake:   


 


  Oral 354  


 


Output:   


 


  Urine 900 975 


 


Other:   


 


  Voiding Method  Indwelling Catheter 














- Exam


 GENERAL EXAM: Alert, pleasant, 87-year-old white female, currently on 6 liters 

of oxygen with a pulse ox of 94% comfortable in no apparent distress.  Patient 

is breathing much easier compared yesterday's exam, and we will be given a trial

on nasal cannula.  Patient is very hard of hearing


HEAD: Normocephalic/atraumatic.


EYES: Normal reaction of pupils, equal size.  Conjunctiva pink, sclera white.


NOSE: Clear with pink turbinates.  Pressure injury from the BiPAP support on the

bridge of the nose, BiPAP mask has been removed and patient was switched to 

nasal cannula


THROAT: No erythema or exudates.


NECK: No masses, no JVD, no thyroid enlargement, no adenopathy.


CHEST: No chest wall deformity.  Symmetrical expansion. 


LUNGS: Equal air entry with diffuse crackles


CVS: Regular rate and rhythm, normal S1 and S2, no gallops, no murmurs, no rubs


ABDOMEN: Soft, nontender.  No hepatosplenomegaly, normal bowel sounds, no 

guarding or rigidity.


EXTREMITIES: No clubbing, no edema, no cyanosis, 2+ pulses and upper and lower 

extremities.


MUSCULOSKELETAL: Muscle strength and tone normal.


SPINE: No scoliosis or deformity


SKIN: No rashes


CENTRAL NERVOUS SYSTEM: Alert and oriented -3.  No focal deficits, tone is 

normal in all 4 extremities.


PSYCHIATRIC: Alert and oriented -3.  Appropriate affect.  Intact judgment and 

insight.











- Labs


CBC & Chem 7: 


                                 02/10/22 05:28





                                 02/10/22 05:28


Labs: 


                  Abnormal Lab Results - Last 24 Hours (Table)











  02/09/22 02/09/22 02/09/22 Range/Units





  11:49 16:56 19:57 


 


RBC     (3.80-5.40)  m/uL


 


Hgb     (11.4-16.0)  gm/dL


 


MCV     (80.0-100.0)  fL


 


MCHC     (31.0-37.0)  g/dL


 


RDW     (11.5-15.5)  %


 


Neutrophils #     (1.3-7.7)  k/uL


 


Lymphocytes #     (1.0-4.8)  k/uL


 


D-Dimer     (<0.60)  mg/L FEU


 


Sodium     (137-145)  mmol/L


 


BUN     (7-17)  mg/dL


 


Creatinine     (0.52-1.04)  mg/dL


 


POC Glucose (mg/dL)  139 H  227 H  206 H  (75-99)  mg/dL


 


AST     (14-36)  U/L


 


ALT     (4-34)  U/L


 


Lactate Dehydrogenase     (313-618)  U/L


 


C-Reactive Protein     (<1.0)  mg/dL


 


Total Protein     (6.3-8.2)  g/dL


 


Albumin     (3.5-5.0)  g/dL














  02/10/22 02/10/22 02/10/22 Range/Units





  05:28 05:28 05:28 


 


RBC  3.38 L    (3.80-5.40)  m/uL


 


Hgb  10.6 L    (11.4-16.0)  gm/dL


 


MCV  102.5 H    (80.0-100.0)  fL


 


MCHC  30.7 L    (31.0-37.0)  g/dL


 


RDW  16.2 H    (11.5-15.5)  %


 


Neutrophils #  8.5 H    (1.3-7.7)  k/uL


 


Lymphocytes #  0.5 L    (1.0-4.8)  k/uL


 


D-Dimer   2.81 H   (<0.60)  mg/L FEU


 


Sodium    135 L  (137-145)  mmol/L


 


BUN    42 H  (7-17)  mg/dL


 


Creatinine    1.07 H  (0.52-1.04)  mg/dL


 


POC Glucose (mg/dL)     (75-99)  mg/dL


 


AST    106 H  (14-36)  U/L


 


ALT    124 H  (4-34)  U/L


 


Lactate Dehydrogenase    853 H  (313-618)  U/L


 


C-Reactive Protein    1.7 H  (<1.0)  mg/dL


 


Total Protein    5.8 L  (6.3-8.2)  g/dL


 


Albumin    3.0 L  (3.5-5.0)  g/dL














  02/10/22 Range/Units





  11:13 


 


RBC   (3.80-5.40)  m/uL


 


Hgb   (11.4-16.0)  gm/dL


 


MCV   (80.0-100.0)  fL


 


MCHC   (31.0-37.0)  g/dL


 


RDW   (11.5-15.5)  %


 


Neutrophils #   (1.3-7.7)  k/uL


 


Lymphocytes #   (1.0-4.8)  k/uL


 


D-Dimer   (<0.60)  mg/L FEU


 


Sodium   (137-145)  mmol/L


 


BUN   (7-17)  mg/dL


 


Creatinine   (0.52-1.04)  mg/dL


 


POC Glucose (mg/dL)  216 H  (75-99)  mg/dL


 


AST   (14-36)  U/L


 


ALT   (4-34)  U/L


 


Lactate Dehydrogenase   (313-618)  U/L


 


C-Reactive Protein   (<1.0)  mg/dL


 


Total Protein   (6.3-8.2)  g/dL


 


Albumin   (3.5-5.0)  g/dL














Assessment and Plan


Plan: 


 Assessment:





#1.  Acute on chronic hypoxic respiratory failure related to acute exacerbation 

of systolic CHF, and recent history of COVID-19 related pneumonia.  Currently 

improved, and patient is off the BiPAP support and on nasal cannula at 5 L, 

tolerating it well.  Today patient had to be placed on BiPAP support with 

pressures of 10 and 5 and FiO2 of 80% for worsening hypoxia.  Chest x-ray shows 

residual pulmonary infiltrates related to previous Covid 19 related pneumonia 

that occurred in June 2021.  The patient was diagnosed having Covid 19 on 

12/31/2021 and the patient was hospitalized between 01/04/2021 and 01/14/2021 

and following that the patient was discharged to a nursing home.  She is still 

having hypoxemia and oxygen requirements remain unchanged at 3 L.  Chest x-ray 

findings are still stable.  Repeat Covid 19 testing came back negative.





#2. CHF with systolic heart failure





#3. Chronic atrial fibrillation





#4. COPD





#5. Chronic stage III kidney disease





#6. Hypertension





#7. Hyperlipidemia





#8. Bilateral heart disease with mild aortic stenosis, moderate MR and severe TR

with severe pulmonary hypertension





#9. Debility with worsening weakness [place] [facility] [place]





#10. Degenerative arthritis





#11. Hard of hearing





#12. Diverticular disease





#13. Lumbar disc disease





Plan:





No worsening dyspnea


Vital signs have been stable,


Appears to be breathing comfortably,


Minimal cough


We'll continue with oral diuretics with Lasix 40 mg twice daily


Continue current does Decadron 6 mg daily


Continue Eliquis, multivitamins


BiPAP support as needed and at bedtime


Titrate FiO2 to keep O2 sats is at 90%


Increase activity as tolerated


We'll continue to follow her clinical course


Seems to be very depressed. 








I performed a history & physical examination of the patient and discussed their 

management with my nurse practitioner, Yeimy Herrera.  I reviewed the nurse 

practitioner's note and agree with the documented findings and plan of care.  

Lung sounds are positive for dim breath sounds with crackles throughout the lung

fields.  The findings and the impression was discussed with the patient.  I 

attest to the documentation by the nurse practitioner. 








Time with Patient: Less than 30

## 2022-02-10 NOTE — P.PN
Subjective


Progress Note Date: 02/10/22





HISTORY OF PRESENT ILLNESS


This is an 87-year-old female patient of Dr. Dailey and Dr. Pope with past 

medical history of chronic persistent atrial fibrillation, chronic systolic hear

t failure, pulmonary hypertension, COPD with chronic hypoxic respiratory failure

on home O2 at 3 L as needed, hypertension, hyperlipidemia, chronic kidney 

disease stage III, generalized osteoarthritis.  A hat hospitalization early in 

January for acute hypoxic respiratory failure secondary to acute exacerbation of

systolic heart failure, Covid 19 pneumonia and acute exacerbation of COPD and A.

fib with RVR.  Patient was discharged home with home care.  She's was 

subsequently admitted on January 10 for same diagnoses and was discharged to 

[facility].  A is sent to us from [facility] due to confusion.  Patient states that 

she is coughing all the time and has shortness of breath.  She is on chronic ox

ygen therapy.


WBC 7.1, hemoglobin 9.1, platelet count 353.  Sodium 130, potassium 4.2, 

chloride 97, CO2 28, BUN 30 creatinine 1.45.  Glucose 140.  Calcium 8.1.  Total 

bilirubin 0.6, AST 31, ALT 17, alkaline phosphatase 76.  Troponin 0.035.  Pro-

calcitonin 0.13.  Urinalysis trace protein and negative for infection.  Urine 

drug screen positive for opiates and benzodiazepines.  Carotid virus PCR not 

detected.


Patient presented to [facility] emergency center and found to 

be afebrile, heart rate 95, blood pressure 96/53, pulse ox 93% on 3 L nasal 

cannula but subsequently pulse ox dropped to 89% on 4 L nasal cannula and 

patient was placed on high flow nasal cannula 15 L. EKG was atrial fibrillation 

heart rate of 90.  


Chest x-ray reveals worsening bilateral opacities consistent with COVID19 

infection progression on background mild cardiomegaly and chronic emphysematour 

and pulmonary fibrotic changes.


Echocardiogram 1/5/2022 revealed EF of 30-35%, mild aortic regurgitation, m

oderate mitral regurgitation, severe tricuspid regurgitation, severe pulmonary 

hypertension.


Patient seen today in the ER waiting for bed on CSD unit. She has been seen by 

pulmonary medicine and was cleared yesterday to return to [facility] prior to the 

drop in pulse ox.





2/8: Patient is seen today on the cardiac stepdown unit.  She is now on BiPAP 

with pulse ox 96%.  Patient's been afebrile, heart rate 96, blood pressure 

93/63.  Patient is seen and followed by pulmonary medicine and recommend cont

inuing BiPAP as needed only otherwise nasal cannula, continue diuretics and 

Decadron.  Pulmonary has changed eliquis to heparin infusion.  They ordered 

lower extremity Dopplers yesterday were negative for DVT.


Repeat chest x-ray reveals findings similar to prior exam.  Correlate for 

pneumonia, possible pleural effusions, heart failure not excluded.  Underlying 

emphysema.





2/9: Patient is having urinary retention and required a straight cath 2 and now

has 500 ML's on bladder scan, Pierce catheter to be placed.  Patient has been 

afebrile, heart rate in the 80s and 90s, blood pressure 99/61, pulse ox 96% on 5

L nasal cannula.  Levaquin nebulizers and Tessalon Perles.  Oxygenation is 

improving.  Patient is followed closely by pulmonary medicine.  We'll plan to 

transfer the patient to [facility] with telemetry.





2/10: She is seen today on the [facility] floor.  She has been afebrile, heart rate

50-80, blood pressure 103/68.  Pulse ox is 91% on 6 L nasal cannula, patient was

on BiPAP during the night.  Repeat chest x-ray reveals COPD with continued 

bilateral interstitial and patchy infiltrates greater at the mid lungs.  There 

may be slight interval improvement.  Suspect trace pleural effusions. WBC is 

9.6, hemoglobin 10.6, platelet count 422.  D-dimer 2.81.  Sodium 135.  BUN 42 

creatinine 1.07.  Blood sugars running between 82 and 226.  , , 

alkaline phosphatase 107.  Lactic acid 853.  C-reactive protein 1.7.  Discharge 

plan is to return to [facility] possibly ready by tomorrow.





REVIEW OF SYSTEMS


Constitutional: No fever, no chills, no night sweats.  No weight change.  Noted 

weakness, noted fatigue noted lethargy.  No daytime sleepiness.


EENT: No headache.  No blurred vision or double vision, no loss of vision.  No 

loss of Hearing, no ringing in the ears, no dizziness.  No nasal drainage or 

congestion.  No epistaxis.  No sore throat.


Lungs: Reports shortness of breathimproving, reports cough, no sputum 

production.  No wheezing.


Cardiovascular: No chest pain, no lower extremity edema.  No palpitations.  No 

paroxysmal nocturnal dyspnea.  No orthopnea.  No lightheadedness or dizziness.  

No syncopal episodes.


Abdominal: No abdominal pain.  No nausea, vomiting.  No diarrhea.  No 

constipation.  No bloody or tarry stools.  Reported loss of appetite.


Genitourinary: No dysuria, increased frequency, urgency.  No urinary retention.


Musculoskeletal: No myalgias.  No muscle weakness, no gait dysfunction, no 

frequent falls.  No back pain.  No neck pain.


Integumentary: No wounds, no lesions.  No rash or pruritus.  No unusual 

bruising.  No change in hair or nails.


Neurologic: No aphasia. No facial droop. Reported change in mentation. No head i

njury. No headache. No paralysis. No paresthesia.


Psychiatric: No depression.  Reports anxiety.  No mood swings.


Endocrine: No abnormal blood sugars.  No weight change.  No excessive sweating 

or thirst.  No cold intolerance.  





PHYSICAL EXAMINATION


Gen: This is a thin 87-year-old  female.  She is resting in bed and 

appears to be comfortable and in no acute distress.  She is currently on 6 L 

nasal cannula. 


HEENT: Head is atraumatic, normocephalic. Pupils equal, round. Sclerae is 

anicteric. 


NECK: Supple. No JVD. No lymphadenopathy. No thyromegaly. 


LUNGS: Scattered crackles.  No wheezing.  No intercostal retractions.


HEART: Irregularly irregular rate and rhythm. No murmur. 


ABDOMEN: Soft. Bowel sounds are present. No masses.  No tenderness.


EXTREMITIES: No pedal edema.  No calf tenderness.  Dorsalis pedis +2 

bilaterally.


NEUROLOGICAL: Patient is awake, alert and oriented x3. Cranial nerves 2 through 

12 are grossly intact. 





ASSESSMENT AND PLAN


1.  Acute on chronic hypoxic respiratory failure secondary to a residual Covid 

19 pneumonia on top of chronic systolic heart failure, COPD, pulmonary fibrosis.

 Continue oxygen therapy, Currently on BiPAP at night and 6 L nasal cannula





2.  Chronic systolic heart failure.  Continue Lasix 40 mg oral twice daily.  





3.  Possible Covid 19 pneumonia.  Pulmonary consult appreciated.  Patient's been

started on Ventolin inhaler 4 times daily, Symbicort 2 puffs twice daily, 

dexamethasone 6 mgoral daily, Spiriva, Tessalon Perles.





4.  COPD without exacerbation.  Continue Symbicort, Spiriva, albuterol, 

dexamethasone.





5.  Chronic persistent atrial fibrillation.  Continue amiodarone 200 mg daily, 

Lopressor 75 mg 3 times daily, eliquis 2.5 mg twice daily





6.  Anemia of chronic disease.  Continue to monitor.





7.  Hypertension.  Continue Lopressor.





8.  Hyperlipidemia.  Continue Lipitor 20 mg at bedtime.





9.  Chronic kidney disease stage III.  Monitor renal function, avoid nephrotoxic

agents.





10.  Valvular heart disease with  mild aortic regurgitation, moderate mitral 

regurgitation, severe tricuspid regurgitation.





11.  Severe pulmonary hypertension.





12.  GI prophylaxis.  Protonix.





13.  DVT prophylaxis.  Eliquis.





14.  COVID-19 testing negative.  Patient has been hospitalized during a 

pandemic.





DISCHARGE PLAN


Return to [facility].





Impression and plan of care have been directed as dictated by the signing 

physician.  Carol Hicks nurse practitioner acting as scribe for signing 

physician.





Objective





- Vital Signs


Vital signs: 


                                   Vital Signs











Temp  97.4 F L  02/10/22 07:14


 


Pulse  52 L  02/10/22 07:14


 


Resp  16   02/10/22 07:54


 


BP  103/68   02/10/22 07:14


 


Pulse Ox  91 L  02/10/22 07:14








                                 Intake & Output











 02/09/22 02/10/22 02/10/22





 18:59 06:59 18:59


 


Intake Total 354  


 


Output Total 900 975 


 


Balance -546 -975 


 


Weight  60.5 kg 


 


Intake:   


 


  Oral 354  


 


Output:   


 


  Urine 900 975 


 


Other:   


 


  Voiding Method  Indwelling Catheter 














- Labs


CBC & Chem 7: 


                                 02/10/22 05:28





                                 02/10/22 05:28


Labs: 


                  Abnormal Lab Results - Last 24 Hours (Table)











  02/09/22 02/09/22 02/10/22 Range/Units





  16:56 19:57 05:28 


 


RBC    3.38 L  (3.80-5.40)  m/uL


 


Hgb    10.6 L  (11.4-16.0)  gm/dL


 


MCV    102.5 H  (80.0-100.0)  fL


 


MCHC    30.7 L  (31.0-37.0)  g/dL


 


RDW    16.2 H  (11.5-15.5)  %


 


Neutrophils #    8.5 H  (1.3-7.7)  k/uL


 


Lymphocytes #    0.5 L  (1.0-4.8)  k/uL


 


D-Dimer     (<0.60)  mg/L FEU


 


Sodium     (137-145)  mmol/L


 


BUN     (7-17)  mg/dL


 


Creatinine     (0.52-1.04)  mg/dL


 


POC Glucose (mg/dL)  227 H  206 H   (75-99)  mg/dL


 


AST     (14-36)  U/L


 


ALT     (4-34)  U/L


 


Lactate Dehydrogenase     (313-618)  U/L


 


C-Reactive Protein     (<1.0)  mg/dL


 


Total Protein     (6.3-8.2)  g/dL


 


Albumin     (3.5-5.0)  g/dL














  02/10/22 02/10/22 02/10/22 Range/Units





  05:28 05:28 11:13 


 


RBC     (3.80-5.40)  m/uL


 


Hgb     (11.4-16.0)  gm/dL


 


MCV     (80.0-100.0)  fL


 


MCHC     (31.0-37.0)  g/dL


 


RDW     (11.5-15.5)  %


 


Neutrophils #     (1.3-7.7)  k/uL


 


Lymphocytes #     (1.0-4.8)  k/uL


 


D-Dimer  2.81 H    (<0.60)  mg/L FEU


 


Sodium   135 L   (137-145)  mmol/L


 


BUN   42 H   (7-17)  mg/dL


 


Creatinine   1.07 H   (0.52-1.04)  mg/dL


 


POC Glucose (mg/dL)    216 H  (75-99)  mg/dL


 


AST   106 H   (14-36)  U/L


 


ALT   124 H   (4-34)  U/L


 


Lactate Dehydrogenase   853 H   (313-618)  U/L


 


C-Reactive Protein   1.7 H   (<1.0)  mg/dL


 


Total Protein   5.8 L   (6.3-8.2)  g/dL


 


Albumin   3.0 L   (3.5-5.0)  g/dL

## 2022-02-10 NOTE — P.PN
Subjective


Progress Note Date: 02/09/22





HISTORY OF PRESENT ILLNESS


This is an 87-year-old female patient of Dr. Dailey and Dr. Pope with past 

medical history of chronic persistent atrial fibrillation, chronic systolic hear

t failure, pulmonary hypertension, COPD with chronic hypoxic respiratory failure

on home O2 at 3 L as needed, hypertension, hyperlipidemia, chronic kidney 

disease stage III, generalized osteoarthritis.  A hat hospitalization early in 

January for acute hypoxic respiratory failure secondary to acute exacerbation of

systolic heart failure, Covid 19 pneumonia and acute exacerbation of COPD and A.

fib with RVR.  Patient was discharged home with home care.  She's was 

subsequently admitted on January 10 for same diagnoses and was discharged to 

[facility].  A is sent to us from [facility] due to confusion.  Patient states that 

she is coughing all the time and has shortness of breath.  She is on chronic ox

ygen therapy.


WBC 7.1, hemoglobin 9.1, platelet count 353.  Sodium 130, potassium 4.2, 

chloride 97, CO2 28, BUN 30 creatinine 1.45.  Glucose 140.  Calcium 8.1.  Total 

bilirubin 0.6, AST 31, ALT 17, alkaline phosphatase 76.  Troponin 0.035.  Pro-

calcitonin 0.13.  Urinalysis trace protein and negative for infection.  Urine 

drug screen positive for opiates and benzodiazepines.  Carotid virus PCR not 

detected.


Patient presented to [facility] emergency center and found to 

be afebrile, heart rate 95, blood pressure 96/53, pulse ox 93% on 3 L nasal 

cannula but subsequently pulse ox dropped to 89% on 4 L nasal cannula and 

patient was placed on high flow nasal cannula 15 L. EKG was atrial fibrillation 

heart rate of 90.  


Chest x-ray reveals worsening bilateral opacities consistent with COVID19 

infection progression on background mild cardiomegaly and chronic emphysematour 

and pulmonary fibrotic changes.


Echocardiogram 1/5/2022 revealed EF of 30-35%, mild aortic regurgitation, m

oderate mitral regurgitation, severe tricuspid regurgitation, severe pulmonary 

hypertension.


Patient seen today in the ER waiting for bed on CSD unit. She has been seen by 

pulmonary medicine and was cleared yesterday to return to [facility] prior to the 

drop in pulse ox.





2/8: Patient is seen today on the cardiac stepdown unit.  She is now on BiPAP 

with pulse ox 96%.  Patient's been afebrile, heart rate 96, blood pressure 

93/63.  Patient is seen and followed by pulmonary medicine and recommend cont

inuing BiPAP as needed only otherwise nasal cannula, continue diuretics and 

Decadron.  Pulmonary has changed eliquis to heparin infusion.  They ordered 

lower extremity Dopplers yesterday were negative for DVT.


Repeat chest x-ray reveals findings similar to prior exam.  Correlate for 

pneumonia, possible pleural effusions, heart failure not excluded.  Underlying 

emphysema.





2/9: Patient is having urinary retention and required a straight cath 2 and now

has 500 ML's on bladder scan, Pierce catheter to be placed.  Patient has been 

afebrile, heart rate in the 80s and 90s, blood pressure 99/61, pulse ox 96% on 5

L nasal cannula.  Levaquin nebulizers and Tessalon Perles.  Oxygenation is 

improving.  Patient is followed closely by pulmonary medicine.  We'll plan to 

transfer the patient to [facility] with telemetry.








REVIEW OF SYSTEMS


Constitutional: No fever, no chills, no night sweats.  No weight change.  Noted 

weakness, noted fatigue noted lethargy.  No daytime sleepiness.


EENT: No headache.  No blurred vision or double vision, no loss of vision.  No 

loss of Hearing, no ringing in the ears, no dizziness.  No nasal drainage or 

congestion.  No epistaxis.  No sore throat.


Lungs: Reports shortness of breathimproving, reports cough, no sputum 

production.  No wheezing.


Cardiovascular: No chest pain, no lower extremity edema.  No palpitations.  No 

paroxysmal nocturnal dyspnea.  No orthopnea.  No lightheadedness or dizziness.  

No syncopal episodes.


Abdominal: No abdominal pain.  No nausea, vomiting.  No diarrhea.  No 

constipation.  No bloody or tarry stools.  No loss of appetite.


Genitourinary: No dysuria, increased frequency, urgency.  No urinary retention.


Musculoskeletal: No myalgias.  No muscle weakness, no gait dysfunction, no 

frequent falls.  No back pain.  No neck pain.


Integumentary: No wounds, no lesions.  No rash or pruritus.  No unusual 

bruising.  No change in hair or nails.


Neurologic: No aphasia. No facial droop. Reported change in mentation. No head 

injury. No headache. No paralysis. No paresthesia.


Psychiatric: No depression.  Reports anxiety.  No mood swings.


Endocrine: No abnormal blood sugars.  No weight change.  No excessive sweating 

or thirst.  No cold intolerance.  





PHYSICAL EXAMINATION


Gen: This is a thin 87-year-old  female.  She is resting in bed and 

appears to be comfortable and in no acute distress.  


HEENT: Head is atraumatic, normocephalic. Pupils equal, round. Sclerae is 

anicteric. 


NECK: Supple. No JVD. No lymphadenopathy. No thyromegaly. 


LUNGS: Scattered crackles.  No wheezing.  No intercostal retractions.


HEART: Irregularly irregular rate and rhythm. No murmur. 


ABDOMEN: Soft. Bowel sounds are present. No masses.  No tenderness.


EXTREMITIES: No pedal edema.  No calf tenderness.  Dorsalis pedis +2 

bilaterally.


NEUROLOGICAL: Patient is awake, alert and oriented x3. Cranial nerves 2 through 

12 are grossly intact. 





ASSESSMENT AND PLAN


1.  Acute on chronic hypoxic respiratory failure secondary to a residual Covid 

19 pneumonia on top of chronic systolic heart failure, COPD, pulmonary fibrosis.

 Continue oxygen therapy, Currently on BiPAP with plan to transition to nasal 

cannula





2.  Chronic systolic heart failure.  Continue Lasix 20 mg oral twice daily.  





3.  Possible Covid 19 pneumonia.  Pulmonary consult appreciated.  Patient's been

started on Ventolin inhaler 4 times daily, Symbicort 2 puffs twice daily, 

dexamethasone 6 mgoral daily, Spiriva, Tessalon Perles.





4.  COPD without exacerbation.  Continue Symbicort, Spiriva, albuterol, dexa

methasone.





5.  Chronic persistent atrial fibrillation.  Continue amiodarone 200 mg daily, 

Lopressor 75 mg 3 times daily.Continue heparin drip versus eliquis her pulmonary





6.  Anemia of chronic disease.  Continue to monitor.





7.  Hypertension.  Continue Lopressor.





8.  Hyperlipidemia.  Continue Lipitor 20 mg at bedtime.





9.  Chronic kidney disease stage III.  Monitor renal function, avoid nephrotoxic

agents.





10.  Valvular heart disease with  mild aortic regurgitation, moderate mitral 

regurgitation, severe tricuspid regurgitation.





11.  Severe pulmonary hypertension.





12.  GI prophylaxis.  Protonix.





13.  DVT prophylaxis.  Eliquis.





14.  COVID-19 testing negative.  Patient has been hospitalized during a 

pandemic.





DISCHARGE PLAN


Return to [facility].





Impression and plan of care have been directed as dictated by the signing 

physician.  Carol Hicks nurse practitioner acting as scribe for signing 

physician.





Objective





- Vital Signs


Vital signs: 


                                   Vital Signs











Temp  97.9 F   02/09/22 03:53


 


Pulse  63   02/09/22 03:53


 


Resp  16   02/09/22 06:11


 


BP  112/57   02/09/22 06:11


 


Pulse Ox  97   02/09/22 06:11








                                 Intake & Output











 02/08/22 02/09/22 02/09/22





 18:59 06:59 18:59


 


Intake Total 796.658  


 


Output Total 825 350 


 


Balance -28.342 -350 


 


Weight 60.5 kg 61 kg 


 


Intake:   


 


  Intake, IV Titration 76.658  





  Amount   


 


    Heparin Sod,Pork in 0.45% 76.658  





    NaCl 25,000 unit In 0.45   





    % NaCl 1 250ml.bag @ 12   





    UNITS/KG/HR 6.804 mls/hr   





    IV .Q24H [facility] Rx#:   





    [id number]   


 


  Oral 720  


 


Output:   


 


  Urine 825 350 


 


    Straight 375  


 


Other:   


 


  Voiding Method  External Catheter 


 


  # Bowel Movements 1  














- Labs


CBC & Chem 7: 


                                 02/10/22 05:28





                                 02/10/22 05:28


Labs: 


                  Abnormal Lab Results - Last 24 Hours (Table)











  02/08/22 02/08/22 02/09/22 Range/Units





  07:21 19:56 06:03 


 


APTT  35.3 H    (22.0-30.0)  sec


 


POC Glucose (mg/dL)   193 H  136 H  (75-99)  mg/dL

## 2022-02-10 NOTE — XR
EXAMINATION TYPE: XR chest 1V portable

 

DATE OF EXAM: 2/10/2022

 

Comparison: 2/8/2022

 

Clinical History: 87-year-old female covid

 

Findings:

Heart upper limits of normal in size. Hyperinflation. Interstitial and patchy opacities persist espec
ially in the midlung regions. There may be slight improvement in aeration. Trace also be present.

 

 

Impression:

 

1. COPD with continued bilateral interstitial and patchy infiltrates, greatest at the mid lungs. Ther
e may be slight interval improvement.

2. Suspect trace pleural effusions.

## 2022-02-11 LAB
GLUCOSE BLD-MCNC: 165 MG/DL (ref 75–99)
GLUCOSE BLD-MCNC: 185 MG/DL (ref 75–99)
GLUCOSE BLD-MCNC: 246 MG/DL (ref 75–99)
GLUCOSE BLD-MCNC: 91 MG/DL (ref 75–99)

## 2022-02-11 RX ADMIN — BENZONATATE SCH MG: 100 CAPSULE ORAL at 21:11

## 2022-02-11 RX ADMIN — BUDESONIDE AND FORMOTEROL FUMARATE DIHYDRATE SCH PUFF: 80; 4.5 AEROSOL RESPIRATORY (INHALATION) at 09:16

## 2022-02-11 RX ADMIN — AMIODARONE HYDROCHLORIDE SCH MG: 200 TABLET ORAL at 08:20

## 2022-02-11 RX ADMIN — ALBUTEROL SULFATE SCH PUFF: 90 AEROSOL, METERED RESPIRATORY (INHALATION) at 15:43

## 2022-02-11 RX ADMIN — INSULIN ASPART SCH UNIT: 100 INJECTION, SOLUTION INTRAVENOUS; SUBCUTANEOUS at 21:12

## 2022-02-11 RX ADMIN — ALBUTEROL SULFATE SCH PUFF: 90 AEROSOL, METERED RESPIRATORY (INHALATION) at 09:16

## 2022-02-11 RX ADMIN — BUDESONIDE AND FORMOTEROL FUMARATE DIHYDRATE SCH PUFF: 80; 4.5 AEROSOL RESPIRATORY (INHALATION) at 20:11

## 2022-02-11 RX ADMIN — APIXABAN SCH MG: 2.5 TABLET, FILM COATED ORAL at 08:20

## 2022-02-11 RX ADMIN — FUROSEMIDE SCH MG: 40 TABLET ORAL at 05:59

## 2022-02-11 RX ADMIN — BENZONATATE SCH MG: 100 CAPSULE ORAL at 05:59

## 2022-02-11 RX ADMIN — INSULIN ASPART SCH UNIT: 100 INJECTION, SOLUTION INTRAVENOUS; SUBCUTANEOUS at 17:47

## 2022-02-11 RX ADMIN — ALBUTEROL SULFATE SCH PUFF: 90 AEROSOL, METERED RESPIRATORY (INHALATION) at 12:39

## 2022-02-11 RX ADMIN — Medication SCH MG: at 08:21

## 2022-02-11 RX ADMIN — Medication SCH MCG: at 08:21

## 2022-02-11 RX ADMIN — TIOTROPIUM BROMIDE INHALATION SPRAY SCH PUFF: 3.12 SPRAY, METERED RESPIRATORY (INHALATION) at 09:16

## 2022-02-11 RX ADMIN — DEXTROSE SCH MG: 50 INJECTION, SOLUTION INTRAVENOUS at 08:21

## 2022-02-11 RX ADMIN — FUROSEMIDE SCH MG: 40 TABLET ORAL at 15:48

## 2022-02-11 RX ADMIN — BENZONATATE SCH MG: 100 CAPSULE ORAL at 08:21

## 2022-02-11 RX ADMIN — Medication SCH MG: at 21:11

## 2022-02-11 RX ADMIN — METOPROLOL TARTRATE SCH MG: 25 TABLET, FILM COATED ORAL at 05:58

## 2022-02-11 RX ADMIN — DOCUSATE SODIUM AND SENNOSIDES SCH EACH: 50; 8.6 TABLET ORAL at 08:21

## 2022-02-11 RX ADMIN — INSULIN ASPART SCH UNIT: 100 INJECTION, SOLUTION INTRAVENOUS; SUBCUTANEOUS at 13:00

## 2022-02-11 RX ADMIN — DOCUSATE SODIUM AND SENNOSIDES SCH EACH: 50; 8.6 TABLET ORAL at 08:20

## 2022-02-11 RX ADMIN — LEVOFLOXACIN SCH MG: 250 TABLET, FILM COATED ORAL at 08:20

## 2022-02-11 RX ADMIN — METOPROLOL TARTRATE SCH MG: 25 TABLET, FILM COATED ORAL at 21:11

## 2022-02-11 RX ADMIN — METOPROLOL TARTRATE SCH MG: 25 TABLET, FILM COATED ORAL at 15:48

## 2022-02-11 RX ADMIN — ACETAMINOPHEN PRN MG: 325 TABLET, FILM COATED ORAL at 08:21

## 2022-02-11 RX ADMIN — ACETAMINOPHEN PRN MG: 325 TABLET, FILM COATED ORAL at 15:52

## 2022-02-11 RX ADMIN — INSULIN ASPART SCH: 100 INJECTION, SOLUTION INTRAVENOUS; SUBCUTANEOUS at 08:17

## 2022-02-11 RX ADMIN — APIXABAN SCH MG: 2.5 TABLET, FILM COATED ORAL at 21:11

## 2022-02-11 RX ADMIN — ALBUTEROL SULFATE SCH PUFF: 90 AEROSOL, METERED RESPIRATORY (INHALATION) at 20:10

## 2022-02-11 NOTE — P.PN
Subjective


Progress Note Date: 02/11/22





HISTORY OF PRESENT ILLNESS


This is an 87-year-old female patient of Dr. Dailey and Dr. Pope with past 

medical history of chronic persistent atrial fibrillation, chronic systolic hear

t failure, pulmonary hypertension, COPD with chronic hypoxic respiratory failure

on home O2 at 3 L as needed, hypertension, hyperlipidemia, chronic kidney 

disease stage III, generalized osteoarthritis.  A hat hospitalization early in 

January for acute hypoxic respiratory failure secondary to acute exacerbation of

systolic heart failure, Covid 19 pneumonia and acute exacerbation of COPD and A.

fib with RVR.  Patient was discharged home with home care.  She's was 

subsequently admitted on January 10 for same diagnoses and was discharged to 

[facility].  A is sent to us from [facility] due to confusion.  Patient states that 

she is coughing all the time and has shortness of breath.  She is on chronic ox

ygen therapy.


WBC 7.1, hemoglobin 9.1, platelet count 353.  Sodium 130, potassium 4.2, 

chloride 97, CO2 28, BUN 30 creatinine 1.45.  Glucose 140.  Calcium 8.1.  Total 

bilirubin 0.6, AST 31, ALT 17, alkaline phosphatase 76.  Troponin 0.035.  Pro-

calcitonin 0.13.  Urinalysis trace protein and negative for infection.  Urine 

drug screen positive for opiates and benzodiazepines.  Carotid virus PCR not 

detected.


Patient presented to [facility] emergency center and found to 

be afebrile, heart rate 95, blood pressure 96/53, pulse ox 93% on 3 L nasal 

cannula but subsequently pulse ox dropped to 89% on 4 L nasal cannula and 

patient was placed on high flow nasal cannula 15 L. EKG was atrial fibrillation 

heart rate of 90.  


Chest x-ray reveals worsening bilateral opacities consistent with COVID19 

infection progression on background mild cardiomegaly and chronic emphysematour 

and pulmonary fibrotic changes.


Echocardiogram 1/5/2022 revealed EF of 30-35%, mild aortic regurgitation, m

oderate mitral regurgitation, severe tricuspid regurgitation, severe pulmonary 

hypertension.


Patient seen today in the ER waiting for bed on CSD unit. She has been seen by 

pulmonary medicine and was cleared yesterday to return to [facility] prior to the 

drop in pulse ox.





2/8: Patient is seen today on the cardiac stepdown unit.  She is now on BiPAP 

with pulse ox 96%.  Patient's been afebrile, heart rate 96, blood pressure 

93/63.  Patient is seen and followed by pulmonary medicine and recommend cont

inuing BiPAP as needed only otherwise nasal cannula, continue diuretics and 

Decadron.  Pulmonary has changed eliquis to heparin infusion.  They ordered 

lower extremity Dopplers yesterday were negative for DVT.


Repeat chest x-ray reveals findings similar to prior exam.  Correlate for 

pneumonia, possible pleural effusions, heart failure not excluded.  Underlying 

emphysema.





2/9: Patient is having urinary retention and required a straight cath 2 and now

has 500 ML's on bladder scan, Pierce catheter to be placed.  Patient has been 

afebrile, heart rate in the 80s and 90s, blood pressure 99/61, pulse ox 96% on 5

L nasal cannula.  Levaquin nebulizers and Tessalon Perles.  Oxygenation is 

improving.  Patient is followed closely by pulmonary medicine.  We'll plan to 

transfer the patient to [facility] with telemetry.





2/10: She is seen today on the [facility] floor.  She has been afebrile, heart rate

50-80, blood pressure 103/68.  Pulse ox is 91% on 6 L nasal cannula, patient was

on BiPAP during the night.  Repeat chest x-ray reveals COPD with continued 

bilateral interstitial and patchy infiltrates greater at the mid lungs.  There 

may be slight interval improvement.  Suspect trace pleural effusions. WBC is 

9.6, hemoglobin 10.6, platelet count 422.  D-dimer 2.81.  Sodium 135.  BUN 42 

creatinine 1.07.  Blood sugars running between 82 and 226.  , , 

alkaline phosphatase 107.  Lactic acid 853.  C-reactive protein 1.7.  Discharge 

plan is to return to [facility] possibly ready by tomorrow.





2/11: Patient is on O2 at 6 L nasal cannula with pulse ox of 93-95%.  Blood 

pressure remains on the lower side 86/47.  She has been afebrile, heart rate 88.

 Capillary blood glucose running between 91 and 264.  Repeat blood work ordered 

for tomorrow.  Patient denies new complaints.  Breathing status seems to be 

improving slowly.  Anticipate discharge back to [facility] on Monday.








REVIEW OF SYSTEMS


Constitutional: No fever, no chills, no night sweats.  No weight change.  Noted 

weakness, noted fatigue noted lethargy.  No daytime sleepiness.


EENT: No headache.  No blurred vision or double vision, no loss of vision.  No 

loss of Hearing, no ringing in the ears, no dizziness.  No nasal drainage or 

congestion.  No epistaxis.  No sore throat.


Lungs: Reports shortness of breathimproving, reports cough, no sputum 

production.  No wheezing.


Cardiovascular: No chest pain, no lower extremity edema.  No palpitations.  No 

paroxysmal nocturnal dyspnea.  No orthopnea.  No lightheadedness or dizziness.  

No syncopal episodes.


Abdominal: No abdominal pain.  No nausea, vomiting.  No diarrhea.  No 

constipation.  No bloody or tarry stools.  Reported loss of appetite.


Genitourinary: No dysuria, increased frequency, urgency.  No urinary retention.


Musculoskeletal: No myalgias.  Reports muscle weakness, reports gait 

dysfunction, no frequent falls.  No back pain.  No neck pain.


Integumentary: No wounds, no lesions.  No rash or pruritus.  No unusual 

bruising.  No change in hair or nails.


Neurologic: No aphasia. No facial droop. Reported change in mentation. No head 

injury. No headache. No paralysis. No paresthesia.


Psychiatric: No depression.  Reports anxiety.  No mood swings.


Endocrine: Noted mildly abnormal blood sugars.  No weight change.  No excessive 

sweating or thirst.  No cold intolerance.  





PHYSICAL EXAMINATION


Gen: This is a thin 87-year-old  female.  She is resting in bed and 

appears to be comfortable and in no acute distress.  She is currently on 6 L 

nasal cannula. 


HEENT: Head is atraumatic, normocephalic. Pupils equal, round. Sclerae is 

anicteric. 


NECK: Supple. No JVD. No lymphadenopathy. No thyromegaly. 


LUNGS: Scattered crackles.  No wheezing.  No intercostal retractions.


HEART: Irregularly irregular rate and rhythm. No murmur. 


ABDOMEN: Soft. Bowel sounds are present. No masses.  No tenderness.


EXTREMITIES: No pedal edema.  No calf tenderness.  Dorsalis pedis +2 

bilaterally.


NEUROLOGICAL: Patient is awake, alert and oriented x3. Cranial nerves 2 through 

12 are grossly intact. 





ASSESSMENT AND PLAN


1.  Acute on chronic hypoxic respiratory failure secondary to a residual Covid 

19 pneumonia on top of chronic systolic heart failure, COPD, pulmonary fibrosis.

 Continue oxygen therapy, Currently on BiPAP at night and 6 L nasal cannula





2.  Chronic systolic heart failure.  Continue Lasix 40 mg oral twice daily.  





3.  Possible Covid 19 pneumonia.  Pulmonary consult appreciated.  Patient's been

started on Ventolin inhaler 4 times daily, Symbicort 2 puffs twice daily, 

dexamethasone 6 mgoral daily, Spiriva, Tessalon Perles.





4.  COPD without exacerbation.  Continue Symbicort, Spiriva, albuterol, 

dexamethasone.





5.  Chronic persistent atrial fibrillation.  Continue amiodarone 200 mg daily, 

Lopressor 75 mg 3 times daily, eliquis 2.5 mg twice daily





6.  Anemia of chronic disease.  Continue to monitor.





7.  Hypertension.  Continue Lopressor.





8.  Hyperlipidemia.  Continue Lipitor 20 mg at bedtime.





9.  Chronic kidney disease stage III.  Monitor renal function, avoid nephrotoxic

agents.





10.  Valvular heart disease with  mild aortic regurgitation, moderate mitral 

regurgitation, severe tricuspid regurgitation.





11.  Severe pulmonary hypertension.





12.  GI prophylaxis.  Protonix.





13.  DVT prophylaxis.  Eliquis.





14.  COVID-19 testing negative.  Patient has been hospitalized during a 

pandemic.





DISCHARGE PLAN


Return to [facility] on Monday.





Impression and plan of care have been directed as dictated by the signing 

physician.  Carol Hicks nurse practitioner acting as scribe for signing 

physician.





Objective





- Vital Signs


Vital signs: 


                                   Vital Signs











Temp  97.1 F L  02/11/22 01:33


 


Pulse  60   02/11/22 01:33


 


Resp  20   02/11/22 01:33


 


BP  99/55   02/11/22 01:33


 


Pulse Ox  95   02/11/22 01:33








                                 Intake & Output











 02/10/22 02/11/22 02/11/22





 18:59 06:59 18:59


 


Weight  59 kg 


 


Other:   


 


  Voiding Method  Indwelling Catheter 














- Labs


CBC & Chem 7: 


                                 02/10/22 05:28





                                 02/10/22 05:28


Labs: 


                  Abnormal Lab Results - Last 24 Hours (Table)











  02/10/22 02/10/22 02/10/22 Range/Units





  11:13 16:18 21:17 


 


POC Glucose (mg/dL)  216 H  175 H  264 H  (75-99)  mg/dL

## 2022-02-11 NOTE — P.PN
Subjective


Progress Note Date: 02/11/22


Principal diagnosis: 





COVID-19 pneumonia





The patient is seen today 02/11/2022 in follow-up on the regular medical floor. 

She is currently resting comfortably in bed.  Still on 6 L high flow nasal 

cannula with O2 saturations in the low to mid 90s.  She's been afebrile.  

Hemodynamically stable.  Most recent chest x-ray reveals evidence of COPD with 

continued bilateral interstitial and patchy infiltrates.  Greatest mid lungs.  

There is slight interval improvement.  Trace pleural effusions.  Blood glucose 

185.  She is continued on Symbicort, Spiriva, albuterol.  Anticoagulated with 

Eliquis.  Remains on Decadron.  Antibiotics in the form of Levaquin.  Oral 

diuretics. 





Objective





- Vital Signs


Vital signs: 


                                   Vital Signs











Temp  97.4 F L  02/11/22 07:23


 


Pulse  88   02/11/22 07:23


 


Resp  15   02/11/22 08:00


 


BP  86/47   02/11/22 07:23


 


Pulse Ox  93 L  02/11/22 07:23








                                 Intake & Output











 02/10/22 02/11/22 02/11/22





 18:59 06:59 18:59


 


Weight  59 kg 


 


Other:   


 


  Voiding Method  Indwelling Catheter Incontinent





   Indwelling Catheter














- Exam





GENERAL EXAM: Alert, pleasant, 87-year-old female patient, currently on 6 liters

of oxygen, comfortable in no apparent distress. Patient is very hard of hearing


HEAD: Normocephalic/atraumatic.


EYES: Normal reaction of pupils, equal size.  Conjunctiva pink, sclera white.


NOSE: Clear with pink turbinates.  


THROAT: No erythema or exudates.


NECK: No masses, no JVD, no thyroid enlargement, no adenopathy.


CHEST: No chest wall deformity.  Symmetrical expansion. 


LUNGS: Equal air entry with diffuse coarse crackles bilaterally mostly in the 

bases


CVS: Regular rate and rhythm, normal S1 and S2, no gallops, no murmurs, no rubs


ABDOMEN: Soft, nontender.  No hepatosplenomegaly, normal bowel sounds, no 

guarding or rigidity.


EXTREMITIES: No clubbing, no edema, no cyanosis, 2+ pulses and upper and lower 

extremities.


MUSCULOSKELETAL: Muscle strength and tone normal.


SPINE: No scoliosis or deformity


SKIN: No rashes


CENTRAL NERVOUS SYSTEM:  No focal deficits, tone is normal in all 4 extremities.


PSYCHIATRIC: Alert and oriented -3.  Appropriate affect.  Intact judgment and 

insight.





- Labs


CBC & Chem 7: 


                                 02/10/22 05:28





                                 02/10/22 05:28


Labs: 


                  Abnormal Lab Results - Last 24 Hours (Table)











  02/10/22 02/10/22 02/11/22 Range/Units





  16:18 21:17 11:29 


 


POC Glucose (mg/dL)  175 H  264 H  185 H  (75-99)  mg/dL














Assessment and Plan


Assessment: 





1  Acute on chronic hypoxic respiratory failure related to acute exacerbation of

systolic CHF, and recent history of COVID-19 related pneumonia.  Currently 

improved, and patient is off the BiPAP support and on nasal cannula at 6 L, 

tolerating it well. Chest x-ray shows residual pulmonary infiltrates related to 

previous Covid 19 related pneumonia.  The patient was diagnosed having Covid 19 

on 12/31/2021 and the patient was hospitalized between 01/04/2021 and 01/14/2021

and following that the patient was discharged to [facility].  She is 

still having hypoxemia and oxygen requirements remain unchanged at 6 L.  Chest 

x-ray findings are still stable.  Repeat Covid 19 testing came back negative.





2 CHF with systolic heart failure





3 Chronic atrial fibrillation





4 COPD





5 Chronic stage III kidney disease





6 Hypertension





7 Hyperlipidemia





8 Bilateral heart disease with mild aortic stenosis, moderate MR and severe TR 

with severe pulmonary hypertension





9 Debility with worsening weakness currently residing at [facility]





10 Degenerative arthritis





11 Hard of hearing





12 Diverticular disease





13 Lumbar disc disease





Plan:





The patient was seen and evaluated


On 6 L high flow nasal cannula


Continue to titrate down the FiO2 as tolerated


Anticoagulated with Eliquis


Continue Symbicort, Spiriva, albuterol


Plan is for return to [facility]





I, the cosigning physician, performed a history & physical examination of the 

patient. Lungs sounds with coarse crackles bilaterally.  Maintaining  O2 

saturations in the 90s on 6 L high flow nasal cannula.  I discussed the 

assessment and plan of care with my nurse practitioner, Susanna Ludwig. I attest to 

the above note as dictated by her.

## 2022-02-12 LAB
ALBUMIN SERPL-MCNC: 3.2 G/DL (ref 3.8–4.9)
ALBUMIN/GLOB SERPL: 1.35 G/DL (ref 1.6–3.17)
ALP SERPL-CCNC: 95 U/L (ref 41–126)
ALT SERPL-CCNC: 100 U/L (ref 8–44)
ANION GAP SERPL CALC-SCNC: 12.6 MMOL/L (ref 10–18)
AST SERPL-CCNC: 49 U/L (ref 13–35)
BUN SERPL-SCNC: 31.7 MG/DL (ref 9–27)
BUN/CREAT SERPL: 33.54 RATIO (ref 12–20)
CALCIUM SPEC-MCNC: 8.7 MG/DL (ref 8.7–10.3)
CHLORIDE SERPL-SCNC: 97 MMOL/L (ref 96–109)
CO2 SERPL-SCNC: 29.6 MMOL/L (ref 20–27.5)
ERYTHROCYTE [DISTWIDTH] IN BLOOD BY AUTOMATED COUNT: 3.67 X 10*6/UL (ref 4.1–5.2)
ERYTHROCYTE [DISTWIDTH] IN BLOOD: 16.6 % (ref 11.5–14.5)
GLOBULIN SER CALC-MCNC: 2.4 G/DL (ref 1.6–3.3)
GLUCOSE BLD-MCNC: 120 MG/DL (ref 75–99)
GLUCOSE BLD-MCNC: 173 MG/DL (ref 75–99)
GLUCOSE BLD-MCNC: 227 MG/DL (ref 75–99)
GLUCOSE BLD-MCNC: 231 MG/DL (ref 75–99)
GLUCOSE BLD-MCNC: 78 MG/DL (ref 75–99)
GLUCOSE BLD-MCNC: 86 MG/DL (ref 75–99)
GLUCOSE SERPL-MCNC: 83 MG/DL (ref 70–110)
HCT VFR BLD AUTO: 36.8 % (ref 37.2–46.3)
HGB BLD-MCNC: 11.1 G/DL (ref 12–15)
LDH SPEC-CCNC: 299 U/L (ref 120–246)
MCH RBC QN AUTO: 30.2 PG (ref 27–32)
MCHC RBC AUTO-ENTMCNC: 30.2 G/DL (ref 32–37)
MCV RBC AUTO: 100.3 FL (ref 80–97)
NRBC BLD AUTO-RTO: 0 /100 WBCS (ref 0–0)
PLATELET # BLD AUTO: 478 X 10*3/UL (ref 140–440)
POTASSIUM SERPL-SCNC: 4.4 MMOL/L (ref 3.5–5.5)
PROT SERPL-MCNC: 5.6 G/DL (ref 6.2–8.2)
SODIUM SERPL-SCNC: 139 MMOL/L (ref 135–145)
WBC # BLD AUTO: 15.67 X 10*3/UL (ref 4.5–10)

## 2022-02-12 RX ADMIN — ACETAMINOPHEN PRN MG: 325 TABLET, FILM COATED ORAL at 15:20

## 2022-02-12 RX ADMIN — BUDESONIDE AND FORMOTEROL FUMARATE DIHYDRATE SCH PUFF: 80; 4.5 AEROSOL RESPIRATORY (INHALATION) at 08:09

## 2022-02-12 RX ADMIN — AMIODARONE HYDROCHLORIDE SCH MG: 200 TABLET ORAL at 08:36

## 2022-02-12 RX ADMIN — FUROSEMIDE SCH MG: 40 TABLET ORAL at 15:15

## 2022-02-12 RX ADMIN — INSULIN ASPART SCH: 100 INJECTION, SOLUTION INTRAVENOUS; SUBCUTANEOUS at 13:01

## 2022-02-12 RX ADMIN — BENZONATATE SCH MG: 100 CAPSULE ORAL at 05:31

## 2022-02-12 RX ADMIN — APIXABAN SCH MG: 2.5 TABLET, FILM COATED ORAL at 21:30

## 2022-02-12 RX ADMIN — ALBUTEROL SULFATE SCH PUFF: 90 AEROSOL, METERED RESPIRATORY (INHALATION) at 11:49

## 2022-02-12 RX ADMIN — METOPROLOL TARTRATE SCH MG: 25 TABLET, FILM COATED ORAL at 21:29

## 2022-02-12 RX ADMIN — ACETAMINOPHEN PRN MG: 325 TABLET, FILM COATED ORAL at 05:30

## 2022-02-12 RX ADMIN — ALBUTEROL SULFATE SCH PUFF: 90 AEROSOL, METERED RESPIRATORY (INHALATION) at 08:09

## 2022-02-12 RX ADMIN — Medication SCH MG: at 21:29

## 2022-02-12 RX ADMIN — ALBUTEROL SULFATE SCH PUFF: 90 AEROSOL, METERED RESPIRATORY (INHALATION) at 19:14

## 2022-02-12 RX ADMIN — Medication SCH MCG: at 08:36

## 2022-02-12 RX ADMIN — APIXABAN SCH MG: 2.5 TABLET, FILM COATED ORAL at 08:35

## 2022-02-12 RX ADMIN — ACETAMINOPHEN PRN MG: 325 TABLET, FILM COATED ORAL at 21:31

## 2022-02-12 RX ADMIN — BENZONATATE SCH MG: 100 CAPSULE ORAL at 21:30

## 2022-02-12 RX ADMIN — FUROSEMIDE SCH MG: 40 TABLET ORAL at 05:30

## 2022-02-12 RX ADMIN — LEVOFLOXACIN SCH MG: 250 TABLET, FILM COATED ORAL at 15:15

## 2022-02-12 RX ADMIN — TIOTROPIUM BROMIDE INHALATION SPRAY SCH PUFF: 3.12 SPRAY, METERED RESPIRATORY (INHALATION) at 08:09

## 2022-02-12 RX ADMIN — DEXTROSE SCH MG: 50 INJECTION, SOLUTION INTRAVENOUS at 08:36

## 2022-02-12 RX ADMIN — INSULIN ASPART SCH: 100 INJECTION, SOLUTION INTRAVENOUS; SUBCUTANEOUS at 07:31

## 2022-02-12 RX ADMIN — INSULIN ASPART SCH UNIT: 100 INJECTION, SOLUTION INTRAVENOUS; SUBCUTANEOUS at 17:27

## 2022-02-12 RX ADMIN — BENZONATATE SCH MG: 100 CAPSULE ORAL at 15:15

## 2022-02-12 RX ADMIN — ALBUTEROL SULFATE SCH PUFF: 90 AEROSOL, METERED RESPIRATORY (INHALATION) at 15:54

## 2022-02-12 RX ADMIN — BUDESONIDE AND FORMOTEROL FUMARATE DIHYDRATE SCH PUFF: 80; 4.5 AEROSOL RESPIRATORY (INHALATION) at 19:14

## 2022-02-12 RX ADMIN — METOPROLOL TARTRATE SCH MG: 25 TABLET, FILM COATED ORAL at 15:15

## 2022-02-12 RX ADMIN — Medication SCH MG: at 08:36

## 2022-02-12 RX ADMIN — METOPROLOL TARTRATE SCH MG: 25 TABLET, FILM COATED ORAL at 05:30

## 2022-02-12 RX ADMIN — INSULIN ASPART SCH UNIT: 100 INJECTION, SOLUTION INTRAVENOUS; SUBCUTANEOUS at 21:30

## 2022-02-12 NOTE — P.PN
Subjective


Progress Note Date: 02/12/22


HISTORY OF PRESENT ILLNESS


This is an 87-year-old female patient of Dr. Dailey and Dr. Pope with past 

medical history of chronic persistent atrial fibrillation, chronic systolic 

heart failure, pulmonary hypertension, COPD with chronic hypoxic respiratory 

failure on home O2 at 3 L as needed, hypertension, hyperlipidemia, chronic 

kidney disease stage III, generalized osteoarthritis.  A hat hospitalization 

early in January for acute hypoxic respiratory failure secondary to acute ex

acerbation of systolic heart failure, Covid 19 pneumonia and acute exacerbation 

of COPD and A. fib with RVR.  Patient was discharged home with home care.  She's

was subsequently admitted on January 10 for same diagnoses and was discharged to

[facility].  A is sent to us from [facility] due to confusion.  Patient states that 

she is coughing all the time and has shortness of breath.  She is on chronic oxy

gen therapy.


WBC 7.1, hemoglobin 9.1, platelet count 353.  Sodium 130, potassium 4.2, 

chloride 97, CO2 28, BUN 30 creatinine 1.45.  Glucose 140.  Calcium 8.1.  Total 

bilirubin 0.6, AST 31, ALT 17, alkaline phosphatase 76.  Troponin 0.035.  Pro-

calcitonin 0.13.  Urinalysis trace protein and negative for infection.  Urine 

drug screen positive for opiates and benzodiazepines.  Carotid virus PCR not d

etected.


Patient presented to [facility] emergency center and found to 

be afebrile, heart rate 95, blood pressure 96/53, pulse ox 93% on 3 L nasal 

cannula but subsequently pulse ox dropped to 89% on 4 L nasal cannula and 

patient was placed on high flow nasal cannula 15 L. EKG was atrial fibrillation 

heart rate of 90.  


Chest x-ray reveals worsening bilateral opacities consistent with COVID19 

infection progression on background mild cardiomegaly and chronic emphysematour 

and pulmonary fibrotic changes.


Echocardiogram 1/5/2022 revealed EF of 30-35%, mild aortic regurgitation, mo

derate mitral regurgitation, severe tricuspid regurgitation, severe pulmonary 

hypertension.


Patient seen today in the ER waiting for bed on CSD unit. She has been seen by 

pulmonary medicine and was cleared yesterday to return to [facility] prior to the 

drop in pulse ox.





2/8: Patient is seen today on the cardiac stepdown unit.  She is now on BiPAP 

with pulse ox 96%.  Patient's been afebrile, heart rate 96, blood pressure 

93/63.  Patient is seen and followed by pulmonary medicine and recommend madhu

nuing BiPAP as needed only otherwise nasal cannula, continue diuretics and 

Decadron.  Pulmonary has changed eliquis to heparin infusion.  They ordered 

lower extremity Dopplers yesterday were negative for DVT.


Repeat chest x-ray reveals findings similar to prior exam.  Correlate for 

pneumonia, possible pleural effusions, heart failure not excluded.  Underlying 

emphysema.





2/9: Patient is having urinary retention and required a straight cath 2 and now

has 500 ML's on bladder scan, Pierce catheter to be placed.  Patient has been 

afebrile, heart rate in the 80s and 90s, blood pressure 99/61, pulse ox 96% on 5

L nasal cannula.  Levaquin nebulizers and Tessalon Perles.  Oxygenation is 

improving.  Patient is followed closely by pulmonary medicine.  We'll plan to 

transfer the patient to [facility] with telemetry.





2/10: She is seen today on the [facility] floor.  She has been afebrile, heart rate

50-80, blood pressure 103/68.  Pulse ox is 91% on 6 L nasal cannula, patient was

on BiPAP during the night.  Repeat chest x-ray reveals COPD with continued 

bilateral interstitial and patchy infiltrates greater at the mid lungs.  There 

may be slight interval improvement.  Suspect trace pleural effusions. WBC is 

9.6, hemoglobin 10.6, platelet count 422.  D-dimer 2.81.  Sodium 135.  BUN 42 

creatinine 1.07.  Blood sugars running between 82 and 226.  , , 

alkaline phosphatase 107.  Lactic acid 853.  C-reactive protein 1.7.  Discharge 

plan is to return to [facility] possibly ready by tomorrow.





2/11: Patient is on O2 at 6 L nasal cannula with pulse ox of 93-95%.  Blood 

pressure remains on the lower side 86/47.  She has been afebrile, heart rate 88.

 Capillary blood glucose running between 91 and 264.  Repeat blood work ordered 

for tomorrow.  Patient denies new complaints.  Breathing status seems to be 

improving slowly.  Anticipate discharge back to [facility] on Monday.





2/12: Patient is found sitting up in bed anxious complaining of increase 

shortness of breath and chest discomfort.  Patient states that her wrist feels 

sore.  Patient has tenderness to the intercostal spacing.  Patient states that 

she feels like her heart is racing and she is unable to catch her breath.  

Patient finds that this is worse than yesterday.  EKG shows A. fib with RVR.  A 

CTA is ordered.  Troponin 3 ordered.  Patient is currently on a eliquis 2.5 

twice a day.  Patient is currently on 5 L nasal cannula 93%





REVIEW OF SYSTEMS


Constitutional: No fever, no chills, no night sweats.  No weight change.  Noted 

weakness, noted fatigue noted lethargy.  No daytime sleepiness.


EENT: No headache.  No blurred vision or double vision, no loss of vision.  No 

loss of Hearing, no ringing in the ears, no dizziness.  No nasal drainage or 

congestion.  No epistaxis.  No sore throat.


Lungs: Reports shortness of breathworsening reports cough, no sputum 

production.  No wheezing.


Cardiovascular: Reports chest pain, no lower extremity edema.  Reports 

palpitations.  No paroxysmal nocturnal dyspnea.  No orthopnea.  No 

lightheadedness or dizziness.  No syncopal episodes.


Abdominal: No abdominal pain.  No nausea, vomiting.  No diarrhea.  No 

constipation.  No bloody or tarry stools.  Reported loss of appetite.


Genitourinary: No dysuria, increased frequency, urgency.  No urinary retention.


Musculoskeletal: No myalgias.  Reports muscle weakness, reports gait 

dysfunction, no frequent falls.  No back pain.  No neck pain.


Integumentary: No wounds, no lesions.  No rash or pruritus.  No unusual 

bruising.  No change in hair or nails.


Neurologic: No aphasia. No facial droop. Reported change in mentation. No head 

injury. No headache. No paralysis. No paresthesia.


Psychiatric: No depression.  Reports anxiety.  No mood swings.


Endocrine: Noted mildly abnormal blood sugars.  No weight change.  No excessive 

sweating or thirst.  No cold intolerance.  





PHYSICAL EXAMINATION


Gen: This is a thin 87-year-old  female.  She is resting in bed, 

anxious in moderate distress.  She is currently on 6 L nasal cannula. 


HEENT: Head is atraumatic, normocephalic. Pupils equal, round. Sclerae is 

anicteric. 


NECK: Supple. No JVD. No lymphadenopathy. No thyromegaly. 


LUNGS: Scattered crackles.  No wheezing.  No intercostal retractions.  

Tenderness to intercostal spacing.


HEART: Irregularly irregular rate and rhythm. No murmur. 


ABDOMEN: Soft. Bowel sounds are present. No masses.  No tenderness.


EXTREMITIES: No pedal edema.  No calf tenderness.  Dorsalis pedis +2 bi

laterally.


NEUROLOGICAL: Patient is awake, alert and oriented x3. Cranial nerves 2 through 

12 are grossly intact. 





ASSESSMENT AND PLAN


1.  Acute on chronic hypoxic respiratory failure secondary to a residual Covid 

19 pneumonia on top of chronic systolic heart failure, COPD, pulmonary fibrosis.

 Continue oxygen therapy, Currently on BiPAP at night and 5 L nasal cannula.  C

TA ordered for increased shortness of breath, troponin, consult cardiology.





2.  Chronic systolic heart failure.  Continue Lasix 40 mg oral twice daily.  





3.  Possible Covid 19 pneumonia.  Pulmonary consult appreciated.  Patient's been

started on Ventolin inhaler 4 times daily, Symbicort 2 puffs twice daily, 

dexamethasone 6 mgoral daily, Spiriva, Tessalon Perles.





4.  COPD without exacerbation.  Continue Symbicort, Spiriva, albuterol, 

dexamethasone.





5.  Chronic persistent atrial fibrillation.  Continue amiodarone 200 mg daily, 

Lopressor 75 mg 3 times daily, eliquis 2.5 mg twice daily





6.  Anemia of chronic disease.  Continue to monitor.





7.  Hypertension.  Continue Lopressor.





8.  Hyperlipidemia.  Continue Lipitor 20 mg at bedtime.





9.  Chronic kidney disease stage III.  Monitor renal function, avoid nephrotoxic

agents.





10.  Valvular heart disease with  mild aortic regurgitation, moderate mitral 

regurgitation, severe tricuspid regurgitation.





11.  Severe pulmonary hypertension.





12.  A. fib with RVR.  Consult cardiology.  Patient is currently on eliquis. 





13.  GI prophylaxis.  Protonix.





14.  DVT prophylaxis.  Eliquis.





15.  COVID-19 testing negative.  Patient has been hospitalized during a 

pandemic.





DISCHARGE PLAN


Return to [facility] on Monday.





Impression and plan of care have been directed as dictated by the signing 

physician.  Jeniffer Platt nurse practitioner acting as scribe for signing 

physician.








Objective





- Vital Signs


Vital signs: 


                                   Vital Signs











Temp  97.4 F L  02/12/22 07:36


 


Pulse  78   02/12/22 07:36


 


Resp  18   02/12/22 07:36


 


BP  103/68   02/12/22 07:36


 


Pulse Ox  93 L  02/12/22 07:36








                                 Intake & Output











 02/11/22 02/12/22 02/12/22





 18:59 06:59 18:59


 


Intake Total 360  420


 


Output Total 950  


 


Balance -590  420


 


Weight  61 kg 


 


Intake:   


 


  Oral 360  420


 


Output:   


 


  Urine 950  


 


Other:   


 


  Voiding Method Incontinent Indwelling Catheter Indwelling Catheter





 Indwelling Catheter  














- Labs


CBC & Chem 7: 


                                 02/10/22 05:28





                                 02/10/22 05:28


Labs: 


                  Abnormal Lab Results - Last 24 Hours (Table)











  02/11/22 02/11/22 02/11/22 Range/Units





  11:29 16:27 20:22 


 


POC Glucose (mg/dL)  185 H  165 H  246 H  (75-99)  mg/dL

## 2022-02-12 NOTE — P.CRDCN
History of Present Illness


History of present illness: 





HISTORY OF PRESENTING ILLNESS


his is a 87-year-old female with a past medical history significant for chronic 

persistent atrial fibrillation, congestive heart failure, NICMP, hypertension, a

nd hyperlipidemia and recent COVID 19 infection. Patient follows in the office 

with Dr. Pope.  Patient has a known history of nonischemic artery myopathy with 

EF 30-35% with prior heart catheterization from 2017 showing only mild to 

moderate disease with FFR of the LAD being normal.





She presented to secondary to apparent altered mental status and 

additionally chronic respiratory failure.  She did have COVID-19 in 2022

and unfortunately has had complications from that time with respiratory failure 

and oxygen dependence.  She has been at subacute rehab at [facility].  

She was admitted to the hospital and chest x-ray showed pulmonary infiltrates 

and was placed on antibiotics as well as home diuretics.  She has been 

hospitalized for approximately 6 days and then started complaining of chest 

discomfort this morning.  Repeat EKG was performed which did show some ST dep

ressions in the lateral leads.  Troponin was drawn which was abnormal at 0.038 

however this appeared similar to prior from 2022 of 0.035 and even 

improved from prior 0.08 from hospitalization in January.  She has been on 

amiodarone as well as Eliquis 2.5 mg twice a day dosing and oral Lasix 40 mg 

twice a day, with metoprolol 75 mg 3 times a day.  CTA was performed today which

showed small left apical pneumothorax less than 3%, no pulmonary embolism, 

pleural effusions and extensive pulmonary infiltrates likely related to 

pneumonia and congestive heart failure with pulmonary infiltrates significantly 

increased compared to old exam.








22 Echocardiogram completed revealing ejection fraction 30-35%, mild aortic

regurgitation, moderate mitral regurgitation, severe tricuspid regurgitation, 

and severe pulmonary hypertension





REVIEW OF SYSTEMS


At the time of my exam:


CONSTITUTIONAL: Denies fever or chills.


CARDIOVASCULAR: +Chest pain, +shortness of breath, no orthopnea, PND or palpit

ations.


RESPIRATORY: Denies cough. 


GASTROINTESTINAL: Denies abdominal pain, diarrhea, constipation, nausea or 

vomiting.


MUSCULOSKELETAL: Denies myalgias.


NEUROLOGIC: Denies numbness, tingling or weakness.


ENDOCRINE: Denies fatigue, weight change,  polydipsia or polyurina.


GENITOURINARY: Denies burning, hematuria or urgency with micturation.


HEMATOLOGIC: Denies history of anemia or bleeding. 





PHYSICAL EXAMINATION


Vital signs reviewed.


CONSTITUTIONAL: No apparent distress, chronically ill appearing


HEENT: Head is normocephalic. Pupils are equal, round. Sclerae anicteric. Mucous

membranes of the mouth are moist.  No JVD. No carotid bruit.


CHEST EXAMINATION: Bilateral rhonchi


HEART EXAMINATION: Irregular rate and rhythm. S1, S2 heard. +2/6 BAHMAN, no gallops

or rub.


ABDOMEN: Soft, nontender. Positive bowel sounds.


EXTREMITIES: 2+ peripheral pulses, no lower extremity edema and no calf tendern

ess.


NEUROLOGIC EXAMINATION: Patient is awake, alert and oriented x3. 





ASSESSMENT


1.  Precordial chest pain may be related to pneumonia versus rule out cardiac 

source


2.  Elevated troponin, previously mildly elevated.  Continue to trend


3.  Acute on chronic respiratory failure mainly related to COVID-19 

infection/residual lung injury plus possible and pneumonia.  Possible component 

of heart failure however appears euvolemic


4.  Chronic systolic heart failure


5.  Nonischemic cardiomyopathy EF 30-35%


6.  Small apical pneumothorax


7.  Mild to moderate coronary artery disease by heart catheterization 


8.  Chronic atrial fibrillation with controlled ventricular rates


9.  Abnormal EKG with ST depressions


10.  Altered mental status, currently appears improved


11.  Debility





PLAN


Patient difficult to get a clear history from however appears she has been 

having chest pain off and on most the day.  We will check repeat troponin to 

ensure no significant change.  Patient does have some mild EKG changes and 

monitor patient's chest pain.  She does have significant risk factors including 

known mild to moderate CAD.  Majority of presentation appears related to 

complications from COVID-19 infection.  Pulmonary recommendations appreciated.  

Continue with home diuretics and attempt to optimize heart failure regimen as 

able.  Prognosis guarded.











Past Medical History


Past Medical History: Atrial Fibrillation, Coronary Artery Disease (CAD), Heart 

Failure, COPD, Eye Disorder, GERD/Reflux, GI Bleed, Hearing Disorder / Deafness,

Hyperlipidemia, Hypertension, Myocardial Infarction (MI), Osteoarthritis (OA), 

Pneumonia, Renal Disease, Respiratory Disorder, Skin Disorder


Additional Past Medical History / Comment(s): Covid+ 21 at [facility].   Pt 

diagnosed 21 with pharyngitis and is on antibiotic, nonischemic 

cardiomyopathy, pulmonary HTN, bronchitis, O2 at 2L/HS, gastritis, diverticular 

disease, [place]/aides bilaterally, bilateral eye glaucoma, arthritis bilateral 

hands, CKD, rosacia, past lumbar slipped disc/pain/improved now.


Last Myocardial Infarction Date:: 


History of Any Multi-Drug Resistant Organisms: None Reported


Past Surgical History: Adenoidectomy, Appendectomy, Heart Catheterization, 

Hysterectomy, Orthopedic Surgery, Tonsillectomy


Additional Past Surgical History / Comment(s): 2017 cardiac cath/treated 

medically, partial hysterectomy/still has part of L ovary, R knee 

arthroscopy/torn meniscus, EGD, colonoscopies, bilateral blepharoplasties, 

bilateral eye cataract removal/laser surgery for glaucoma.


Past Anesthesia/Blood Transfusion Reactions: No Reported Reaction


Additional Past Anesthesia/Blood Transfusion Reaction / Comment(s): .


Past Psychological History: Anxiety


Past Alcohol Use History: None Reported





- Past Family History


  ** Brother(s)


Additional Family Medical History / Comment(s): Patient had 3 brothers and one 

is living and 93 years of age and [place] with history of Alzheimer's dementia, 

coronary artery disease, CHF, A. fib, COPD.  Patient has 2 brothers that have 

passed one from alcoholism and one from a traumatic head injury from a fall.





  ** Sister(s)


Additional Family Medical History / Comment(s): Patient has one sister that 

of ovarian cancer.  Patient has one son that  from alcoholic complications.





  ** Father


Family Medical History: Cancer


Additional Family Medical History / Comment(s): Father  at age 77yrs of 

cancer which pt believes may have started in his throat.





  ** Mother


Family Medical History: Dementia


Additional Family Medical History / Comment(s): Mother  at age 95yrs.  She 

was very healthy most of her life- she had dementia at the very end of her life.





Medications and Allergies


                                Home Medications











 Medication  Instructions  Recorded  Confirmed  Type


 


Fluticasone/Salmeterol [Advair 1 puff INHALATION RT-BID@0900,2100 17 History





250-50 Diskus]    


 


Tiotropium 18 Mcg/Puff [Spiriva] 1 cap INHALATION RT-DAILY@0900 17 History


 


Ferrous Sulfate [Iron (65  mg PO DAILY@0900 18 History





Elemental)]    


 


Albuterol Inhaler [Ventolin Hfa 1 puff INHALATION RT-QID #1 gm 22

 Rx





Inhaler]    


 


Acetaminophen [Tylenol] 650 mg PO Q4H PRN 22 History


 


Albuterol Nebulized [Ventolin 2.5 mg INHALATION RT-Q6H PRN 22 

History





Nebulized]    


 


Amiodarone [Cordarone] 200 mg PO DAILY@0900 22 History


 


Apixaban [Eliquis] 2.5 mg PO BID@0900,22 History


 


Atorvastatin [Lipitor] 20 mg PO AS DIRECTED 22 History


 


Benzonatate [Tessalon Perles] 100 mg PO TID@0600,1400,22 

History


 


Cholecalciferol [Vitamin D3 (25 50 mcg PO DAILY@0900 22 History





Mcg = 1000 Iu)]    


 


Furosemide [Lasix] 20 mg PO BID@0600,1400 22 History


 


LORazepam [Ativan] 1 mg PO HS@22 History


 


Lactose-Reduced Food [Ensure Plus] 120 ml PO TID@0900,1300,22 History


 


Levofloxacin [Levaquin] 500 mg PO DAILY@1400 22 History


 


Melatonin 6 mg PO HS@22 History


 


Metoprolol Tartrate [Lopressor] 75 mg PO TID@0600,1400,22 

History


 


Sennosides-Docusate Sodium 1 tab PO DAILY@0900 22 History





[Senokot-S]    








                                    Allergies











Allergy/AdvReac Type Severity Reaction Status Date / Time


 


amoxicillin [From Augmentin] Allergy  Unknown Verified 22 13:30


 


cefuroxime axetil Allergy  Unknown Verified 22 13:30





[From Ceftin]     


 


clavulanic acid Allergy  Unknown Verified 22 13:30





[From Augmentin]     














Physical Exam


Vitals: 


                                   Vital Signs











  Temp Pulse Resp BP Pulse Ox


 


 22 14:43  97.4 F L  81  19  106/73  95


 


 22 07:36  97.4 F L  78  18  103/68  93 L


 


 22 05:30   82   106/63 


 


 22 01:22  96.9 F L  76  16  108/67  95








                                Intake and Output











 22





 06:59 14:59 22:59


 


Intake Total  420 


 


Output Total  1050 


 


Balance  -630 


 


Intake:   


 


  Oral  420 


 


Output:   


 


  Urine  1050 


 


Other:   


 


  Voiding Method  Indwelling Catheter 


 


  Weight 61 kg 61 kg 














Results





                                 22 07:25





                                 22 07:25


                                 Cardiac Enzymes











  22 Range/Units





  07:25 09:32 


 


AST  49 H   (13-35)  U/L


 


Lactate Dehydrogenase  299 H   (120-246)  U/L


 


Troponin I   0.038 H*  (0.000-0.034)  ng/mL








                                       CBC











  22 Range/Units





  07:25 


 


WBC  15.67 H  (4.50-10.00)  X 10*3/uL


 


RBC  3.67 L  (4.10-5.20)  X 10*6/uL


 


Hgb  11.1 L  (12.0-15.0)  g/dL


 


Hct  36.8 L  (37.2-46.3)  %


 


Plt Count  478 H  (140-440)  X 10*3/uL








                          Comprehensive Metabolic Panel











  22 Range/Units





  07:25 


 


Sodium  139  (135-145)  mmol/L


 


Potassium  4.4  (3.5-5.5)  mmol/L


 


Chloride  97  ()  mmol/L


 


Carbon Dioxide  29.6 H  (20.0-27.5)  mmol/L


 


BUN  31.7 H  (9.0-27.0)  mg/dL


 


Creatinine  0.9  (0.6-1.5)  mg/dL


 


Glucose  83  ()  mg/dL


 


Calcium  8.7  (8.7-10.3)  mg/dL


 


AST  49 H  (13-35)  U/L


 


ALT  100 H  (8-44)  U/L


 


Alkaline Phosphatase  95  ()  U/L


 


Total Protein  5.6 L  (6.2-8.2)  g/dL


 


Albumin  3.2 L  (3.8-4.9)  g/dL








                               Current Medications











Generic Name Dose Route Start Last Admin





  Trade Name Freq  PRN Reason Stop Dose Admin


 


Acetaminophen  650 mg  22 10:42  22 15:20





  Acetaminophen Tab 325 Mg Tab  PO   650 mg





  Q4H PRN   Administration





  MILD Pain  


 


Albuterol Sulfate  1 puff  22 12:00  22 19:14





  Albuterol Hfa Inhaler  INHALATION   1 puff





  RT-QID GOYO   Administration


 


Albuterol Sulfate  2 puff  22 10:42 





  Albuterol Hfa Inhaler  INHALATION  





  RT-Q6H PRN  





  Shortness Of Breath  


 


Amiodarone HCl  200 mg  22 09:00  22 08:36





  Amiodarone 200 Mg Tab  PO   200 mg





  DAILY@0900 GOYO   Administration


 


Apixaban  2.5 mg  22 12:00  22 08:35





  Apixaban 2.5 Mg Tablet  PO   2.5 mg





  BID GOYO   Administration





  Protocol  


 


Benzonatate  100 mg  22 14:00  22 15:15





  Benzonatate 100 Mg Cap  PO   100 mg





  TID@0600,1400,2200 GOYO   Administration


 


Budesonide/Formoterol Fumarate  2 puff  22 21:00  22 19:14





  Symbicort 80-4.5 Mcg Inhaler  INHALATION   2 puff





  RT-BID@0900,2100 GOYO   Administration


 


Cholecalciferol  50 mcg  22 09:00  22 08:36





  Cholecalciferol 25 Mcg (1000 Iu) Tablet  PO   50 mcg





  DAILY@0900 GOYO   Administration


 


Dexamethasone Sodium Phosphate  6 mg  22 12:30  22 08:36





  Dexamethasone Sod Phosphate 10 Mg/Ml 1 Ml Vial  IVP   6 mg





  DAILY GOYO   Administration


 


Ferrous Sulfate  325 mg  22 09:00  22 08:36





  Ferrous Sulfate 325 Mg Tab  PO   325 mg





  DAILY@0900 GOYO   Administration


 


Furosemide  40 mg  22 14:00  22 15:15





  Furosemide 40 Mg Tab  PO   40 mg





  BID@0600,1400 GOYO   Administration


 


Insulin Aspart  0 unit  22 21:00  22 17:27





  Insulin Aspart (Novolog) 100 Unit/Ml Vial  SQ   4 unit





  ACHS GOYO   Administration





  Protocol  


 


Levofloxacin  250 mg  22 14:00  22 15:15





  Levofloxacin 250 Mg Tab  PO   250 mg





  DAILY@1400 GOYO   Administration


 


Lorazepam  1 mg  22 21:00  22 21:11





  Lorazepam 1 Mg Tab  PO   1 mg





  HS@2100 GOYO   Administration


 


Melatonin  6 mg  22 21:00  22 21:11





  Melatonin 3 Mg Tablet  PO   6 mg





  HS@2100 GOYO   Administration


 


Metoprolol Tartrate  75 mg  22 14:00  22 15:15





  Metoprolol Tartrate 25 Mg Tab  PO   75 mg





  TID@0600,1400,2200 GOYO   Administration


 


Senna/Docusate Sodium  1 each  22 09:00  22 08:21





  Sennosides-Docusate Sodium 1 Each Tab  PO   1 each





  DAILY@0900 GOYO   Administration


 


Tiotropium Bromide  2 puff  22 08:00  22 08:09





  Tiotropium 2.5 Mcg Inhaler  INHALATION   2 puff





  RT-DAILY GOYO   Administration








                                Intake and Output











 22





 06:59 14:59 22:59


 


Intake Total  420 


 


Output Total  1050 


 


Balance  -630 


 


Intake:   


 


  Oral  420 


 


Output:   


 


  Urine  1050 


 


Other:   


 


  Voiding Method  Indwelling Catheter 


 


  Weight 61 kg 61 kg 








                                 Patient Weight











 22





 06:59


 


Weight 61 kg








                                        





                                 22 07:25 





                                 22 07:25

## 2022-02-12 NOTE — CT
EXAMINATION TYPE: CT chest angio for PE

 

DATE OF EXAM: 2/12/2022

 

COMPARISON: 1/7/2017

 

HISTORY: Shortness of breath

 

CT DLP: 215.7 mGycm

Automated exposure control for dose reduction was used.

 

CONTRAST: 

Performed with IV Contrast, patient injected with 80 mL of Isovue 370.

 

There are 3-D postprocess images.

 

There are mild-to-moderate bilateral pleural effusions. There is bilateral patchy airspace infiltrate
s in the posterior lung fields bilaterally. There is pulmonary emphysema. There is reticular intersti
tial density in both lungs. There is small left apical pneumothorax.

 

There is no mediastinal adenopathy. There are no hilar masses.

 

There is normal contrast opacification of the pulmonary arteries. There are no filling defects.

 

The thoracic spine is intact. There is no compression fracture. There is minor spur formation. Sternu
m is intact.

 

IMPRESSION:

 

Small left apical pneumothorax less than 3%.

No evidence of pulmonary embolism.

 

Pleural effusions and extensive pulmonary infiltrates probably related to combined pneumonia and torin
estive heart failure. Pulmonary infiltrates significantly increased compared to old exam.

## 2022-02-13 VITALS — RESPIRATION RATE: 15 BRPM

## 2022-02-13 LAB
GLUCOSE BLD-MCNC: 112 MG/DL (ref 75–99)
GLUCOSE BLD-MCNC: 198 MG/DL (ref 75–99)
GLUCOSE BLD-MCNC: 329 MG/DL (ref 75–99)
GLUCOSE BLD-MCNC: 94 MG/DL (ref 75–99)

## 2022-02-13 RX ADMIN — BENZONATATE SCH MG: 100 CAPSULE ORAL at 05:54

## 2022-02-13 RX ADMIN — METOPROLOL TARTRATE SCH MG: 25 TABLET, FILM COATED ORAL at 05:54

## 2022-02-13 RX ADMIN — Medication SCH MG: at 09:12

## 2022-02-13 RX ADMIN — ACETAMINOPHEN PRN MG: 325 TABLET, FILM COATED ORAL at 21:28

## 2022-02-13 RX ADMIN — TIOTROPIUM BROMIDE INHALATION SPRAY SCH PUFF: 3.12 SPRAY, METERED RESPIRATORY (INHALATION) at 07:35

## 2022-02-13 RX ADMIN — BENZONATATE SCH MG: 100 CAPSULE ORAL at 21:27

## 2022-02-13 RX ADMIN — BENZONATATE SCH MG: 100 CAPSULE ORAL at 13:55

## 2022-02-13 RX ADMIN — BUDESONIDE AND FORMOTEROL FUMARATE DIHYDRATE SCH: 80; 4.5 AEROSOL RESPIRATORY (INHALATION) at 19:46

## 2022-02-13 RX ADMIN — ALBUTEROL SULFATE SCH PUFF: 90 AEROSOL, METERED RESPIRATORY (INHALATION) at 11:20

## 2022-02-13 RX ADMIN — ALBUTEROL SULFATE SCH PUFF: 90 AEROSOL, METERED RESPIRATORY (INHALATION) at 15:45

## 2022-02-13 RX ADMIN — APIXABAN SCH MG: 2.5 TABLET, FILM COATED ORAL at 21:28

## 2022-02-13 RX ADMIN — APIXABAN SCH MG: 2.5 TABLET, FILM COATED ORAL at 09:12

## 2022-02-13 RX ADMIN — INSULIN ASPART SCH: 100 INJECTION, SOLUTION INTRAVENOUS; SUBCUTANEOUS at 11:42

## 2022-02-13 RX ADMIN — METOPROLOL TARTRATE SCH MG: 25 TABLET, FILM COATED ORAL at 21:28

## 2022-02-13 RX ADMIN — ALBUTEROL SULFATE SCH PUFF: 90 AEROSOL, METERED RESPIRATORY (INHALATION) at 07:35

## 2022-02-13 RX ADMIN — INSULIN ASPART SCH: 100 INJECTION, SOLUTION INTRAVENOUS; SUBCUTANEOUS at 22:15

## 2022-02-13 RX ADMIN — Medication SCH MCG: at 09:12

## 2022-02-13 RX ADMIN — FUROSEMIDE SCH MG: 40 TABLET ORAL at 05:54

## 2022-02-13 RX ADMIN — INSULIN ASPART SCH: 100 INJECTION, SOLUTION INTRAVENOUS; SUBCUTANEOUS at 07:27

## 2022-02-13 RX ADMIN — LEVOFLOXACIN SCH MG: 250 TABLET, FILM COATED ORAL at 13:55

## 2022-02-13 RX ADMIN — Medication SCH MG: at 21:27

## 2022-02-13 RX ADMIN — BUDESONIDE AND FORMOTEROL FUMARATE DIHYDRATE SCH PUFF: 80; 4.5 AEROSOL RESPIRATORY (INHALATION) at 07:35

## 2022-02-13 RX ADMIN — DOCUSATE SODIUM AND SENNOSIDES SCH EACH: 50; 8.6 TABLET ORAL at 09:12

## 2022-02-13 RX ADMIN — ALBUTEROL SULFATE SCH: 90 AEROSOL, METERED RESPIRATORY (INHALATION) at 19:46

## 2022-02-13 RX ADMIN — DEXTROSE SCH MG: 50 INJECTION, SOLUTION INTRAVENOUS at 09:43

## 2022-02-13 RX ADMIN — INSULIN ASPART SCH UNIT: 100 INJECTION, SOLUTION INTRAVENOUS; SUBCUTANEOUS at 17:08

## 2022-02-13 RX ADMIN — FUROSEMIDE SCH MG: 40 TABLET ORAL at 13:55

## 2022-02-13 RX ADMIN — AMIODARONE HYDROCHLORIDE SCH MG: 200 TABLET ORAL at 09:12

## 2022-02-13 RX ADMIN — METOPROLOL TARTRATE SCH MG: 25 TABLET, FILM COATED ORAL at 13:55

## 2022-02-13 NOTE — P.PN
Subjective


Progress Note Date: 02/13/22


HISTORY OF PRESENT ILLNESS


This is an 87-year-old female patient of Dr. Dailey and Dr. Pope with past 

medical history of chronic persistent atrial fibrillation, chronic systolic 

heart failure, pulmonary hypertension, COPD with chronic hypoxic respiratory 

failure on home O2 at 3 L as needed, hypertension, hyperlipidemia, chronic 

kidney disease stage III, generalized osteoarthritis.  A hat hospitalization 

early in January for acute hypoxic respiratory failure secondary to acute ex

acerbation of systolic heart failure, Covid 19 pneumonia and acute exacerbation 

of COPD and A. fib with RVR.  Patient was discharged home with home care.  She's

was subsequently admitted on January 10 for same diagnoses and was discharged to

[facility].  A is sent to us from [facility] due to confusion.  Patient states that 

she is coughing all the time and has shortness of breath.  She is on chronic oxy

gen therapy.


WBC 7.1, hemoglobin 9.1, platelet count 353.  Sodium 130, potassium 4.2, 

chloride 97, CO2 28, BUN 30 creatinine 1.45.  Glucose 140.  Calcium 8.1.  Total 

bilirubin 0.6, AST 31, ALT 17, alkaline phosphatase 76.  Troponin 0.035.  Pro-

calcitonin 0.13.  Urinalysis trace protein and negative for infection.  Urine 

drug screen positive for opiates and benzodiazepines.  Carotid virus PCR not d

etected.


Patient presented to [facility] emergency center and found to 

be afebrile, heart rate 95, blood pressure 96/53, pulse ox 93% on 3 L nasal 

cannula but subsequently pulse ox dropped to 89% on 4 L nasal cannula and 

patient was placed on high flow nasal cannula 15 L. EKG was atrial fibrillation 

heart rate of 90.  


Chest x-ray reveals worsening bilateral opacities consistent with COVID19 

infection progression on background mild cardiomegaly and chronic emphysematour 

and pulmonary fibrotic changes.


Echocardiogram 1/5/2022 revealed EF of 30-35%, mild aortic regurgitation, mo

derate mitral regurgitation, severe tricuspid regurgitation, severe pulmonary 

hypertension.


Patient seen today in the ER waiting for bed on CSD unit. She has been seen by 

pulmonary medicine and was cleared yesterday to return to [facility] prior to the 

drop in pulse ox.





2/8: Patient is seen today on the cardiac stepdown unit.  She is now on BiPAP 

with pulse ox 96%.  Patient's been afebrile, heart rate 96, blood pressure 

93/63.  Patient is seen and followed by pulmonary medicine and recommend madhu

nuing BiPAP as needed only otherwise nasal cannula, continue diuretics and 

Decadron.  Pulmonary has changed eliquis to heparin infusion.  They ordered 

lower extremity Dopplers yesterday were negative for DVT.


Repeat chest x-ray reveals findings similar to prior exam.  Correlate for 

pneumonia, possible pleural effusions, heart failure not excluded.  Underlying 

emphysema.





2/9: Patient is having urinary retention and required a straight cath 2 and now

has 500 ML's on bladder scan, Pierce catheter to be placed.  Patient has been 

afebrile, heart rate in the 80s and 90s, blood pressure 99/61, pulse ox 96% on 5

L nasal cannula.  Levaquin nebulizers and Tessalon Perles.  Oxygenation is 

improving.  Patient is followed closely by pulmonary medicine.  We'll plan to 

transfer the patient to [facility] with telemetry.





2/10: She is seen today on the [facility] floor.  She has been afebrile, heart rate

50-80, blood pressure 103/68.  Pulse ox is 91% on 6 L nasal cannula, patient was

on BiPAP during the night.  Repeat chest x-ray reveals COPD with continued 

bilateral interstitial and patchy infiltrates greater at the mid lungs.  There 

may be slight interval improvement.  Suspect trace pleural effusions. WBC is 

9.6, hemoglobin 10.6, platelet count 422.  D-dimer 2.81.  Sodium 135.  BUN 42 

creatinine 1.07.  Blood sugars running between 82 and 226.  , , 

alkaline phosphatase 107.  Lactic acid 853.  C-reactive protein 1.7.  Discharge 

plan is to return to [facility] possibly ready by tomorrow.





2/11: Patient is on O2 at 6 L nasal cannula with pulse ox of 93-95%.  Blood 

pressure remains on the lower side 86/47.  She has been afebrile, heart rate 88.

 Capillary blood glucose running between 91 and 264.  Repeat blood work ordered 

for tomorrow.  Patient denies new complaints.  Breathing status seems to be 

improving slowly.  Anticipate discharge back to [facility] on Monday.





2/12: Patient is found sitting up in bed anxious complaining of increase 

shortness of breath and chest discomfort.  Patient states that her wrist feels 

sore.  Patient has tenderness to the intercostal spacing.  Patient states that 

she feels like her heart is racing and she is unable to catch her breath.  

Patient finds that this is worse than yesterday.  EKG shows A. fib with RVR.  A 

CTA is ordered.  Troponin 3 ordered.  Patient is currently on a eliquis 2.5 

twice a day.  Patient is currently on 5 L nasal cannula 93%





2/13: Patient is found sitting up in bed.  She appears improved compared to 

yesterday.  No longer complaining of increased shortness of breath or chest 

discomfort.  Her chest CTA did show a left apical pneumothorax at less than 3%. 

Patient's first troponin was slightly elevated.  Second troponin was within 

normal limits.  Patient is anxious to return to [facility] on Monday.  She is 

currently on 3 L of oxygen via nasal cannula pulse pulse ox of 94%.  Patient 

remains afebrile, heart rate 73 respirations 16 blood pressure 98/61.





REVIEW OF SYSTEMS


Constitutional: No fever, no chills, no night sweats.  No weight change.  Noted 

weakness, noted fatigue noted lethargy.  No daytime sleepiness.


EENT: No headache.  No blurred vision or double vision, no loss of vision.  No 

loss of Hearing, no ringing in the ears, no dizziness.  No nasal drainage or 

congestion.  No epistaxis.  No sore throat.


Lungs: Reports shortness of breathworsening reports cough, no sputum prod

uction.  No wheezing.


Cardiovascular: Reports chest pain, no lower extremity edema.  Reports palpita

tions.  No paroxysmal nocturnal dyspnea.  No orthopnea.  No lightheadedness or 

dizziness.  No syncopal episodes.


Abdominal: No abdominal pain.  No nausea, vomiting.  No diarrhea.  No 

constipation.  No bloody or tarry stools.  Reported loss of appetite.


Genitourinary: No dysuria, increased frequency, urgency.  No urinary retention.


Musculoskeletal: No myalgias.  Reports muscle weakness, reports gait 

dysfunction, no frequent falls.  No back pain.  No neck pain.


Integumentary: No wounds, no lesions.  No rash or pruritus.  No unusual 

bruising.  No change in hair or nails.


Neurologic: No aphasia. No facial droop. Reported change in mentation. No head 

injury. No headache. No paralysis. No paresthesia.


Psychiatric: No depression.  Reports anxiety.  No mood swings.


Endocrine: Noted mildly abnormal blood sugars.  No weight change.  No excessive 

sweating or thirst.  No cold intolerance.  





PHYSICAL EXAMINATION


Gen: This is a thin 87-year-old  female.  She is resting in bed, 

anxious in moderate distress.  She is currently on 6 L nasal cannula. 


HEENT: Head is atraumatic, normocephalic. Pupils equal, round. Sclerae is 

anicteric. 


NECK: Supple. No JVD. No lymphadenopathy. No thyromegaly. 


LUNGS: Scattered crackles.  No wheezing.  No intercostal retractions.  

Tenderness to intercostal spacing.


HEART: Irregularly irregular rate and rhythm. No murmur. 


ABDOMEN: Soft. Bowel sounds are present. No masses.  No tenderness.


EXTREMITIES: No pedal edema.  No calf tenderness.  Dorsalis pedis +2 

bilaterally.


NEUROLOGICAL: Patient is awake, alert and oriented x3. Cranial nerves 2 through 

12 are grossly intact. 





ASSESSMENT AND PLAN


1.  Acute on chronic hypoxic respiratory failure secondary to a residual Covid 

19 pneumonia on top of chronic systolic heart failure, COPD, pulmonary fibrosis.

 Continue oxygen therapy, Currently on BiPAP at night and 5 L nasal cannula.  

CTA impression: Small left apical pneumothorax less than 3%, no evidence of 

pulmonary embolism, pleural effusions and extensive pulmonary infiltrates 

probably related to combined pneumonia and congestive heart failure.  Pulmonary 

infiltrates equivocally decreased compared to EXAM.  Second set of troponins 

within normal limits.  Cardiology consult appreciated.





2.  Chronic systolic heart failure.  Continue Lasix 40 mg oral twice daily.  





3.  Possible Covid 19 pneumonia.  Pulmonary consult appreciated.  Patient's been

started on Ventolin inhaler 4 times daily, Symbicort 2 puffs twice daily, 

dexamethasone 6 mgoral daily, Spiriva, Tessalon Perles.





4.  COPD without exacerbation.  Continue Symbicort, Spiriva, albuterol, 

dexamethasone.





5.  Chronic persistent atrial fibrillation.  Continue amiodarone 200 mg daily, 

Lopressor 75 mg 3 times daily, eliquis 2.5 mg twice daily





6.  Anemia of chronic disease.  Continue to monitor.





7.  Hypertension.  Continue Lopressor.





8.  Hyperlipidemia.  Continue Lipitor 20 mg at bedtime.





9.  Chronic kidney disease stage III.  Monitor renal function, avoid nephrotoxic

agents.





10.  Valvular heart disease with  mild aortic regurgitation, moderate mitral 

regurgitation, severe tricuspid regurgitation.





11.  Severe pulmonary hypertension.





12.  A. fib with RVR.  Consult cardiology.  Patient is currently on eliquis. 





13.  GI prophylaxis.  Protonix.





14.  DVT prophylaxis.  Eliquis.





15.  COVID-19 testing negative.  Patient has been hospitalized during a 

pandemic.





DISCHARGE PLAN


Return to [facility] on Monday.





Impression and plan of care have been directed as dictated by the signing 

physician.  Jeniffer Platt nurse practitioner acting as scribe for signing 

physician.








Objective





- Vital Signs


Vital signs: 


                                   Vital Signs











Temp  97.5 F L  02/13/22 08:00


 


Pulse  73   02/13/22 08:00


 


Resp  16   02/13/22 08:00


 


BP  98/61   02/13/22 08:00


 


Pulse Ox  94 L  02/13/22 08:00








                                 Intake & Output











 02/12/22 02/13/22 02/13/22





 18:59 06:59 18:59


 


Intake Total 420  


 


Output Total 1050 800 


 


Balance -630 -800 


 


Weight 61 kg  


 


Intake:   


 


  Oral 420  


 


Output:   


 


  Urine 1050 800 


 


Other:   


 


  Voiding Method Indwelling Catheter Indwelling Catheter Indwelling Catheter


 


  # Bowel Movements  1 














- Labs


CBC & Chem 7: 


                                 02/12/22 07:25





                                 02/12/22 07:25


Labs: 


                  Abnormal Lab Results - Last 24 Hours (Table)











  02/12/22 02/12/22 02/12/22 Range/Units





  07:25 07:25 09:32 


 


WBC  15.67 H    (4.50-10.00)  X 10*3/uL


 


RBC  3.67 L    (4.10-5.20)  X 10*6/uL


 


Hgb  11.1 L    (12.0-15.0)  g/dL


 


Hct  36.8 L    (37.2-46.3)  %


 


MCV  100.3 H    (80.0-97.0)  fL


 


MCHC  30.2 L    (32.0-37.0)  g/dL


 


RDW  16.6 H    (11.5-14.5)  %


 


Plt Count  478 H    (140-440)  X 10*3/uL


 


MPV  9.2 L    (9.5-12.2)  fL


 


Carbon Dioxide   29.6 H   (20.0-27.5)  mmol/L


 


BUN   31.7 H   (9.0-27.0)  mg/dL


 


Est GFR (CKD-EPI)NonAf   54.2 L   (60.0-200.0)   


 


BUN/Creatinine Ratio   33.54 H   (12.00-20.00)  Ratio


 


POC Glucose (mg/dL)     (75-99)  mg/dL


 


AST   49 H   (13-35)  U/L


 


ALT   100 H   (8-44)  U/L


 


Lactate Dehydrogenase   299 H   (120-246)  U/L


 


Troponin I    0.038 H*  (0.000-0.034)  ng/mL


 


Total Protein   5.6 L   (6.2-8.2)  g/dL


 


Albumin   3.2 L   (3.8-4.9)  g/dL


 


Albumin/Globulin Ratio   1.35 L   (1.60-3.17)  g/dL














  02/12/22 02/12/22 02/12/22 Range/Units





  11:01 16:24 18:56 


 


WBC     (4.50-10.00)  X 10*3/uL


 


RBC     (4.10-5.20)  X 10*6/uL


 


Hgb     (12.0-15.0)  g/dL


 


Hct     (37.2-46.3)  %


 


MCV     (80.0-97.0)  fL


 


MCHC     (32.0-37.0)  g/dL


 


RDW     (11.5-14.5)  %


 


Plt Count     (140-440)  X 10*3/uL


 


MPV     (9.5-12.2)  fL


 


Carbon Dioxide     (20.0-27.5)  mmol/L


 


BUN     (9.0-27.0)  mg/dL


 


Est GFR (CKD-EPI)NonAf     (60.0-200.0)   


 


BUN/Creatinine Ratio     (12.00-20.00)  Ratio


 


POC Glucose (mg/dL)  120 H  173 H  231 H  (75-99)  mg/dL


 


AST     (13-35)  U/L


 


ALT     (8-44)  U/L


 


Lactate Dehydrogenase     (120-246)  U/L


 


Troponin I     (0.000-0.034)  ng/mL


 


Total Protein     (6.2-8.2)  g/dL


 


Albumin     (3.8-4.9)  g/dL


 


Albumin/Globulin Ratio     (1.60-3.17)  g/dL














  02/12/22 Range/Units





  20:30 


 


WBC   (4.50-10.00)  X 10*3/uL


 


RBC   (4.10-5.20)  X 10*6/uL


 


Hgb   (12.0-15.0)  g/dL


 


Hct   (37.2-46.3)  %


 


MCV   (80.0-97.0)  fL


 


MCHC   (32.0-37.0)  g/dL


 


RDW   (11.5-14.5)  %


 


Plt Count   (140-440)  X 10*3/uL


 


MPV   (9.5-12.2)  fL


 


Carbon Dioxide   (20.0-27.5)  mmol/L


 


BUN   (9.0-27.0)  mg/dL


 


Est GFR (CKD-EPI)NonAf   (60.0-200.0)   


 


BUN/Creatinine Ratio   (12.00-20.00)  Ratio


 


POC Glucose (mg/dL)  227 H  (75-99)  mg/dL


 


AST   (13-35)  U/L


 


ALT   (8-44)  U/L


 


Lactate Dehydrogenase   (120-246)  U/L


 


Troponin I   (0.000-0.034)  ng/mL


 


Total Protein   (6.2-8.2)  g/dL


 


Albumin   (3.8-4.9)  g/dL


 


Albumin/Globulin Ratio   (1.60-3.17)  g/dL

## 2022-02-13 NOTE — P.PN
Subjective





HISTORY OF PRESENTING ILLNESS


his is a 87-year-old female with a past medical history significant for chronic 

persistent atrial fibrillation, congestive heart failure, NICMP, hypertension, 

and hyperlipidemia and recent COVID 19 infection. Patient follows in the office 

with Dr. Pope.  Patient has a known history of nonischemic artery myopathy with 

EF 30-35% with prior heart catheterization from 2017 showing only mild to 

moderate disease with FFR of the LAD being normal.





She presented to/7/2022 secondary to apparent altered mental status and a

dditionally chronic respiratory failure.  She did have COVID-19 in January 2022 

and unfortunately has had complications from that time with respiratory failure 

and oxygen dependence.  She has been at subacute rehab at [facility].  

She was admitted to the hospital and chest x-ray showed pulmonary infiltrates 

and was placed on antibiotics as well as home diuretics.  She has been 

hospitalized for approximately 6 days and then started complaining of chest 

discomfort this morning.  Repeat EKG was performed which did show some ST 

depressions in the lateral leads.  Troponin was drawn which was abnormal at 

0.038 however this appeared similar to prior from 02/06/2022 of 0.035 and even 

improved from prior 0.08 from hospitalization in January.  She has been on 

amiodarone as well as Eliquis 2.5 mg twice a day dosing and oral Lasix 40 mg 

twice a day, with metoprolol 75 mg 3 times a day.  CTA was performed today which

showed small left apical pneumothorax less than 3%, no pulmonary embolism, 

pleural effusions and extensive pulmonary infiltrates likely related to 

pneumonia and congestive heart failure with pulmonary infiltrates significantly 

increased compared to old exam.








01/6/22 Echocardiogram completed revealing ejection fraction 30-35%, mild aortic

regurgitation, moderate mitral regurgitation, severe tricuspid regurgitation, 

and severe pulmonary hypertension





2/13


Patient seen and examined.  Patient denies any further chest pain.  States she 

is still feeling "sick" and short of breath.  States she has little appetite.





PHYSICAL EXAMINATION


Vital signs reviewed.


CONSTITUTIONAL: No apparent distress, chronically ill appearing


HEENT: Head is normocephalic. Pupils are equal, round. Sclerae anicteric. Mucous

membranes of the mouth are moist.  No JVD. No carotid bruit.


CHEST EXAMINATION: Bilateral rhonchi


HEART EXAMINATION: Irregular rate and rhythm. S1, S2 heard. +2/6 BAHMAN, no gallops

or rub.


ABDOMEN: Soft, nontender. Positive bowel sounds.


EXTREMITIES: 2+ peripheral pulses, no lower extremity edema and no calf 

tenderness.


NEUROLOGIC EXAMINATION: Patient is awake, alert and oriented x3. 





ASSESSMENT


1.  Precordial chest pain may be related to pneumonia versus rule out cardiac 

source


2.  Elevated troponin, previously mildly elevated.  Continue to trend


3.  Acute on chronic respiratory failure mainly related to COVID-19 

infection/residual lung injury plus possible and pneumonia.  Possible component 

of heart failure however appears euvolemic


4.  Chronic systolic heart failure


5.  Nonischemic cardiomyopathy EF 30-35%


6.  Small apical pneumothorax


7.  Mild to moderate coronary artery disease by heart catheterization 2017


8.  Chronic atrial fibrillation with controlled ventricular rates


9.  Abnormal EKG with ST depressions


10.  Altered mental status, currently appears improved


11.  Debility





PLAN


Repeat troponin noted to be lower in stable and suspect patient's chest pain 

noncardiac related to her residual lung injury from Covid.  She is on her home 

dose of Lasix 40 mg oral twice a day.  Appears euvolemic.  Heart rates well 

controlled on metoprolol 75 mg 3 times a day.  Continue supportive care. Attempt

to optimize heart failure regimen as able however BP borderline.  Prognosis 

guarded.





Objective





- Vital Signs


Vital signs: 


                                   Vital Signs











Temp  97.7 F   02/13/22 14:00


 


Pulse  76   02/13/22 14:00


 


Resp  18   02/13/22 14:00


 


BP  92/60   02/13/22 14:00


 


Pulse Ox  92 L  02/13/22 14:00








                                 Intake & Output











 02/12/22 02/13/22 02/13/22





 18:59 06:59 18:59


 


Intake Total 420  


 


Output Total 1050 800 800


 


Balance -630 -800 -800


 


Weight 61 kg  


 


Intake:   


 


  Oral 420  


 


Output:   


 


  Urine 1050 800 800


 


Other:   


 


  Voiding Method Indwelling Catheter Indwelling Catheter Indwelling Catheter


 


  # Bowel Movements  1 














- Labs


CBC & Chem 7: 


                                 02/12/22 07:25





                                 02/12/22 07:25


Labs: 


                  Abnormal Lab Results - Last 24 Hours (Table)











  02/12/22 02/12/22 02/13/22 Range/Units





  18:56 20:30 11:29 


 


POC Glucose (mg/dL)  231 H  227 H  112 H  (75-99)  mg/dL

## 2022-02-14 VITALS — HEART RATE: 82 BPM | DIASTOLIC BLOOD PRESSURE: 73 MMHG | TEMPERATURE: 97.6 F | SYSTOLIC BLOOD PRESSURE: 114 MMHG

## 2022-02-14 LAB
GLUCOSE BLD-MCNC: 114 MG/DL (ref 75–99)
GLUCOSE BLD-MCNC: 140 MG/DL (ref 75–99)
GLUCOSE BLD-MCNC: 142 MG/DL (ref 75–99)
GLUCOSE BLD-MCNC: 58 MG/DL (ref 75–99)

## 2022-02-14 RX ADMIN — Medication SCH MCG: at 09:40

## 2022-02-14 RX ADMIN — DOCUSATE SODIUM AND SENNOSIDES SCH EACH: 50; 8.6 TABLET ORAL at 09:40

## 2022-02-14 RX ADMIN — ALBUTEROL SULFATE SCH PUFF: 90 AEROSOL, METERED RESPIRATORY (INHALATION) at 12:46

## 2022-02-14 RX ADMIN — METOPROLOL TARTRATE SCH MG: 25 TABLET, FILM COATED ORAL at 05:50

## 2022-02-14 RX ADMIN — FUROSEMIDE SCH MG: 40 TABLET ORAL at 05:50

## 2022-02-14 RX ADMIN — INSULIN ASPART SCH: 100 INJECTION, SOLUTION INTRAVENOUS; SUBCUTANEOUS at 09:37

## 2022-02-14 RX ADMIN — ALBUTEROL SULFATE SCH PUFF: 90 AEROSOL, METERED RESPIRATORY (INHALATION) at 09:35

## 2022-02-14 RX ADMIN — TIOTROPIUM BROMIDE INHALATION SPRAY SCH PUFF: 3.12 SPRAY, METERED RESPIRATORY (INHALATION) at 09:35

## 2022-02-14 RX ADMIN — METOPROLOL TARTRATE SCH MG: 25 TABLET, FILM COATED ORAL at 14:57

## 2022-02-14 RX ADMIN — AMIODARONE HYDROCHLORIDE SCH MG: 200 TABLET ORAL at 09:40

## 2022-02-14 RX ADMIN — BENZONATATE SCH MG: 100 CAPSULE ORAL at 05:50

## 2022-02-14 RX ADMIN — FUROSEMIDE SCH MG: 40 TABLET ORAL at 14:57

## 2022-02-14 RX ADMIN — ACETAMINOPHEN PRN MG: 325 TABLET, FILM COATED ORAL at 03:30

## 2022-02-14 RX ADMIN — BENZONATATE SCH MG: 100 CAPSULE ORAL at 14:57

## 2022-02-14 RX ADMIN — LEVOFLOXACIN SCH MG: 250 TABLET, FILM COATED ORAL at 14:57

## 2022-02-14 RX ADMIN — Medication SCH MG: at 09:40

## 2022-02-14 RX ADMIN — APIXABAN SCH MG: 2.5 TABLET, FILM COATED ORAL at 09:40

## 2022-02-14 RX ADMIN — BUDESONIDE AND FORMOTEROL FUMARATE DIHYDRATE SCH PUFF: 80; 4.5 AEROSOL RESPIRATORY (INHALATION) at 09:35

## 2022-02-14 RX ADMIN — DEXTROSE SCH MG: 50 INJECTION, SOLUTION INTRAVENOUS at 09:40

## 2022-02-14 RX ADMIN — INSULIN ASPART SCH: 100 INJECTION, SOLUTION INTRAVENOUS; SUBCUTANEOUS at 12:11

## 2022-02-14 NOTE — P.PN
Subjective


Progress Note Date: 02/14/22





HISTORY OF PRESENT ILLNESS: 





This is a 87-year-old female with a past medical history significant for chronic

persistent atrial fibrillation, congestive heart failure, NICMP, hypertension, 

and hyperlipidemia and recent COVID 19 infection. Patient follows in the office 

with Dr. Pope.  Patient has a known history of nonischemic artery myopathy with 

EF 30-35% with prior heart catheterization from 2017 showing only mild to 

moderate disease with FFR of the LAD being normal.





She presented to/7/2022 secondary to apparent altered mental status and 

additionally chronic respiratory failure.  She did have COVID-19 in January 2022

and unfortunately has had complications from that time with respiratory failure 

and oxygen dependence.  She has been at subacute rehab at [facility].  

She was admitted to the hospital and chest x-ray showed pulmonary infiltrates 

and was placed on antibiotics as well as home diuretics.  She has been 

hospitalized for approximately 6 days and then started complaining of chest 

discomfort this morning.  Repeat EKG was performed which did show some ST 

depressions in the lateral leads.  Troponin was drawn which was abnormal at 

0.038 however this appeared similar to prior from 02/06/2022 of 0.035 and even 

improved from prior 0.08 from hospitalization in January.  She has been on 

amiodarone as well as Eliquis 2.5 mg twice a day dosing and oral Lasix 40 mg 

twice a day, with metoprolol 75 mg 3 times a day.  CTA was performed today which

showed small left apical pneumothorax less than 3%, no pulmonary embolism, 

pleural effusions and extensive pulmonary infiltrates likely related to 

pneumonia and congestive heart failure with pulmonary infiltrates significantly 

increased compared to old exam.





01/6/22 Echocardiogram completed revealing ejection fraction 30-35%, mild aortic

regurgitation, moderate mitral regurgitation, severe tricuspid regurgitation, 

and severe pulmonary hypertension





2/13


Patient seen and examined.  Patient denies any further chest pain.  States she 

is still feeling "sick" and short of breath.  States she has little appetite.





2/14/2022


Patient examined this afternoon at the bedside.  Patient denies chest pain or 

pressure.  She denies shortness of breath.  She states that she does not feel 

well.  She reports generalized discomfort.  Vital signs are stable.





PHYSICAL EXAM: 


VITAL SIGNS: Reviewed.


GENERAL: Well-developed in no acute distress. 


NECK: Supple. No JVD or thyromegaly


LUNGS: Respirations even and unlabored. Lungs diminished with bilateral rhonchi


HEART: Irregular rate and rhythm.  S1 and S2 heard. Systolic murmur noted.


EXTREMITIES: Normal range of motion.  No clubbing or cyanosis.  Peripheral 

pulses intact.  No lower extremity edema





ASSESSMENT: 


1.  Precordial chest pain may be related to pneumonia versus rule out cardiac 

source


2.  Elevated troponin, previously mildly elevated.  Continue to trend


3.  Acute on chronic respiratory failure mainly related to COVID-19 

infection/residual lung injury plus possible and pneumonia.  Possible component 

of heart failure however appears euvolemic


4.  Chronic systolic heart failure


5.  Nonischemic cardiomyopathy EF 30-35%


6.  Small apical pneumothorax


7.  Mild to moderate coronary artery disease by heart catheterization 2017


8.  Chronic atrial fibrillation with controlled ventricular rates


9.  Abnormal EKG with ST depressions


10.  Altered mental status, currently appears improved


11.  Debility





PLAN: 


Continue current cardiac medications


Patient is stable for discharge today from a cardiac standpoint


We will sign off.  Please reconsult if needed.








Nurse practitioner note has been reviewed by physician. Signing provider agrees 

with the documented findings, assessment, and plan of care. 








Objective





- Vital Signs


Vital signs: 


                                   Vital Signs











Temp  97.6 F   02/14/22 05:24


 


Pulse  82   02/14/22 07:48


 


Resp  15   02/14/22 07:48


 


BP  114/73   02/14/22 05:24


 


Pulse Ox  96   02/14/22 05:24








                                 Intake & Output











 02/13/22 02/14/22 02/14/22





 18:59 06:59 18:59


 


Output Total 1475 600 


 


Balance -1475 -600 


 


Output:   


 


  Urine 1475 600 


 


Other:   


 


  Voiding Method Indwelling Catheter Indwelling Catheter Indwelling Catheter


 


  # Bowel Movements  1 














- Labs


CBC & Chem 7: 


                                 02/12/22 07:25





                                 02/12/22 07:25


Labs: 


                  Abnormal Lab Results - Last 24 Hours (Table)











  02/13/22 02/13/22 02/13/22 Range/Units





  11:29 16:41 19:49 


 


POC Glucose (mg/dL)  112 H  198 H  329 H  (75-99)  mg/dL














  02/14/22 02/14/22 02/14/22 Range/Units





  00:29 00:48 08:58 


 


POC Glucose (mg/dL)  58 L  114 H  140 H  (75-99)  mg/dL

## 2022-02-14 NOTE — P.DS
Providers


Date of admission: 


02/06/22 14:47





Expected date of discharge: 02/14/22


Attending physician: 


Gamaliel Kearns MD





Consults: 





                                        





02/06/22 14:46


Consult Physician Urgent 


   Consulting Provider: Monique Portillo


   Consult Reason/Comments: COVID pneumonia


   Do you want consulting provider notified?: Yes





02/12/22 09:02


Consult Physician Routine 


   Consulting Provider: Dre Mar


   Consult Reason/Comments: Afib with RVR


   Do you want consulting provider notified?: Yes











Primary care physician: 


[facility] Course: 





HISTORY OF PRESENT ILLNESS


This is an 87-year-old female patient of Dr. Dailey and Dr. Pope with past 

medical history of chronic persistent atrial fibrillation, chronic systolic 

heart failure, pulmonary hypertension, COPD with chronic hypoxic respiratory 

failure on home O2 at 3 L as needed, hypertension, hyperlipidemia, chronic 

kidney disease stage III, generalized osteoarthritis.  A hat hospitalization 

early in January for acute hypoxic respiratory failure secondary to acute 

exacerbation of systolic heart failure, Covid 19 pneumonia and acute 

exacerbation of COPD and A. fib with RVR.  Patient was discharged home with home

care.  She's was subsequently admitted on January 10 for same diagnoses and was 

discharged to [facility].  A is sent to us from [facility] due to confusion.  Patient 

states that she is coughing all the time and has shortness of breath.  She is on

chronic oxygen therapy.


WBC 7.1, hemoglobin 9.1, platelet count 353.  Sodium 130, potassium 4.2, 

chloride 97, CO2 28, BUN 30 creatinine 1.45.  Glucose 140.  Calcium 8.1.  Total 

bilirubin 0.6, AST 31, ALT 17, alkaline phosphatase 76.  Troponin 0.035.  Pro-

calcitonin 0.13.  Urinalysis trace protein and negative for infection.  Urine 

drug screen positive for opiates and benzodiazepines.  Carotid virus PCR not 

detected.


Patient presented to [facility] emergency center and found to 

be afebrile, heart rate 95, blood pressure 96/53, pulse ox 93% on 3 L nasal 

cannula but subsequently pulse ox dropped to 89% on 4 L nasal cannula and 

patient was placed on high flow nasal cannula 15 L. EKG was atrial fibrillation 

heart rate of 90.  


Chest x-ray reveals worsening bilateral opacities consistent with COVID19 

infection progression on background mild cardiomegaly and chronic emphysematour 

and pulmonary fibrotic changes.


Echocardiogram 1/5/2022 revealed EF of 30-35%, mild aortic regurgitation, 

moderate mitral regurgitation, severe tricuspid regurgitation, severe pulmonary 

hypertension.


Patient seen today in the ER waiting for bed on CSD unit. She has been seen by 

pulmonary medicine and was cleared yesterday to return to [facility] prior to the 

drop in pulse ox.





2/8: Patient is seen today on the cardiac stepdown unit.  She is now on BiPAP 

with pulse ox 96%.  Patient's been afebrile, heart rate 96, blood pressure 

93/63.  Patient is seen and followed by pulmonary medicine and recommend 

continuing BiPAP as needed only otherwise nasal cannula, continue diuretics and 

Decadron.  Pulmonary has changed eliquis to heparin infusion.  They ordered 

lower extremity Dopplers yesterday were negative for DVT.


Repeat chest x-ray reveals findings similar to prior exam.  Correlate for 

pneumonia, possible pleural effusions, heart failure not excluded.  Underlying 

emphysema.





2/9: Patient is having urinary retention and required a straight cath 2 and now

has 500 ML's on bladder scan, Pierce catheter to be placed.  Patient has been 

afebrile, heart rate in the 80s and 90s, blood pressure 99/61, pulse ox 96% on 5

L nasal cannula.  Levaquin nebulizers and Tessalon Perles.  Oxygenation is 

improving.  Patient is followed closely by pulmonary medicine.  We'll plan to 

transfer the patient to [facility] with telemetry.





2/10: She is seen today on the [facility] floor.  She has been afebrile, heart rate

50-80, blood pressure 103/68.  Pulse ox is 91% on 6 L nasal cannula, patient was

on BiPAP during the night.  Repeat chest x-ray reveals COPD with continued mariana

ateral interstitial and patchy infiltrates greater at the mid lungs.  There may 

be slight interval improvement.  Suspect trace pleural effusions. WBC is 9.6, 

hemoglobin 10.6, platelet count 422.  D-dimer 2.81.  Sodium 135.  BUN 42 

creatinine 1.07.  Blood sugars running between 82 and 226.  , , 

alkaline phosphatase 107.  Lactic acid 853.  C-reactive protein 1.7.  Discharge 

plan is to return to [facility] possibly ready by tomorrow.





2/11: Patient is on O2 at 6 L nasal cannula with pulse ox of 93-95%.  Blood 

pressure remains on the lower side 86/47.  She has been afebrile, heart rate 88.

 Capillary blood glucose running between 91 and 264.  Repeat blood work ordered 

for tomorrow.  Patient denies new complaints.  Breathing status seems to be 

improving slowly.  Anticipate discharge back to [facility] on Monday.





2/12: Patient is found sitting up in bed anxious complaining of increase 

shortness of breath and chest discomfort.  Patient states that her wrist feels 

sore.  Patient has tenderness to the intercostal spacing.  Patient states that 

she feels like her heart is racing and she is unable to catch her breath.  

Patient finds that this is worse than yesterday.  EKG shows A. fib with RVR.  A 

CTA is ordered.  Troponin 3 ordered.  Patient is currently on a eliquis 2.5 

twice a day.  Patient is currently on 5 L nasal cannula 93%





2/13: Patient is found sitting up in bed.  She appears improved compared to 

yesterday.  No longer complaining of increased shortness of breath or chest 

discomfort.  Her chest CTA did show a left apical pneumothorax at less than 3%. 

Patient's first troponin was slightly elevated.  Second troponin was within 

normal limits.  Patient is anxious to return to [facility] on Monday.  She is 

currently on 3 L of oxygen via nasal cannula pulse pulse ox of 94%.  Patient 

remains afebrile, heart rate 73 respirations 16 blood pressure 98/61.





2/14: Patient is currently on 5 L nasal cannula pulse ox of 96%, plan to wean 

this down and her baseline is at 3 L.  She's been afebrile, heart rate in the 

80s, blood pressure 114/73.  Capillary blood glucose running between 5842.  Note

the patient was 329 last evening.  Repeat chest x-ray reveals COPD correlate for

superimposed heart failure and patchy pulmonary edema.  Patchy opacities are 

slightly increased in the upper lungs.  Unable to exclude 4 mm left apical 

pneumothorax versus projectional artifact.  Continue small pleural effusions 

with adjacent atelectasis and/or consolidation.  Cardiology has been following. 

Patient is complaining of pain from the Pierce catheter which will have 

discontinued prior to her discharge.  Patient will be discharged back to [facility]

today in stable condition.








DISCHARGE DIAGNOSES


1.  Acute on chronic hypoxic respiratory failure secondary to a residual Covid 

19 pneumonia on top of chronic systolic heart failure, COPD, pulmonary fibrosis.

 


2.  Chronic systolic heart failure.  


3.  Possible Covid 19 pneumonia.  


4.  COPD without exacerbation.  


5.  Chronic persistent atrial fibrillation.  


6.  Anemia of chronic disease.  


7.  Hypertension.  


8.  Hyperlipidemia.  


9.  Chronic kidney disease stage III.  


10.  Valvular heart disease with  mild aortic regurgitation, moderate mitral 

regurgitation, severe tricuspid regurgitation.


11.  Severe pulmonary hypertension.


12.  A. fib with RVR.  








DISCHARGE PLAN


Return to [facility] 


Greater than 35 minutes was utilized and coordinating patient's discharge.


Impression and plan of care have been directed as dictated by the signing 

physician.  Carol Hicks nurse practitioner acting as scribe for signing 

physician.





Patient Condition at Discharge: Stable





Plan - Discharge Summary


Discharge Rx Participant: No


New Discharge Prescriptions: 


New


   Levofloxacin [Levaquin] 250 mg PO DAILY@1400 #3 tab





Continue


   Fluticasone/Salmeterol [Advair 250-50 Diskus] 1 puff INHALATION RT-

BID@0900,2100


   Tiotropium 18 Mcg/Puff [Spiriva] 1 cap INHALATION RT-DAILY@0900


   Ferrous Sulfate [Iron (65 MG Elemental)] 325 mg PO DAILY@0900


   Albuterol Inhaler [Ventolin Hfa Inhaler] 1 puff INHALATION RT-QID #1 gm


   Benzonatate [Tessalon Perles] 100 mg PO TID@0600,1400,2200


   Albuterol Nebulized [Ventolin Nebulized] 2.5 mg INHALATION RT-Q6H PRN


     PRN Reason: Shortness Of Breath


   Lactose-Reduced Food [Ensure Plus] 120 ml PO TID@0900,1300,2000


   Cholecalciferol [Vitamin D3 (25 Mcg = 1000 Iu)] 50 mcg PO DAILY@0900


   Apixaban [Eliquis] 2.5 mg PO BID@0900,2100


   Amiodarone [Cordarone] 200 mg PO DAILY@0900


   Acetaminophen [Tylenol] 650 mg PO Q4H PRN


     PRN Reason: Pain


   Metoprolol Tartrate [Lopressor] 75 mg PO TID@0600,1400,2200


   Furosemide [Lasix] 20 mg PO BID@0600,1400


   Sennosides-Docusate Sodium [Senokot-S] 1 tab PO DAILY@0900


   Melatonin 6 mg PO HS@2100


   Atorvastatin [Lipitor] 20 mg PO AS DIRECTED





Changed


   LORazepam [Ativan] 1 mg PO HS@2100 #3 tab





Discontinued


   Levofloxacin [Levaquin] 500 mg PO DAILY@1400


Discharge Medication List





Fluticasone/Salmeterol [Advair 250-50 Diskus] 1 puff INHALATION RT-BID@0900,2100 01/07/17 [History]


Tiotropium 18 Mcg/Puff [Spiriva] 1 cap INHALATION RT-DAILY@0900 06/03/17 

[History]


Ferrous Sulfate [Iron (65 MG Elemental)] 325 mg PO DAILY@0900 05/11/18 [History]


Albuterol Inhaler [Ventolin Hfa Inhaler] 1 puff INHALATION RT-QID #1 gm 01/08/22

[Rx]


Acetaminophen [Tylenol] 650 mg PO Q4H PRN 02/06/22 [History]


Albuterol Nebulized [Ventolin Nebulized] 2.5 mg INHALATION RT-Q6H PRN 02/06/22 

[History]


Amiodarone [Cordarone] 200 mg PO DAILY@0900 02/06/22 [History]


Apixaban [Eliquis] 2.5 mg PO BID@0900,2100 02/06/22 [History]


Atorvastatin [Lipitor] 20 mg PO AS DIRECTED 02/06/22 [History]


Benzonatate [Tessalon Perles] 100 mg PO TID@0600,1400,2200 02/06/22 [History]


Cholecalciferol [Vitamin D3 (25 Mcg = 1000 Iu)] 50 mcg PO DAILY@0900 02/06/22 

[History]


Furosemide [Lasix] 20 mg PO BID@0600,1400 02/06/22 [History]


Lactose-Reduced Food [Ensure Plus] 120 ml PO TID@0900,1300,2000 02/06/22 

[History]


Melatonin 6 mg PO HS@2100 02/06/22 [History]


Metoprolol Tartrate [Lopressor] 75 mg PO TID@0600,1400,2200 02/06/22 [History]


Sennosides-Docusate Sodium [Senokot-S] 1 tab PO DAILY@0900 02/06/22 [History]


LORazepam [Ativan] 1 mg PO HS@2100 #3 tab 02/14/22 [Rx]


Levofloxacin [Levaquin] 250 mg PO DAILY@1400 #3 tab 02/14/22 [Rx]








Follow up Appointment(s)/Referral(s): 


Lenin Dailey MD [Primary Care Provider] - 1 Week (After discharge from [facility])


Activity/Diet/Wound Care/Special Instructions: 


Pierce catheter to be removed.  Monitor for urinary retention.


Discharge Disposition: TRANSFER TO SNF/ECF

## 2022-02-14 NOTE — XR
EXAMINATION TYPE: XR chest 2V

 

DATE OF EXAM: 2/14/2022

 

COMPARISON: 2/10/2022

 

HISTORY: 87-year-old female left apical pneumothorax

 

TECHNIQUE:  AP and lateral views

 

FINDINGS:  

Heart remains upper limits of normal in size. Hyperinflation. Interstitial and patchy opacities bilat
erally, especially in the midlungs persist. Changes slightly increased in the upper lungs. Unable to 
exclude a trace 4 mm left apical pneumothorax. Finding may be projectional artifact. Continued small 
pleural effusions with basilar opacities.

 

 

IMPRESSION:  

 

1. COPD. Correlate for possible superimposed CHF with patchy pulmonary edema. Patchy opacities are sl
ightly increased in the upper lungs.

2. Unable to exclude a trace 4 mm left apical pneumothorax versus projectional artifact. Attention on
 follow-up.

3. Continued small pleural effusions with adjacent atelectasis and/or consolidation.

## 2022-02-17 NOTE — CDI
Documentation Clarification Form



Date: 02/17/2022 02:48:01 PM

From: Arelis Yepez RN CCDS

Phone: [phone]

MRN: [id number]

Admit Date: 02/06/2022 02:47:00 PM

Patient Name: Rosana Valenzuela

Visit Number: [id number]

Discharge Date:  02/14/2022 02:59:00 PM





ATTENTION: The Clinical Documentation Specialists (CDI) and [facility] Coding Staff 
appreciate your assistance in clarifying documentation. Please respond to the 
clarification below the line at the bottom and electronically sign. The CDI & 
[facility] Coding staff will review the response and follow-up if needed. Please note: 
Queries are made part of the Legal Health Record. If you have any questions, 
please contact the author of this message via ITS.



Dr. Gamaliel Kearns



Conflicting documentation has been found in the medical record.  As attending 
physician, please provide clarification.



Possible COVID 19 Pneumonia DCS, 2/14.

Residual COVID 19 Pneumonia DCS, 2/14.



History/Risk Factors: 87-year-old female presents to the ED from [facility] with 
confusion, shortness of breath and coughing. The patient was admitted in January
of 2022 with Acute COVID 19 pneumonia and decompensated CHF and COPD 
exacerbation. Medical history COVID 19 Pneumonia, Home oxygen 3L as needed & 
COPD. 



Clinical Indicators: 

VSS 2/6 B/P 96/53; HR 95; Temp 98.5 F Oral; RR 22; SpO2 93% nasal cannula. 

CXR 2/6: Worsening bilateral opacities consistent with covid 19 infection 
progression on background mild cardiomegaly and chronic emphysematous and 
pulmonary fibrotic changes. 

LABS 2/6: Wbc 7.7; Lymphocytes 0.5; Procalcitonin 0.13; Cazares Not Detected. 

2/7 D-dimer 3.47.



Treatment: 2/7 2/14 Ventolin Hfa Inhaler 1 Puff Inhalation QID; 2/7-2/14 
Tessalon Perles 100mg PO TID; Vitamin D3 50Mcg PO Daily; 2/6 Hexadrol 6mg PO x1 
STAT; 2/7 Hexadrol 6mg PO Daily; 2/7 2/14 Hexadrol 6mgh PO Daily; 2/7 2/14Decadron 6mg IVP Daily; 2/7 Levaquin 500mg PO x 1; 2/8 2/14 Levaquin 250mg 
PO Daily; 2/8 2/14 Spiriva Respimat 2.5Mcg 2 Puff Inhalation Daily. 



Can you please clarify COVID 19 Pneumonia?



   [  ] A current active infection 

   [ x ] History of recent infection

   [  ] Other, please specify ________

   [  ] Unable to determine





(Template Last Revised: March 2021)

MTDD